# Patient Record
Sex: MALE | Race: WHITE | NOT HISPANIC OR LATINO | URBAN - METROPOLITAN AREA
[De-identification: names, ages, dates, MRNs, and addresses within clinical notes are randomized per-mention and may not be internally consistent; named-entity substitution may affect disease eponyms.]

---

## 2017-12-15 ENCOUNTER — EMERGENCY (EMERGENCY)
Facility: HOSPITAL | Age: 57
LOS: 1 days | End: 2017-12-15
Attending: EMERGENCY MEDICINE | Admitting: EMERGENCY MEDICINE
Payer: COMMERCIAL

## 2017-12-15 VITALS
DIASTOLIC BLOOD PRESSURE: 80 MMHG | SYSTOLIC BLOOD PRESSURE: 118 MMHG | TEMPERATURE: 98 F | HEART RATE: 102 BPM | OXYGEN SATURATION: 97 % | RESPIRATION RATE: 16 BRPM

## 2017-12-15 VITALS
SYSTOLIC BLOOD PRESSURE: 134 MMHG | DIASTOLIC BLOOD PRESSURE: 78 MMHG | OXYGEN SATURATION: 98 % | HEART RATE: 101 BPM | TEMPERATURE: 99 F | RESPIRATION RATE: 15 BRPM

## 2017-12-15 DIAGNOSIS — K85.90 ACUTE PANCREATITIS WITHOUT NECROSIS OR INFECTION, UNSPECIFIED: ICD-10-CM

## 2017-12-15 DIAGNOSIS — Z88.0 ALLERGY STATUS TO PENICILLIN: ICD-10-CM

## 2017-12-15 DIAGNOSIS — K80.50 CALCULUS OF BILE DUCT WITHOUT CHOLANGITIS OR CHOLECYSTITIS WITHOUT OBSTRUCTION: ICD-10-CM

## 2017-12-15 DIAGNOSIS — Z87.891 PERSONAL HISTORY OF NICOTINE DEPENDENCE: ICD-10-CM

## 2017-12-15 DIAGNOSIS — Z96.643 PRESENCE OF ARTIFICIAL HIP JOINT, BILATERAL: Chronic | ICD-10-CM

## 2017-12-15 DIAGNOSIS — I48.92 UNSPECIFIED ATRIAL FLUTTER: ICD-10-CM

## 2017-12-15 DIAGNOSIS — E78.5 HYPERLIPIDEMIA, UNSPECIFIED: ICD-10-CM

## 2017-12-15 DIAGNOSIS — R10.13 EPIGASTRIC PAIN: ICD-10-CM

## 2017-12-15 DIAGNOSIS — I10 ESSENTIAL (PRIMARY) HYPERTENSION: ICD-10-CM

## 2017-12-15 LAB
ALBUMIN SERPL ELPH-MCNC: 3.6 G/DL — SIGNIFICANT CHANGE UP (ref 3.4–5)
ALP SERPL-CCNC: 177 U/L — HIGH (ref 40–120)
ALT FLD-CCNC: 99 U/L — HIGH (ref 12–42)
ANION GAP SERPL CALC-SCNC: 9 MMOL/L — SIGNIFICANT CHANGE UP (ref 9–16)
APTT BLD: 44.7 SEC — HIGH (ref 27.5–36.5)
AST SERPL-CCNC: 270 U/L — HIGH (ref 15–37)
BILIRUB DIRECT SERPL-MCNC: 1 MG/DL — HIGH
BILIRUB SERPL-MCNC: 1.4 MG/DL — HIGH (ref 0.2–1.2)
BUN SERPL-MCNC: 32 MG/DL — HIGH (ref 7–23)
CALCIUM SERPL-MCNC: 9.2 MG/DL — SIGNIFICANT CHANGE UP (ref 8.5–10.5)
CHLORIDE SERPL-SCNC: 95 MMOL/L — LOW (ref 96–108)
CK MB BLD-MCNC: 2.06 % — SIGNIFICANT CHANGE UP
CK MB CFR SERPL CALC: 2.8 NG/ML — SIGNIFICANT CHANGE UP (ref 0.5–3.6)
CO2 SERPL-SCNC: 29 MMOL/L — SIGNIFICANT CHANGE UP (ref 22–31)
CREAT SERPL-MCNC: 2.12 MG/DL — HIGH (ref 0.5–1.3)
D DIMER BLD IA.RAPID-MCNC: 363 NG/ML DDU — HIGH
GLUCOSE SERPL-MCNC: 129 MG/DL — HIGH (ref 70–99)
HCT VFR BLD CALC: 36.9 % — LOW (ref 39–50)
HGB BLD-MCNC: 12.7 G/DL — LOW (ref 13–17)
INR BLD: 1 — SIGNIFICANT CHANGE UP (ref 0.88–1.16)
LACTATE SERPL-SCNC: 2.2 MMOL/L — HIGH (ref 0.4–2)
LIDOCAIN IGE QN: 1450 U/L — HIGH (ref 73–393)
MAGNESIUM SERPL-MCNC: 1.7 MG/DL — SIGNIFICANT CHANGE UP (ref 1.6–2.6)
MCHC RBC-ENTMCNC: 33.6 PG — SIGNIFICANT CHANGE UP (ref 27–34)
MCHC RBC-ENTMCNC: 34.4 G/DL — SIGNIFICANT CHANGE UP (ref 32–36)
MCV RBC AUTO: 97.6 FL — SIGNIFICANT CHANGE UP (ref 80–100)
PLATELET # BLD AUTO: 136 K/UL — LOW (ref 150–400)
POTASSIUM SERPL-MCNC: 2.9 MMOL/L — CRITICAL LOW (ref 3.5–5.3)
POTASSIUM SERPL-SCNC: 2.9 MMOL/L — CRITICAL LOW (ref 3.5–5.3)
PROT SERPL-MCNC: 7.5 G/DL — SIGNIFICANT CHANGE UP (ref 6.4–8.2)
PROTHROM AB SERPL-ACNC: 11 SEC — SIGNIFICANT CHANGE UP (ref 9.8–12.7)
RBC # BLD: 3.78 M/UL — LOW (ref 4.2–5.8)
RBC # FLD: 12.1 % — SIGNIFICANT CHANGE UP (ref 10.3–16.9)
SODIUM SERPL-SCNC: 133 MMOL/L — SIGNIFICANT CHANGE UP (ref 132–145)
TROPONIN I SERPL-MCNC: 0.18 NG/ML — HIGH (ref 0.02–0.06)
WBC # BLD: 6.3 K/UL — SIGNIFICANT CHANGE UP (ref 3.8–10.5)
WBC # FLD AUTO: 6.3 K/UL — SIGNIFICANT CHANGE UP (ref 3.8–10.5)

## 2017-12-15 PROCEDURE — 71010: CPT | Mod: 26

## 2017-12-15 PROCEDURE — 74176 CT ABD & PELVIS W/O CONTRAST: CPT | Mod: 26

## 2017-12-15 PROCEDURE — 76705 ECHO EXAM OF ABDOMEN: CPT | Mod: 26

## 2017-12-15 PROCEDURE — 93010 ELECTROCARDIOGRAM REPORT: CPT

## 2017-12-15 PROCEDURE — 99285 EMERGENCY DEPT VISIT HI MDM: CPT | Mod: 25

## 2017-12-15 RX ORDER — HYDROMORPHONE HYDROCHLORIDE 2 MG/ML
1 INJECTION INTRAMUSCULAR; INTRAVENOUS; SUBCUTANEOUS ONCE
Qty: 0 | Refills: 0 | Status: DISCONTINUED | OUTPATIENT
Start: 2017-12-15 | End: 2017-12-15

## 2017-12-15 RX ORDER — ONDANSETRON 8 MG/1
8 TABLET, FILM COATED ORAL ONCE
Qty: 0 | Refills: 0 | Status: COMPLETED | OUTPATIENT
Start: 2017-12-15 | End: 2017-12-15

## 2017-12-15 RX ORDER — SODIUM CHLORIDE 9 MG/ML
1000 INJECTION INTRAMUSCULAR; INTRAVENOUS; SUBCUTANEOUS ONCE
Qty: 0 | Refills: 0 | Status: COMPLETED | OUTPATIENT
Start: 2017-12-15 | End: 2017-12-15

## 2017-12-15 RX ORDER — POTASSIUM CHLORIDE 20 MEQ
10 PACKET (EA) ORAL
Qty: 0 | Refills: 0 | Status: DISCONTINUED | OUTPATIENT
Start: 2017-12-15 | End: 2017-12-19

## 2017-12-15 RX ORDER — MORPHINE SULFATE 50 MG/1
6 CAPSULE, EXTENDED RELEASE ORAL ONCE
Qty: 0 | Refills: 0 | Status: DISCONTINUED | OUTPATIENT
Start: 2017-12-15 | End: 2017-12-15

## 2017-12-15 RX ORDER — POTASSIUM CHLORIDE 20 MEQ
40 PACKET (EA) ORAL ONCE
Qty: 0 | Refills: 0 | Status: COMPLETED | OUTPATIENT
Start: 2017-12-15 | End: 2017-12-15

## 2017-12-15 RX ORDER — SODIUM CHLORIDE 9 MG/ML
1000 INJECTION, SOLUTION INTRAVENOUS
Qty: 0 | Refills: 0 | Status: DISCONTINUED | OUTPATIENT
Start: 2017-12-15 | End: 2017-12-19

## 2017-12-15 RX ADMIN — HYDROMORPHONE HYDROCHLORIDE 1 MILLIGRAM(S): 2 INJECTION INTRAMUSCULAR; INTRAVENOUS; SUBCUTANEOUS at 03:06

## 2017-12-15 RX ADMIN — HYDROMORPHONE HYDROCHLORIDE 1 MILLIGRAM(S): 2 INJECTION INTRAMUSCULAR; INTRAVENOUS; SUBCUTANEOUS at 03:56

## 2017-12-15 RX ADMIN — MORPHINE SULFATE 6 MILLIGRAM(S): 50 CAPSULE, EXTENDED RELEASE ORAL at 03:00

## 2017-12-15 RX ADMIN — ONDANSETRON 8 MILLIGRAM(S): 8 TABLET, FILM COATED ORAL at 03:00

## 2017-12-15 RX ADMIN — SODIUM CHLORIDE 2000 MILLILITER(S): 9 INJECTION INTRAMUSCULAR; INTRAVENOUS; SUBCUTANEOUS at 03:06

## 2017-12-15 RX ADMIN — Medication 100 MILLIEQUIVALENT(S): at 03:59

## 2017-12-15 RX ADMIN — SODIUM CHLORIDE 200 MILLILITER(S): 9 INJECTION, SOLUTION INTRAVENOUS at 05:04

## 2017-12-15 NOTE — ED PROVIDER NOTE - OBJECTIVE STATEMENT
56 yo M with PMHx of MI x 2, no stents, HTN, HLD, CKD, baseline Cr unknown, "leaky valve" on pradaxa, presenting c/o epigastric pain x 3 hrs PTA to the ED.  Pt reports   Denies fever, chills, N/V/D/C, melena, hematochezia, hematuria, change in urinary/bowel function, dysuria, vaginal bleeding, d/c, flank pain, HA, dizziness, focal weakness, CP, SOB, palpitations, cough, and malaise. 56 yo M with PMHx of MI x 2, no stents, HTN, HLD, CKD, baseline Cr unknown, "leaky valve" on pradaxa, presenting c/o epigastric pain x 3 hrs PTA to the ED.  Pt reports being awaken by sharp pain in the epigastric region. Pain is constant, rated 9/10 with no alleviating factor noted.  Attempted alleviation of sx with pepcid PTA to the ED with no improvement.  No similar pain reported in the past.  Had a few martinis and some onion rings for dinner, nothing out of the norm per pt. Denies fever, chills, palpitations, diaphoresis, RAMIREZ, SOB, orthopnea, cough, hemoptysis, wheezing, peripheral edema, focal weakness, numbness, tingling, paresthesia, HA, dizziness, neck pain, V/D/C, abdominal pain, change in urinary/bowel function, trauma, fall, rash, and malaise. 56 yo M with PMHx of MI x 2, no stents, HTN, HLD, CKD, baseline Cr unknown, "leaky valve" on pradaxa, presenting c/o epigastric pain x 3 hrs PTA to the ED.  Pt reports being awaken by sharp pain in the epigastric region. Pain is constant, rated 9/10 with no alleviating factor noted.  Attempted alleviation of sx with pepcid PTA to the ED with no improvement.  No similar pain reported in the past.  Had a few martinis and some fried food for dinner, nothing out of the norm per pt. Denies fever, chills, palpitations, diaphoresis, RAMIREZ, SOB, orthopnea, cough, hemoptysis, wheezing, peripheral edema, focal weakness, numbness, tingling, paresthesia, HA, dizziness, neck pain, V/D/C, abdominal pain, change in urinary/bowel function, trauma, fall, rash, and malaise.

## 2017-12-15 NOTE — ED PROVIDER NOTE - DIAGNOSTIC INTERPRETATION
Xray (wet reads) interpreted by CHILO MARTINEZ   CXR - Cardiac silhouette, mediastinal and hilar contours wnl, no acute consolidation, infiltrate, effusion, or PTX. No bony abnormalities noted

## 2017-12-15 NOTE — ED PROVIDER NOTE - ATTENDING CONTRIBUTION TO CARE
57 y.o. male with hx MI x 2, no stents, HTN, HLD, CKD, "leaky valve" on pradaxa with c/o epigastric pain/RUQ pain, labs noted with elevated LFTs and lipase, bedside US with (+) gallstones no AGBW thickening, pt requested to go to [facility], accepted for transfer there for surgical and medical eval for gallstone pancreatitis.  EKG with aflutter (likely chronic). Pain controlled with dilaudid IV.

## 2017-12-15 NOTE — ED PROVIDER NOTE - MEDICAL DECISION MAKING DETAILS
pt with sudden onset of epigastric pain at 12am this morning, afebrile, no other associated sx, EKG with new onset A.flutter, no h/o flutter/a.fib in the past, currently on pradaxa, labs with no leukocytosis, noted elevated T.bili w LFTs and lipase, bedside US performed and noted multiple mobile gallstones with no pericholecystitic fluid or wall thickening, sx likely biliary colic w gallstone pancreatitis, pt with elevated Cr at baseline, CT A/P non con performed, consistent w above findings, s/p multiple rounds of IV analgesics with no improvement in sx, pt with most cares established at [facility], PMD Dr. Adan, pt is requesting to be transferred to [facility], case discussed via [facility] transfer center [facility], and ER [facility], accepted by Dr. Galaviz for further management of biliary colic/gallstone induced pancreatitis/new onset A.flutter/hypokalemia, trop elevated at 0.18 but likely 2/2 impaired renal clearance, EKG w A.flutter but no acute ST changes, d-dimer slightly elevated at 363, but normal after age adjustment, unlikely PE, also pt already on NOAC - pradaxa, transfer team aware of findings

## 2017-12-15 NOTE — ED PROVIDER NOTE - PHYSICAL EXAMINATION
Gen - NAD, comfortable in stretcher, non-toxic appearing, speaking in full sentences   Skin - warm, dry, intact  HEENT - AT/NC, PERRL, EOMI, no conjunctival injection, o/p clear with no erythema, edema, or exudate, uvula midline, airway patent, neck supple, no JVD or carotid bruits b/l, no palpable nodes   CV - S1S2, R/R/R  Resp - respiration non-labored, CTAB, symmetric bs b/l, no r/r/w  GI - NABS, soft, ND, NT, no rebound or guarding, no CVAT b/l   MS - w/w/p, no c/c/e, calves supple and NT, distal pulses symmetric b/l   Neuro - AxOx3, no focal neuro deficits, CN II-XII grossly intact, cerebellar function intact, ambulatory without gait disturbance Gen - Obese M, in pain, uncomfortable, no respiratory distress    Skin - warm, dry, intact  HEENT - AT/NC, PERRL, EOMI, no conjunctival injection, o/p clear with no erythema, edema, or exudate, uvula midline, airway patent, neck supple, no JVD or carotid bruits b/l, no palpable nodes   CV - S1S2, R/R/R  Resp - respiration non-labored, CTAB, symmetric bs b/l, no r/r/w  GI - NABS, soft, ND, epigastrium TTP with guarding, no rebound, no CVAT b/l   MS - w/w/p, no c/c/e, calves supple and NT, distal pulses symmetric b/l   Neuro - AxOx3, no focal neuro deficits, ambulatory without gait disturbance Gen - Obese M, writhing in pain, uncomfortable, no respiratory distress    Skin - warm, dry, intact  HEENT - AT/NC, PERRL, EOMI, no conjunctival injection, o/p clear with no erythema, edema, or exudate, uvula midline, airway patent, neck supple, no JVD or carotid bruits b/l, no palpable nodes   CV - S1S2, R/R/R  Resp - respiration non-labored, CTAB, symmetric bs b/l, no r/r/w  GI - NABS, soft, ND, epigastrium TTP with guarding, no rebound, no CVAT b/l   MS - w/w/p, no c/c/e, calves supple and NT, distal pulses symmetric b/l   Neuro - AxOx3, no focal neuro deficits, ambulatory without gait disturbance

## 2017-12-15 NOTE — ED ADULT NURSE NOTE - OBJECTIVE STATEMENT
Pt c/o sudden onset epigastric pain 8/10 that woke him from his sleep at 12AM. Pt reports burning sensation, non radiating. Pt denies any SOB, no fever/chills, no N/V/D, no recent travel or recent sick contacts. Pt has hx of x2 silent heart attacks and takes Pradaxa for 'leaky valve'. Denies any arrhthymias in the past, reports having tachycardia. Pt c/o sudden onset epigastric pain 8/10 that woke him from his sleep at 12AM. Pt reports burning sensation, non radiating. Pt denies any SOB, no fever/chills, no N/V/D, no recent travel or recent sick contacts. Pt has hx of x2 silent heart attacks and takes Pradaxa for 'leaky valve'. Denies any arrhthymias in the past, reports having tachycardia. Pt reports hx of gastritis, reports drinking x6 martinis and hamburger tonight.

## 2017-12-15 NOTE — ED ADULT NURSE NOTE - CHPI ED SYMPTOMS NEG
no dizziness/no vomiting/no diaphoresis/no shortness of breath/no chills/no syncope/no nausea/no fever/no back pain/no chest pain/no cough

## 2018-09-08 NOTE — ED PROVIDER NOTE - CARE PLAN
Private Vehicle
Principal Discharge DX:	Biliary colic  Secondary Diagnosis:	Pancreatitis  Secondary Diagnosis:	Atrial flutter

## 2018-10-02 NOTE — ED PROVIDER NOTE - CROS ED ROS STATEMENT
all other ROS negative except as per HPI Purse String (Intermediate) Text: Given the location of the defect and the characteristics of the surrounding skin a pursestring intermediate closure was deemed most appropriate.  Undermining was performed circumfirentially around the surgical defect.  A purstring suture was then placed and tightened.

## 2023-10-03 ENCOUNTER — INPATIENT (INPATIENT)
Facility: HOSPITAL | Age: 63
LOS: 23 days | Discharge: NURSING, REHAB OR LONG-TERM CARE | DRG: 640 | End: 2023-10-27
Attending: INTERNAL MEDICINE | Admitting: INTERNAL MEDICINE
Payer: MEDICARE

## 2023-10-03 VITALS
TEMPERATURE: 98 F | OXYGEN SATURATION: 98 % | HEART RATE: 106 BPM | WEIGHT: 225.09 LBS | RESPIRATION RATE: 18 BRPM | SYSTOLIC BLOOD PRESSURE: 123 MMHG | DIASTOLIC BLOOD PRESSURE: 78 MMHG

## 2023-10-03 DIAGNOSIS — Z96.643 PRESENCE OF ARTIFICIAL HIP JOINT, BILATERAL: Chronic | ICD-10-CM

## 2023-10-03 LAB
ALBUMIN SERPL ELPH-MCNC: 2.9 G/DL — LOW (ref 3.4–5)
ALP SERPL-CCNC: 273 U/L — HIGH (ref 40–120)
ALT FLD-CCNC: 43 U/L — HIGH (ref 12–42)
AMMONIA BLD-MCNC: 37 UMOL/L — HIGH (ref 11–32)
ANION GAP SERPL CALC-SCNC: 17 MMOL/L — HIGH (ref 9–16)
APTT BLD: 35.4 SEC — SIGNIFICANT CHANGE UP (ref 24.5–35.6)
AST SERPL-CCNC: 45 U/L — HIGH (ref 15–37)
BASOPHILS # BLD AUTO: 0.04 K/UL — SIGNIFICANT CHANGE UP (ref 0–0.2)
BASOPHILS NFR BLD AUTO: 0.7 % — SIGNIFICANT CHANGE UP (ref 0–2)
BILIRUB SERPL-MCNC: 2 MG/DL — HIGH (ref 0.2–1.2)
BUN SERPL-MCNC: 18 MG/DL — SIGNIFICANT CHANGE UP (ref 7–23)
CALCIUM SERPL-MCNC: 9.5 MG/DL — SIGNIFICANT CHANGE UP (ref 8.5–10.5)
CHLORIDE SERPL-SCNC: 93 MMOL/L — LOW (ref 96–108)
CO2 SERPL-SCNC: 22 MMOL/L — SIGNIFICANT CHANGE UP (ref 22–31)
CREAT SERPL-MCNC: 3.93 MG/DL — HIGH (ref 0.5–1.3)
EGFR: 16 ML/MIN/1.73M2 — LOW
EOSINOPHIL # BLD AUTO: 0.03 K/UL — SIGNIFICANT CHANGE UP (ref 0–0.5)
EOSINOPHIL NFR BLD AUTO: 0.5 % — SIGNIFICANT CHANGE UP (ref 0–6)
ETHANOL SERPL-MCNC: 29 MG/DL — HIGH
FLUAV AG NPH QL: SIGNIFICANT CHANGE UP
FLUBV AG NPH QL: SIGNIFICANT CHANGE UP
GLUCOSE SERPL-MCNC: 78 MG/DL — SIGNIFICANT CHANGE UP (ref 70–99)
HCT VFR BLD CALC: 32 % — LOW (ref 39–50)
HGB BLD-MCNC: 10.2 G/DL — LOW (ref 13–17)
IMM GRANULOCYTES NFR BLD AUTO: 0.5 % — SIGNIFICANT CHANGE UP (ref 0–0.9)
INR BLD: 1.46 — HIGH (ref 0.85–1.18)
LACTATE BLDV-MCNC: 3 MMOL/L — HIGH (ref 0.5–2)
LYMPHOCYTES # BLD AUTO: 0.86 K/UL — LOW (ref 1–3.3)
LYMPHOCYTES # BLD AUTO: 14.1 % — SIGNIFICANT CHANGE UP (ref 13–44)
MAGNESIUM SERPL-MCNC: 1.7 MG/DL — SIGNIFICANT CHANGE UP (ref 1.6–2.6)
MCHC RBC-ENTMCNC: 31.9 GM/DL — LOW (ref 32–36)
MCHC RBC-ENTMCNC: 33.3 PG — SIGNIFICANT CHANGE UP (ref 27–34)
MCV RBC AUTO: 104.6 FL — HIGH (ref 80–100)
MONOCYTES # BLD AUTO: 0.57 K/UL — SIGNIFICANT CHANGE UP (ref 0–0.9)
MONOCYTES NFR BLD AUTO: 9.4 % — SIGNIFICANT CHANGE UP (ref 2–14)
NEUTROPHILS # BLD AUTO: 4.55 K/UL — SIGNIFICANT CHANGE UP (ref 1.8–7.4)
NEUTROPHILS NFR BLD AUTO: 74.8 % — SIGNIFICANT CHANGE UP (ref 43–77)
NRBC # BLD: 0 /100 WBCS — SIGNIFICANT CHANGE UP (ref 0–0)
NT-PROBNP SERPL-SCNC: HIGH PG/ML
PCO2 BLDV: 40 MMHG — LOW (ref 42–55)
PH BLDV: 7.36 — SIGNIFICANT CHANGE UP (ref 7.32–7.43)
PHOSPHATE SERPL-MCNC: 4.4 MG/DL — SIGNIFICANT CHANGE UP (ref 2.5–4.5)
PLATELET # BLD AUTO: 141 K/UL — LOW (ref 150–400)
PO2 BLDV: 38 MMHG — SIGNIFICANT CHANGE UP (ref 25–45)
POTASSIUM SERPL-MCNC: 3.4 MMOL/L — LOW (ref 3.5–5.3)
POTASSIUM SERPL-SCNC: 3.4 MMOL/L — LOW (ref 3.5–5.3)
PROT SERPL-MCNC: 7.4 G/DL — SIGNIFICANT CHANGE UP (ref 6.4–8.2)
PROTHROM AB SERPL-ACNC: 16.3 SEC — HIGH (ref 9.5–13)
RBC # BLD: 3.06 M/UL — LOW (ref 4.2–5.8)
RBC # FLD: 17.9 % — HIGH (ref 10.3–14.5)
RSV RNA NPH QL NAA+NON-PROBE: SIGNIFICANT CHANGE UP
SAO2 % BLDV: 62.8 % — LOW (ref 67–88)
SARS-COV-2 RNA SPEC QL NAA+PROBE: SIGNIFICANT CHANGE UP
SODIUM SERPL-SCNC: 132 MMOL/L — SIGNIFICANT CHANGE UP (ref 132–145)
TROPONIN I, HIGH SENSITIVITY RESULT: 564.2 NG/L — HIGH
WBC # BLD: 6.08 K/UL — SIGNIFICANT CHANGE UP (ref 3.8–10.5)
WBC # FLD AUTO: 6.08 K/UL — SIGNIFICANT CHANGE UP (ref 3.8–10.5)

## 2023-10-03 PROCEDURE — 99222 1ST HOSP IP/OBS MODERATE 55: CPT | Mod: GC

## 2023-10-03 PROCEDURE — 71045 X-RAY EXAM CHEST 1 VIEW: CPT | Mod: 26

## 2023-10-03 PROCEDURE — 99285 EMERGENCY DEPT VISIT HI MDM: CPT

## 2023-10-03 NOTE — ED PROVIDER NOTE - PROGRESS NOTE DETAILS
patient is requesting admission to [facility], his PMD is Romulo Morton, cardiologist Dr Payton Centeno. will call admission now through ctc. patient is requesting admission to [facility], his PMD is Romulo Morton, cardiologist Dr Payton Centeno. will call admission now through ctc. at the time of presentation of patient - VS stable, and satting 99% on 3L NC. spoke to cardiologist on call dr remy and hospitalist dr bernadette jackson, per hospitalist noting hospital is at capacity, and recommends admitting patient to [facility]. Spoke to Dr Burns, recommends medicine tely for admission, and will subconsult as needed. Dr Jon Allan accepting patient for medicine tele. patient has no white count, refusing rectal temp or guiac, afebrile oral temp. given fluid overload, fluids held as no wbc or fever. abdomen is protruberant but nontender, and do not suspect SBP. cultures sent will give one dose rocephin. will attempt to also reach out to renal for admission. Dr Jon Allan accepting patient for medicine tele. patient has no white count, refusing rectal temp or guiac, afebrile oral temp. given fluid overload, fluids held as no wbc or fever. abdomen is protruberant but nontender, and do not suspect SBP. cultures sent will give one dose rocephin. will attempt to also reach out to renal for admission. spoke to dr mccarty, renal aware of patient, and reviewed all labs. We will likely dialyze in the morning.  Patient informed of admission to [facility] he consented

## 2023-10-03 NOTE — ED PROVIDER NOTE - CARE PLAN
1 Principal Discharge DX:	Acute on chronic systolic congestive heart failure  Secondary Diagnosis:	End stage renal disease

## 2023-10-03 NOTE — ED ADULT NURSE NOTE - OBJECTIVE STATEMENT
64 y/o male bibems after shortness of breath at dinner. patient endorses he is an every day drinker (socially) and missed his dialysis today. permcath located on right chest. dialysis center is aware rescheduled patient for tomorrow for scheduled session.

## 2023-10-03 NOTE — ED ADULT TRIAGE NOTE - CHIEF COMPLAINT QUOTE
pt. picked up from a restaurant c/o worsening shortness of breath today. Pt. reports he didn't go to his dialysis today because he's been feeling unwell, chest/epigastric pain nausea vomiting.

## 2023-10-03 NOTE — ED PROVIDER NOTE - OBJECTIVE STATEMENT
Patient with hx of ESRD, HD 4 times a week M/T/TH/SAT, hx iron deficiency anemia, HLD, HTN, GERD, CAD, new to HD in the past 6 months, use of port a cath to R side of chest, preparing for L AV fistula, presents with increasing SOB at rest, associated with generalized weakness and fatigue. patient denies active cp, Patient with hx of ESRD, HD 4 times a week M/T/TH/SAT, hx iron deficiency anemia, HLD, HTN, GERD, CAD, new to HD in the past 6 months, use of port a cath to R side of chest, preparing for L AV fistula, presents with increasing SOB at rest, associated with generalized weakness and fatigue and 2 episode of nonbloody vomiting, clear once in the morning then in the evening in the ED. patient denies active cp, diarrhea, abdominal pain, focal weakness, headaches, neck pain, dizziness, or syncope. denies smoking. daily drinker, denies drugs. denies recent trauma or hospitalizations. Per patient compliant with all medications.    home meds: asa, plavix, midodrine, nephrovite, omeprazole, pregabalin, iron, vit B12, B, D3, Selvemer, Bumex, cyproheptadine, Calicode 25 mg

## 2023-10-03 NOTE — ED PROVIDER NOTE - CONSIDERATION OF ADMISSION OBSERVATION
Consideration of Admission/Observation Patient will require admission likely for dialysis and fluid overload and management

## 2023-10-03 NOTE — ED PROVIDER NOTE - OTHER FREE TEXT FOR MDM DISCUSSED CASE WITH QUESTION
Discussed case with [facility] cardiology on-call as well as hospitalist on-call names documented in progress notes and times of discussions.

## 2023-10-03 NOTE — ED PROVIDER NOTE - CLINICAL SUMMARY MEDICAL DECISION MAKING FREE TEXT BOX
Patient with hx of ESRD, HD 4 times a week M/T/TH/SAT, hx iron deficiency anemia, HLD, HTN, GERD, CAD, new to HD in the past 6 months, use of port a cath to R side of chest, preparing for L AV fistula, presents with increasing SOB at rest, associated with generalized weakness and fatigue and 2 episode of nonbloody vomiting, clear once in the morning then in the evening in the ED.    At this time differential includes fluid overload, CHF, cardiogenic shock, NSTEMI, need for emergent dialysis, hyperkalemia, I do not suspect SBP, do not suspect sepsis although will do rectal temp, patient is declining rectal temp will encourage, panculture, patient notes that he does make urine, his abdomen is soft with no fluid shift, check all electrolytes cardiac labs, viral swab to rule out infectious etiology

## 2023-10-04 DIAGNOSIS — E78.5 HYPERLIPIDEMIA, UNSPECIFIED: ICD-10-CM

## 2023-10-04 DIAGNOSIS — D50.9 IRON DEFICIENCY ANEMIA, UNSPECIFIED: ICD-10-CM

## 2023-10-04 DIAGNOSIS — F10.99 ALCOHOL USE, UNSPECIFIED WITH UNSPECIFIED ALCOHOL-INDUCED DISORDER: ICD-10-CM

## 2023-10-04 DIAGNOSIS — F10.20 ALCOHOL DEPENDENCE, UNCOMPLICATED: ICD-10-CM

## 2023-10-04 DIAGNOSIS — K21.9 GASTRO-ESOPHAGEAL REFLUX DISEASE WITHOUT ESOPHAGITIS: ICD-10-CM

## 2023-10-04 DIAGNOSIS — K76.1 CHRONIC PASSIVE CONGESTION OF LIVER: ICD-10-CM

## 2023-10-04 DIAGNOSIS — N18.6 END STAGE RENAL DISEASE: ICD-10-CM

## 2023-10-04 DIAGNOSIS — K70.30 ALCOHOLIC CIRRHOSIS OF LIVER WITHOUT ASCITES: ICD-10-CM

## 2023-10-04 DIAGNOSIS — D64.9 ANEMIA, UNSPECIFIED: ICD-10-CM

## 2023-10-04 DIAGNOSIS — Z29.9 ENCOUNTER FOR PROPHYLACTIC MEASURES, UNSPECIFIED: ICD-10-CM

## 2023-10-04 DIAGNOSIS — I10 ESSENTIAL (PRIMARY) HYPERTENSION: ICD-10-CM

## 2023-10-04 DIAGNOSIS — I25.10 ATHEROSCLEROTIC HEART DISEASE OF NATIVE CORONARY ARTERY WITHOUT ANGINA PECTORIS: ICD-10-CM

## 2023-10-04 DIAGNOSIS — L30.9 DERMATITIS, UNSPECIFIED: ICD-10-CM

## 2023-10-04 DIAGNOSIS — I50.20 UNSPECIFIED SYSTOLIC (CONGESTIVE) HEART FAILURE: ICD-10-CM

## 2023-10-04 PROBLEM — N18.9 CHRONIC KIDNEY DISEASE, UNSPECIFIED: Chronic | Status: ACTIVE | Noted: 2017-12-15

## 2023-10-04 LAB
A1C WITH ESTIMATED AVERAGE GLUCOSE RESULT: 5.4 % — SIGNIFICANT CHANGE UP (ref 4–5.6)
ALBUMIN SERPL ELPH-MCNC: 3.1 G/DL — LOW (ref 3.3–5)
ALBUMIN SERPL ELPH-MCNC: 3.2 G/DL — LOW (ref 3.3–5)
ALP SERPL-CCNC: 240 U/L — HIGH (ref 40–120)
ALP SERPL-CCNC: 267 U/L — HIGH (ref 40–120)
ALT FLD-CCNC: 34 U/L — SIGNIFICANT CHANGE UP (ref 10–45)
ALT FLD-CCNC: 38 U/L — SIGNIFICANT CHANGE UP (ref 10–45)
ANION GAP SERPL CALC-SCNC: 16 MMOL/L — SIGNIFICANT CHANGE UP (ref 5–17)
ANION GAP SERPL CALC-SCNC: 18 MMOL/L — HIGH (ref 5–17)
APTT BLD: 33.1 SEC — SIGNIFICANT CHANGE UP (ref 24.5–35.6)
AST SERPL-CCNC: 35 U/L — SIGNIFICANT CHANGE UP (ref 10–40)
AST SERPL-CCNC: 38 U/L — SIGNIFICANT CHANGE UP (ref 10–40)
BASOPHILS # BLD AUTO: 0.03 K/UL — SIGNIFICANT CHANGE UP (ref 0–0.2)
BASOPHILS NFR BLD AUTO: 0.5 % — SIGNIFICANT CHANGE UP (ref 0–2)
BILIRUB SERPL-MCNC: 1.9 MG/DL — HIGH (ref 0.2–1.2)
BILIRUB SERPL-MCNC: 2.2 MG/DL — HIGH (ref 0.2–1.2)
BLD GP AB SCN SERPL QL: NEGATIVE — SIGNIFICANT CHANGE UP
BLD GP AB SCN SERPL QL: NEGATIVE — SIGNIFICANT CHANGE UP
BUN SERPL-MCNC: 13 MG/DL — SIGNIFICANT CHANGE UP (ref 7–23)
BUN SERPL-MCNC: 19 MG/DL — SIGNIFICANT CHANGE UP (ref 7–23)
CALCIUM SERPL-MCNC: 8.5 MG/DL — SIGNIFICANT CHANGE UP (ref 8.4–10.5)
CALCIUM SERPL-MCNC: 9.6 MG/DL — SIGNIFICANT CHANGE UP (ref 8.4–10.5)
CHLORIDE SERPL-SCNC: 78 MMOL/L — LOW (ref 96–108)
CHLORIDE SERPL-SCNC: 92 MMOL/L — LOW (ref 96–108)
CK MB CFR SERPL CALC: 3.5 NG/ML — SIGNIFICANT CHANGE UP (ref 0–6.7)
CK SERPL-CCNC: 31 U/L — SIGNIFICANT CHANGE UP (ref 30–200)
CO2 SERPL-SCNC: 18 MMOL/L — LOW (ref 22–31)
CO2 SERPL-SCNC: 22 MMOL/L — SIGNIFICANT CHANGE UP (ref 22–31)
CREAT SERPL-MCNC: 3.33 MG/DL — HIGH (ref 0.5–1.3)
CREAT SERPL-MCNC: 4.01 MG/DL — HIGH (ref 0.5–1.3)
EGFR: 16 ML/MIN/1.73M2 — LOW
EGFR: 20 ML/MIN/1.73M2 — LOW
EOSINOPHIL # BLD AUTO: 0.01 K/UL — SIGNIFICANT CHANGE UP (ref 0–0.5)
EOSINOPHIL NFR BLD AUTO: 0.2 % — SIGNIFICANT CHANGE UP (ref 0–6)
ESTIMATED AVERAGE GLUCOSE: 108 MG/DL — SIGNIFICANT CHANGE UP (ref 68–114)
FOLATE SERPL-MCNC: 19.8 NG/ML — SIGNIFICANT CHANGE UP
GLUCOSE BLDC GLUCOMTR-MCNC: 85 MG/DL — SIGNIFICANT CHANGE UP (ref 70–99)
GLUCOSE SERPL-MCNC: 747 MG/DL — CRITICAL HIGH (ref 70–99)
GLUCOSE SERPL-MCNC: 99 MG/DL — SIGNIFICANT CHANGE UP (ref 70–99)
HAV IGM SER-ACNC: SIGNIFICANT CHANGE UP
HBV CORE AB SER-ACNC: SIGNIFICANT CHANGE UP
HBV CORE IGM SER-ACNC: SIGNIFICANT CHANGE UP
HBV SURFACE AB SER-ACNC: REACTIVE
HBV SURFACE AG SER-ACNC: SIGNIFICANT CHANGE UP
HCT VFR BLD CALC: 26.7 % — LOW (ref 39–50)
HCT VFR BLD CALC: 31.9 % — LOW (ref 39–50)
HCT VFR BLD CALC: 33.9 % — LOW (ref 39–50)
HCV AB S/CO SERPL IA: 0.06 S/CO — SIGNIFICANT CHANGE UP
HCV AB SERPL-IMP: SIGNIFICANT CHANGE UP
HGB BLD-MCNC: 10.2 G/DL — LOW (ref 13–17)
HGB BLD-MCNC: 10.9 G/DL — LOW (ref 13–17)
HGB BLD-MCNC: 6.9 G/DL — CRITICAL LOW (ref 13–17)
IMM GRANULOCYTES NFR BLD AUTO: 0.4 % — SIGNIFICANT CHANGE UP (ref 0–0.9)
INR BLD: 1.56 — HIGH (ref 0.85–1.18)
LACTATE SERPL-SCNC: 1.1 MMOL/L — SIGNIFICANT CHANGE UP (ref 0.5–2)
LYMPHOCYTES # BLD AUTO: 0.74 K/UL — LOW (ref 1–3.3)
LYMPHOCYTES # BLD AUTO: 13.5 % — SIGNIFICANT CHANGE UP (ref 13–44)
MAGNESIUM SERPL-MCNC: 1.4 MG/DL — LOW (ref 1.6–2.6)
MAGNESIUM SERPL-MCNC: 1.6 MG/DL — SIGNIFICANT CHANGE UP (ref 1.6–2.6)
MCHC RBC-ENTMCNC: 25.8 GM/DL — LOW (ref 32–36)
MCHC RBC-ENTMCNC: 32 GM/DL — SIGNIFICANT CHANGE UP (ref 32–36)
MCHC RBC-ENTMCNC: 32.2 GM/DL — SIGNIFICANT CHANGE UP (ref 32–36)
MCHC RBC-ENTMCNC: 32.8 PG — SIGNIFICANT CHANGE UP (ref 27–34)
MCHC RBC-ENTMCNC: 33.5 PG — SIGNIFICANT CHANGE UP (ref 27–34)
MCHC RBC-ENTMCNC: 34.2 PG — HIGH (ref 27–34)
MCV RBC AUTO: 102.6 FL — HIGH (ref 80–100)
MCV RBC AUTO: 104.3 FL — HIGH (ref 80–100)
MCV RBC AUTO: 132.2 FL — HIGH (ref 80–100)
MONOCYTES # BLD AUTO: 0.5 K/UL — SIGNIFICANT CHANGE UP (ref 0–0.9)
MONOCYTES NFR BLD AUTO: 9.1 % — SIGNIFICANT CHANGE UP (ref 2–14)
NEUTROPHILS # BLD AUTO: 4.17 K/UL — SIGNIFICANT CHANGE UP (ref 1.8–7.4)
NEUTROPHILS NFR BLD AUTO: 76.3 % — SIGNIFICANT CHANGE UP (ref 43–77)
NRBC # BLD: 0 /100 WBCS — SIGNIFICANT CHANGE UP (ref 0–0)
NRBC # BLD: 0 /100 WBCS — SIGNIFICANT CHANGE UP (ref 0–0)
PHOSPHATE SERPL-MCNC: 3.8 MG/DL — SIGNIFICANT CHANGE UP (ref 2.5–4.5)
PHOSPHATE SERPL-MCNC: 4.6 MG/DL — HIGH (ref 2.5–4.5)
PLATELET # BLD AUTO: 108 K/UL — LOW (ref 150–400)
PLATELET # BLD AUTO: 115 K/UL — LOW (ref 150–400)
PLATELET # BLD AUTO: 76 K/UL — LOW (ref 150–400)
POTASSIUM SERPL-MCNC: 3.1 MMOL/L — LOW (ref 3.5–5.3)
POTASSIUM SERPL-MCNC: 3.8 MMOL/L — SIGNIFICANT CHANGE UP (ref 3.5–5.3)
POTASSIUM SERPL-SCNC: 3.1 MMOL/L — LOW (ref 3.5–5.3)
POTASSIUM SERPL-SCNC: 3.8 MMOL/L — SIGNIFICANT CHANGE UP (ref 3.5–5.3)
PROT SERPL-MCNC: 6.3 G/DL — SIGNIFICANT CHANGE UP (ref 6–8.3)
PROT SERPL-MCNC: 7 G/DL — SIGNIFICANT CHANGE UP (ref 6–8.3)
PROTHROM AB SERPL-ACNC: 17.6 SEC — HIGH (ref 9.5–13)
RBC # BLD: 2.02 M/UL — LOW (ref 4.2–5.8)
RBC # BLD: 3.11 M/UL — LOW (ref 4.2–5.8)
RBC # BLD: 3.25 M/UL — LOW (ref 4.2–5.8)
RBC # FLD: 17.8 % — HIGH (ref 10.3–14.5)
RBC # FLD: 17.9 % — HIGH (ref 10.3–14.5)
RBC # FLD: 18.7 % — HIGH (ref 10.3–14.5)
RH IG SCN BLD-IMP: NEGATIVE — SIGNIFICANT CHANGE UP
RH IG SCN BLD-IMP: NEGATIVE — SIGNIFICANT CHANGE UP
SODIUM SERPL-SCNC: 112 MMOL/L — CRITICAL LOW (ref 135–145)
SODIUM SERPL-SCNC: 132 MMOL/L — LOW (ref 135–145)
T4 AB SER-ACNC: 3.86 UG/DL — LOW (ref 4.5–11.7)
TROPONIN T, HIGH SENSITIVITY RESULT: 130 NG/L — CRITICAL HIGH (ref 0–51)
TROPONIN T, HIGH SENSITIVITY RESULT: 133 NG/L — CRITICAL HIGH (ref 0–51)
TROPONIN T, HIGH SENSITIVITY RESULT: 144 NG/L — CRITICAL HIGH (ref 0–51)
TSH SERPL-MCNC: 5.2 UIU/ML — HIGH (ref 0.27–4.2)
URATE SERPL-MCNC: 4.5 MG/DL — SIGNIFICANT CHANGE UP (ref 3.4–8.8)
VIT B12 SERPL-MCNC: >2000 PG/ML — HIGH (ref 232–1245)
VIT B12 SERPL-MCNC: >2000 PG/ML — HIGH (ref 232–1245)
WBC # BLD: 3.62 K/UL — LOW (ref 3.8–10.5)
WBC # BLD: 5.47 K/UL — SIGNIFICANT CHANGE UP (ref 3.8–10.5)
WBC # BLD: 6.17 K/UL — SIGNIFICANT CHANGE UP (ref 3.8–10.5)
WBC # FLD AUTO: 3.62 K/UL — LOW (ref 3.8–10.5)
WBC # FLD AUTO: 5.47 K/UL — SIGNIFICANT CHANGE UP (ref 3.8–10.5)
WBC # FLD AUTO: 6.17 K/UL — SIGNIFICANT CHANGE UP (ref 3.8–10.5)

## 2023-10-04 PROCEDURE — 99222 1ST HOSP IP/OBS MODERATE 55: CPT | Mod: GC

## 2023-10-04 PROCEDURE — 76705 ECHO EXAM OF ABDOMEN: CPT | Mod: 26

## 2023-10-04 PROCEDURE — 99232 SBSQ HOSP IP/OBS MODERATE 35: CPT | Mod: GC

## 2023-10-04 PROCEDURE — 99232 SBSQ HOSP IP/OBS MODERATE 35: CPT

## 2023-10-04 RX ORDER — FOLIC ACID 0.8 MG
1 TABLET ORAL DAILY
Refills: 0 | Status: DISCONTINUED | OUTPATIENT
Start: 2023-10-04 | End: 2023-10-27

## 2023-10-04 RX ORDER — ATORVASTATIN CALCIUM 80 MG/1
40 TABLET, FILM COATED ORAL AT BEDTIME
Refills: 0 | Status: DISCONTINUED | OUTPATIENT
Start: 2023-10-04 | End: 2023-10-27

## 2023-10-04 RX ORDER — MIDODRINE HYDROCHLORIDE 2.5 MG/1
5 TABLET ORAL EVERY 8 HOURS
Refills: 0 | Status: DISCONTINUED | OUTPATIENT
Start: 2023-10-04 | End: 2023-10-09

## 2023-10-04 RX ORDER — THIAMINE MONONITRATE (VIT B1) 100 MG
500 TABLET ORAL EVERY 24 HOURS
Refills: 0 | Status: COMPLETED | OUTPATIENT
Start: 2023-10-04 | End: 2023-10-06

## 2023-10-04 RX ORDER — ATORVASTATIN CALCIUM 80 MG/1
1 TABLET, FILM COATED ORAL
Refills: 0 | DISCHARGE

## 2023-10-04 RX ORDER — ASPIRIN/CALCIUM CARB/MAGNESIUM 324 MG
81 TABLET ORAL DAILY
Refills: 0 | Status: DISCONTINUED | OUTPATIENT
Start: 2023-10-04 | End: 2023-10-07

## 2023-10-04 RX ORDER — PANTOPRAZOLE SODIUM 20 MG/1
40 TABLET, DELAYED RELEASE ORAL
Refills: 0 | Status: DISCONTINUED | OUTPATIENT
Start: 2023-10-04 | End: 2023-10-27

## 2023-10-04 RX ORDER — OMEPRAZOLE 10 MG/1
1 CAPSULE, DELAYED RELEASE ORAL
Refills: 0 | DISCHARGE

## 2023-10-04 RX ORDER — ASPIRIN/CALCIUM CARB/MAGNESIUM 324 MG
1 TABLET ORAL
Refills: 0 | DISCHARGE

## 2023-10-04 RX ORDER — CLOPIDOGREL BISULFATE 75 MG/1
75 TABLET, FILM COATED ORAL DAILY
Refills: 0 | Status: DISCONTINUED | OUTPATIENT
Start: 2023-10-04 | End: 2023-10-10

## 2023-10-04 RX ORDER — INFLUENZA VIRUS VACCINE 15; 15; 15; 15 UG/.5ML; UG/.5ML; UG/.5ML; UG/.5ML
0.7 SUSPENSION INTRAMUSCULAR ONCE
Refills: 0 | Status: COMPLETED | OUTPATIENT
Start: 2023-10-04 | End: 2023-10-04

## 2023-10-04 RX ORDER — BUMETANIDE 0.25 MG/ML
2 INJECTION INTRAMUSCULAR; INTRAVENOUS
Refills: 0 | Status: DISCONTINUED | OUTPATIENT
Start: 2023-10-04 | End: 2023-10-06

## 2023-10-04 RX ORDER — ONDANSETRON 8 MG/1
4 TABLET, FILM COATED ORAL ONCE
Refills: 0 | Status: COMPLETED | OUTPATIENT
Start: 2023-10-04 | End: 2023-10-04

## 2023-10-04 RX ADMIN — Medication 105 MILLIGRAM(S): at 03:25

## 2023-10-04 RX ADMIN — ATORVASTATIN CALCIUM 40 MILLIGRAM(S): 80 TABLET, FILM COATED ORAL at 20:19

## 2023-10-04 RX ADMIN — CLOPIDOGREL BISULFATE 75 MILLIGRAM(S): 75 TABLET, FILM COATED ORAL at 16:20

## 2023-10-04 RX ADMIN — Medication 1 MILLIGRAM(S): at 16:19

## 2023-10-04 RX ADMIN — Medication 75 MILLIGRAM(S): at 16:19

## 2023-10-04 RX ADMIN — MIDODRINE HYDROCHLORIDE 5 MILLIGRAM(S): 2.5 TABLET ORAL at 16:38

## 2023-10-04 RX ADMIN — Medication 1 TABLET(S): at 16:19

## 2023-10-04 RX ADMIN — PANTOPRAZOLE SODIUM 40 MILLIGRAM(S): 20 TABLET, DELAYED RELEASE ORAL at 07:00

## 2023-10-04 RX ADMIN — Medication 81 MILLIGRAM(S): at 16:19

## 2023-10-04 RX ADMIN — ONDANSETRON 4 MILLIGRAM(S): 8 TABLET, FILM COATED ORAL at 01:57

## 2023-10-04 NOTE — H&P ADULT - HISTORY OF PRESENT ILLNESS
CC: "  HPI  Denies fevers, chills, cough, chest pain, dyspnea, nausea, headache, diarrhea, sick contactschange in urinary or bowel habits      In the ED:    - VS: Tmax: , HR:  , BP:  , RR:  , O2:    %     - Pertinent Labs:     - Imaging: CXR: CT: US: Cath: EKG:     - Treatment/interventions:     PMHx:   PSHx:  Meds: See med rec  Allergies:  Social: see below      CC: missed HD for ESRD  HPI: 63 year old males with history of ESRD (HD 4 x M/T/TH/SAT)ELMER HLD, GERD, CAD presents after missed HD on 10/3/23 with increased shortness of breath at rest and brief bilateral leg weakness and two episodes of non-bloody emesis. He began HD 6 months prior with R chest port use, with recent left AV fistula 1-2 weeks ago. He noticed increased shortness of breath without any recent travel or sick contacts. At baseline he breathes comfortably on room air, but required 3 L nasal cannula for 85% oxygen saturation in ED. He felt like his legs gave out earlier in the day and at baseline ambulates with a cane. He denies recent falls, but has fall history with most recent fall 3-4 weeks ago with no acute head injuries. He also chronically drinks alcohol, with 2-3 martini's per day with last drink at 2 pm on 10/3/23. He denies alcohol withdrawal hospitalizations in the past and no withdrawal seizures. He endorses some mid epigastric chest pain that does not radiate. He denies fever, chills, diarrhea, abdominal pain, headaches, neck pain, dizziness, or syncope. denies smoking. He receives all his care at [facility] and is compliant with medications. He took last took all his medications at home at 10/3/23 AM and takes all medications together in the morning to prevent forgetting taking his medications.     In the ED:    - VS: Tmax: 98.3, HR: 106, /78, RR 18, 98% on RA    - Pertinent Labs: WBC 6, Hg 10.2, .6; INR 1.46; Serum Ammonia 37, K 3.4, Mg 1.7, Na 132, AGAP 17, Cr 3.93, Albumin 2.0, BiliT 2, Alk Phos 273, AST 45, ALT 43, VBG pH 7.36, BIENVENIDO 29, proBNP 72672, Trop I 564    - Imaging:  EKG sinus tach 104 bpm, Qtc 397, right axis deviation, incomplete RBBB, possible right ventricular hypertrophy, nonspecific t wave abnormality. CXR no infiltrates.    - Treatment/interventions: None in ED  PMHx: HTN, HLD, GERD, CAD s/p MI no stents, HD on ESRD  PSHx: two hip replacement surgeries  Meds: See med rec  Allergies: pencillin- patient not aware of reaction  Social: see below  CC: missed HD for ESRD  HPI: 63 year old males with history of ESRD (HD 4 x M/T/TH/SAT)ELMER HLD, GERD, CAD presents after missed HD on 10/3/23 with increased shortness of breath at rest and brief bilateral leg weakness and two episodes of non-bloody emesis. He began HD 6 months prior with R chest port use, with recent left hand mapping 1-2 weeks ago for upcoming L AV fistula placement. He noticed increased shortness of breath without any recent travel or sick contacts. At baseline he breathes comfortably on room air, but required 3 L nasal cannula for 85% oxygen saturation in ED. He felt like his legs gave out earlier in the day and at baseline ambulates with a cane. He denies recent falls, but has fall history with most recent fall 3-4 weeks ago with no acute head injuries. He also chronically drinks alcohol, with 2-3 martini's per day with last drink at 2 pm on 10/3/23. He denies alcohol withdrawal hospitalizations in the past and no withdrawal seizures. He endorses some mid epigastric chest pain that does not radiate. He denies fever, chills, diarrhea, abdominal pain, headaches, neck pain, dizziness, or syncope. denies smoking. He receives all his care at [facility] and is compliant with medications. He took last took all his medications at home at 10/3/23 AM and takes all medications together in the morning to prevent forgetting taking his medications.     In the ED:    - VS: Tmax: 98.3, HR: 106, /78, RR 18, 98% on RA    - Pertinent Labs: WBC 6, Hg 10.2, .6; INR 1.46; Serum Ammonia 37, K 3.4, Mg 1.7, Na 132, AGAP 17, Cr 3.93, Albumin 2.0, BiliT 2, Alk Phos 273, AST 45, ALT 43, VBG pH 7.36, BIENVENIDO 29, proBNP 93087, Trop I 564    - Imaging:  EKG sinus tach 104 bpm, Qtc 397, right axis deviation, incomplete RBBB, possible right ventricular hypertrophy, nonspecific t wave abnormality. CXR no infiltrates.    - Treatment/interventions: None in ED  PMHx: HTN, HLD, GERD, CAD s/p MI no stents, HD on ESRD  PSHx: two hip replacement surgeries  Meds: See med rec  Allergies: pencillin- patient not aware of reaction  Social: see below  yes

## 2023-10-04 NOTE — PROGRESS NOTE ADULT - PROBLEM SELECTOR PLAN 2
History of HF with recent TTE EF 30-40% with Dr. Jacobo Cain ([facility]), Dr. Corey Souza ([facility] Heart Failure). Bilateral 2+ pitting edema to midthigh and left sided crackles, concern for some fluid overload. ProBNP >57571. Home med Bumex 2 mg MWF; no GDMT listed.  -Continue home med Bumex 2 mg MWF  -Obtain collateral for [facility] Card/HF for GDMT, EF confirmation  -Strict I&O

## 2023-10-04 NOTE — PROGRESS NOTE ADULT - PROBLEM SELECTOR PLAN 9
History of GERD, complains of epigastric pain.   -Continue home med omeprazole 40 mg qd 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  with. Bilateral lower extremity 2+edema with topical dry skin, likely venous stasis dermatitis on due to peripheral artery disease vs. heart failure related edema changes. Likely from niacin deficiency (pellagra) as etiology for dermatitis as patient has 3 month anorexia, fatigue, numbness.  -Obtain collateral from [facility] Dermatologist; recently prescribed Cerave w/o use  -Caution with home cyproheptadine 4 mg home med with zofran to avoid serotonin syndrome  -Consider niacin supplementation (vitamin b3)

## 2023-10-04 NOTE — PROGRESS NOTE ADULT - PROBLEM SELECTOR PLAN 2
History of HF with recent TTE EF 30-40% with Dr. Jacobo Cain ([facility]), Dr. Corey Souza ([facility] Heart Failure). Bilateral 2+ pitting edema to midthigh and left sided crackles, concern for some fluid overload. ProBNP >87387. Home med Bumex 2 mg MWF; no GDMT listed.  -Continue home med Bumex 2 mg MWF  -Obtain collateral for [facility] Card/HF for GDMT, EF confirmation  -Strict I&O History of HF with recent TTE EF 30-40% with Dr. Jacobo Cain ([facility]), Dr. Corey Souza ([facility] Heart Failure). Bilateral 2+ pitting edema to midthigh and left sided crackles, concern for some fluid overload. ProBNP >07265. Home med Bumex 2 mg MWF; no GDMT listed.  -Continue home med Bumex 2 mg MWF  -collateral for [facility] Card/HF in physical chart  -Strict I&O

## 2023-10-04 NOTE — H&P ADULT - PROBLEM SELECTOR PLAN 4
At admission BIENVENIDO 29. AST/AlT 45/43. Last drink 2 PM on 10/3/23, has 2-3 martinis daily. No history of alcohol withdrawals or withdrawal seizures. CIWA 3. Three non-bloody, clear/bilious emesis in past 24 hours. Qtc 397. 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   -Zofran 4 mg q6 PRN nausea, vomiting  -CIWA protocol: q4 CIWA screens with CIWA>8 give 2 mg ativan  -Thiamine, folic acid, multivitamin  -Home meds B1 100 mg OTC, D3 1000 IU OTC, B12 1000 mcg OTC  -CTM on telemetry; 12 hours from last drink  -Consider urine tox screen

## 2023-10-04 NOTE — PROGRESS NOTE ADULT - SUBJECTIVE AND OBJECTIVE BOX
INTERVAL HPI/OVERNIGHT EVENTS:  Patient was seen and examined at bedside. Overnight the patient had one small episode of emesis nbnb and given 4mg IV zofran. Patient resting comfortably. No other complaints at this time. Patient denies: fever, chills, lightheadedness, weakness, CP, palpitations, SOB, cough, N/V. ROS otherwise negative.    VITAL SIGNS:  T(F): 97.8 (10-04-23 @ 11:20)  HR: 103 (10-04-23 @ 11:20)  BP: 123/95 (10-04-23 @ 11:20)  RR: 18 (10-04-23 @ 11:20)  SpO2: 100% (10-04-23 @ 11:20)  Wt(kg): --      10-03-23 @ 07:01  -  10-04-23 @ 07:00  --------------------------------------------------------  IN: 300 mL / OUT: 0 mL / NET: 300 mL        PHYSICAL EXAM:    CONSTITUTIONAL: no apparent distress  EYES: PERRLA and symmetric, EOMI, no conjunctival or scleral injection, non-icteric; no nystagmus  ENMT: Dry mucus membranes  NECK: Supple  RESP: No respiratory distress, no use of accessory muscles; right lung CTAB; left lower lung crackles  CV: RRR, +S1S2, no MRG; +bilateral 2+ pitting peripheral edema up to midthigh  GI: Soft, NT, ND, no rebound, no guarding; +reducible umbilical hernia; +fluid wave ascites, +multiple abdominal straie without caput medusa  MSK: Normal ROM without pain, no spinal tenderness, normal muscle strength/tone  SKIN: +bilateral upper extremity maculopapular rash with excoriations as well on balding scalp; venous stasis dermatitis bilateral ankles and lower calves. No jaundice  NEURO: CN II-XII intact; normal reflexes in upper and lower extremities, sensation intact in upper and lower extremities b/l to light touch   PSYCH: Appropriate insight/judgment; AO x 3, mood and affect appropriate, recent/remote memory intact    MEDICATIONS  (STANDING):  aspirin enteric coated 81 milliGRAM(s) Oral daily  atorvastatin 40 milliGRAM(s) Oral at bedtime  clopidogrel Tablet 75 milliGRAM(s) Oral daily  folic acid 1 milliGRAM(s) Oral daily  influenza  Vaccine (HIGH DOSE) 0.7 milliLiter(s) IntraMuscular once  multivitamin 1 Tablet(s) Oral daily  pantoprazole    Tablet 40 milliGRAM(s) Oral before breakfast  pregabalin 75 milliGRAM(s) Oral daily  thiamine IVPB 500 milliGRAM(s) IV Intermittent every 24 hours    MEDICATIONS  (PRN):  LORazepam   Injectable 1 milliGRAM(s) IV Push every 1 hour PRN CIWA-Ar score 8 or greater      Allergies    penicillins (Unknown)    Intolerances        LABS:                        10.2   5.47  )-----------( 108      ( 04 Oct 2023 05:49 )             31.9     10-04    132<L>  |  92<L>  |  19  ----------------------------<  99  3.8   |  22  |  4.01<H>    Ca    9.6      04 Oct 2023 05:49  Phos  4.6     10-04  Mg     1.6     10-04    TPro  7.0  /  Alb  3.2<L>  /  TBili  2.2<H>  /  DBili  x   /  AST  38  /  ALT  38  /  AlkPhos  267<H>  10-04    PT/INR - ( 04 Oct 2023 04:05 )   PT: 17.6 sec;   INR: 1.56          PTT - ( 04 Oct 2023 04:05 )  PTT:33.1 sec  Urinalysis Basic - ( 04 Oct 2023 05:49 )    Color: x / Appearance: x / SG: x / pH: x  Gluc: 99 mg/dL / Ketone: x  / Bili: x / Urobili: x   Blood: x / Protein: x / Nitrite: x   Leuk Esterase: x / RBC: x / WBC x   Sq Epi: x / Non Sq Epi: x / Bacteria: x        RADIOLOGY & ADDITIONAL TESTS:  Reviewed Hospital Course:   Pt with 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 Remote hx CVA. Afib s/p watchman, not on full dose AC as pt has watchman, not on rate control iso persistent hypotension (Pt chronically on midodrine outpt). Pt also has mild AS/moderate AI, Chronic diastolic HF, most recet TTE LVEF 35% on 6/15/23; (Only on Bumex 2g Qd on non-HD days, no GDMT iso kidney function and hypotension). Pt presented with one day SOB and leg weakness/ heaviness after missing 1 day HD. Pt found to by hypoxic to 85% on admission, placed on HF, weaned to 3LNC and now on RA.       INTERVAL HPI/OVERNIGHT EVENTS:  Patient was seen and examined at bedside. Overnight the patient had one small episode of emesis nbnb and given 4mg IV zofran. Patient resting comfortably. No other complaints at this time. Patient denies: fever, chills, lightheadedness, weakness, CP, palpitations, SOB, cough, N/V. ROS otherwise negative.    VITAL SIGNS:  T(F): 97.8 (10-04-23 @ 11:20)  HR: 103 (10-04-23 @ 11:20)  BP: 123/95 (10-04-23 @ 11:20)  RR: 18 (10-04-23 @ 11:20)  SpO2: 100% (10-04-23 @ 11:20)  Wt(kg): --      10-03-23 @ 07:01  -  10-04-23 @ 07:00  --------------------------------------------------------  IN: 300 mL / OUT: 0 mL / NET: 300 mL        PHYSICAL EXAM:    CONSTITUTIONAL: no apparent distress  EYES: PERRLA and symmetric, EOMI, no conjunctival or scleral injection, non-icteric; no nystagmus  ENMT: Dry mucus membranes  NECK: Supple  RESP: No respiratory distress, no use of accessory muscles; right lung CTAB; left lower lung crackles  CV: RRR, +S1S2, no MRG; +bilateral 2+ pitting peripheral edema up to midthigh  GI: Soft, NT, ND, no rebound, no guarding; +reducible umbilical hernia; +fluid wave ascites, +multiple abdominal straie without caput medusa  MSK: Normal ROM without pain, no spinal tenderness, normal muscle strength/tone  SKIN: +bilateral upper extremity maculopapular rash with excoriations as well on balding scalp; venous stasis dermatitis bilateral ankles and lower calves. No jaundice  NEURO: CN II-XII intact; normal reflexes in upper and lower extremities, sensation intact in upper and lower extremities b/l to light touch   PSYCH: Appropriate insight/judgment; AO x 3, mood and affect appropriate, recent/remote memory intact    MEDICATIONS  (STANDING):  aspirin enteric coated 81 milliGRAM(s) Oral daily  atorvastatin 40 milliGRAM(s) Oral at bedtime  clopidogrel Tablet 75 milliGRAM(s) Oral daily  folic acid 1 milliGRAM(s) Oral daily  influenza  Vaccine (HIGH DOSE) 0.7 milliLiter(s) IntraMuscular once  multivitamin 1 Tablet(s) Oral daily  pantoprazole    Tablet 40 milliGRAM(s) Oral before breakfast  pregabalin 75 milliGRAM(s) Oral daily  thiamine IVPB 500 milliGRAM(s) IV Intermittent every 24 hours    MEDICATIONS  (PRN):  LORazepam   Injectable 1 milliGRAM(s) IV Push every 1 hour PRN CIWA-Ar score 8 or greater      Allergies    penicillins (Unknown)    Intolerances        LABS:                        10.2   5.47  )-----------( 108      ( 04 Oct 2023 05:49 )             31.9     10-04    132<L>  |  92<L>  |  19  ----------------------------<  99  3.8   |  22  |  4.01<H>    Ca    9.6      04 Oct 2023 05:49  Phos  4.6     10-04  Mg     1.6     10-04    TPro  7.0  /  Alb  3.2<L>  /  TBili  2.2<H>  /  DBili  x   /  AST  38  /  ALT  38  /  AlkPhos  267<H>  10-04    PT/INR - ( 04 Oct 2023 04:05 )   PT: 17.6 sec;   INR: 1.56          PTT - ( 04 Oct 2023 04:05 )  PTT:33.1 sec  Urinalysis Basic - ( 04 Oct 2023 05:49 )    Color: x / Appearance: x / SG: x / pH: x  Gluc: 99 mg/dL / Ketone: x  / Bili: x / Urobili: x   Blood: x / Protein: x / Nitrite: x   Leuk Esterase: x / RBC: x / WBC x   Sq Epi: x / Non Sq Epi: x / Bacteria: x        RADIOLOGY & ADDITIONAL TESTS:  Reviewed Hospital Course:   Pt with 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 Remote hx CVA. Afib s/p watchman, not on full dose AC as pt has watchman, not on rate control iso persistent hypotension (Pt chronically on midodrine outpt). Pt also has mild AS/moderate AI, Chronic diastolic HF, most recet TTE LVEF 35% on 6/15/23; (Only on Bumex 2g Qd on non-HD days, no GDMT iso kidney function and hypotension). Pt presented with one day SOB and leg weakness/ heaviness after missing 1 day HD. POCUS negative for pericardial effusion. Pt with enlarged heart on CXR and peripheral edema. Echo pending. Pt found to by hypoxic to 85% on admission, placed on HF, weaned to 3LNC and now on RA.  Pt now s/p HD and feeling better. Pt troponin and lactate now downtrending and cleared respectively. Pt pending echo and abdominal US. Pt stable and medically ready for stepdown to [facility] for continued medical management      INTERVAL HPI/OVERNIGHT EVENTS:  Patient was seen and examined at bedside. Overnight the patient had one small episode of emesis nbnb and given 4mg IV zofran. Patient resting comfortably. No other complaints at this time. Patient denies: fever, chills, lightheadedness, weakness, CP, palpitations, SOB, cough, N/V. ROS otherwise negative.    VITAL SIGNS:  T(F): 97.8 (10-04-23 @ 11:20)  HR: 103 (10-04-23 @ 11:20)  BP: 123/95 (10-04-23 @ 11:20)  RR: 18 (10-04-23 @ 11:20)  SpO2: 100% (10-04-23 @ 11:20)  Wt(kg): --      10-03-23 @ 07:01  -  10-04-23 @ 07:00  --------------------------------------------------------  IN: 300 mL / OUT: 0 mL / NET: 300 mL        PHYSICAL EXAM:    CONSTITUTIONAL: no apparent distress  EYES: PERRLA and symmetric, EOMI, no conjunctival or scleral injection, non-icteric; no nystagmus  ENMT: Dry mucus membranes  NECK: Supple  RESP: No respiratory distress, no use of accessory muscles; right lung CTAB; left lower lung crackles  CV: RRR, +S1S2, no MRG; +bilateral 2+ pitting peripheral edema up to midthigh  GI: Soft, NT, ND, no rebound, no guarding; +reducible umbilical hernia; +fluid wave ascites, +multiple abdominal straie without caput medusa  MSK: Normal ROM without pain, no spinal tenderness, normal muscle strength/tone  SKIN: +bilateral upper extremity maculopapular rash with excoriations as well on balding scalp; venous stasis dermatitis bilateral ankles and lower calves. No jaundice  NEURO: CN II-XII intact; normal reflexes in upper and lower extremities, sensation intact in upper and lower extremities b/l to light touch   PSYCH: Appropriate insight/judgment; AO x 3, mood and affect appropriate, recent/remote memory intact    MEDICATIONS  (STANDING):  aspirin enteric coated 81 milliGRAM(s) Oral daily  atorvastatin 40 milliGRAM(s) Oral at bedtime  clopidogrel Tablet 75 milliGRAM(s) Oral daily  folic acid 1 milliGRAM(s) Oral daily  influenza  Vaccine (HIGH DOSE) 0.7 milliLiter(s) IntraMuscular once  multivitamin 1 Tablet(s) Oral daily  pantoprazole    Tablet 40 milliGRAM(s) Oral before breakfast  pregabalin 75 milliGRAM(s) Oral daily  thiamine IVPB 500 milliGRAM(s) IV Intermittent every 24 hours    MEDICATIONS  (PRN):  LORazepam   Injectable 1 milliGRAM(s) IV Push every 1 hour PRN CIWA-Ar score 8 or greater      Allergies    penicillins (Unknown)    Intolerances        LABS:                        10.2   5.47  )-----------( 108      ( 04 Oct 2023 05:49 )             31.9     10-04    132<L>  |  92<L>  |  19  ----------------------------<  99  3.8   |  22  |  4.01<H>    Ca    9.6      04 Oct 2023 05:49  Phos  4.6     10-04  Mg     1.6     10-04    TPro  7.0  /  Alb  3.2<L>  /  TBili  2.2<H>  /  DBili  x   /  AST  38  /  ALT  38  /  AlkPhos  267<H>  10-04    PT/INR - ( 04 Oct 2023 04:05 )   PT: 17.6 sec;   INR: 1.56          PTT - ( 04 Oct 2023 04:05 )  PTT:33.1 sec  Urinalysis Basic - ( 04 Oct 2023 05:49 )    Color: x / Appearance: x / SG: x / pH: x  Gluc: 99 mg/dL / Ketone: x  / Bili: x / Urobili: x   Blood: x / Protein: x / Nitrite: x   Leuk Esterase: x / RBC: x / WBC x   Sq Epi: x / Non Sq Epi: x / Bacteria: x        RADIOLOGY & ADDITIONAL TESTS:  Reviewed

## 2023-10-04 NOTE — H&P ADULT - NSHPLABSRESULTS_GEN_ALL_CORE
10.2   6.08  )-----------( 141      ( 03 Oct 2023 20:16 )             32.0       10-03    132  |  93<L>  |  18  ----------------------------<  78  3.4<L>   |  22  |  3.93<H>    Ca    9.5      03 Oct 2023 20:16  Phos  4.4     10-03  Mg     1.7     10-03    TPro  7.4  /  Alb  2.9<L>  /  TBili  2.0<H>  /  DBili  x   /  AST  45<H>  /  ALT  43<H>  /  AlkPhos  273<H>  10-03    Urinalysis Basic - ( 03 Oct 2023 20:16 )    Color: x / Appearance: x / SG: x / pH: x  Gluc: 78 mg/dL / Ketone: x  / Bili: x / Urobili: x   Blood: x / Protein: x / Nitrite: x   Leuk Esterase: x / RBC: x / WBC x   Sq Epi: x / Non Sq Epi: x / Bacteria: x    PT/INR - ( 03 Oct 2023 20:16 )   PT: 16.3 sec;   INR: 1.46     PTT - ( 03 Oct 2023 20:16 )  PTT:35.4 sec  CAPILLARY BLOOD GLUCOSE

## 2023-10-04 NOTE — H&P ADULT - NSHPPHYSICALEXAM_GEN_ALL_CORE
T(C): 36.7 (10-04-23 @ 00:03), Max: 36.8 (10-03-23 @ 22:48)  HR: 96 (10-04-23 @ 00:42) (86 - 106)  BP: 133/83 (10-04-23 @ 00:42) (122/80 - 138/76)  RR: 18 (10-04-23 @ 00:42) (16 - 18)  SpO2: 98% (10-04-23 @ 00:03) (98% - 99%)    CONSTITUTIONAL: Well groomed, no apparent distress  EYES: PERRLA and symmetric, EOMI, No conjunctival or scleral injection, non-icteric  ENMT: Oral mucosa with moist membranes. Normal dentition; no pharyngeal injection or exudates             NECK: Supple, symmetric and without tracheal deviation   RESP: No respiratory distress, no use of accessory muscles; CTA b/l, no WRR  CV: RRR, +S1S2, no MRG; no JVD; no peripheral edema  GI: Soft, NT, ND, no rebound, no guarding; no palpable masses; no hepatosplenomegaly; no hernia palpated  LYMPH: No cervical LAD or tenderness; no axillary LAD or tenderness; no inguinal LAD or tenderness  MSK: Normal gait; No digital clubbing or cyanosis; examination of the (head/neck/spine/ribs/pelvis, RUE, LUE, RLE, LLE) without misalignment,            Normal ROM without pain, no spinal tenderness, normal muscle strength/tone  SKIN: No rashes or ulcers noted; no subcutaneous nodules or induration palpable  NEURO: CN II-XII intact; normal reflexes in upper and lower extremities, sensation intact in upper and lower extremities b/l to light touch   PSYCH: Appropriate insight/judgment; A+O x 3, mood and affect appropriate, recent/remote memory intact T(C): 36.7 (10-04-23 @ 00:03), Max: 36.8 (10-03-23 @ 22:48)  HR: 96 (10-04-23 @ 00:42) (86 - 106)  BP: 133/83 (10-04-23 @ 00:42) (122/80 - 138/76)  RR: 18 (10-04-23 @ 00:42) (16 - 18)  SpO2: 98% (10-04-23 @ 00:03) (98% - 99%)    CONSTITUTIONAL: no apparent distress  EYES: PERRLA and symmetric, EOMI, no conjunctival or scleral injection, non-icteric; no nystagmus  ENMT: Dry mucus membranes  NECK: Supple  RESP: No respiratory distress, no use of accessory muscles; right lung CTAB; left lower lung crackles  CV: RRR, +S1S2, no MRG; +bilateral 2+ pitting peripheral edema up to midthigh  GI: Soft, NT, ND, no rebound, no guarding; +reducible umbilical hernia; +fluid wave ascites, +multiple abdominal straie without caput medusa; +hepatosplenomegaly  MSK: Normal ROM without pain, no spinal tenderness, normal muscle strength/tone  SKIN: +bilateral upper extremity maculopapular rash with excoriations as well on balding scalp; venous stasis dermatitis bilateral ankles and lower calves. No jaundice  NEURO: CN II-XII intact; normal reflexes in upper and lower extremities, sensation intact in upper and lower extremities b/l to light touch   PSYCH: Appropriate insight/judgment; AO x 3, mood and affect appropriate, recent/remote memory intact T(C): 36.7 (10-04-23 @ 00:03), Max: 36.8 (10-03-23 @ 22:48)  HR: 96 (10-04-23 @ 00:42) (86 - 106)  BP: 133/83 (10-04-23 @ 00:42) (122/80 - 138/76)  RR: 18 (10-04-23 @ 00:42) (16 - 18)  SpO2: 98% (10-04-23 @ 00:03) (98% - 99%)    CONSTITUTIONAL: no apparent distress  EYES: PERRLA and symmetric, EOMI, no conjunctival or scleral injection, non-icteric; no nystagmus  ENMT: Dry mucus membranes  NECK: Supple  RESP: No respiratory distress, no use of accessory muscles; right lung CTAB; left lower lung crackles  CV: RRR, +S1S2, no MRG; +bilateral 2+ pitting peripheral edema up to midthigh  GI: Soft, NT, ND, no rebound, no guarding; +reducible umbilical hernia; +fluid wave ascites, +multiple abdominal straie without caput medusa  MSK: Normal ROM without pain, no spinal tenderness, normal muscle strength/tone  SKIN: +bilateral upper extremity maculopapular rash with excoriations as well on balding scalp; venous stasis dermatitis bilateral ankles and lower calves. No jaundice  NEURO: CN II-XII intact; normal reflexes in upper and lower extremities, sensation intact in upper and lower extremities b/l to light touch   PSYCH: Appropriate insight/judgment; AO x 3, mood and affect appropriate, recent/remote memory intact

## 2023-10-04 NOTE — PROGRESS NOTE ADULT - PROBLEM SELECTOR PLAN 6
History of HLD, home med atorvastatin 40 mg. Patient unclear if had stent after MI.  -continue home med atorvastatin 40 mg  -Obtain collateral for [facility]/HF for MI/stent history and recent lipid panel History of CAD with "cardiac events," patient not sure if stent placed but on home meds of asa, plavix. Elevated trop I 564.2. Etiology ischemic vs. due to missed HD for ESRD.  Troponin 144 (10/4)   -Continue home med aspirin 81 mg, plavix 75 mg  -Trend troponin  -   -CTM on telemetry  -Obtain collateral for [facility] for MI/stent history and recent lipid panel

## 2023-10-04 NOTE — PROGRESS NOTE ADULT - PROBLEM SELECTOR PLAN 7
At admission Hg 10.2, Hct 32, .6. No baseline Hg or iron studies for comparison. Home med iron 65 mg qd for ELMER, though MCV is macrocytic likely 2/2 folate or b12 deficiency. Etiology of anemia likely multifactorial of ELMER, AOCD, and vitb12/folate deficiency. No known history of GI bleeds, hemoptysis, hematochezia, melenic stool.  -Hold home med iron 65 mg  -Iron studies for morning labs  -F/u vit b12, folate levels  -F/u coag panel  -Maintain active type and screen

## 2023-10-04 NOTE — H&P ADULT - PROBLEM SELECTOR PLAN 1
ESRD, started on HD 6 months ago, 4x week (M/T/Th/Sat), missed HD session on 10/3/23. Use R port on chest, with L AV fistula placed 1-2 weeks ago. At admission, Cr 3.93 (un-established baseline Cr), K 3.4, Phos 4.4.  -continue home nephro chichi 60 300mg; hold midodrine 5 mg TID i/s/o normotensive BP and missed HD session  -pending HD session in AM  -appreciate continued nephro recs ESRD, started on HD 6 months ago, 4x week (M/T/Th/Sat), missed HD session on 10/3/23. Use R port on chest, with L AV fistula will be placed soon and vein was mapped last week. At admission, Cr 3.93 (un-established baseline Cr), K 3.4, Phos 4.4. Metabolic acidosis (AGAP 17) likely due to hypochloremic emesis.   -continue home nephro chichi 60 300mg; hold midodrine 5 mg TID i/s/o normotensive BP and missed HD session  -pending HD session in AM  -appreciate continued nephro recs  -consider VBG or ABG for metabolic acidosis  -avoid L hand blood draw as AV fistula will be placed there at [facility] in next couple of weeks

## 2023-10-04 NOTE — PROGRESS NOTE ADULT - SUBJECTIVE AND OBJECTIVE BOX
Patient seen on HD tolerating procedure well. Patient does not offer any complaints and is hemodynamically stable.  Continue HD as prescribed.   Hemodialysis Treatment.:     Schedule: Once, Modality: Hemodialysis, Access: Internal Jugular Central Venous Catheter    Dialyzer: Optiflux [id number], Time: 210 Min    Blood Flow: 400 mL/Min , Dialysate Flow: 500 mL/Min, Dialysate Temp: 36.5, Tubinmm (Adult)    Target Fluid Removal: 2 Liters    Dialysate Electrolytes (mEq/L): Potassium 3, Calcium 2.5, Sodium 138, Bicarbonate 35       Vitals   T(F): 97.8  HR: 103  BP: 123/95  RR: 18  SpO2: 100%          Physical Exam   GENERAL: Alert, awake, NAD laying in bed tolerating HD   HEENT: VIPIN, EOMI, neck supple, no JVP, icteric sclera   CHEST/LUNG: crackles appreciated b/l   HEART: Regular rate and rhythm, no murmur, no gallops, no rub   ABDOMEN: +fluid wave, NT soft, ND   EXTREMITIES: 2+ pitting edema b/l LE venous stasis dermatitis bilateral ankles  Neurology: AAOx3, no focal neurological deficit  Access: L TDC accessed

## 2023-10-04 NOTE — CONSULT NOTE ADULT - SUBJECTIVE AND OBJECTIVE BOX
HPI:  HPI: 63 year old males with history of ESRD (HD 4 x M/T/TH/SAT)ELMER HLD, GERD, CAD presents after missed HD on 10/3/23 with increased shortness of breath at rest and brief bilateral leg weakness and two episodes of non-bloody emesis. He began HD 6 months prior with R chest port use, with recent left hand mapping 1-2 weeks ago for upcoming L AV fistula placement. He noticed increased shortness of breath without any recent travel or sick contacts. At baseline he breathes comfortably on room air, but required 3 L nasal cannula for 85% oxygen saturation in ED. He felt like his legs gave out earlier in the day and at baseline ambulates with a cane. He receives all his care at [facility] and is compliant with medications. He took last took all his medications at home at 10/3/23 AM and takes all medications together in the morning to prevent forgetting taking his medications. Patient states that his last HD session was on Monday and was able to remove 2L, states that still working to adjust his dry weight. Dry weight post HD on 10/2 was 221 Kg. Also states that recently he started to have low BP with HD, and was started on midodrine. Patient also states that his urine output has decreased over the past few months. Nephrology consulted for inpatient HD management.       PAST MEDICAL & SURGICAL HISTORY:  HTN (hypertension)      HLD (hyperlipidemia)      Chronic kidney disease      H/O bilateral hip replacements            Allergies:  penicillins (Unknown)      Home Medications:   aspirin 81 mg oral tablet: 1 tab(s) orally once a day  atorvastatin 40 mg oral tablet: 1 tab(s) orally once a day  Bumex 2 mg oral tablet: 1 tab(s) orally Monday, Wednesday, and Friday  cyproheptadine 4 mg oral tablet: 1 tab(s) orally 3 times a day  midodrine 5 mg oral tablet: 2 tab(s) orally 3 times a day  omeprazole 40 mg oral delayed release capsule: 1 cap(s) orally once a day  Plavix 75 mg oral tablet: 1 tab(s) orally once a day  pregabalin 75 mg oral capsule: 1 cap(s) orally once a day  sevelamer hydrochloride 800 mg oral tablet: 2 tab(s) orally 3 times a day      Hospital Medications:   MEDICATIONS  (STANDING):  aspirin enteric coated 81 milliGRAM(s) Oral daily  atorvastatin 40 milliGRAM(s) Oral at bedtime  clopidogrel Tablet 75 milliGRAM(s) Oral daily  folic acid 1 milliGRAM(s) Oral daily  influenza  Vaccine (HIGH DOSE) 0.7 milliLiter(s) IntraMuscular once  multivitamin 1 Tablet(s) Oral daily  pantoprazole    Tablet 40 milliGRAM(s) Oral before breakfast  pregabalin 75 milliGRAM(s) Oral daily  thiamine IVPB 500 milliGRAM(s) IV Intermittent every 24 hours      SOCIAL HISTORY:  Denies ETOh, Smoking    Family History:  FAMILY HISTORY:        VITALS:  T(F): 98.8 (10-04-23 @ 06:39), Max: 98.8 (10-04-23 @ 06:39)  HR: 104 (10-04-23 @ 09:10)  BP: 128/86 (10-04-23 @ 09:10)  RR: 18 (10-04-23 @ 09:10)  SpO2: 99% (10-04-23 @ 09:10)  Wt(kg): --    10-03 @ 07:01  -  10-04 @ 07:00  --------------------------------------------------------  IN: 300 mL / OUT: 0 mL / NET: 300 mL      Height (cm): 186 (10-04 @ 01:50)  Weight (kg): 102.1 (10-03 @ 20:08)  BMI (kg/m2): 29.5 (10-04 @ 01:50)  BSA (m2): 2.27 (10-04 @ 01:50)  CAPILLARY BLOOD GLUCOSE      POCT Blood Glucose.: 85 mg/dL (04 Oct 2023 04:40)      Review of Systems:  CONSTITUTIONAL: No fever or chills.  RESPIRATORY:  shortness of breath   CARDIOVASCULAR: No Chest pain, shortness of breath, palpitations  GASTROINTESTINAL: , nausea, vomiting  GENITOURINARY: No urinary frequency, gross hematuria, dysuria  NEUROLOGICAL: No headache, weakness  SKIN: No rash or skin lesion  MUSCULOSKELETAL: No swelling  Psych: Denies suicidal or homicidal ideation    PHYSICAL EXAM:  GENERAL: Alert, awake, NAD laying in bed NC   HEENT: VIPIN, EOMI, neck supple, no JVP, icteric sclera   CHEST/LUNG: crackles appreciated b/l   HEART: Regular rate and rhythm, no murmur, no gallops, no rub   ABDOMEN: +fluid wave, NT soft, ND   EXTREMITIES: 2+ pitting edema b/l LE venous stasis dermatitis bilateral ankles  Neurology: AAOx3, no focal neurological deficit  Access: L TDC c/d/i     LABS:  10-04    132<L>  |  92<L>  |  19  ----------------------------<  99  3.8   |  22  |  4.01<H>    Ca    9.6      04 Oct 2023 05:49  Phos  4.6     10-04  Mg     1.6     10-04    TPro  7.0  /  Alb  3.2<L>  /  TBili  2.2<H>  /  DBili      /  AST  38  /  ALT  38  /  AlkPhos  267<H>  10-04    Creatinine Trend: 4.01 <--, 3.33 <--, 3.93 <--                        10.2   5.47  )-----------( 108      ( 04 Oct 2023 05:49 )             31.9     Urine Studies:  Urinalysis Basic - ( 04 Oct 2023 05:49 )    Color:  / Appearance:  / SG:  / pH:   Gluc: 99 mg/dL / Ketone:   / Bili:  / Urobili:    Blood:  / Protein:  / Nitrite:    Leuk Esterase:  / RBC:  / WBC    Sq Epi:  / Non Sq Epi:  / Bacteria:

## 2023-10-04 NOTE — PROGRESS NOTE ADULT - PROBLEM SELECTOR PLAN 10
F: n/a  E: Replete per HD with ESRD  N: NPO  GI ppx:  DVT ppx:  Full code  Dispo: Telemetry F: n/a  E: Replete per HD with ESRD  N: NPO  GI ppx:  DVT ppx:  Full code  Dispo: [facility]

## 2023-10-04 NOTE — H&P ADULT - PROBLEM SELECTOR PLAN 8
History of ELMER. At admission Hg 10.2, Hct 32, .6. No baseline Hg or iron studies for comparison. No known history of GI bleeds, hemoptysis, hematochezia, melenic stool.  -Continue home med iron 65 mg  -Consider iron studies for morning labs  -Maintain active type and screen At admission Hg 10.2, Hct 32, .6. No baseline Hg or iron studies for comparison. Home med iron 65 mg qd for ELMER, though MCV is macrocytic likely 2/2 folate or b12 deficiency. Etiology of anemia likely multifactorial of ELMER, AOCD, and vitb12/folate deficiency. No known history of GI bleeds, hemoptysis, hematochezia, melenic stool.  -Hold home med iron 65 mg  -Iron studies for morning labs  -F/u vit b12, folate levels  -F/u coag panel  -Maintain active type and screen

## 2023-10-04 NOTE — PROGRESS NOTE ADULT - ATTENDING COMMENTS
62 yo M with PMHx ESRD 2/2 IgA nephropathy (HD 4x/wk via R chest port), HFrEF (LVEF 30-40%), HTN, HLD, GERD, CAD, etOH use disorder BIBEMS from a restaurant after experiencing worsening shortness of breath in setting of missed HD admitted to [facility]/telemetry for urgent HD now stepped down to F for ongoing management.      #Volume overload 2/2 missed HD – Renal consulted on admission s/p missed HD outpatient. HD completed on admission to [facility]. Continue midodrine in setting of hypotension 2/2 HD. Plan for continued outpatient F/U for continued AVF planning. HD schedule moving forward per renal.       #etOH use disorder – BAL 29 on admission. Hx of daily etOH use without prior etOH withdrawal hospitalizations, intubations, seizures. Continue CIWA protocol for monitoring of withdrawal sx in next 24-48hrs. Has not required Ativan as of yet this hospitalization. Ativan PRN for CIWA >8.      Agree with remainder of resident plan as above.

## 2023-10-04 NOTE — DISCHARGE NOTE PROVIDER - NSDCMRMEDTOKEN_GEN_ALL_CORE_FT
aspirin 81 mg oral tablet: 1 tab(s) orally once a day  atorvastatin 40 mg oral tablet: 1 tab(s) orally once a day  Bumex 2 mg oral tablet: 1 tab(s) orally Monday, Wednesday, and Friday  cyproheptadine 4 mg oral tablet: 1 tab(s) orally 3 times a day  midodrine 5 mg oral tablet: 2 tab(s) orally 3 times a day  omeprazole 40 mg oral delayed release capsule: 1 cap(s) orally once a day  Plavix 75 mg oral tablet: 1 tab(s) orally once a day  pregabalin 75 mg oral capsule: 1 cap(s) orally once a day  sevelamer hydrochloride 800 mg oral tablet: 2 tab(s) orally 3 times a day   aspirin 81 mg oral tablet: 1 tab(s) orally once a day  atorvastatin 40 mg oral tablet: 1 tab(s) orally once a day  Bumex 2 mg oral tablet: 1 tab(s) orally Monday, Wednesday, and Friday  cyproheptadine 4 mg oral tablet: 1 tab(s) orally 3 times a day  folic acid 1 mg oral tablet: 1 tab(s) orally once a day  hydrOXYzine hydrochloride 25 mg oral tablet: 1 tab(s) orally once a day  midodrine 5 mg oral tablet: 1 tab(s) orally 3 times a day  omeprazole 40 mg oral delayed release capsule: 1 cap(s) orally once a day  Plavix 75 mg oral tablet: 1 tab(s) orally once a day  pregabalin 75 mg oral capsule: 1 cap(s) orally once a day  sevelamer hydrochloride 800 mg oral tablet: 1 tab(s) orally 3 times a day   aspirin 81 mg oral tablet: 1 tab(s) orally once a day  atorvastatin 40 mg oral tablet: 1 tab(s) orally once a day  Bumex 2 mg oral tablet: 1 tab(s) orally Monday, Wednesday, and Friday  cyproheptadine 4 mg oral tablet: 1 tab(s) orally 3 times a day  folic acid 1 mg oral tablet: 1 tab(s) orally once a day  midodrine 5 mg oral tablet: 1 tab(s) orally 3 times a day  omeprazole 40 mg oral delayed release capsule: 1 cap(s) orally once a day  Plavix 75 mg oral tablet: 1 tab(s) orally once a day  pregabalin 75 mg oral capsule: 1 cap(s) orally once a day  sevelamer hydrochloride 800 mg oral tablet: 1 tab(s) orally 3 times a day   aspirin 81 mg oral tablet: 1 tab(s) orally once a day  atorvastatin 40 mg oral tablet: 1 tab(s) orally once a day  folic acid 1 mg oral tablet: 1 tab(s) orally once a day  midodrine 5 mg oral tablet: 2 tab(s) orally 4 times a week  omeprazole 40 mg oral delayed release capsule: 1 cap(s) orally once a day  polyethylene glycol 3350 oral powder for reconstitution: 17 gram(s) orally once a day  pregabalin 75 mg oral capsule: 1 cap(s) orally once a day  senna leaf extract oral tablet: 2 tab(s) orally once a day (at bedtime)  sevelamer hydrochloride 800 mg oral tablet: 1 tab(s) orally 3 times a day

## 2023-10-04 NOTE — H&P ADULT - PROBLEM SELECTOR PLAN 6
History of HLD, home med atorvastatin 40 mg. Patient unclear if had stent after MI.  -continue home med atorvastatin 40 mg  -Obtain collateral for [facility] for MI/stent history and recent lipid panel

## 2023-10-04 NOTE — H&P ADULT - PROBLEM SELECTOR PLAN 10
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  with. Bilateral lower extremity 2+edema with topical dry skin, likely venous stasis dermatitis on due to peripheral artery disease vs. heart failure related edema changes.  -Obtain collateral from [facility] Dermatologist; recently prescribed Cerave w/o use 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  with. Bilateral lower extremity 2+edema with topical dry skin, likely venous stasis dermatitis on due to peripheral artery disease vs. heart failure related edema changes.  -Obtain collateral from [facility] Dermatologist; recently prescribed Cerave w/o use  -Caution with home cyproheptadine 4 mg home med with zofran to avoid serotonin syndrome 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  with. Bilateral lower extremity 2+edema with topical dry skin, likely venous stasis dermatitis on due to peripheral artery disease vs. heart failure related edema changes. Likely from niacin deficiency (pellagra) as etiology for dermatitis as patient has 3 month anorexia, fatigue, numbness.  -Obtain collateral from [facility] Dermatologist; recently prescribed Cerave w/o use  -Caution with home cyproheptadine 4 mg home med with zofran to avoid serotonin syndrome  -Consider niacin supplementation (vitamin b3)

## 2023-10-04 NOTE — H&P ADULT - PROBLEM SELECTOR PLAN 3
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 Clinical exam: abdominal straie, positive fluid wave with ascites, +bilateral pitting edema, +hepatospenlomegaly. Serum ammonia 37, Alk phos 273. Low suspicion for hepatic encephalopathy at this time; patient mentating AOx3 and conversant, w/o jaundice or non-icteric sclera. Low concern for hepatorenal syndrome as remains hemodynamically stable. No known gallbladder pathology established.  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 Clinical exam: abdominal straie, positive fluid wave with ascites, +bilateral pitting edema, +hepatosplenomegaly. Serum ammonia 37, Alk phos 273. Low suspicion for hepatic encephalopathy at this time; patient mentating AOx3 and conversant, w/o jaundice or non-icteric sclera. Low concern for hepatorenal syndrome as remains hemodynamically stable. No known gallbladder pathology established.  -f/u abdominal ultrasound   -consider paracentesis for ascites

## 2023-10-04 NOTE — PROGRESS NOTE ADULT - PROBLEM SELECTOR PLAN 10
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  with. Bilateral lower extremity 2+edema with topical dry skin, likely venous stasis dermatitis on due to peripheral artery disease vs. heart failure related edema changes. Likely from niacin deficiency (pellagra) as etiology for dermatitis as patient has 3 month anorexia, fatigue, numbness.  -Obtain collateral from [facility] Dermatologist; recently prescribed Cerave w/o use  -Caution with home cyproheptadine 4 mg home med with zofran to avoid serotonin syndrome  -Consider niacin supplementation (vitamin b3) F: n/a  E: Replete per HD with ESRD  N: NPO  GI ppx:  DVT ppx:  Full code  Dispo: Telemetry

## 2023-10-04 NOTE — H&P ADULT - PROBLEM SELECTOR PLAN 7
History of CAD with "cardiac events," patient not sure if stent placed but on home meds of asa, plavix. Elevated trop I 564.2. Etiology ischemic vs. due to missed HD for ESRD.  -Continue home med aspirin 81 mg, plavix 75 mg  -Trend troponin  -CTM on telemetry  -Obtain collateral for [facility] for MI/stent history and recent lipid panel

## 2023-10-04 NOTE — PROGRESS NOTE ADULT - PROBLEM SELECTOR PLAN 1
ESRD, started on HD 6 months ago, 4x week (M/T/Th/Sat), missed HD session on 10/3/23. Use R port on chest, with L AV fistula will be placed soon and vein was mapped last week. At admission, Cr 3.93 (un-established baseline Cr), K 3.4, Phos 4.4. Metabolic acidosis (AGAP 17) likely due to hypochloremic emesis.   -continue home nephro chichi 60 300mg; h  - Restart  midodrine 5 mg TID i/s/o hypotension after HD (10/4)   -pending HD session in AM  -appreciate continued nephro recs  -consider VBG or ABG for metabolic acidosis  -avoid L hand blood draw as AV fistula will be placed there at [facility] in next couple of weeks ESRD, started on HD 6 months ago, 4x week (M/T/Th/Sat), missed HD session on 10/3/23. Use R port on chest, with L AV fistula will be placed soon and vein was mapped last week. At admission, Cr 3.93 (un-established baseline Cr), K 3.4, Phos 4.4. Metabolic acidosis (AGAP 17) likely due to hypochloremic emesis.   - Continue HD  - continue home nephro chichi 60 300mg; h  - Restart  midodrine 5 mg TID i/s/o hypotension after HD (10/4)   -appreciate continued nephro recs  -avoid L hand blood draw as AV fistula will be placed there at [facility] in next couple of weeks

## 2023-10-04 NOTE — PROGRESS NOTE ADULT - PROBLEM SELECTOR PLAN 6
History of CAD with "cardiac events," patient not sure if stent placed but on home meds of asa, plavix. Elevated trop I 564.2. Etiology ischemic vs. due to missed HD for ESRD.  Troponin 144 (10/4)   -Continue home med aspirin 81 mg, plavix 75 mg  -Trend troponin  -   -CTM on telemetry  -Obtain collateral for [facility] for MI/stent history and recent lipid panel History of CAD with "cardiac events," patient not sure if stent placed but on home meds of asa, plavix. Elevated trop I 564.2. Etiology ischemic vs. due to missed HD for ESRD.  Troponin 144 (10/4)   -Continue home med aspirin 81 mg, plavix 75 mg  -collateral from [facility] in physical chart

## 2023-10-04 NOTE — DISCHARGE NOTE PROVIDER - HOSPITAL COURSE
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 Remote hx CVA. Afib s/p watchman, not on full dose AC as pt has watchman, not on rate control iso persistent hypotension (Pt chronically on midodrine outpt). Pt also has mild AS/moderate AI, Chronic diastolic HF, most recet TTE LVEF 35% on 6/15/23; (Only on Bumex 2g Qd on non-HD days, no GDMT iso kidney function and hypotension). Pt presented with one day SOB and leg weakness/ heaviness after missing 1 day HD. Pt found to by hypoxic to 85% on admission, placed on HF, weaned to 3LNC and now on RA. Pt now s/p HD (10/4). ,     Inpatient treatment course:    Problem List/Main Diagnoses (system-based):       #ESRD on dialysis.   ESRD, started on HD 6 months ago, 4x week (M/T/Th/Sat), missed HD session on 10/3/23. Use R port on chest, with L AV fistula will be placed soon and vein was mapped last week. At admission, Cr 3.93 (un-established baseline Cr), K 3.4, Phos 4.4. Metabolic acidosis (AGAP 17) likely due to hypochloremic emesis.   -continue home nephro chichi 60 300mg; hold midodrine 5 mg TID i/s/o normotensive BP and missed HD session  - c/w HD     #Heart failure with reduced ejection fraction.   History of HF with recent TTE EF 30-40% with Dr. Jacobo Cain ([facility]), Dr. Corey Souza ([facility]). Bilateral 2+ pitting edema to midthigh and left sided crackles, concern for some fluid overload. ProBNP >31859. Home med Bumex 2 mg MWF; no GDMT listed.  -Continue home med Bumex 2 mg MWF    #Cardiac cirrhosis.   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 Clinical exam: abdominal straie, positive fluid wave with ascites, +bilateral pitting edema, +hepatosplenomegaly. Serum ammonia 37, Alk phos 273. Low suspicion for hepatic encephalopathy at this time; patient mentating AOx3 and conversant, w/o jaundice or non-icteric sclera. Low concern for hepatorenal syndrome as remains hemodynamically stable. No known gallbladder pathology established.  Abdominal ultrasound demonstrates__________________  -consider paracentesis for ascites.    #Moderate alcohol use disorder.   At admission BIENVENIDO 29. AST/AlT 45/43. Last drink 2 PM on 10/3/23, has 2-3 martinis daily. No history of alcohol withdrawals or withdrawal seizures. CIWA 3. Three non-bloody, clear/bilious emesis in past 24 hours. Qtc 397. 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   -Zofran 4 mg q6 PRN nausea, vomiting  -CIWA protocol: q4 CIWA screens with CIWA>8 give 2 mg ativan  -Thiamine, folic acid, multivitamin  -Home meds B1 100 mg OTC, D3 1000 IU OTC, B12 1000 mcg OTC  -CTM on telemetry; 12 hours from last drink  -Consider urine tox screen.    #HLD (hyperlipidemia).   History of HLD, home med atorvastatin 40 mg. Patient unclear if had stent after MI.  -continue home med atorvastatin 40 mg      # CAD (coronary artery disease).   History of CAD with "cardiac events," patient not sure if stent placed but on home meds of asa, plavix. Elevated trop I 564.2. Etiology ischemic vs. due to missed HD for ESRD.  Troponin 144 (10/4)   -Continue home med aspirin 81 mg, plavix 75 mg      #Anemia.   At admission Hg 10.2, Hct 32, .6. No baseline Hg or iron studies for comparison. Home med iron 65 mg qd for ELMER, though MCV is macrocytic likely 2/2 folate or b12 deficiency. Etiology of anemia likely multifactorial of ELMER, AOCD, and vitb12/folate deficiency. No known history of GI bleeds, hemoptysis, hematochezia, melenic stool.  - c/w home meds    #GERD (gastroesophageal reflux disease).   History of GERD, complains of epigastric pain.   -Continue home med omeprazole 40 mg qd.    #Dermatitis.   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  with. Bilateral lower extremity 2+edema with topical dry skin, likely venous stasis dermatitis on due to peripheral artery disease vs. heart failure related edema changes. Likely from niacin deficiency (pellagra) as etiology for dermatitis as patient has 3 month anorexia, fatigue, numbness.  - c/w home med  - f/u w/ outpt dermatologist     Patient was discharged to: (home/GENE/acute rehab/hospice, etc, and with what services – home health PT/RN? 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 Remote hx CVA. Afib s/p watchman, not on full dose AC as pt has watchman, not on rate control iso persistent hypotension (Pt chronically on midodrine outpt). Pt also has mild AS/moderate AI, Chronic diastolic HF, most recet TTE LVEF 35% on 6/15/23; (Only on Bumex 2g Qd on non-HD days, no GDMT iso kidney function and hypotension). Pt presented with one day SOB and leg weakness/ heaviness after missing 1 day HD. Pt found to by hypoxic to 85% on admission, placed on HF, weaned to 3LNC and now on RA. Pt now s/p HD (10/4).     Problem List/Main Diagnoses (system-based):  #ESRD on dialysis  ESRD, started on HD 6 months ago, 4x week (M/T/Th/Sat), missed HD session on 10/3/23. Use R port on chest. Vein was mapped last week with plan for new dialysis access placement next week. At admission, Cr 3.93 (un-established baseline Cr), K 3.4, Phos 4.4. Metabolic acidosis (AGAP 17) likely due to hypochloremic emesis / etoh leading to lactic acidosis.   - continue home nephro chichi 60 300mg   - Restart midodrine 5 mg TID i/s/o hypotension after HD (10/4)   - c HD sessions    #Heart failure with reduced ejection fraction.   History of HF with recent TTE EF 30-40% with Dr. Jacobo Cain ([facility]), Dr. Corey Souza ([facility]). Edema and crackles resolved on exam. ProBNP >06954. Home med Bumex 2 mg MWF; not on GDMT given kidney function/hypotensive episodes.  - Continue home med Bumex 2 mg MWF  - GDMT to be started/slowly added on as outpatient  - f/u with Dr. Lara (appointment made for 10/13)    #Cardiac cirrhosis  History of cardiac cirrhosis likely 2/2 chronic alcohol use. Low suspicion for hepatic encephalopathy at this time; patient mentating AOx3 and conversant, w/o jaundice or non-icteric sclera. Low concern for hepatorenal syndrome as remains hemodynamically stable. No known gallbladder pathology established.  Abdominal US (10/4) Cirrhotic morphology. Pulsatile flow in the main and left portal veins. Moderate ascites. Increased right renal cortical echogenicity compatible with medical renal disease.  - address cardiac dysfunction as per above and with HD    #Moderate alcohol use disorder  At admission BIENVENIDO 29. AST/AlT 45/43. Last drink 2 PM on 10/3/23, has 2-3 martinis daily. No history of alcohol withdrawals or withdrawal seizures. CIWA 2 this AM (anxiety). One non-bloody, clear/bilious emesis in past 24 hours. Qtc 397. 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   - c Zofran 4 mg IV q12 PRN nausea, vomiting   - CIWA protocol: q4 CIWA screens with CIWA>8 give 2 mg ativan  - Thiamine, folic acid, multivitamin  - Home meds B1 100 mg OTC, D3 1000 IU OTC, B12 1000 mcg OTC    #HLD (hyperlipidemia)  History of HLD, home med atorvastatin 40 mg. Patient unclear if had stent after MI.  -continue home med atorvastatin 40 mg    # CAD (coronary artery disease)  History of CAD with "cardiac events," patient not sure if stent placed but on home meds of asa, plavix. Elevated trop I 564.2. Etiology ischemic vs. due to missed HD for ESRD leading to volume overload and demand.  Troponin 144 (10/4) , downtrending  -Continue home med aspirin 81 mg, plavix 75 mg  -Stop trending troponin    #Anemia  At admission Hg 10.2, Hct 32, .6. No baseline Hg or iron studies for comparison. Home med iron 65 mg qd for ELMER, though MCV is macrocytic likely 2/2 folate or b12 deficiency. Etiology of anemia likely multifactorial of ELMER, AOCD, and vitb12/folate deficiency. No known history of GI bleeds, hemoptysis, hematochezia, melenic stool.  - c/w home meds    #GERD (gastroesophageal reflux disease)  History of GERD, complains of epigastric pain.   -Continue home med omeprazole 40 mg qd.    #Dermatitis  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  with. Bilateral lower extremity 2+edema with topical dry skin, likely venous stasis dermatitis on due to peripheral artery disease vs. heart failure related edema changes. Likely from niacin deficiency (pellagra) as etiology for dermatitis as patient has 3 month anorexia, fatigue, numbness.  - c/w home med  - f/u w/ outpt dermatologist     Patient was discharged to: (home/[facility]E/acute rehab/hospice, etc, and with what services – home health PT/RN? 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 Remote hx CVA. Afib s/p watchman, not on full dose AC as pt has watchman, not on rate control iso persistent hypotension (Pt chronically on midodrine outpt). Pt also has mild AS/moderate AI, Chronic diastolic HF, most recet TTE LVEF 35% on 6/15/23; (Only on Bumex 2g Qd on non-HD days, no GDMT iso kidney function and hypotension). Pt presented with one day SOB and leg weakness/ heaviness after missing 1 day HD. Pt found to by hypoxic to 85% on admission, placed on HF, weaned to 3LNC and now on RA. Pt now s/p HD (10/4).       Problem List/Main Diagnoses (system-based):  #ESRD on dialysis  ESRD, started on HD 6 months ago, 4x week (M/T/Th/Sat), missed HD session on 10/3/23. Use R port on chest. Vein was mapped last week with plan for new dialysis access placement next week. At admission, Cr 3.93 (un-established baseline Cr), K 3.4, Phos 4.4. Metabolic acidosis (AGAP 17) likely due to hypochloremic emesis / etoh leading to lactic acidosis.   - continue home nephro chichi 60 300mg   - Restart midodrine 5 mg TID i/s/o hypotension after HD (10/4)   - c HD sessions    #Heart failure with reduced ejection fraction.   History of HF with recent TTE EF 30-40% with Dr. Jacobo Cain ([facility]), Dr. Corey Souza ([facility]). Edema and crackles resolved on exam. ProBNP >79682. Home med Bumex 2 mg MWF; not on GDMT given kidney function/hypotensive episodes.  - Continue home med Bumex 2 mg MWF  - GDMT to be started/slowly added on as outpatient  - f/u with Dr. Lara (appointment made for 10/13)    #Cardiac cirrhosis  History of cardiac cirrhosis likely 2/2 chronic alcohol use. Low suspicion for hepatic encephalopathy at this time; patient mentating AOx3 and conversant, w/o jaundice or non-icteric sclera. Low concern for hepatorenal syndrome as remains hemodynamically stable. No known gallbladder pathology established.  Abdominal US (10/4) Cirrhotic morphology. Pulsatile flow in the main and left portal veins. Moderate ascites. Increased right renal cortical echogenicity compatible with medical renal disease.  - address cardiac dysfunction as per above and with HD    #Moderate alcohol use disorder  At admission BIENVENIDO 29. AST/AlT 45/43. Last drink 2 PM on 10/3/23, has 2-3 martinis daily. No history of alcohol withdrawals or withdrawal seizures. CIWA 2 this AM (anxiety). One non-bloody, clear/bilious emesis in past 24 hours. Qtc 397. 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   - c Zofran 4 mg IV q12 PRN nausea, vomiting   - CIWA protocol: q4 CIWA screens with CIWA>8 give 2 mg ativan  - Thiamine, folic acid, multivitamin  - Home meds B1 100 mg OTC, D3 1000 IU OTC, B12 1000 mcg OTC    #HLD (hyperlipidemia)  History of HLD, home med atorvastatin 40 mg. Patient unclear if had stent after MI.  -continue home med atorvastatin 40 mg    # CAD (coronary artery disease)  History of CAD with "cardiac events," patient not sure if stent placed but on home meds of asa, plavix. Elevated trop I 564.2. Etiology ischemic vs. due to missed HD for ESRD leading to volume overload and demand.  Troponin 144 (10/4) , downtrending  -Continue home med aspirin 81 mg, plavix 75 mg  -Stop trending troponin    #Anemia  At admission Hg 10.2, Hct 32, .6. No baseline Hg or iron studies for comparison. Home med iron 65 mg qd for ELMER, though MCV is macrocytic likely 2/2 folate or b12 deficiency. Etiology of anemia likely multifactorial of ELMER, AOCD, and vitb12/folate deficiency. No known history of GI bleeds, hemoptysis, hematochezia, melenic stool.  - c/w home meds    #GERD (gastroesophageal reflux disease)  History of GERD, complains of epigastric pain.   -Continue home med omeprazole 40 mg qd.    #Dermatitis  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  with. Bilateral lower extremity 2+edema with topical dry skin, likely venous stasis dermatitis on due to peripheral artery disease vs. heart failure related edema changes. Likely from niacin deficiency (pellagra) as etiology for dermatitis as patient has 3 month anorexia, fatigue, numbness.  - c/w home med  - f/u w/ outpt dermatologist     Patient was discharged to: (home/[facility]/acute rehab/hospice, etc, and with what services – home health PT/RN? Home O2?)  New medications:  Changes to old medications:  Medications that were stopped:   Items to follow up as outpatient:  Physical exam at the time of discharge:   #Discharge: do not delete    Mr. Aceves is a 64 yo M w/ PMHx of HFrEF (last known EF 30-40% a few weeks ago), AFib (s/p watchman), ERSD 2/2 IgA nephropathy on hemodialysis (Mon, Tues, Thurs, Sat) since February 2023, congestive hepatopathy, alcohol use disorder, who presented with one day SOB and leg weakness/heaviness iso missed dialysis, admitted for HD and transferred to CCU for concern of cardiogenic shock i/s/o volume overload on dobutamine. Patient received CVVHD and was stepped down to telemetry on 10/11/23 late afternoon with interval SBP 80smmHg, also c/b acute HypoNa to 122: as discussed with Nephrology, pt too hypotensive to safely dialyze on floor; transferred back to CCU/ast boarding mini-ICU for continuous HD with close monitoring. Dialyzed in CCU and stepped back down to telemetry 10/17. At Tele, was noted to be fluid overloaded/hyponatremic requiring daily HD sessions. Given that patient was on maximum dose of midodrine and would need CVVD/aquaphoresis, pt was stepped up to CCU for 2 days and placed on a dobutamine drip.  Sodium levels remained to be 125-127 on CCU admission, so pt was given 250cc 3% NaCl bolus x2 per renal recs. Pt now completed aquapheresis and weaned off dobutamine gtt. Clinically stable for stepdown to cardiac tele on 10/24. Pt returned to his 4 day HD schedule and was deemed stable for discharge to [facility] on 10/27.     Problem List/Main Diagnoses (system-based):  #ESRD on dialysis  ESRD, started on HD 6 months ago, 4x week (M/T/Th/Sat), missed HD session on 10/3/23. Use R port on chest. Vein was mapped last week with plan for new dialysis access placement next week. At admission, Cr 3.93 (un-established baseline Cr), K 3.4, Phos 4.4. Metabolic acidosis (AGAP 17) likely due to hypochloremic emesis / etoh leading to lactic acidosis.   Plan:   - c/w home nephro chichi 60 300mg   - c/w midodrine 10 mg on dialysis days and PRN for hypotension episodes    - c/w HD sessions, 4x week (M/T/Th/Sat)    #Hyponatremia  Pt was asymptomatic w Na levels as low as 112. Stable levels of 124-127 after dialysis. Pt is to receive dialysis 4x/week. s/p ~6.5L serous fluid removed on     #Heart failure with reduced ejection fraction.   History of HF with recent TTE EF 30-40% with Dr. Jacobo Cain ([facility]), Dr. Corey Souza ([facility]). ProBNP >47189. Home med Bumex 2 mg MWF; not on GDMT given kidney function/hypotensive episodes.  - GDMT to be started/slowly added on as outpatient  - F/u with Dr. Lara and Dr. Cain at [facility]    #Moderate alcohol use disorder  At admission BIENVENIDO 29. AST/AlT 45/43. Last drink 2 PM on 10/3/23, has 2-3 martinis daily. No history of alcohol withdrawals or withdrawal seizures. CIWA 2 this AM (anxiety). One non-bloody, clear/bilious emesis in past 24 hours. Qtc 397. 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   - c/w Thiamine, folic acid, multivitamin    #HLD (hyperlipidemia)  History of HLD, home med atorvastatin 40 mg. Patient unclear if had stent after MI.  - c/w home med atorvastatin 40 mg    # CAD (coronary artery disease)  History of CAD with "cardiac events," patient not sure if stent placed but on home meds of asa, plavix. Elevated trop I 564.2. Etiology ischemic vs. due to missed HD for ESRD leading to volume overload and demand.  Troponin 144 (10/4) , downtrending  - c/w home med aspirin 81 mg    #Anemia  At admission Hg 10.2, Hct 32, .6. No baseline Hg or iron studies for comparison. Home med iron 65 mg qd for ELMER, though MCV is macrocytic likely 2/2 folate or b12 deficiency. Etiology of anemia likely multifactorial of ELMER, AOCD, and vitb12/folate deficiency. No known history of GI bleeds, hemoptysis, hematochezia, melenic stool.  - c/w home meds    #GERD (gastroesophageal reflux disease)  History of GERD, complains of epigastric pain.   - Continue home med omeprazole 40 mg qd.    #Dermatitis  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  with. Bilateral lower extremity 2+edema with topical dry skin, likely venous stasis dermatitis on due to peripheral artery disease vs. heart failure related edema changes. Likely from niacin deficiency (pellagra) as etiology for dermatitis as patient has 3 month anorexia, fatigue, numbness.  - c/w home med  - f/u w/ outpt dermatologist     Patient was discharged to: home  New medications: no new meds  Changes to old medications: none  Medications that were stopped: plavix and bumex   Items to follow up as outpatient: HD and hepatology and cardiology    Physical exam at the time of discharge:  General: NAD  HEENT: MMM  Neck: supple  Cardiovascular: +S1/S2; RRR  Respiratory: CTA B/L; no W/R/R  Gastrointestinal: soft, NT/ND  Extremities: WWP; no edema, clubbing or cyanosis  Vascular: 2+ radial, DP/PT pulses B/L  Neurological: AAOx3; no focal deficits   #Discharge: do not delete    Mr. Aceves is a 64 yo M w/ PMHx of HFrEF (last known EF 30-40% a few weeks ago), AFib (s/p watchman), ERSD 2/2 IgA nephropathy on hemodialysis (Mon, Tues, Thurs, Sat) since February 2023, congestive hepatopathy, alcohol use disorder, who presented with one day SOB and leg weakness/heaviness iso missed dialysis, admitted for HD and transferred to CCU for concern of cardiogenic shock i/s/o volume overload on dobutamine. Patient received CVVHD and was stepped down to telemetry on 10/11/23 late afternoon with interval SBP 80smmHg, also c/b acute HypoNa to 122: as discussed with Nephrology, pt too hypotensive to safely dialyze on floor; transferred back to CCU/ast boarding mini-ICU for continuous HD with close monitoring. Dialyzed in CCU and stepped back down to telemetry 10/17. At Tele, was noted to be fluid overloaded/hyponatremic requiring daily HD sessions. Given that patient was on maximum dose of midodrine and would need CVVD/aquaphoresis, pt was stepped up to CCU for 2 days and placed on a dobutamine drip.  Sodium levels remained to be 125-127 on CCU admission, so pt was given 250cc 3% NaCl bolus x2 per renal recs. Pt now completed aquapheresis and weaned off dobutamine gtt. Clinically stable for stepdown to cardiac tele on 10/24. Pt returned to his 4 day HD schedule and was deemed stable for discharge to [facility] on 10/27.     Problem List/Main Diagnoses (system-based):  #ESRD on dialysis  ESRD, started on HD 6 months ago, 4x week (M/T/Th/Sat), missed HD session on 10/3/23. Use R port on chest. Vein was mapped last week with plan for new dialysis access placement next week. At admission, Cr 3.93 (un-established baseline Cr), K 3.4, Phos 4.4. Metabolic acidosis (AGAP 17) likely due to hypochloremic emesis / etoh leading to lactic acidosis.   Plan:   - c/w home nephro chichi 60 300mg   - c/w midodrine 10 mg on dialysis days and PRN for hypotension episodes    - c/w HD sessions, 4x week (M/T/Th/Sat)    #Hyponatremia  Pt was asymptomatic w Na levels as low as 112. Stable levels of 124-127 after dialysis. Pt is to receive dialysis 4x/week. s/p ~6.5L serous fluid removed on 10/25/2023.   Plan:  - 1L fluid restriction per day  - Salt tabs TID  - Hepatology f/u at [facility]     #Heart failure with reduced ejection fraction.   History of HF with recent TTE EF 30-40% with Dr. Jacobo Cain ([facility]), Dr. Corey Souza ([facility]). ProBNP >04095. Home med Bumex 2 mg MWF; not on GDMT given kidney function/hypotensive episodes.  - GDMT to be started/slowly added on as outpatient  - F/u with Dr. Lara and Dr. Cain at [facility]    #Moderate alcohol use disorder  At admission BIENVENIDO 29. AST/AlT 45/43. Last drink 2 PM on 10/3/23, has 2-3 martinis daily. No history of alcohol withdrawals or withdrawal seizures. CIWA 2 this AM (anxiety). One non-bloody, clear/bilious emesis in past 24 hours. Qtc 397. 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   - c/w Thiamine, folic acid, multivitamin    #HLD (hyperlipidemia)  History of HLD, home med atorvastatin 40 mg. Patient unclear if had stent after MI.  - c/w home med atorvastatin 40 mg    # CAD (coronary artery disease)  History of CAD with "cardiac events," patient not sure if stent placed but on home meds of asa, plavix. Elevated trop I 564.2. Etiology ischemic vs. due to missed HD for ESRD leading to volume overload and demand.  Troponin 144 (10/4) , downtrending  - c/w home med aspirin 81 mg    #Anemia  At admission Hg 10.2, Hct 32, .6. No baseline Hg or iron studies for comparison. Home med iron 65 mg qd for ELMER, though MCV is macrocytic likely 2/2 folate or b12 deficiency. Etiology of anemia likely multifactorial of ELMER, AOCD, and vitb12/folate deficiency. No known history of GI bleeds, hemoptysis, hematochezia, melenic stool.  - c/w home meds    #GERD (gastroesophageal reflux disease)  History of GERD, complains of epigastric pain.   - Continue home med omeprazole 40 mg qd.    #Dermatitis  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  with. Bilateral lower extremity 2+edema with topical dry skin, likely venous stasis dermatitis on due to peripheral artery disease vs. heart failure related edema changes. Likely from niacin deficiency (pellagra) as etiology for dermatitis as patient has 3 month anorexia, fatigue, numbness.  - c/w home med  - f/u w/ outpt dermatologist     Patient was discharged to: home  New medications: no new meds  Changes to old medications: none  Medications that were stopped: plavix and bumex   Items to follow up as outpatient: HD and hepatology and cardiology    Physical exam at the time of discharge:  General: NAD  HEENT: MMM  Neck: supple  Cardiovascular: +S1/S2; RRR  Respiratory: CTA B/L; no W/R/R  Gastrointestinal: soft, NT/ND  Extremities: WWP; no edema, clubbing or cyanosis  Vascular: 2+ radial, DP/PT pulses B/L  Neurological: AAOx3; no focal deficits   #Discharge: do not delete    Mr. Aceves is a 64 yo M w/ PMHx of HFrEF (last known EF 30-40% a few weeks ago), AFib (s/p watchman), ERSD 2/2 IgA nephropathy on hemodialysis (Mon, Tues, Thurs, Sat) since February 2023, congestive hepatopathy, alcohol use disorder, who presented with one day SOB and leg weakness/heaviness iso missed dialysis, admitted for HD and transferred to CCU for concern of cardiogenic shock i/s/o volume overload on dobutamine. Patient received CVVHD and was stepped down to telemetry on 10/11/23 late afternoon with interval SBP 80smmHg, also c/b acute HypoNa to 122: as discussed with Nephrology, pt too hypotensive to safely dialyze on floor; transferred back to CCU/ast boarding mini-ICU for continuous HD with close monitoring. Dialyzed in CCU and stepped back down to telemetry 10/17. At Tele, was noted to be fluid overloaded/hyponatremic requiring daily HD sessions. Given that patient was on maximum dose of midodrine and would need CVVD/aquaphoresis, pt was stepped up to CCU for 2 days and placed on a dobutamine drip.  Sodium levels remained to be 125-127 on CCU admission, so pt was given 250cc 3% NaCl bolus x2 per renal recs. Pt now completed aquapheresis and weaned off dobutamine gtt. Clinically stable for stepdown to cardiac tele on 10/24. Pt returned to his 4 day HD schedule and was deemed stable for discharge to [facility] on 10/27.     Problem List/Main Diagnoses (system-based):  #ESRD on dialysis  ESRD, started on HD 6 months ago, 4x week (M/T/Th/Sat), missed HD session on 10/3/23. Use R port on chest. Vein was mapped last week with plan for new dialysis access placement next week. At admission, Cr 3.93 (un-established baseline Cr), K 3.4, Phos 4.4. Metabolic acidosis (AGAP 17) likely due to hypochloremic emesis / etoh leading to lactic acidosis.   Plan:   - c/w home nephro chichi 60 300mg   - c/w midodrine 10 mg on dialysis days 45 minutes prior to session and PRN for hypotension episodes    - c/w HD sessions, 4x week (M/T/Th/Sat)    #Hyponatremia	  Pt was asymptomatic w Na levels as low as 112. Stable levels of 124-127 after dialysis. Pt is to receive dialysis 4x/week. s/p ~6.5L serous fluid removed on 10/25/2023.   Plan:  - 1L fluid restriction per day  - Salt tabs TID  - Hepatology f/u at [facility]     #Heart failure with reduced ejection fraction.   History of HF with recent TTE EF 30-40% with Dr. Jacobo Cain ([facility]), Dr. Corey Souza ([facility] Heart Failure). ProBNP >47190. Home med Bumex 2 mg MWF; not on GDMT given kidney function/hypotensive episodes.  - GDMT to be started/slowly added on as outpatient  - F/u with Dr. Lara and Dr. Cain at [facility]    #Moderate alcohol use disorder  At admission BIENVENIDO 29. AST/AlT 45/43. Last drink 2 PM on 10/3/23, has 2-3 martinis daily. No history of alcohol withdrawals or withdrawal seizures. CIWA 2 this AM (anxiety). One non-bloody, clear/bilious emesis in past 24 hours. Qtc 397. 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   - c/w Thiamine, folic acid, multivitamin    #HLD (hyperlipidemia)  History of HLD, home med atorvastatin 40 mg. Patient unclear if had stent after MI.  - c/w home med atorvastatin 40 mg    # CAD (coronary artery disease)  History of CAD with "cardiac events," patient not sure if stent placed but on home meds of asa, plavix. Elevated trop I 564.2. Etiology ischemic vs. due to missed HD for ESRD leading to volume overload and demand.  Troponin 144 (10/4) , downtrending  - c/w home med aspirin 81 mg    #Anemia  At admission Hg 10.2, Hct 32, .6. No baseline Hg or iron studies for comparison. Home med iron 65 mg qd for ELMER, though MCV is macrocytic likely 2/2 folate or b12 deficiency. Etiology of anemia likely multifactorial of ELMER, AOCD, and vitb12/folate deficiency. No known history of GI bleeds, hemoptysis, hematochezia, melenic stool.  - c/w home meds    #GERD (gastroesophageal reflux disease)  History of GERD, complains of epigastric pain.   - Continue home med omeprazole 40 mg qd.    #Dermatitis  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  with. Bilateral lower extremity 2+edema with topical dry skin, likely venous stasis dermatitis on due to peripheral artery disease vs. heart failure related edema changes. Likely from niacin deficiency (pellagra) as etiology for dermatitis as patient has 3 month anorexia, fatigue, numbness.  - c/w home med  - f/u w/ outpt dermatologist     Patient was discharged to: home  New medications: no new meds  Changes to old medications: none  Medications that were stopped: plavix and bumex   Items to follow up as outpatient: HD and hepatology and cardiology    Physical exam at the time of discharge:  General: NAD  HEENT: MMM  Neck: supple  Cardiovascular: +S1/S2; RRR  Respiratory: CTA B/L; no W/R/R  Gastrointestinal: soft, NT/ND  Extremities: WWP; no edema, clubbing or cyanosis  Vascular: 2+ radial, DP/PT pulses B/L  Neurological: AAOx3; no focal deficits

## 2023-10-04 NOTE — H&P ADULT - PROBLEM SELECTOR PLAN 2
History of HF with recent TTE EF 30-40% with Dr. Jacobo Cain ([facility]), Dr. Corey Souza ([facility] Heart Failure). Bilateral 2+ pitting edema to midthigh and left sided crackles, concern for some fluid overload. ProBNP >34880. Home med Bumex 2 mg MWF; no GDMT listed.  -Continue home med Bumex 2 mg MWF  -Obtain collateral for [facility] Card/HF for GDMT, EF confirmation  -Strict I&O

## 2023-10-04 NOTE — CONSULT NOTE ADULT - ASSESSMENT
63Y M w/hx of ESRD (HD 4x weekly M/T//SAT) admitted for SOB and generalized weakness and fatigue missed HD, volume overloaded on exam with stable electrolytes consulted for inpatient HD management       #ESRD  HD center: [facility], [address] [place] [address] ([phone])  Nephrologist: Dr. Jean Nicole with [facility]   Rx: 3.5hrs UF 2L 4x a week   EDW: per patient still adjusting, last weight 221lbs, will f/u OP records  Stable lytes, volume overloaded on exam     Plan:  HD today as below   Hemodialysis Treatment.:     Schedule: Once, Modality: Hemodialysis, Access: Internal Jugular Central Venous Catheter    Dialyzer: Optiflux [id number], Time: 210 Min    Blood Flow: 400 mL/Min , Dialysate Flow: 500 mL/Min, Dialysate Temp: 36.5, Tubinmm (Adult)    Target Fluid Removal: 2 Liters    Dialysate Electrolytes (mEq/L): Potassium 3, Calcium 2.5, Sodium 138, Bicarbonate 35  Daily BMP   Daily weights  Will reassess for HD 10/5    Access:  R TDC c/d/i   Per patient has Vascular appointment next week for LUE AVF creation, would avoid further blood draws and IV insertion      HTN:  BP stable   On Midodrine 5mg TID   UF with HD as tolerated   F/u echo     Anemia:  Hgb at goal 10.2  EPO with HD     MBD;  Calcium: 9.6  Phos: 4.6  Continue phos binders

## 2023-10-04 NOTE — PROGRESS NOTE ADULT - SUBJECTIVE AND OBJECTIVE BOX
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 Remote hx CVA. Afib s/p watchman, not on full dose AC as pt has watchman, not on rate control iso persistent hypotension (Pt chronically on midodrine outpt). Pt also has mild AS/moderate AI, Chronic diastolic HF, most recet TTE LVEF 35% on 6/15/23; (Only on Bumex 2g Qd on non-HD days, no GDMT iso kidney function and hypotension). Pt presented with one day SOB and leg weakness/ heaviness after missing 1 day HD. POCUS negative for pericardial effusion. Pt with enlarged heart on CXR and peripheral edema. Echo pending. Pt found to by hypoxic to 85% on admission, placed on HF, weaned to 3LNC and now on RA.  Pt now s/p HD and feeling better. Pt troponin and lactate now downtrending and cleared respectively. Pt pending echo and abdominal US. Pt stable and medically ready for stepdown to [facility] for continued medical management    SUBJECTIVE / INTERVAL HPI: Patient seen and examined at bedside. No complaints at this time. States that he has seen outpatient doctor for abdominal hernia- no surgical intervention was recommended. ROS otherwise negative.    VITAL SIGNS:  Vital Signs Last 24 Hrs  T(C): 36.7 (04 Oct 2023 22:54), Max: 37.1 (04 Oct 2023 01:06)  T(F): 98 (04 Oct 2023 22:54), Max: 98.8 (04 Oct 2023 06:39)  HR: 100 (04 Oct 2023 23:17) (71 - 104)  BP: 107/76 (04 Oct 2023 23:17) (105/72 - 133/83)  BP(mean): 87 (04 Oct 2023 23:17) (87 - 103)  RR: 18 (04 Oct 2023 23:17) (16 - 18)  SpO2: 98% (04 Oct 2023 20:15) (92% - 100%)    Parameters below as of 04 Oct 2023 23:17  Patient On (Oxygen Delivery Method): room air        PHYSICAL EXAM:    General: NAD  HEENT: NCAT  Neck: supple, trachea midline  Cardiovascular: S1, S2 normal; RRR, no M/G/R  Respiratory: CTABL; no W/R/R  Gastrointestinal: soft, nontender, nondistended. Reducible umbilical hernia  Skin: no ulcerations or visible rashes appreciated  Extremities: WWP; no edema, clubbing or cyanosis  Vascular: 2+ radial, DP/PT pulses B/L  Neurological: AAOx3; CN II-XII grossly intact; no focal deficits    MEDICATIONS:  MEDICATIONS  (STANDING):  aspirin enteric coated 81 milliGRAM(s) Oral daily  atorvastatin 40 milliGRAM(s) Oral at bedtime  buMETAnide 2 milliGRAM(s) Oral <User Schedule>  clopidogrel Tablet 75 milliGRAM(s) Oral daily  folic acid 1 milliGRAM(s) Oral daily  influenza  Vaccine (HIGH DOSE) 0.7 milliLiter(s) IntraMuscular once  midodrine. 5 milliGRAM(s) Oral every 8 hours  multivitamin 1 Tablet(s) Oral daily  pantoprazole    Tablet 40 milliGRAM(s) Oral before breakfast  pregabalin 75 milliGRAM(s) Oral daily  thiamine IVPB 500 milliGRAM(s) IV Intermittent every 24 hours    MEDICATIONS  (PRN):  LORazepam   Injectable 1 milliGRAM(s) IV Push every 1 hour PRN CIWA-Ar score 8 or greater      ALLERGIES:  Allergies    penicillins (Unknown)    Intolerances        LABS:                        10.2   5.47  )-----------( 108      ( 04 Oct 2023 05:49 )             31.9     10-04    132<L>  |  92<L>  |  19  ----------------------------<  99  3.8   |  22  |  4.01<H>    Ca    9.6      04 Oct 2023 05:49  Phos  4.6     10-04  Mg     1.6     10-04    TPro  7.0  /  Alb  3.2<L>  /  TBili  2.2<H>  /  DBili  x   /  AST  38  /  ALT  38  /  AlkPhos  267<H>  10-04    PT/INR - ( 04 Oct 2023 04:05 )   PT: 17.6 sec;   INR: 1.56          PTT - ( 04 Oct 2023 04:05 )  PTT:33.1 sec  Urinalysis Basic - ( 04 Oct 2023 05:49 )    Color: x / Appearance: x / SG: x / pH: x  Gluc: 99 mg/dL / Ketone: x  / Bili: x / Urobili: x   Blood: x / Protein: x / Nitrite: x   Leuk Esterase: x / RBC: x / WBC x   Sq Epi: x / Non Sq Epi: x / Bacteria: x      CAPILLARY BLOOD GLUCOSE      POCT Blood Glucose.: 85 mg/dL (04 Oct 2023 04:40)      RADIOLOGY & ADDITIONAL TESTS: Reviewed. 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 Remote hx CVA. Afib s/p watchman, not on full dose AC as pt has watchman, not on rate control iso persistent hypotension (Pt chronically on midodrine outpt). Pt also has mild AS/moderate AI, Chronic diastolic HF, most recet TTE LVEF 35% on 6/15/23; (Only on Bumex 2g Qd on non-HD days, no GDMT iso kidney function and hypotension). Pt presented with one day SOB and leg weakness/ heaviness after missing 1 day HD. POCUS negative for pericardial effusion. Pt with enlarged heart on CXR and peripheral edema. Echo pending. Pt found to by hypoxic to 85% on admission, placed on HF, weaned to 3LNC and now on RA.  Pt now s/p HD and feeling better. Pt troponin and lactate now downtrending and cleared respectively. Pt pending echo and abdominal US. Pt stable and medically ready for stepdown to [facility] for continued medical management    SUBJECTIVE / INTERVAL HPI: Patient seen and examined at bedside. No complaints at this time. States that he has seen outpatient doctor for abdominal hernia- no surgical intervention was recommended. ROS otherwise negative.    VITAL SIGNS:  Vital Signs Last 24 Hrs  T(C): 36.7 (04 Oct 2023 22:54), Max: 37.1 (04 Oct 2023 01:06)  T(F): 98 (04 Oct 2023 22:54), Max: 98.8 (04 Oct 2023 06:39)  HR: 100 (04 Oct 2023 23:17) (71 - 104)  BP: 107/76 (04 Oct 2023 23:17) (105/72 - 133/83)  BP(mean): 87 (04 Oct 2023 23:17) (87 - 103)  RR: 18 (04 Oct 2023 23:17) (16 - 18)  SpO2: 98% (04 Oct 2023 20:15) (92% - 100%)    Parameters below as of 04 Oct 2023 23:17  Patient On (Oxygen Delivery Method): room air        PHYSICAL EXAM:    General: NAD  HEENT: NCAT  Neck: supple, trachea midline  Cardiovascular: S1, S2 normal; RRR, no M/G/R  Respiratory: CTABL; no W/R/R  Gastrointestinal: soft, nontender, distended. Reducible umbilical hernia  Skin: no ulcerations or visible rashes appreciated  Extremities: WWP; no edema, clubbing or cyanosis  Vascular: 2+ radial, DP/PT pulses B/L  Neurological: AAOx3; CN II-XII grossly intact; no focal deficits    MEDICATIONS:  MEDICATIONS  (STANDING):  aspirin enteric coated 81 milliGRAM(s) Oral daily  atorvastatin 40 milliGRAM(s) Oral at bedtime  buMETAnide 2 milliGRAM(s) Oral <User Schedule>  clopidogrel Tablet 75 milliGRAM(s) Oral daily  folic acid 1 milliGRAM(s) Oral daily  influenza  Vaccine (HIGH DOSE) 0.7 milliLiter(s) IntraMuscular once  midodrine. 5 milliGRAM(s) Oral every 8 hours  multivitamin 1 Tablet(s) Oral daily  pantoprazole    Tablet 40 milliGRAM(s) Oral before breakfast  pregabalin 75 milliGRAM(s) Oral daily  thiamine IVPB 500 milliGRAM(s) IV Intermittent every 24 hours    MEDICATIONS  (PRN):  LORazepam   Injectable 1 milliGRAM(s) IV Push every 1 hour PRN CIWA-Ar score 8 or greater      ALLERGIES:  Allergies    penicillins (Unknown)    Intolerances        LABS:                        10.2   5.47  )-----------( 108      ( 04 Oct 2023 05:49 )             31.9     10-04    132<L>  |  92<L>  |  19  ----------------------------<  99  3.8   |  22  |  4.01<H>    Ca    9.6      04 Oct 2023 05:49  Phos  4.6     10-04  Mg     1.6     10-04    TPro  7.0  /  Alb  3.2<L>  /  TBili  2.2<H>  /  DBili  x   /  AST  38  /  ALT  38  /  AlkPhos  267<H>  10-04    PT/INR - ( 04 Oct 2023 04:05 )   PT: 17.6 sec;   INR: 1.56          PTT - ( 04 Oct 2023 04:05 )  PTT:33.1 sec  Urinalysis Basic - ( 04 Oct 2023 05:49 )    Color: x / Appearance: x / SG: x / pH: x  Gluc: 99 mg/dL / Ketone: x  / Bili: x / Urobili: x   Blood: x / Protein: x / Nitrite: x   Leuk Esterase: x / RBC: x / WBC x   Sq Epi: x / Non Sq Epi: x / Bacteria: x      CAPILLARY BLOOD GLUCOSE      POCT Blood Glucose.: 85 mg/dL (04 Oct 2023 04:40)      RADIOLOGY & ADDITIONAL TESTS: Reviewed.

## 2023-10-04 NOTE — H&P ADULT - ASSESSMENT
63 year old male with history of ESRD (HD 4x weekly M/T/TH/SAT), HLD, HTN, GERD, CAD presents with increasing SOB at rest, associated with generalized weakness and fatigue two non-bloody emesis, clear once in the morning then in the evening in the ED.

## 2023-10-04 NOTE — PROGRESS NOTE ADULT - PROBLEM SELECTOR PLAN 4
At admission BIENVENIDO 29. AST/AlT 45/43. Last drink 2 PM on 10/3/23, has 2-3 martinis daily. No history of alcohol withdrawals or withdrawal seizures. CIWA 3. Three non-bloody, clear/bilious emesis in past 24 hours. Qtc 397. 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   -Zofran 4 mg q6 PRN nausea, vomiting  -CIWA protocol: q4 CIWA screens with CIWA>8 give 2 mg ativan  -Thiamine, folic acid, multivitamin  -Home meds B1 100 mg OTC, D3 1000 IU OTC, B12 1000 mcg OTC  -CTM on telemetry; 12 hours from last drink  -Consider urine tox screen At admission BIENVENIDO 29. AST/AlT 45/43. Last drink 2 PM on 10/3/23, has 2-3 martinis daily. No history of alcohol withdrawals or withdrawal seizures. CIWA 3. Three non-bloody, clear/bilious emesis in past 24 hours. Qtc 397. 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   -Zofran 4 mg q6 PRN nausea, vomiting  -CIWA protocol: q4 CIWA screens with CIWA>8 give 2 mg ativan  -Thiamine, folic acid, multivitamin  -Home meds B1 100 mg OTC, D3 1000 IU OTC, B12 1000 mcg OTC

## 2023-10-04 NOTE — PATIENT PROFILE ADULT - SURGICAL SITE INCISION
FAMILY HISTORY:  Family history of reaction to anesthesia, brother-delayed awakening  FH: diabetes mellitus, mother  FH: GI cancer, father  FH: HTN (hypertension), mother  FH: myocardial infarction, maternal grandmother and uncle no

## 2023-10-04 NOTE — CHART NOTE - NSCHARTNOTEFT_GEN_A_CORE
Additional collateral collected from [facility] heart failure physician Dr. Manjinder Souza's office.     Pt with 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 Remote hx CVA. Afib s/p watchman, not on full dose AC as pt has watchman, not on rate control iso persistent hypotension (Pt chronically on midodrine outpt). Pt also has mild AS/moderate AI, Chronic diastolic HF, most recet TTE LVEF 35% on 6/15/23; (Only on Bumex 2g Qd on non-HD days, no GDMT iso kidney function and hypotension).     Current outpt HF recs from Dr. Souza are C/w midodrine, Bumex, ASA, Plavix.   Not on AC iso watchman  Not on GDMT or rate control iso persistent hypotension, ideally start GDMT when possible.

## 2023-10-04 NOTE — PROGRESS NOTE ADULT - PROBLEM SELECTOR PLAN 5
History of HLD, home med atorvastatin 40 mg. Patient unclear if had stent after MI.  -continue home med atorvastatin 40 mg  -Obtain collateral for [facility] Card/HF for MI/stent history and recent lipid panel History of HLD, home med atorvastatin 40 mg. Patient unclear if had stent after MI.  -continue home med atorvastatin 40 mg  -collateral from [facility] Card/HF in physical chart

## 2023-10-04 NOTE — PATIENT PROFILE ADULT - FALL HARM RISK - HARM RISK INTERVENTIONS

## 2023-10-04 NOTE — PROVIDER CONTACT NOTE (CRITICAL VALUE NOTIFICATION) - ACTION/TREATMENT ORDERED:
redraw labs
No intervention at this time. Troponin T trending down. Waiting for patient to return from dialysis.
recheck blood sugar, redraw labs

## 2023-10-04 NOTE — H&P ADULT - NSHPOUTPATIENTPROVIDERS_GEN_ALL_CORE
Dr. Jacobo Cain ([facility])  Dr. Corey Souza ([facility] Heart Failure)  Dr. Jean Nicole ([facility] Nephrology)  Dr. Christiana Arellano ([facility] Pulmonary/Crit Care/Pulm HTN)

## 2023-10-04 NOTE — DISCHARGE NOTE PROVIDER - PROVIDER TOKENS
FREE:[LAST:[Viki],FIRST:[Corey],PHONE:[phone]],FAX:[(   )    -],ADDRESS:[[address] [place] [address]],SCHEDULEDAPPT:[10/13/2023],SCHEDULEDAPPTTIME:[09:30 AM]] FREE:[LAST:[Leonilaita],FIRST:[Corey],PHONE:[phone]],FAX:[(   )    -],ADDRESS:[[address] [place] [address]  [phone]],SCHEDULEDAPPT:[11/03/2023],SCHEDULEDAPPTTIME:[11:00 AM],ESTABLISHEDPATIENT:[T]]

## 2023-10-04 NOTE — PROGRESS NOTE ADULT - PROBLEM SELECTOR PLAN 1
ESRD, started on HD 6 months ago, 4x week (M/T/Th/Sat), missed HD session on 10/3/23. Use R port on chest, with L AV fistula will be placed soon and vein was mapped last week. At admission, Cr 3.93 (un-established baseline Cr), K 3.4, Phos 4.4. Metabolic acidosis (AGAP 17) likely due to hypochloremic emesis.   -continue home nephro chichi 60 300mg; hold midodrine 5 mg TID i/s/o normotensive BP and missed HD session  -pending HD session in AM  -appreciate continued nephro recs  -consider VBG or ABG for metabolic acidosis  -avoid L hand blood draw as AV fistula will be placed there at [facility] in next couple of weeks ESRD, started on HD 6 months ago, 4x week (M/T/Th/Sat), missed HD session on 10/3/23. Use R port on chest, with L AV fistula will be placed soon and vein was mapped last week. At admission, Cr 3.93 (un-established baseline Cr), K 3.4, Phos 4.4. Metabolic acidosis (AGAP 17) likely due to hypochloremic emesis.   -continue home nephro chichi 60 300mg; h  - Restart  midodrine 5 mg TID i/s/o hypotension after HD (10/4)   -pending HD session in AM  -appreciate continued nephro recs  -consider VBG or ABG for metabolic acidosis  -avoid L hand blood draw as AV fistula will be placed there at [facility] in next couple of weeks

## 2023-10-04 NOTE — H&P ADULT - ATTENDING COMMENTS
Reg exercise  Weight loss  Tylenol as needed for pain   This is a 62 y/o male with CAD, MI, stents with increasing SOB, weakness, vomiting, on O2 NC . He has bilateral LE edema.  -SOB with hypoxia  -acute respiratory failure  -HFrEF  -ESRD with fluid overload  -abdominal ascites  -increased troponin  -increased bilirubin  -increased transaminases  -alcohol dependent  >Monitor for alcohol withdrawal  >HD with removal of fluid  Please see the resident's note above for the rest of the details.

## 2023-10-04 NOTE — PROGRESS NOTE ADULT - PROBLEM SELECTOR PLAN 3
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 Clinical exam: abdominal straie, positive fluid wave with ascites, +bilateral pitting edema, +hepatosplenomegaly. Serum ammonia 37, Alk phos 273. Low suspicion for hepatic encephalopathy at this time; patient mentating AOx3 and conversant, w/o jaundice or non-icteric sclera. Low concern for hepatorenal syndrome as remains hemodynamically stable. No known gallbladder pathology established.  Abdominal US (10/4) Cirrhotic morphology. Pulsatile flow in the main and left portal veins. Moderate ascites. Increased right renal cortical echogenicity compatible with medical renal disease.  -consider paracentesis for ascites

## 2023-10-04 NOTE — PROGRESS NOTE ADULT - PROBLEM SELECTOR PLAN 7
History of CAD with "cardiac events," patient not sure if stent placed but on home meds of asa, plavix. Elevated trop I 564.2. Etiology ischemic vs. due to missed HD for ESRD.  Troponin 144 (10/4)   -Continue home med aspirin 81 mg, plavix 75 mg  -Trend troponin  -CTM on telemetry  -Obtain collateral for [facility] Card/HF for MI/stent history and recent lipid panel History of CAD with "cardiac events," patient not sure if stent placed but on home meds of asa, plavix. Elevated trop I 564.2. Etiology ischemic vs. due to missed HD for ESRD.  Troponin 144 (10/4)   -Continue home med aspirin 81 mg, plavix 75 mg  -Trend troponin  -   -CTM on telemetry  -Obtain collateral for [facility] Card/HF for MI/stent history and recent lipid panel At admission Hg 10.2, Hct 32, .6. No baseline Hg or iron studies for comparison. Home med iron 65 mg qd for ELMER, though MCV is macrocytic likely 2/2 folate or b12 deficiency. Etiology of anemia likely multifactorial of ELMER, AOCD, and vitb12/folate deficiency. No known history of GI bleeds, hemoptysis, hematochezia, melenic stool.  -Hold home med iron 65 mg  -Iron studies for morning labs  -F/u vit b12, folate levels  -F/u coag panel  -Maintain active type and screen

## 2023-10-04 NOTE — PROGRESS NOTE ADULT - PROBLEM SELECTOR PLAN 8
At admission Hg 10.2, Hct 32, .6. No baseline Hg or iron studies for comparison. Home med iron 65 mg qd for ELMER, though MCV is macrocytic likely 2/2 folate or b12 deficiency. Etiology of anemia likely multifactorial of ELMER, AOCD, and vitb12/folate deficiency. No known history of GI bleeds, hemoptysis, hematochezia, melenic stool.  -Hold home med iron 65 mg  -Iron studies for morning labs  -F/u vit b12, folate levels  -F/u coag panel  -Maintain active type and screen History of GERD, complains of epigastric pain.   -Continue home med omeprazole 40 mg qd

## 2023-10-04 NOTE — DISCHARGE NOTE PROVIDER - NSDCCPCAREPLAN_GEN_ALL_CORE_FT
PRINCIPAL DISCHARGE DIAGNOSIS  Diagnosis: End stage renal disease  Assessment and Plan of Treatment:       SECONDARY DISCHARGE DIAGNOSES  Diagnosis: Hyponatremia  Assessment and Plan of Treatment:      PRINCIPAL DISCHARGE DIAGNOSIS  Diagnosis: End stage renal disease  Assessment and Plan of Treatment: End stage renal disease is a chronic condition that causes decreased urinary output and limitations on removing fluid in your body. Your stage of renal, cardiac, and liver disease makes this condition complex and missing dialysis sessions may cause electrolyte abnormalities as seen during your admission. It is important for you to follow up with your established doctors at [facility] for continued management. You will be receiving dialysis at [facility] and will continue your dialysis sessions for right now Monday/Tuesday/Thursday/Saturday. This schedule may change as you arrive to your outpatient facility.      SECONDARY DISCHARGE DIAGNOSES  Diagnosis: Hyponatremia  Assessment and Plan of Treatment: Please limit the amount of fluid you are consuming and increase your salt intake. This is a condition in which the salt levels in your blood are too low. This can cause you to have headaches, blurry vision, confusion, nausea/vomiting, and worse case, seizures. Your levels will be checked on during dialysis. Of note, you had a paracentesis to remove almost 6 liters of fluid from your stomach. Please follow up with your hepatologist at [facility] for further care.     PRINCIPAL DISCHARGE DIAGNOSIS  Diagnosis: End stage renal disease  Assessment and Plan of Treatment: End stage renal disease is a chronic condition that causes decreased urinary output and limitations on removing fluid in your body. Your stage of renal, cardiac, and liver disease makes this condition complex and missing dialysis sessions may cause electrolyte abnormalities as seen during your admission. It is important for you to follow up with your established doctors at [facility] for continued management. You will be receiving dialysis at [facility] and will continue your dialysis sessions for right now Monday/Tuesday/Thursday/Saturday. This schedule may change as you arrive to your outpatient facility.  It is important that you take your midodrine 10 mg dose 45 minutes to 1 hour ago prior to your dialysis session as you tend to get a low blood pressure after your sessions.      SECONDARY DISCHARGE DIAGNOSES  Diagnosis: Hyponatremia  Assessment and Plan of Treatment: Please limit the amount of fluid you are consuming and increase your salt intake. This is a condition in which the salt levels in your blood are too low. This can cause you to have headaches, blurry vision, confusion, nausea/vomiting, and worse case, seizures. Your levels will be checked on during dialysis. Of note, you had a paracentesis to remove almost 6 liters of fluid from your stomach. Please follow up with your hepatologist at [facility] for further care.

## 2023-10-04 NOTE — PROGRESS NOTE ADULT - PROBLEM SELECTOR PLAN 9
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  with. Bilateral lower extremity 2+edema with topical dry skin, likely venous stasis dermatitis on due to peripheral artery disease vs. heart failure related edema changes. Likely from niacin deficiency (pellagra) as etiology for dermatitis as patient has 3 month anorexia, fatigue, numbness.  -Obtain collateral from [facility] Dermatologist; recently prescribed Cerave w/o use  -Caution with home cyproheptadine 4 mg home med with zofran to avoid serotonin syndrome  -Consider niacin supplementation (vitamin b3)

## 2023-10-04 NOTE — DISCHARGE NOTE PROVIDER - NSDCCPTREATMENT_GEN_ALL_CORE_FT
PRINCIPAL PROCEDURE  Procedure: US guided paracentesis  Findings and Treatment: Diagnostic  Therapeutic  Location: L Peritoneal cavity  Local Anesthesia: 1% lidocaine  Amount Of Fluid Obtained (mL): 6000 milliLiter(s)  Findings: serous peritoneal fluid  Patient tolerated procedure well without any complications.  · Estimated Blood Loss: none

## 2023-10-04 NOTE — DISCHARGE NOTE PROVIDER - CARE PROVIDER_API CALL
Corey Drew  [address] [place] [address]  Phone: [phone]  Fax: (   )    -  Scheduled Appointment: 10/13/2023 09:30 AM   Corey Souza  [address] [place] [id number]  [phone]  Phone: [phone]  Fax: (   )    -  Established Patient  Scheduled Appointment: 11/03/2023 11:00 AM

## 2023-10-04 NOTE — PROGRESS NOTE ADULT - PROBLEM SELECTOR PLAN 3
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 Clinical exam: abdominal straie, positive fluid wave with ascites, +bilateral pitting edema, +hepatosplenomegaly. Serum ammonia 37, Alk phos 273. Low suspicion for hepatic encephalopathy at this time; patient mentating AOx3 and conversant, w/o jaundice or non-icteric sclera. Low concern for hepatorenal syndrome as remains hemodynamically stable. No known gallbladder pathology established.  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 Clinical exam: abdominal straie, positive fluid wave with ascites, +bilateral pitting edema, +hepatosplenomegaly. Serum ammonia 37, Alk phos 273. Low suspicion for hepatic encephalopathy at this time; patient mentating AOx3 and conversant, w/o jaundice or non-icteric sclera. Low concern for hepatorenal syndrome as remains hemodynamically stable. No known gallbladder pathology established.  Abdominal US (10/4) Cirrhotic morphology. Pulsatile flow in the main and left portal veins. Moderate ascites. Increased right renal cortical echogenicity compatible with medical renal disease.  -consider paracentesis for ascites

## 2023-10-05 LAB
ALBUMIN SERPL ELPH-MCNC: 3 G/DL — LOW (ref 3.3–5)
ALP SERPL-CCNC: 237 U/L — HIGH (ref 40–120)
ALT FLD-CCNC: 40 U/L — SIGNIFICANT CHANGE UP (ref 10–45)
ANION GAP SERPL CALC-SCNC: 16 MMOL/L — SIGNIFICANT CHANGE UP (ref 5–17)
AST SERPL-CCNC: 42 U/L — HIGH (ref 10–40)
BASOPHILS # BLD AUTO: 0.04 K/UL — SIGNIFICANT CHANGE UP (ref 0–0.2)
BASOPHILS NFR BLD AUTO: 0.8 % — SIGNIFICANT CHANGE UP (ref 0–2)
BILIRUB SERPL-MCNC: 1.9 MG/DL — HIGH (ref 0.2–1.2)
BUN SERPL-MCNC: 15 MG/DL — SIGNIFICANT CHANGE UP (ref 7–23)
CALCIUM SERPL-MCNC: 9.4 MG/DL — SIGNIFICANT CHANGE UP (ref 8.4–10.5)
CHLORIDE SERPL-SCNC: 94 MMOL/L — LOW (ref 96–108)
CO2 SERPL-SCNC: 23 MMOL/L — SIGNIFICANT CHANGE UP (ref 22–31)
CREAT SERPL-MCNC: 3.19 MG/DL — HIGH (ref 0.5–1.3)
EGFR: 21 ML/MIN/1.73M2 — LOW
EOSINOPHIL # BLD AUTO: 0.06 K/UL — SIGNIFICANT CHANGE UP (ref 0–0.5)
EOSINOPHIL NFR BLD AUTO: 1.2 % — SIGNIFICANT CHANGE UP (ref 0–6)
FERRITIN SERPL-MCNC: 686 NG/ML — HIGH (ref 30–400)
GLUCOSE SERPL-MCNC: 86 MG/DL — SIGNIFICANT CHANGE UP (ref 70–99)
HCT VFR BLD CALC: 32 % — LOW (ref 39–50)
HGB BLD-MCNC: 10.1 G/DL — LOW (ref 13–17)
IMM GRANULOCYTES NFR BLD AUTO: 0.4 % — SIGNIFICANT CHANGE UP (ref 0–0.9)
INR BLD: 1.54 — HIGH (ref 0.85–1.18)
IRON SATN MFR SERPL: 45 % — SIGNIFICANT CHANGE UP (ref 16–55)
IRON SATN MFR SERPL: 76 UG/DL — SIGNIFICANT CHANGE UP (ref 45–165)
LYMPHOCYTES # BLD AUTO: 0.95 K/UL — LOW (ref 1–3.3)
LYMPHOCYTES # BLD AUTO: 19.6 % — SIGNIFICANT CHANGE UP (ref 13–44)
MAGNESIUM SERPL-MCNC: 1.7 MG/DL — SIGNIFICANT CHANGE UP (ref 1.6–2.6)
MCHC RBC-ENTMCNC: 31.6 GM/DL — LOW (ref 32–36)
MCHC RBC-ENTMCNC: 33.1 PG — SIGNIFICANT CHANGE UP (ref 27–34)
MCV RBC AUTO: 104.9 FL — HIGH (ref 80–100)
MELD SCORE WITH DIALYSIS: 29 POINTS — SIGNIFICANT CHANGE UP
MELD SCORE WITHOUT DIALYSIS: 27 POINTS — SIGNIFICANT CHANGE UP
MONOCYTES # BLD AUTO: 0.51 K/UL — SIGNIFICANT CHANGE UP (ref 0–0.9)
MONOCYTES NFR BLD AUTO: 10.5 % — SIGNIFICANT CHANGE UP (ref 2–14)
NEUTROPHILS # BLD AUTO: 3.27 K/UL — SIGNIFICANT CHANGE UP (ref 1.8–7.4)
NEUTROPHILS NFR BLD AUTO: 67.5 % — SIGNIFICANT CHANGE UP (ref 43–77)
NRBC # BLD: 0 /100 WBCS — SIGNIFICANT CHANGE UP (ref 0–0)
PHOSPHATE SERPL-MCNC: 4.1 MG/DL — SIGNIFICANT CHANGE UP (ref 2.5–4.5)
PLATELET # BLD AUTO: 106 K/UL — LOW (ref 150–400)
POTASSIUM SERPL-MCNC: 3.5 MMOL/L — SIGNIFICANT CHANGE UP (ref 3.5–5.3)
POTASSIUM SERPL-SCNC: 3.5 MMOL/L — SIGNIFICANT CHANGE UP (ref 3.5–5.3)
PROT SERPL-MCNC: 6.4 G/DL — SIGNIFICANT CHANGE UP (ref 6–8.3)
PROTHROM AB SERPL-ACNC: 17.3 SEC — HIGH (ref 9.5–13)
RBC # BLD: 3.05 M/UL — LOW (ref 4.2–5.8)
RBC # FLD: 18.2 % — HIGH (ref 10.3–14.5)
SODIUM SERPL-SCNC: 133 MMOL/L — LOW (ref 135–145)
TIBC SERPL-MCNC: 170 UG/DL — LOW (ref 220–430)
TRANSFERRIN SERPL-MCNC: 138 MG/DL — LOW (ref 200–360)
UIBC SERPL-MCNC: 94 UG/DL — LOW (ref 110–370)
WBC # BLD: 4.85 K/UL — SIGNIFICANT CHANGE UP (ref 3.8–10.5)
WBC # FLD AUTO: 4.85 K/UL — SIGNIFICANT CHANGE UP (ref 3.8–10.5)

## 2023-10-05 PROCEDURE — 99232 SBSQ HOSP IP/OBS MODERATE 35: CPT | Mod: GC

## 2023-10-05 PROCEDURE — 93306 TTE W/DOPPLER COMPLETE: CPT | Mod: 26

## 2023-10-05 RX ORDER — ONDANSETRON 8 MG/1
4 TABLET, FILM COATED ORAL
Refills: 0 | Status: DISCONTINUED | OUTPATIENT
Start: 2023-10-05 | End: 2023-10-05

## 2023-10-05 RX ORDER — HYDROXYZINE HCL 10 MG
1 TABLET ORAL
Qty: 0 | Refills: 0 | DISCHARGE

## 2023-10-05 RX ORDER — MAGNESIUM SULFATE 500 MG/ML
2 VIAL (ML) INJECTION ONCE
Refills: 0 | Status: COMPLETED | OUTPATIENT
Start: 2023-10-05 | End: 2023-10-05

## 2023-10-05 RX ORDER — FOLIC ACID 0.8 MG
1 TABLET ORAL
Qty: 0 | Refills: 0 | DISCHARGE
Start: 2023-10-05

## 2023-10-05 RX ORDER — ONDANSETRON 8 MG/1
4 TABLET, FILM COATED ORAL EVERY 12 HOURS
Refills: 0 | Status: DISCONTINUED | OUTPATIENT
Start: 2023-10-05 | End: 2023-10-27

## 2023-10-05 RX ORDER — SIMETHICONE 80 MG/1
80 TABLET, CHEWABLE ORAL ONCE
Refills: 0 | Status: COMPLETED | OUTPATIENT
Start: 2023-10-05 | End: 2023-10-05

## 2023-10-05 RX ORDER — SEVELAMER CARBONATE 2400 MG/1
1 POWDER, FOR SUSPENSION ORAL
Qty: 0 | Refills: 0 | DISCHARGE

## 2023-10-05 RX ADMIN — Medication 1 TABLET(S): at 11:11

## 2023-10-05 RX ADMIN — MIDODRINE HYDROCHLORIDE 5 MILLIGRAM(S): 2.5 TABLET ORAL at 16:59

## 2023-10-05 RX ADMIN — PANTOPRAZOLE SODIUM 40 MILLIGRAM(S): 20 TABLET, DELAYED RELEASE ORAL at 07:03

## 2023-10-05 RX ADMIN — ATORVASTATIN CALCIUM 40 MILLIGRAM(S): 80 TABLET, FILM COATED ORAL at 22:58

## 2023-10-05 RX ADMIN — Medication 105 MILLIGRAM(S): at 01:01

## 2023-10-05 RX ADMIN — Medication 81 MILLIGRAM(S): at 11:12

## 2023-10-05 RX ADMIN — SIMETHICONE 80 MILLIGRAM(S): 80 TABLET, CHEWABLE ORAL at 22:58

## 2023-10-05 RX ADMIN — MIDODRINE HYDROCHLORIDE 5 MILLIGRAM(S): 2.5 TABLET ORAL at 23:58

## 2023-10-05 RX ADMIN — Medication 1 MILLIGRAM(S): at 11:12

## 2023-10-05 RX ADMIN — CLOPIDOGREL BISULFATE 75 MILLIGRAM(S): 75 TABLET, FILM COATED ORAL at 11:12

## 2023-10-05 RX ADMIN — Medication 1 TABLET(S): at 11:12

## 2023-10-05 RX ADMIN — Medication 75 MILLIGRAM(S): at 11:31

## 2023-10-05 RX ADMIN — MIDODRINE HYDROCHLORIDE 5 MILLIGRAM(S): 2.5 TABLET ORAL at 07:03

## 2023-10-05 RX ADMIN — MIDODRINE HYDROCHLORIDE 5 MILLIGRAM(S): 2.5 TABLET ORAL at 00:42

## 2023-10-05 RX ADMIN — Medication 25 GRAM(S): at 11:11

## 2023-10-05 NOTE — PROGRESS NOTE ADULT - PROBLEM SELECTOR PLAN 6
History of CAD with "cardiac events," patient not sure if stent placed but on home meds of asa, plavix. Elevated trop I 564.2. Etiology ischemic vs. due to missed HD for ESRD leading to volume overload and demand.  Troponin 144 (10/4) , downtrending.   -Continue home med aspirin 81 mg, plavix 75 mg  -Stop trending troponin

## 2023-10-05 NOTE — PHYSICAL THERAPY INITIAL EVALUATION ADULT - ADDITIONAL COMMENTS
Pt currently resides w/ 2 roomates in private home, 3 INDIRA. Has chairlift to access 2nd floor for bedroom. Primarily amb in [place]. Experienced 1 fall 3 months ago, unable to self recover. Indep w/ showering and dressing tasks prior to [facility] admission. Pt currently resides w/ 2 roomates in private home, 8 INDIRA. Has chairlift to access 2nd floor for bedroom. Primarily amb in [place]. Experienced 1 fall 3 months ago, unable to self recover. Indep w/ showering and dressing tasks prior to [facility] admission.

## 2023-10-05 NOTE — PROGRESS NOTE ADULT - PROBLEM SELECTOR PLAN 5
History of HLD, home med atorvastatin 40 mg. Patient unclear if had stent after MI.  -continue home med atorvastatin 40 mg

## 2023-10-05 NOTE — PROGRESS NOTE ADULT - PROBLEM SELECTOR PLAN 7
At admission Hg 10.2, Hct 32, .6. No baseline Hgb or iron studies for comparison. Home med iron 65 mg qd for ELMER, though MCV is macrocytic likely 2/2 folate deficiency. However, etiology of anemia likely multifactorial of ELMER, AOCD, and folate deficiency. No known history of GI bleeds, hemoptysis, hematochezia, melenic stool. B12 increased.   -Hold home med iron 65 mg  -order Iron studies for next lab draw  -F/u folate level  -Maintain active type and screen

## 2023-10-05 NOTE — PHYSICAL THERAPY INITIAL EVALUATION ADULT - PERTINENT HX OF CURRENT PROBLEM, REHAB EVAL
63 year old males with history of ESRD (HD 4 x M/T/TH/SAT)ELMER HLD, GERD, CAD presents after missed HD on 10/3/23 with increased shortness of breath at rest and brief bilateral leg weakness and two episodes of non-bloody emesis. He began HD 6 months prior with R chest port use, with recent left hand mapping 1-2 weeks ago for upcoming L AV fistula placement. He noticed increased shortness of breath without any recent travel or sick contacts. At baseline he breathes comfortably on room air, but required 3 L nasal cannula for 85% oxygen saturation in ED. He felt like his legs gave out earlier in the day and at baseline ambulates with a cane. He denies recent falls, but has fall history with most recent fall 3-4 weeks ago with no acute head injuries. He also chronically drinks alcohol, with 2-3 martini's per day with last drink at 2 pm on 10/3/23. He denies alcohol withdrawal hospitalizations in the past and no withdrawal seizures. He endorses some mid epigastric chest pain that does not radiate. He denies fever, chills, diarrhea, abdominal pain, headaches, neck pain, dizziness, or syncope. denies smoking. He receives all his care at [facility] and is compliant with medications. He took last took all his medications at home at 10/3/23 AM and takes all medications together in the morning to prevent forgetting taking his medications.

## 2023-10-05 NOTE — PHYSICAL THERAPY INITIAL EVALUATION ADULT - TRANSFER SAFETY CONCERNS NOTED: SIT/STAND, REHAB EVAL
Pt unsteady w/ dec weight shifting abilities. Maintained slight crouched posture 100% of the time./decreased weight-shifting ability

## 2023-10-05 NOTE — PROGRESS NOTE ADULT - ASSESSMENT
Patient is a 63 year old man with HFrEF (last known EF 30-40% a few weeks ago), ERSD on dialysis (M,T, TH, S) since February 2023, congestive hepatopathy, severe eczema, alcohol use disorder, who presents today with one day SOB and leg weakness/heaviness, admitted for HD.  Patient is a 63 year old M PMH HFrEF (last known EF 30-40% a few weeks ago), ERSD on dialysis (M,T, TH, S) since February 2023, congestive hepatopathy, alcohol use disorder, who presented with one day SOB and leg weakness/heaviness iso missed dialysis, admitted for HD.

## 2023-10-05 NOTE — PROGRESS NOTE ADULT - PROBLEM SELECTOR PLAN 9
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 B/l LE dry skin, likely venous stasis dermatitis on due to peripheral artery disease vs. heart failure related edema changes. Likely from niacin deficiency (pellagra) as etiology for dermatitis as patient has 3 month anorexia, fatigue, numbness. Recently prescribed Cerave w/o use  - Consider niacin supplementation (vitamin b3) or topical steroid  - lotion

## 2023-10-05 NOTE — PHYSICAL THERAPY INITIAL EVALUATION ADULT - GAIT DEVIATIONS NOTED, PT EVAL
Pt w/ dec weight shifting abilities/slightly unsteady gait however no LOB/falls or knee buckling observed. Dec step length and anette. Required assist w/ RW management especially during turns./decreased anette/decreased step length/decreased weight-shifting ability

## 2023-10-05 NOTE — PROGRESS NOTE ADULT - PROBLEM SELECTOR PLAN 3
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 Low suspicion for hepatic encephalopathy at this time; patient mentating AOx3 and conversant, w/o jaundice or non-icteric sclera. Low concern for hepatorenal syndrome as remains hemodynamically stable. No known gallbladder pathology established.  Abdominal US (10/4) Cirrhotic morphology. Pulsatile flow in the main and left portal veins. Moderate ascites. Increased right renal cortical echogenicity compatible with medical renal disease.  - consider paracentesis if increasing ascites   - address cardiac dysfunction as per above and with HD

## 2023-10-05 NOTE — PROGRESS NOTE ADULT - PROBLEM SELECTOR PLAN 2
History of HF with recent TTE EF 30-40% with Dr. Jacobo Cain ([facility]), Dr. Corey Souza ([facility] Failure). Edema and crackles resolved on exam. ProBNP >14389. Home med Bumex 2 mg MWF; no GDMT listed.  - Continue home med Bumex 2 mg MWF  - C strict I&Os  - GDMT to be started/slowly added on as outpatient

## 2023-10-05 NOTE — PROGRESS NOTE ADULT - ATTENDING COMMENTS
Patient is a 63 year old M PMH HFrEF (last known EF 30-40% a few weeks ago), ERSD on dialysis (M,T, TH, S) since February 2023, congestive hepatopathy, alcohol use disorder, who presented with one day SOB and leg weakness/heaviness iso missed dialysis, admitted for HD.     #Volume overload 2/2 missed HD session  #Anemia  #HF not in acute exacerbation   #ETOH use disorder    -s/p urgent HD 10/04 No HD session today. Nephrology following   -Midodrine, home dose   -EPO with HD for Hb of 10  -Repeat TFTs   -C/w CIWA monitoring and ativan for for CIWA >8  -Repeat TSH and free T4  PT recs for GENE

## 2023-10-05 NOTE — PROGRESS NOTE ADULT - SUBJECTIVE AND OBJECTIVE BOX
**INCOMPLETE NOTE    OVERNIGHT EVENTS: None    SUBJECTIVE:  Patient seen and examined at bedside, comfortable, NAD. Denied fever, chest pain, dyspnea, abdominal pain.     Vital Signs Last 12 Hrs  T(F): 97.4 (10-05-23 @ 05:54), Max: 98 (10-04-23 @ 22:54)  HR: 78 (10-05-23 @ 05:54) (63 - 100)  BP: 105/75 (10-05-23 @ 05:54) (105/75 - 124/81)  BP(mean): 87 (10-04-23 @ 23:17) (87 - 102)  RR: 18 (10-05-23 @ 05:54) (17 - 18)  SpO2: 100% (10-05-23 @ 05:54) (97% - 100%)  I&O's Summary    04 Oct 2023 07:01  -  05 Oct 2023 07:00  --------------------------------------------------------  IN: 811 mL / OUT: 1000 mL / NET: -189 mL        PHYSICAL EXAM:  Constitutional: NAD, comfortable in bed.  HEENT: NC/AT, PERRLA, EOMI, no conjunctival pallor or scleral icterus, MMM  Neck: Supple, no JVD  Respiratory: CTA B/L. No w/r/r.   Cardiovascular: RRR, normal S1 and S2, no m/r/g.   Gastrointestinal: +BS, soft NTND, no guarding or rebound tenderness, no palpable masses   Extremities: wwp; no cyanosis, clubbing or edema.   Vascular: Pulses equal and strong throughout.   Neurological: AAOx3, no CN deficits, strength and sensation intact throughout.   Skin: No gross skin abnormalities or rashes        LABS:                        10.2   5.47  )-----------( 108      ( 04 Oct 2023 05:49 )             31.9     10-04    132<L>  |  92<L>  |  19  ----------------------------<  99  3.8   |  22  |  4.01<H>    Ca    9.6      04 Oct 2023 05:49  Phos  4.6     10-04  Mg     1.6     10-04    TPro  7.0  /  Alb  3.2<L>  /  TBili  2.2<H>  /  DBili  x   /  AST  38  /  ALT  38  /  AlkPhos  267<H>  10-04    PT/INR - ( 04 Oct 2023 04:05 )   PT: 17.6 sec;   INR: 1.56          PTT - ( 04 Oct 2023 04:05 )  PTT:33.1 sec  Urinalysis Basic - ( 04 Oct 2023 05:49 )    Color: x / Appearance: x / SG: x / pH: x  Gluc: 99 mg/dL / Ketone: x  / Bili: x / Urobili: x   Blood: x / Protein: x / Nitrite: x   Leuk Esterase: x / RBC: x / WBC x   Sq Epi: x / Non Sq Epi: x / Bacteria: x      RADIOLOGY & ADDITIONAL TESTS:  - reviewed    MEDICATIONS  (STANDING):  aspirin enteric coated 81 milliGRAM(s) Oral daily  atorvastatin 40 milliGRAM(s) Oral at bedtime  buMETAnide 2 milliGRAM(s) Oral <User Schedule>  clopidogrel Tablet 75 milliGRAM(s) Oral daily  folic acid 1 milliGRAM(s) Oral daily  influenza  Vaccine (HIGH DOSE) 0.7 milliLiter(s) IntraMuscular once  midodrine. 5 milliGRAM(s) Oral every 8 hours  multivitamin 1 Tablet(s) Oral daily  pantoprazole    Tablet 40 milliGRAM(s) Oral before breakfast  pregabalin 75 milliGRAM(s) Oral daily  thiamine IVPB 500 milliGRAM(s) IV Intermittent every 24 hours    MEDICATIONS  (PRN):  LORazepam   Injectable 1 milliGRAM(s) IV Push every 1 hour PRN CIWA-Ar score 8 or greater   OVERNIGHT EVENTS:   - Overnight the patient had one small episode of emesis nbnb and given 4mg IV zofran. Patient resting comfortably. No other complaints at this time    SUBJECTIVE:    - Patient seen and examined at bedside, comfortable, NAD. Denied fever, chest pain, dyspnea, abdominal pain.     Vital Signs Last 12 Hrs  T(F): 97.4 (10-05-23 @ 05:54), Max: 98 (10-04-23 @ 22:54)  HR: 78 (10-05-23 @ 05:54) (63 - 100)  BP: 105/75 (10-05-23 @ 05:54) (105/75 - 124/81)  BP(mean): 87 (10-04-23 @ 23:17) (87 - 102)  RR: 18 (10-05-23 @ 05:54) (17 - 18)  SpO2: 100% (10-05-23 @ 05:54) (97% - 100%)    I&O's Summary  04 Oct 2023 07:01  -  05 Oct 2023 07:00  --------------------------------------------------------  IN: 811 mL / OUT: 1000 mL / NET: -189 mL    PHYSICAL EXAM:  CONSTITUTIONAL: NAD  EYES: PERRLA and symmetric, EOMI, no conjunctival or scleral injection, non-icteric; no nystagmus  ENMT: moist mucus membranes  NECK: Supple  RESP: No respiratory distress, no use of accessory muscles; CTAB b/l; no crackles   CV: RRR, +S1S2, no MRG; trace edema of b/l LE  GI: Soft, NT, mildly distended, no rebound, no guarding; +reducible umbilical hernia midline lower quadrant;+multiple abdominal striae   MSK: Normal ROM without pain, no spinal tenderness, normal muscle strength/tone  SKIN: +bilateral upper extremity maculopapular rash with excoriations as well on scalp; venous stasis dermatitis bilateral ankles and lower calves. No jaundice  NEURO: CN II-XII intact; normal reflexes in upper and lower extremities, sensation intact in upper and lower extremities b/l to light touch   PSYCH: Appropriate insight/judgment; AO x 3, mood and affect appropriate, recent/remote memory intact    LABS:                        10.2   5.47  )-----------( 108      ( 04 Oct 2023 05:49 )             31.9     10-04    132<L>  |  92<L>  |  19  ----------------------------<  99  3.8   |  22  |  4.01<H>    Ca    9.6      04 Oct 2023 05:49  Phos  4.6     10-04  Mg     1.6     10-04    TPro  7.0  /  Alb  3.2<L>  /  TBili  2.2<H>  /  DBili  x   /  AST  38  /  ALT  38  /  AlkPhos  267<H>  10-04    PT/INR - ( 04 Oct 2023 04:05 )   PT: 17.6 sec;   INR: 1.56          PTT - ( 04 Oct 2023 04:05 )  PTT:33.1 sec  Urinalysis Basic - ( 04 Oct 2023 05:49 )    Color: x / Appearance: x / SG: x / pH: x  Gluc: 99 mg/dL / Ketone: x  / Bili: x / Urobili: x   Blood: x / Protein: x / Nitrite: x   Leuk Esterase: x / RBC: x / WBC x   Sq Epi: x / Non Sq Epi: x / Bacteria: x    RADIOLOGY & ADDITIONAL TESTS:  - reviewed    MEDICATIONS  (STANDING):  aspirin enteric coated 81 milliGRAM(s) Oral daily  atorvastatin 40 milliGRAM(s) Oral at bedtime  buMETAnide 2 milliGRAM(s) Oral <User Schedule>  clopidogrel Tablet 75 milliGRAM(s) Oral daily  folic acid 1 milliGRAM(s) Oral daily  influenza  Vaccine (HIGH DOSE) 0.7 milliLiter(s) IntraMuscular once  midodrine. 5 milliGRAM(s) Oral every 8 hours  multivitamin 1 Tablet(s) Oral daily  pantoprazole    Tablet 40 milliGRAM(s) Oral before breakfast  pregabalin 75 milliGRAM(s) Oral daily  thiamine IVPB 500 milliGRAM(s) IV Intermittent every 24 hours    MEDICATIONS  (PRN):  LORazepam   Injectable 1 milliGRAM(s) IV Push every 1 hour PRN CIWA-Ar score 8 or greater

## 2023-10-05 NOTE — PROGRESS NOTE ADULT - PROBLEM SELECTOR PLAN 4
At admission BIENVENIDO 29. AST/AlT 45/43. Last drink 2 PM on 10/3/23, has 2-3 martinis daily. No history of alcohol withdrawals or withdrawal seizures. CIWA 2 this AM (anxiety). One non-bloody, clear/bilious emesis in past 24 hours. Qtc 397. 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   - c Zofran 4 mg IV q12 PRN nausea, vomiting   - CIWA protocol: q4 CIWA screens with CIWA>8 give 2 mg ativan  - Thiamine, folic acid, multivitamin  - Home meds B1 100 mg OTC, D3 1000 IU OTC, B12 1000 mcg OTC

## 2023-10-05 NOTE — PROGRESS NOTE ADULT - PROBLEM SELECTOR PLAN 10
F: none  E: Replete per HD with ESRD  N: renal restrictions  GI ppx: pantoprazole  DVT ppx: SCDs and on DAPT  Full code  Dispo: [facility]

## 2023-10-05 NOTE — PROGRESS NOTE ADULT - PROBLEM SELECTOR PLAN 1
ESRD, started on HD 6 months ago, 4x week (M/T/Th/Sat), missed HD session on 10/3/23. R port utilized. At admission, Cr 3.93 (un-established baseline Cr), K 3.4, Phos 4.4. Metabolic acidosis (AGAP 17) likely due to hypochloremic emesis / etoh leading to lactic acidosis   - continue home nephro chicih 60 300mg   - Restart midodrine 5 mg TID i/s/o hypotension after HD (10/4)   - HD session in AM today   - appreciate continued nephro recs  - avoid L hand blood draw as AV fistula will be placed there at [facility] in next couple of weeks

## 2023-10-06 LAB
ALBUMIN SERPL ELPH-MCNC: 2.9 G/DL — LOW (ref 3.3–5)
ALBUMIN SERPL ELPH-MCNC: 3.2 G/DL — LOW (ref 3.3–5)
ALBUMIN SERPL ELPH-MCNC: 3.3 G/DL — SIGNIFICANT CHANGE UP (ref 3.3–5)
ALP SERPL-CCNC: 222 U/L — HIGH (ref 40–120)
ALP SERPL-CCNC: 247 U/L — HIGH (ref 40–120)
ALP SERPL-CCNC: 249 U/L — HIGH (ref 40–120)
ALT FLD-CCNC: 40 U/L — SIGNIFICANT CHANGE UP (ref 10–45)
ALT FLD-CCNC: 44 U/L — SIGNIFICANT CHANGE UP (ref 10–45)
ALT FLD-CCNC: 45 U/L — SIGNIFICANT CHANGE UP (ref 10–45)
ANION GAP SERPL CALC-SCNC: 13 MMOL/L — SIGNIFICANT CHANGE UP (ref 5–17)
ANION GAP SERPL CALC-SCNC: 14 MMOL/L — SIGNIFICANT CHANGE UP (ref 5–17)
ANION GAP SERPL CALC-SCNC: 17 MMOL/L — SIGNIFICANT CHANGE UP (ref 5–17)
ANISOCYTOSIS BLD QL: SIGNIFICANT CHANGE UP
APTT BLD: 161.6 SEC — CRITICAL HIGH (ref 24.5–35.6)
APTT BLD: 172.6 SEC — CRITICAL HIGH (ref 24.5–35.6)
APTT BLD: 31.4 SEC — SIGNIFICANT CHANGE UP (ref 24.5–35.6)
AST SERPL-CCNC: 40 U/L — SIGNIFICANT CHANGE UP (ref 10–40)
AST SERPL-CCNC: 45 U/L — HIGH (ref 10–40)
AST SERPL-CCNC: 48 U/L — HIGH (ref 10–40)
BASE EXCESS BLDA CALC-SCNC: -2.8 MMOL/L — LOW (ref -2–3)
BASE EXCESS BLDA CALC-SCNC: -3.5 MMOL/L — LOW (ref -2–3)
BASE EXCESS BLDV CALC-SCNC: -0.8 MMOL/L — SIGNIFICANT CHANGE UP (ref -2–3)
BASOPHILS # BLD AUTO: 0.18 K/UL — SIGNIFICANT CHANGE UP (ref 0–0.2)
BASOPHILS NFR BLD AUTO: 2.8 % — HIGH (ref 0–2)
BILIRUB SERPL-MCNC: 1.5 MG/DL — HIGH (ref 0.2–1.2)
BILIRUB SERPL-MCNC: 1.6 MG/DL — HIGH (ref 0.2–1.2)
BILIRUB SERPL-MCNC: 1.8 MG/DL — HIGH (ref 0.2–1.2)
BUN SERPL-MCNC: 23 MG/DL — SIGNIFICANT CHANGE UP (ref 7–23)
BUN SERPL-MCNC: 24 MG/DL — HIGH (ref 7–23)
BUN SERPL-MCNC: 26 MG/DL — HIGH (ref 7–23)
BURR CELLS BLD QL SMEAR: PRESENT — SIGNIFICANT CHANGE UP
CALCIUM SERPL-MCNC: 9.4 MG/DL — SIGNIFICANT CHANGE UP (ref 8.4–10.5)
CALCIUM SERPL-MCNC: 9.7 MG/DL — SIGNIFICANT CHANGE UP (ref 8.4–10.5)
CALCIUM SERPL-MCNC: 9.9 MG/DL — SIGNIFICANT CHANGE UP (ref 8.4–10.5)
CHLORIDE SERPL-SCNC: 91 MMOL/L — LOW (ref 96–108)
CHLORIDE SERPL-SCNC: 92 MMOL/L — LOW (ref 96–108)
CHLORIDE SERPL-SCNC: 93 MMOL/L — LOW (ref 96–108)
CK MB CFR SERPL CALC: 5.4 NG/ML — SIGNIFICANT CHANGE UP (ref 0–6.7)
CK SERPL-CCNC: 67 U/L — SIGNIFICANT CHANGE UP (ref 30–200)
CO2 BLDA-SCNC: 23 MMOL/L — SIGNIFICANT CHANGE UP (ref 19–24)
CO2 BLDA-SCNC: 25 MMOL/L — HIGH (ref 19–24)
CO2 BLDV-SCNC: 27.8 MMOL/L — HIGH (ref 22–26)
CO2 SERPL-SCNC: 22 MMOL/L — SIGNIFICANT CHANGE UP (ref 22–31)
CO2 SERPL-SCNC: 23 MMOL/L — SIGNIFICANT CHANGE UP (ref 22–31)
CO2 SERPL-SCNC: 25 MMOL/L — SIGNIFICANT CHANGE UP (ref 22–31)
CREAT SERPL-MCNC: 3.92 MG/DL — HIGH (ref 0.5–1.3)
CREAT SERPL-MCNC: 4.06 MG/DL — HIGH (ref 0.5–1.3)
CREAT SERPL-MCNC: 4.11 MG/DL — HIGH (ref 0.5–1.3)
D DIMER BLD IA.RAPID-MCNC: 2174 NG/ML DDU — HIGH
EGFR: 15 ML/MIN/1.73M2 — LOW
EGFR: 16 ML/MIN/1.73M2 — LOW
EGFR: 16 ML/MIN/1.73M2 — LOW
ELLIPTOCYTES BLD QL SMEAR: SLIGHT — SIGNIFICANT CHANGE UP
EOSINOPHIL # BLD AUTO: 0.06 K/UL — SIGNIFICANT CHANGE UP (ref 0–0.5)
EOSINOPHIL NFR BLD AUTO: 0.9 % — SIGNIFICANT CHANGE UP (ref 0–6)
FIBRINOGEN PPP-MCNC: 160 MG/DL — LOW (ref 200–445)
GAS PNL BLDA: SIGNIFICANT CHANGE UP
GIANT PLATELETS BLD QL SMEAR: PRESENT — SIGNIFICANT CHANGE UP
GLUCOSE BLDC GLUCOMTR-MCNC: 108 MG/DL — HIGH (ref 70–99)
GLUCOSE BLDC GLUCOMTR-MCNC: 37 MG/DL — CRITICAL LOW (ref 70–99)
GLUCOSE BLDC GLUCOMTR-MCNC: 64 MG/DL — LOW (ref 70–99)
GLUCOSE BLDC GLUCOMTR-MCNC: 72 MG/DL — SIGNIFICANT CHANGE UP (ref 70–99)
GLUCOSE SERPL-MCNC: 101 MG/DL — HIGH (ref 70–99)
GLUCOSE SERPL-MCNC: 91 MG/DL — SIGNIFICANT CHANGE UP (ref 70–99)
GLUCOSE SERPL-MCNC: 93 MG/DL — SIGNIFICANT CHANGE UP (ref 70–99)
HCO3 BLDA-SCNC: 22 MMOL/L — SIGNIFICANT CHANGE UP (ref 21–28)
HCO3 BLDA-SCNC: 23 MMOL/L — SIGNIFICANT CHANGE UP (ref 21–28)
HCO3 BLDV-SCNC: 26 MMOL/L — SIGNIFICANT CHANGE UP (ref 22–29)
HCT VFR BLD CALC: 31.5 % — LOW (ref 39–50)
HCT VFR BLD CALC: 31.6 % — LOW (ref 39–50)
HCT VFR BLD CALC: 33.3 % — LOW (ref 39–50)
HCT VFR BLD CALC: 35.2 % — LOW (ref 39–50)
HGB BLD CALC-MCNC: 10.9 G/DL — LOW (ref 12.6–17.4)
HGB BLD-MCNC: 10.1 G/DL — LOW (ref 13–17)
HGB BLD-MCNC: 10.3 G/DL — LOW (ref 13–17)
HGB BLD-MCNC: 10.4 G/DL — LOW (ref 13–17)
HGB BLD-MCNC: 10.9 G/DL — LOW (ref 13–17)
INR BLD: 1.63 — HIGH (ref 0.85–1.18)
LACTATE SERPL-SCNC: 1.2 MMOL/L — SIGNIFICANT CHANGE UP (ref 0.5–2)
LACTATE SERPL-SCNC: 2 MMOL/L — SIGNIFICANT CHANGE UP (ref 0.5–2)
LACTATE SERPL-SCNC: 2.3 MMOL/L — HIGH (ref 0.5–2)
LYMPHOCYTES # BLD AUTO: 0.06 K/UL — LOW (ref 1–3.3)
LYMPHOCYTES # BLD AUTO: 0.9 % — LOW (ref 13–44)
MACROCYTES BLD QL: SIGNIFICANT CHANGE UP
MAGNESIUM SERPL-MCNC: 1.7 MG/DL — SIGNIFICANT CHANGE UP (ref 1.6–2.6)
MAGNESIUM SERPL-MCNC: 1.7 MG/DL — SIGNIFICANT CHANGE UP (ref 1.6–2.6)
MANUAL SMEAR VERIFICATION: SIGNIFICANT CHANGE UP
MCHC RBC-ENTMCNC: 31 GM/DL — LOW (ref 32–36)
MCHC RBC-ENTMCNC: 31.2 GM/DL — LOW (ref 32–36)
MCHC RBC-ENTMCNC: 32.1 GM/DL — SIGNIFICANT CHANGE UP (ref 32–36)
MCHC RBC-ENTMCNC: 32.6 GM/DL — SIGNIFICANT CHANGE UP (ref 32–36)
MCHC RBC-ENTMCNC: 33 PG — SIGNIFICANT CHANGE UP (ref 27–34)
MCHC RBC-ENTMCNC: 33.1 PG — SIGNIFICANT CHANGE UP (ref 27–34)
MCHC RBC-ENTMCNC: 33.2 PG — SIGNIFICANT CHANGE UP (ref 27–34)
MCHC RBC-ENTMCNC: 33.3 PG — SIGNIFICANT CHANGE UP (ref 27–34)
MCV RBC AUTO: 101.9 FL — HIGH (ref 80–100)
MCV RBC AUTO: 104 FL — HIGH (ref 80–100)
MCV RBC AUTO: 105.7 FL — HIGH (ref 80–100)
MCV RBC AUTO: 107 FL — HIGH (ref 80–100)
METHGB MFR BLDV: 0.6 % — SIGNIFICANT CHANGE UP
MICROCYTES BLD QL: SLIGHT — SIGNIFICANT CHANGE UP
MONOCYTES # BLD AUTO: 0.3 K/UL — SIGNIFICANT CHANGE UP (ref 0–0.9)
MONOCYTES NFR BLD AUTO: 4.7 % — SIGNIFICANT CHANGE UP (ref 2–14)
NEUTROPHILS # BLD AUTO: 5.68 K/UL — SIGNIFICANT CHANGE UP (ref 1.8–7.4)
NEUTROPHILS NFR BLD AUTO: 89.8 % — HIGH (ref 43–77)
NRBC # BLD: 0 /100 WBCS — SIGNIFICANT CHANGE UP (ref 0–0)
NRBC # BLD: 1 /100 — HIGH (ref 0–0)
NRBC # BLD: SIGNIFICANT CHANGE UP /100 WBCS (ref 0–0)
OVALOCYTES BLD QL SMEAR: SLIGHT — SIGNIFICANT CHANGE UP
PCO2 BLDA: 41 MMHG — SIGNIFICANT CHANGE UP (ref 35–48)
PCO2 BLDA: 44 MMHG — SIGNIFICANT CHANGE UP (ref 35–48)
PCO2 BLDV: 52 MMHG — SIGNIFICANT CHANGE UP (ref 42–55)
PH BLDA: 7.33 — LOW (ref 7.35–7.45)
PH BLDA: 7.34 — LOW (ref 7.35–7.45)
PH BLDV: 7.31 — LOW (ref 7.32–7.43)
PHOSPHATE SERPL-MCNC: 4.7 MG/DL — HIGH (ref 2.5–4.5)
PHOSPHATE SERPL-MCNC: 4.7 MG/DL — HIGH (ref 2.5–4.5)
PLAT MORPH BLD: ABNORMAL
PLATELET # BLD AUTO: 102 K/UL — LOW (ref 150–400)
PLATELET # BLD AUTO: 104 K/UL — LOW (ref 150–400)
PLATELET # BLD AUTO: 83 K/UL — LOW (ref 150–400)
PLATELET # BLD AUTO: 90 K/UL — LOW (ref 150–400)
PO2 BLDA: 415 MMHG — HIGH (ref 83–108)
PO2 BLDA: 481 MMHG — HIGH (ref 83–108)
PO2 BLDV: 39 MMHG — SIGNIFICANT CHANGE UP (ref 25–45)
POIKILOCYTOSIS BLD QL AUTO: SIGNIFICANT CHANGE UP
POLYCHROMASIA BLD QL SMEAR: SLIGHT — SIGNIFICANT CHANGE UP
POTASSIUM SERPL-MCNC: 3.6 MMOL/L — SIGNIFICANT CHANGE UP (ref 3.5–5.3)
POTASSIUM SERPL-MCNC: 3.8 MMOL/L — SIGNIFICANT CHANGE UP (ref 3.5–5.3)
POTASSIUM SERPL-MCNC: 3.8 MMOL/L — SIGNIFICANT CHANGE UP (ref 3.5–5.3)
POTASSIUM SERPL-SCNC: 3.6 MMOL/L — SIGNIFICANT CHANGE UP (ref 3.5–5.3)
POTASSIUM SERPL-SCNC: 3.8 MMOL/L — SIGNIFICANT CHANGE UP (ref 3.5–5.3)
POTASSIUM SERPL-SCNC: 3.8 MMOL/L — SIGNIFICANT CHANGE UP (ref 3.5–5.3)
PROT SERPL-MCNC: 6.2 G/DL — SIGNIFICANT CHANGE UP (ref 6–8.3)
PROT SERPL-MCNC: 6.9 G/DL — SIGNIFICANT CHANGE UP (ref 6–8.3)
PROT SERPL-MCNC: 6.9 G/DL — SIGNIFICANT CHANGE UP (ref 6–8.3)
PROTHROM AB SERPL-ACNC: 18.3 SEC — HIGH (ref 9.5–13)
RBC # BLD: 3.03 M/UL — LOW (ref 4.2–5.8)
RBC # BLD: 3.1 M/UL — LOW (ref 4.2–5.8)
RBC # BLD: 3.15 M/UL — LOW (ref 4.2–5.8)
RBC # BLD: 3.29 M/UL — LOW (ref 4.2–5.8)
RBC # FLD: 18.1 % — HIGH (ref 10.3–14.5)
RBC # FLD: 18.1 % — HIGH (ref 10.3–14.5)
RBC # FLD: 18.3 % — HIGH (ref 10.3–14.5)
RBC # FLD: 18.3 % — HIGH (ref 10.3–14.5)
RBC BLD AUTO: ABNORMAL
SAO2 % BLDA: 100 % — HIGH (ref 94–98)
SAO2 % BLDA: 100 % — HIGH (ref 94–98)
SAO2 % BLDV: 62.7 % — LOW (ref 67–88)
SMUDGE CELLS # BLD: PRESENT — SIGNIFICANT CHANGE UP
SODIUM SERPL-SCNC: 130 MMOL/L — LOW (ref 135–145)
T4 FREE SERPL-MCNC: 0.98 NG/DL — SIGNIFICANT CHANGE UP (ref 0.93–1.7)
TROPONIN T, HIGH SENSITIVITY RESULT: 104 NG/L — CRITICAL HIGH (ref 0–51)
TROPONIN T, HIGH SENSITIVITY RESULT: 114 NG/L — CRITICAL HIGH (ref 0–51)
TSH SERPL-MCNC: 4.7 UIU/ML — HIGH (ref 0.27–4.2)
VARIANT LYMPHS # BLD: 0.9 % — SIGNIFICANT CHANGE UP (ref 0–6)
WBC # BLD: 4.82 K/UL — SIGNIFICANT CHANGE UP (ref 3.8–10.5)
WBC # BLD: 5.2 K/UL — SIGNIFICANT CHANGE UP (ref 3.8–10.5)
WBC # BLD: 5.97 K/UL — SIGNIFICANT CHANGE UP (ref 3.8–10.5)
WBC # BLD: 6.32 K/UL — SIGNIFICANT CHANGE UP (ref 3.8–10.5)
WBC # FLD AUTO: 4.82 K/UL — SIGNIFICANT CHANGE UP (ref 3.8–10.5)
WBC # FLD AUTO: 5.2 K/UL — SIGNIFICANT CHANGE UP (ref 3.8–10.5)
WBC # FLD AUTO: 5.97 K/UL — SIGNIFICANT CHANGE UP (ref 3.8–10.5)
WBC # FLD AUTO: 6.32 K/UL — SIGNIFICANT CHANGE UP (ref 3.8–10.5)

## 2023-10-06 PROCEDURE — 76604 US EXAM CHEST: CPT | Mod: 26,GC

## 2023-10-06 PROCEDURE — 99291 CRITICAL CARE FIRST HOUR: CPT

## 2023-10-06 PROCEDURE — 36000 PLACE NEEDLE IN VEIN: CPT

## 2023-10-06 PROCEDURE — 99232 SBSQ HOSP IP/OBS MODERATE 35: CPT | Mod: GC

## 2023-10-06 PROCEDURE — 99223 1ST HOSP IP/OBS HIGH 75: CPT | Mod: GC

## 2023-10-06 PROCEDURE — 93308 TTE F-UP OR LMTD: CPT | Mod: 26,GC

## 2023-10-06 PROCEDURE — 93308 TTE F-UP OR LMTD: CPT | Mod: 26

## 2023-10-06 PROCEDURE — 76937 US GUIDE VASCULAR ACCESS: CPT | Mod: 26

## 2023-10-06 PROCEDURE — 99292 CRITICAL CARE ADDL 30 MIN: CPT

## 2023-10-06 PROCEDURE — 99291 CRITICAL CARE FIRST HOUR: CPT | Mod: 25

## 2023-10-06 PROCEDURE — 36620 INSERTION CATHETER ARTERY: CPT | Mod: 76

## 2023-10-06 RX ORDER — HEPARIN SODIUM 5000 [USP'U]/ML
INJECTION INTRAVENOUS; SUBCUTANEOUS
Qty: 25000 | Refills: 0 | Status: DISCONTINUED | OUTPATIENT
Start: 2023-10-06 | End: 2023-10-06

## 2023-10-06 RX ORDER — DEXTROSE 50 % IN WATER 50 %
50 SYRINGE (ML) INTRAVENOUS ONCE
Refills: 0 | Status: DISCONTINUED | OUTPATIENT
Start: 2023-10-06 | End: 2023-10-06

## 2023-10-06 RX ORDER — DOBUTAMINE HCL 250MG/20ML
2.5 VIAL (ML) INTRAVENOUS
Qty: 1000 | Refills: 0 | Status: DISCONTINUED | OUTPATIENT
Start: 2023-10-06 | End: 2023-10-06

## 2023-10-06 RX ORDER — HEPARIN SODIUM 5000 [USP'U]/ML
1600 INJECTION INTRAVENOUS; SUBCUTANEOUS
Qty: 25000 | Refills: 0 | Status: DISCONTINUED | OUTPATIENT
Start: 2023-10-06 | End: 2023-10-08

## 2023-10-06 RX ORDER — DOBUTAMINE HCL 250MG/20ML
2.5 VIAL (ML) INTRAVENOUS
Qty: 500 | Refills: 0 | Status: DISCONTINUED | OUTPATIENT
Start: 2023-10-06 | End: 2023-10-10

## 2023-10-06 RX ADMIN — PANTOPRAZOLE SODIUM 40 MILLIGRAM(S): 20 TABLET, DELAYED RELEASE ORAL at 06:23

## 2023-10-06 RX ADMIN — HEPARIN SODIUM 1800 UNIT(S)/HR: 5000 INJECTION INTRAVENOUS; SUBCUTANEOUS at 12:17

## 2023-10-06 RX ADMIN — ONDANSETRON 4 MILLIGRAM(S): 8 TABLET, FILM COATED ORAL at 05:37

## 2023-10-06 RX ADMIN — Medication 7.66 MICROGRAM(S)/KG/MIN: at 18:31

## 2023-10-06 RX ADMIN — ATORVASTATIN CALCIUM 40 MILLIGRAM(S): 80 TABLET, FILM COATED ORAL at 22:01

## 2023-10-06 RX ADMIN — Medication 105 MILLIGRAM(S): at 01:44

## 2023-10-06 RX ADMIN — MIDODRINE HYDROCHLORIDE 5 MILLIGRAM(S): 2.5 TABLET ORAL at 06:22

## 2023-10-06 RX ADMIN — MIDODRINE HYDROCHLORIDE 5 MILLIGRAM(S): 2.5 TABLET ORAL at 23:11

## 2023-10-06 NOTE — PROVIDER CONTACT NOTE (CHANGE IN STATUS NOTIFICATION) - ASSESSMENT
Patient came down in HD from floor unit. On assessment, pt 02 sat 85%, pt complain of not feeling well and complained of SOB. Extremities noted to be cyanotic. /84, HR 92, RR- 22, temp: 97.5, O2 sat 85%. Nephrology team at bedside assessing pt.

## 2023-10-06 NOTE — PROVIDER CONTACT NOTE (CHANGE IN STATUS NOTIFICATION) - SITUATION
Patient came down in HD from floor unit. On assessment, pt 02 sat 85%, pt complain of not feeling well and complained of SOB. Extremities noted to be cyanotic.

## 2023-10-06 NOTE — PROGRESS NOTE ADULT - PROBLEM SELECTOR PLAN 7
At admission Hg 10.2, Hct 32, .6. No baseline Hg or iron studies for comparison. Home med iron 65 mg qd for ELMER, though MCV is macrocytic likely 2/2 folate or b12 deficiency. Etiology of anemia likely multifactorial of ELMER, AOCD, and vitb12/folate deficiency. No known history of GI bleeds, hemoptysis, hematochezia, melenic stool.  -Hold home med iron 65 mg  -Iron studies for morning labs  -F/u vit b12, folate levels  -F/u coag panel  -Maintain active type and screen At admission Hg 10.2, Hct 32, .6. No baseline Hgb or iron studies for comparison. Home med iron 65 mg qd for ELMER, though MCV is macrocytic likely 2/2 folate deficiency. However, etiology of anemia likely multifactorial of ELMER, AOCD, and folate deficiency. No known history of GI bleeds, hemoptysis, hematochezia, melenic stool. B12 increased.   -Hold home med iron 65 mg  -F/u folate level  -Maintain active type and screen, order placed for AM labs

## 2023-10-06 NOTE — PROGRESS NOTE ADULT - ASSESSMENT
63Y M w/hx of ESRD (HD 4x weekly M/T/TH/SAT) admitted for SOB and generalized weakness and fatigue missed HD, now h/c c/b cardiogenic shock, started on inotropes and stepped up to CCU for further monitoring       #ESRD  Last HD 10/4 tolerated 2L with no complications   now on dobutamine     Plan:  CVVHD with net negative 60cc/hr adjust Uf based on HDS as below   CRRT Treatment:   Modality: CVVHD, Filter: NxStage [id number], Target Blood Flow: 300 mL/Min  Target Fluid Balance: Titrate to net NEGATIVE fluid balance, 60 mL/Hr     Access:  R TDC c/d/i       HTN:  BP stable   On Midodrine 5mg TID   UF with HD as tolerated       Anemia:  Hgb at goal 10.3  EPO with HD     MBD;  Calcium: 9.4  Phos: 4.7  Continue phos binders

## 2023-10-06 NOTE — PROCEDURE NOTE - NSUS ED ADDITIONAL DETAIL1 FT
b/l a line pattern anteriorly  no pleffs  ascites b/l  posterior small atelectasis worse on R, with b lines
RV dilated, decreased systolic function  LVEF severely reduced  wall motion abnormality   bowing if interventricular septum in systole- mild  pericardial effusion- moderate. no tamponade physiology present  VTI ~8.5 (single measurement in a patient in afib may be imprecise, but overall low VTI)

## 2023-10-06 NOTE — PROGRESS NOTE ADULT - PROBLEM SELECTOR PLAN 9
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  with. Bilateral lower extremity 2+edema with topical dry skin, likely venous stasis dermatitis on due to peripheral artery disease vs. heart failure related edema changes. Likely from niacin deficiency (pellagra) as etiology for dermatitis as patient has 3 month anorexia, fatigue, numbness.  -Obtain collateral from 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 B/l LE dry skin, likely venous stasis dermatitis on due to peripheral artery disease vs. heart failure related edema changes. Likely from niacin deficiency (pellagra) as etiology for dermatitis as patient has 3 month anorexia, fatigue, numbness. Recently prescribed Cerave w/o use  - Consider niacin supplementation (vitamin b3) or topical steroid  - lotion inpatient  - f/u outpt dermatologist

## 2023-10-06 NOTE — CHART NOTE - NSCHARTNOTEFT_GEN_A_CORE
64 yo M PMHx of ESRD (HD M/T/TH/SAT), ELMER, HTn, HLD, CAD (unknown MI/PCI history), CHF admitted with shortness of breath in the setting of a missed HD session. TTE this admission with Bi-ventricular dysfunction (possible EtOH induced vs ischemic) which was seen on recent TTE done as outpatient ([facility] Cardiology/HF).     RRT called today for hypotension (SBP 90s) and acute mental status change and reported hypoxia.  CCU was consulted for admission due to concern for cardiogenic shock.     On my arrival patient was mentating appropriately and /80 with warm extremities. He was empirically placed on HiFlow NC due to inability to obtain accurate pulse ox (appearance of finger appeared mottled).   Limited TTE (technically difficult study) with moderate LV systolic dysfunction, RV appeared dilated with reduced systolic dysfunction. Additionally, there was a moderate pericardial effusion located posterior to LV.  No respiratory variation to PW doppler of MV to suggest tamponade physiology.   Lactate 2.0 and LFTs were stable.     The patient does not appear to be in cardiogenic shock but agree with empiric heparin gtt in case of PE, and worsened RV function.  - Official TTE ordered to re-evaluate RV function , echo lab called and expedited.  - consider CTA chest to r/o PE   - Cardiology consult service to follow     discussed with CCU attending Dr. Arun Guadarrama MD  Cardiovascular Disease Fellow, PGY-6 64 yo M PMHx of ESRD (HD M/T/TH/SAT), ELMER, HTn, HLD, CAD (unknown MI/PCI history), CHF admitted with shortness of breath in the setting of a missed HD session. TTE this admission with Bi-ventricular dysfunction (possible EtOH induced vs ischemic) which was seen on recent TTE done as outpatient ([facility] Cardiology/HF).     RRT called today for hypotension (SBP 90s) and acute mental status change and reported hypoxia.  CCU was consulted for admission due to concern for cardiogenic shock.     On my arrival patient was mentating appropriately and /80 with warm extremities. He was empirically placed on HiFlow NC due to inability to obtain accurate pulse ox (appearance of finger appeared mottled).   Limited TTE (technically difficult study) with moderate-severe LV systolic dysfunction, RV appeared dilated with reduced systolic dysfunction. Additionally, there was a moderate pericardial effusion located posterior to LV.  No respiratory variation to PW doppler of MV to suggest tamponade physiology.   Lactate 2.0 and LFTs were stable.     The patient does not appear to be in cardiogenic shock but agree with empiric heparin gtt in case of PE, and worsened RV function.  - Official TTE ordered to re-evaluate RV function , echo lab called and expedited.  - consider CTA chest to r/o PE   - Cardiology consult service to follow     discussed with CCU attending Dr. Arun Guadarrama MD  Cardiovascular Disease Fellow, PGY-6 64 yo M PMHx of ESRD (HD M/T/TH/SAT), ELMER, HTn, HLD, CAD (unknown MI/PCI history), CHF admitted with shortness of breath in the setting of a missed HD session. TTE this admission with Bi-ventricular dysfunction (possible EtOH induced vs ischemic) which was seen on recent TTE done as outpatient ([facility] Cardiology/HF).     RRT called today for hypotension (SBP 90s) and acute mental status change and reported hypoxia.  CCU was consulted for admission due to concern for cardiogenic shock.     On my arrival patient was mentating appropriately and /80 with warm extremities. He was empirically placed on HiFlow NC due to inability to obtain accurate pulse ox (appearance of finger appeared mottled).   Limited TTE (technically difficult study) with moderateLV systolic dysfunction, RV appeared dilated with reduced systolic dysfunction. Additionally, there was a moderate pericardial effusion located posterior to LV.  No respiratory variation to PW doppler of MV to suggest tamponade physiology. VTI low but inaccurate in setting of afib. Lactate 2.0 and LFTs were stable.     The patient does not appear to be in cardiogenic shock but agree with empiric heparin gtt in case of PE, and worsened RV function.  - Official TTE ordered to re-evaluate RV function , echo lab called and expedited.  - consider CTA chest to r/o PE   - Cardiology consult service to follow     discussed with CCU attending Dr. Arun Guadarrama MD  Cardiovascular Disease Fellow, PGY-6

## 2023-10-06 NOTE — PROGRESS NOTE ADULT - PROBLEM SELECTOR PLAN 8
History of GERD, complains of epigastric pain.   -Continue home med omeprazole 40 mg qd History of GERD, no complaints of epigastric pain at this time.   -Continue pantoprazole 40 mg qd.

## 2023-10-06 NOTE — CONSULT NOTE ADULT - SUBJECTIVE AND OBJECTIVE BOX
63M w/ PMHx of ESRD on HD (M/T/Th/Sat via R SC Permacath placed 6 months ago per pt, pending LUE AVF placement), CAD, ELMER, HLD, GERD, and ?ETOH abuse (drinks 2-3 martini's daily, last drink 2pm 10/3/23) who initially presented to [facility] after missed HD session 10/3/23 c/o SOB and brief b/l LE weakness, as well as two episodes of non-bloody emesis. Since admission course s/f new initial O2 requirement (satting 85% on RA, improved to 98% on 3L NC and since weaned off to RA), Dx of new onset Afib, an uncomplicated HD session on 10/4/23 w/ 2L removed after which home midodrine was restarted for soft BP's, and TTE 10/5 revealing moderately reduced LVEF w/ RWMA, reduced RV function w/ dilated RV, mod AR w/ mild-mod AS, mod-severe TR, PASP 37, and small-mod pericardial effusion w/o tamponade physiology. Pt was planned to undergo additional HD session today (10/6).    RRT called at 9:40am for episode of hypotension to SBP 90's and associated AMS prior to HD session.    On arrival VS: T 97.5F oral, HR 89 irregularly irregular, /84, RR 24 non-labored, SpO2 85% but w/ difficulty obtaining good waveform but pt noted to have peripheral cyanosis of b/l hands and feet; FS BGL 72. Pt reports feeling tired/lethargic, but otherwise no complaints and further denies chest pain, SOB, RAMIREZ, palpitations, dizziness, LOC, N/V/D, fever/chills/sick contact, diaphoresis, orthopnea/PND, and leg swelling. Pt was then transitioned to NRB 15L, still w/ difficulty obtaining SpO2 reading and no significant improvement in distal extremity cyanosis, and was subsequently transitioned to HFNC w/ gradual resolution of peripheral cyanosis, and ABG and venous bloodwork was sent. Pt administered D50 IV x1 amp, repeat FS . POCUS reveals A-line pattern b/l, no LE DVT b/l. STAT Cardiology consult was placed and bedside echo performed by Cardiology fellow reveals grossly reduced EF w/ RV dilation (>1:1 ratio w/ LV) and ?intraventricular septal bowing into LV, biatrial dilation, and significant AS/AR. EKG reveals low voltage Afib w/ incomplete RBBB; no ischemic changes.    Allergies  penicillins (Unknown)    Home Medications:  aspirin 81 mg oral tablet: 1 tab(s) orally once a day (04 Oct 2023 01:57)  atorvastatin 40 mg oral tablet: 1 tab(s) orally once a day (04 Oct 2023 02:00)  Bumex 2 mg oral tablet: 1 tab(s) orally Monday, Wednesday, and Friday (04 Oct 2023 01:57)  cyproheptadine 4 mg oral tablet: 1 tab(s) orally 3 times a day (04 Oct 2023 04:07)  folic acid 1 mg oral tablet: 1 tab(s) orally once a day (05 Oct 2023 14:11)  midodrine 5 mg oral tablet: 1 tab(s) orally 3 times a day (05 Oct 2023 14:11)  omeprazole 40 mg oral delayed release capsule: 1 cap(s) orally once a day (04 Oct 2023 01:58)  Plavix 75 mg oral tablet: 1 tab(s) orally once a day (04 Oct 2023 02:02)  pregabalin 75 mg oral capsule: 1 cap(s) orally once a day (04 Oct 2023 01:58)  sevelamer hydrochloride 800 mg oral tablet: 1 tab(s) orally 3 times a day (05 Oct 2023 14:11)    PAST MEDICAL & SURGICAL HISTORY:  HTN (hypertension)  HLD (hyperlipidemia)  Chronic kidney disease  H/O bilateral hip replacements    ROS:  See HPI     PHYSICAL EXAM    Vital Signs Last 24 Hrs  T(C): 36.3 (06 Oct 2023 05:51), Max: 36.3 (05 Oct 2023 12:47)  T(F): 97.4 (06 Oct 2023 05:51), Max: 97.4 (05 Oct 2023 12:47)  HR: 92 (06 Oct 2023 10:06) (91 - 110)  BP: 115/78 (06 Oct 2023 05:51) (113/77 - 120/80)  BP(mean): --  RR: 20 (06 Oct 2023 10:06) (17 - 20)  SpO2: 98% (06 Oct 2023 10:06) (98% - 99%)    Parameters below as of 06 Oct 2023 10:06  Patient On (Oxygen Delivery Method): nasal cannula, high flow  O2 Flow (L/min): 50  O2 Concentration (%): 100      General:  awake and in no apparent distress. Somnolent but easily arrousable.  HEENT:  VIPIN              Lymphatic system: No LN  Lungs: Bilateral BS  Cardiovascular: Irregularly irregular rate and rhythm ,+S1/+S2, rubs, gallops. + 3/6 Systolic crescendo/decrescendo murmur at R upper sternal border.  Gastrointestinal: Soft, Positive BS  Musculoskeletal: No clubbing.  Moves all extremities.    Skin: Warm.  Intact, cyanosed in distal extremities and cool  Neurological: No motor or sensory deficit     10-06-23 @ 07:01  -  10-06-23 @ 20:12  --------------------------------------------------------  IN:    DOBUTamine: 23 mL    Heparin Infusion: 126 mL  Total IN: 149 mL    OUT:  Total OUT: 0 mL    Total NET: 149 mL          LABS:                          10.3   5.20  )-----------( 90       ( 06 Oct 2023 18:33 )             31.6                                               10-06    130<L>  |  92<L>  |  26<H>  ----------------------------<  101<H>  3.6   |  25  |  4.11<H>    Ca    9.4      06 Oct 2023 17:37  Phos  4.7     10-06  Mg     1.7     10-06    TPro  6.2  /  Alb  2.9<L>  /  TBili  1.5<H>  /  DBili  x   /  AST  40  /  ALT  40  /  AlkPhos  222<H>  10-06      PT/INR - ( 06 Oct 2023 10:18 )   PT: 18.3 sec;   INR: 1.63          PTT - ( 06 Oct 2023 18:33 )  PTT:172.6 sec                                       Urinalysis Basic - ( 06 Oct 2023 17:37 )    Color: x / Appearance: x / SG: x / pH: x  Gluc: 101 mg/dL / Ketone: x  / Bili: x / Urobili: x   Blood: x / Protein: x / Nitrite: x   Leuk Esterase: x / RBC: x / WBC x   Sq Epi: x / Non Sq Epi: x / Bacteria: x        CARDIAC MARKERS ( 06 Oct 2023 13:17 )  x     / x     / 67 U/L / x     / 5.4 ng/mL                                            LIVER FUNCTIONS - ( 06 Oct 2023 17:37 )  Alb: 2.9 g/dL / Pro: 6.2 g/dL / ALK PHOS: 222 U/L / ALT: 40 U/L / AST: 40 U/L / GGT: x                                                  Culture - Blood (collected 03 Oct 2023 20:47)  Source: .Blood Blood  Preliminary Report (06 Oct 2023 03:01):    No growth at 48 Hours    Culture - Blood (collected 03 Oct 2023 20:47)  Source: .Blood Blood  Preliminary Report (06 Oct 2023 03:01):    No growth at 48 Hours                                                                                       ABG - ( 06 Oct 2023 11:02 )  pH, Arterial: 7.33  pH, Blood: x     /  pCO2: 44    /  pO2: 481   / HCO3: 23    / Base Excess: -2.8  /  SaO2: 100.0     MEDICATIONS  (STANDING):  aspirin enteric coated 81 milliGRAM(s) Oral daily  atorvastatin 40 milliGRAM(s) Oral at bedtime  clopidogrel Tablet 75 milliGRAM(s) Oral daily  CRRT Treatment    <Continuous>  DOBUTamine Infusion 2.5 MICROgram(s)/kG/Min (7.66 mL/Hr) IV Continuous <Continuous>  folic acid 1 milliGRAM(s) Oral daily  heparin  Infusion.  Unit(s)/Hr (18 mL/Hr) IV Continuous <Continuous>  influenza  Vaccine (HIGH DOSE) 0.7 milliLiter(s) IntraMuscular once  midodrine. 5 milliGRAM(s) Oral every 8 hours  multivitamin 1 Tablet(s) Oral daily  Nephro-chichi 1 Tablet(s) Oral every 24 hours  pantoprazole    Tablet 40 milliGRAM(s) Oral before breakfast  pregabalin 75 milliGRAM(s) Oral daily  PureFlow Dialysate RFP-400 (K 2 / Ca 3) 5000 milliLiter(s) (2000 mL/Hr) CRRT <Continuous>    MEDICATIONS  (PRN):  LORazepam   Injectable 1 milliGRAM(s) IV Push every 1 hour PRN CIWA-Ar score 8 or greater  ondansetron Injectable 4 milliGRAM(s) IV Push every 12 hours PRN Nausea and/or Vomiting

## 2023-10-06 NOTE — PROGRESS NOTE ADULT - PROBLEM SELECTOR PLAN 10
F: n/a  E: Replete per HD with ESRD  N: NPO  GI ppx:  DVT ppx:  Full code  Dispo: [facility] F: none  E: Replete per HD with ESRD  N: renal restrictions  GI ppx: pantoprazole  DVT ppx: SCDs and on DAPT  Full code  Dispo: [facility]

## 2023-10-06 NOTE — PROCEDURE NOTE - NSPOSTCAREGUIDE_GEN_A_CORE
Verbal/written post procedure instructions were given to patient/caregiver/Instructed patient/caregiver regarding signs and symptoms of infection/Keep the cast/splint/dressing clean and dry/Care for catheter as per unit/ICU protocols
Care for catheter as per unit/ICU protocols
Care for catheter as per unit/ICU protocols

## 2023-10-06 NOTE — PROCEDURE NOTE - NSUSCPTCODES_ED_ALL
12078 US Chest (PTX, Pleural Effussion/CHF vs COPD)
15720 Echocardiography Transthoracic with Image 2D (Echo/FAST)

## 2023-10-06 NOTE — PROGRESS NOTE ADULT - PROBLEM SELECTOR PLAN 2
History of HF with recent TTE EF 30-40% with Dr. Jacobo Cain ([facility]), Dr. Corey Souza ([facility] Heart Failure). Bilateral 2+ pitting edema to midthigh and left sided crackles, concern for some fluid overload. ProBNP >91564. Home med Bumex 2 mg MWF; no GDMT listed.  -Continue home med Bumex 2 mg MWF  -collateral for [facility] Card/HF in physical chart  -Strict I&O History of HF with recent TTE EF 30-40% with Dr. Jacobo Cain ([facility]), Dr. Corey Souza ([facility] Heart Failure). Edema and crackles resolved on exam. ProBNP >77565. Home med Bumex 2 mg MWF; no GDMT listed.  - Continue home med Bumex 2 mg MWF  - GDMT to be started/slowly added on as outpatient.

## 2023-10-06 NOTE — PROGRESS NOTE ADULT - PROBLEM SELECTOR PLAN 6
History of CAD with "cardiac events," patient not sure if stent placed but on home meds of asa, plavix. Elevated trop I 564.2. Etiology ischemic vs. due to missed HD for ESRD.  Troponin 144 (10/4)   -Continue home med aspirin 81 mg, plavix 75 mg  -collateral from [facility] Card/HF in physical chart History of CAD with "cardiac events," patient not sure if stent placed but on home meds of asa, plavix. Elevated trop I 564.2. Etiology ischemic vs. due to missed HD for ESRD leading to volume overload and demand.  Troponin 144 (10/4) , downtrending.   -Continue home med aspirin 81 mg, plavix 75 mg  -Stop trending troponin

## 2023-10-06 NOTE — PROGRESS NOTE ADULT - ASSESSMENT
63 year old male with history of ESRD (HD 4x weekly M/T/TH/SAT), HLD, HTN, GERD, CAD presents with increasing SOB at rest, associated with generalized weakness and fatigue two non-bloody emesis, clear once in the morning then in the evening in the ED.     NEURO  -FLOR    CARDIO  #Heart failure with reduced ejection fraction.   History of HF with recent TTE EF 30-40% with Dr. Jacobo Cain ([facility]), Dr. Corey Souza ([facility]). Edema and crackles resolved on exam. ProBNP >84959. Home med Bumex 2 mg MWF; no GDMT listed.  - Continue home med Bumex 2 mg MWF  - GDMT to be started/slowly added on as outpatient.    #CAD (coronary artery disease).   History of CAD with "cardiac events," patient not sure if stent placed but on home meds of asa, plavix. Elevated trop I 564.2. Etiology ischemic vs. due to missed HD for ESRD leading to volume overload and demand.  Troponin 144 (10/4) , downtrending.   -Continue home med aspirin 81 mg, plavix 75 mg  -Stop trending troponin.    #HLD (hyperlipidemia).   History of HLD, home med atorvastatin 40 mg. Patient unclear if had stent after MI.  -continue home med atorvastatin 40 mg.    RESPIRATORY  -FLOR    GASTROINTESTINAL  #GERD (gastroesophageal reflux disease).   History of GERD, no complaints of epigastric pain at this time.   -Continue pantoprazole 40 mg qd.    RENAL  #ESRD on dialysis.   Started on HD 6 months ago, 4x week (M/T/Th/Sat), missed HD session on 10/3/23. R port utilized. At admission, Cr 3.93 (un-established baseline Cr), K 3.4, Phos 4.4. Metabolic acidosis (AGAP 17) likely due to hypochloremic emesis / etoh leading to lactic acidosis   - continue home nephro chichi 60 300mg   - c/w midodrine 5 mg TID i/s/o hypotension after HD  - appreciate continued nephro recs- next dialysis session as per nephro  - will continue to avoid L hand blood draw as AV fistula will be placed there at [facility] in next couple of weeks.    #Cardiac cirrhosis  History of cardiac cirrhosis likely 2/2 chronic alcohol use. Low suspicion for hepatic encephalopathy at this time; patient mentating AOx3 and conversant, w/o jaundice or non-icteric sclera. Low concern for hepatorenal syndrome as remains hemodynamically stable. No known gallbladder pathology established.  Abdominal US (10/4) Cirrhotic morphology. Pulsatile flow in the main and left portal veins. Moderate ascites. Increased right renal cortical echogenicity compatible with medical renal disease.  - consider paracentesis if increasing ascites   - address cardiac dysfunction as per above and with HD.    HEME  #Anemia.   At admission Hg 10.2, Hct 32, .6. No baseline Hgb or iron studies for comparison. Home med iron 65 mg qd for ELMER, though MCV is macrocytic likely 2/2 folate deficiency. However, etiology of anemia likely multifactorial of ELMER, AOCD, and folate deficiency. No known history of GI bleeds, hemoptysis, hematochezia, melenic stool. B12 increased.   -Hold home med iron 65 mg  -F/u folate level  -Maintain active type and screen, order placed for AM labs.    ENDO  -FLOR    ID  -FLOR    PSYCH  #Moderate alcohol use disorder.   At admission BIENVENIDO 29. AST/AlT 45/43. Last drink 2 PM on 10/3/23, has 2-3 martinis daily. No history of alcohol withdrawals or withdrawal seizures. CIWA 2 this AM (anxiety). One non-bloody, clear/bilious emesis in past 24 hours. Qtc 397. 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   - c Zofran 4 mg IV q12 PRN nausea, vomiting   - CIWA protocol: q4 CIWA screens with CIWA>8 give 2 mg ativan  - Thiamine, folic acid, multivitamin  - Home meds B1 100 mg OTC, D3 1000 IU OTC, B12 1000 mcg OTC.    DERM  #Dermatitis.   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 B/l LE dry skin, likely venous stasis dermatitis on due to peripheral artery disease vs. heart failure related edema changes. Likely from niacin deficiency (pellagra) as etiology for dermatitis as patient has 3 month anorexia, fatigue, numbness. Recently prescribed Cerave w/o use  - Consider niacin supplementation (vitamin b3) or topical steroid  - lotion inpatient  - f/u outpt dermatologist.    PROPHYLACTIC MEASURE  F: none  E: Replete per HD with ESRD  N: renal restrictions  GI ppx: pantoprazole  DVT ppx: SCDs and on DAPT  Full code  Dispo: [facility].     63 year old male with history of ESRD (HD 4x weekly M/T/TH/SAT), HLD, HTN, GERD, CAD presents with increasing SOB at rest, associated with generalized weakness and fatigue two non-bloody emesis, clear once in the morning then in the evening in the ED.     NEURO  AAOx3  -FLOR    CARDIO  #Heart failure with reduced ejection fraction.   History of HF with recent TTE EF 30-40% with Dr. Jacobo Cain ([facility]), Dr. Corey Souza ([facility]). Edema and crackles resolved on exam. ProBNP >07122. Home med Bumex 2 mg MWF; no GDMT listed.  - Continue home med Bumex 2 mg MWF  - Start dobutamine drip 2.5 mcg/kg/min  - GDMT to be started/slowly added on as outpatient.    #CAD (coronary artery disease).   History of CAD with "cardiac events," patient not sure if stent placed but on home meds of asa, plavix. Elevated trop I 564.2. Etiology ischemic vs. due to missed HD for ESRD leading to volume overload and demand.  Troponin 144 (10/4) , downtrending.   -Continue home med aspirin 81 mg, plavix 75 mg  -Stop trending troponin.    #HLD (hyperlipidemia).   History of HLD, home med atorvastatin 40 mg. Patient unclear if had stent after MI.  -continue home med atorvastatin 40 mg.    RESPIRATORY  -FLOR    GASTROINTESTINAL  #GERD (gastroesophageal reflux disease).   History of GERD, no complaints of epigastric pain at this time.   -Continue pantoprazole 40 mg qd.    RENAL  #ESRD on dialysis.   Started on HD 6 months ago, 4x week (M/T/Th/Sat), missed HD session on 10/3/23. R port utilized. At admission, Cr 3.93 (un-established baseline Cr), K 3.4, Phos 4.4. Metabolic acidosis (AGAP 17) likely due to hypochloremic emesis / etoh leading to lactic acidosis   - continue home nephro chichi 60 300mg   - c/w midodrine 5 mg TID i/s/o hypotension after HD  - plan for CVVHD today   - appreciate continued nephro recs  - will continue to avoid L hand blood draw as AV fistula will be placed there at [facility] in next couple of weeks.    #Cardiac cirrhosis  History of cardiac cirrhosis likely 2/2 chronic alcohol use. Low suspicion for hepatic encephalopathy at this time; patient mentating AOx3 and conversant, w/o jaundice or non-icteric sclera. Low concern for hepatorenal syndrome as remains hemodynamically stable. No known gallbladder pathology established.  Abdominal US (10/4) Cirrhotic morphology. Pulsatile flow in the main and left portal veins. Moderate ascites. Increased right renal cortical echogenicity compatible with medical renal disease.  - consider paracentesis if increasing ascites   - address cardiac dysfunction as per above and with HD.    HEME  #Anemia.   At admission Hg 10.2, Hct 32, .6. No baseline Hgb or iron studies for comparison. Home med iron 65 mg qd for ELMER, though MCV is macrocytic likely 2/2 folate deficiency. However, etiology of anemia likely multifactorial of ELMER, AOCD, and folate deficiency. No known history of GI bleeds, hemoptysis, hematochezia, melenic stool. B12 increased.   -Hold home med iron 65 mg  -F/u folate level  -Maintain active type and screen, order placed for AM labs.    ENDO  -FLOR    ID  -FLOR    PSYCH  #Moderate alcohol use disorder.   At admission BIENVENIDO 29. AST/AlT 45/43. Last drink 2 PM on 10/3/23, has 2-3 martinis daily. No history of alcohol withdrawals or withdrawal seizures. CIWA 2 this AM (anxiety). One non-bloody, clear/bilious emesis in past 24 hours. Qtc 397. 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   - c Zofran 4 mg IV q12 PRN nausea, vomiting   - CIWA protocol: q4 CIWA screens with CIWA>8 give 2 mg ativan  - Thiamine, folic acid, multivitamin  - Home meds B1 100 mg OTC, D3 1000 IU OTC, B12 1000 mcg OTC.    DERM  #Dermatitis.   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 B/l LE dry skin, likely venous stasis dermatitis on due to peripheral artery disease vs. heart failure related edema changes. Likely from niacin deficiency (pellagra) as etiology for dermatitis as patient has 3 month anorexia, fatigue, numbness. Recently prescribed Cerave w/o use  - Consider niacin supplementation (vitamin b3) or topical steroid  - lotion inpatient  - f/u outpt dermatologist.    PROPHYLACTIC MEASURE  F: none  E: Replete per HD with ESRD- CVVHD 10/06  N: renal restrictions  GI ppx: pantoprazole  DVT ppx: SCDs and on DAPT  Full code  Dispo: CCU     63 year old male with history of ESRD (HD 4x weekly M/T/TH/SAT), atrial fibrillation (s/p Watchman), HLD, HTN, GERD presents with increasing SOB at rest, associated with generalized weakness and fatigue two non-bloody emesis, clear once in the morning then in the evening in the ED.     NEURO  AAOx3  -FLOR    CARDIO  #Heart failure with reduced ejection fraction.   History of HF with recent TTE EF 30-40% with Dr. Jacobo Cain ([facility]), Dr. Corey Souza ([facility]). Edema and crackles resolved on exam. ProBNP >39747. Home med Bumex 2 mg MWF; no GDMT listed.  - Continue home med Bumex 2 mg MWF  - Start dobutamine drip 2.5 mcg/kg/min  - GDMT to be started/slowly added on as outpatient.    #CAD (coronary artery disease).   History of CAD with "cardiac events," patient not sure if stent placed but on home meds of asa, plavix. Elevated trop I 564.2. Etiology ischemic vs. due to missed HD for ESRD leading to volume overload and demand.  Troponin 144 (10/4) , downtrending.   -Continue home med aspirin 81 mg, plavix 75 mg  -Stop trending troponin.  -Obtain collateral    #HLD (hyperlipidemia).   History of HLD, home med atorvastatin 40 mg. Patient unclear if had stent after MI.  -continue home med atorvastatin 40 mg.    RESPIRATORY  -FLOR    GASTROINTESTINAL  #GERD (gastroesophageal reflux disease).   History of GERD, no complaints of epigastric pain at this time.   -Continue pantoprazole 40 mg qd.    RENAL  #ESRD on dialysis.   Started on HD 6 months ago, 4x week (M/T/Th/Sat), missed HD session on 10/3/23. R port utilized. At admission, Cr 3.93 (un-established baseline Cr), K 3.4, Phos 4.4. Metabolic acidosis (AGAP 17) likely due to hypochloremic emesis / etoh leading to lactic acidosis   - continue home nephro chichi 60 300mg   - c/w midodrine 5 mg TID i/s/o hypotension after HD  - plan for CVVHD today   - appreciate continued nephro recs  - will continue to avoid L hand blood draw as AV fistula will be placed there at [facility] in next couple of weeks.    #Cardiac cirrhosis  History of cardiac cirrhosis likely 2/2 chronic alcohol use. Low suspicion for hepatic encephalopathy at this time; patient mentating AOx3 and conversant, w/o jaundice or non-icteric sclera. Low concern for hepatorenal syndrome as remains hemodynamically stable. No known gallbladder pathology established.  Abdominal US (10/4) Cirrhotic morphology. Pulsatile flow in the main and left portal veins. Moderate ascites. Increased right renal cortical echogenicity compatible with medical renal disease.  - consider paracentesis if increasing ascites   - address cardiac dysfunction as per above and with HD.    HEME  #Anemia.   At admission Hg 10.2, Hct 32, .6. No baseline Hgb or iron studies for comparison. Home med iron 65 mg qd for ELMER, though MCV is macrocytic likely 2/2 folate deficiency. However, etiology of anemia likely multifactorial of ELMER, AOCD, and folate deficiency. No known history of GI bleeds, hemoptysis, hematochezia, melenic stool. B12 increased.   -Hold home med iron 65 mg  -F/u folate level  -Maintain active type and screen, order placed for AM labs.    ENDO  -FLOR    ID  -FLOR    PSYCH  #Moderate alcohol use disorder.   At admission BIENVENIDO 29. AST/AlT 45/43. Last drink 2 PM on 10/3/23, has 2-3 martinis daily. No history of alcohol withdrawals or withdrawal seizures. CIWA 2 this AM (anxiety). One non-bloody, clear/bilious emesis in past 24 hours. Qtc 397. 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   - c Zofran 4 mg IV q12 PRN nausea, vomiting   - CIWA protocol: q4 CIWA screens with CIWA>8 give 2 mg ativan  - Thiamine, folic acid, multivitamin  - Home meds B1 100 mg OTC, D3 1000 IU OTC, B12 1000 mcg OTC.    DERM  #Dermatitis.   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 B/l LE dry skin, likely venous stasis dermatitis on due to peripheral artery disease vs. heart failure related edema changes. Likely from niacin deficiency (pellagra) as etiology for dermatitis as patient has 3 month anorexia, fatigue, numbness. Recently prescribed Cerave w/o use  - Consider niacin supplementation (vitamin b3) or topical steroid  - lotion inpatient  - f/u outpt dermatologist.    PROPHYLACTIC MEASURE  F: none  E: Replete per HD with ESRD- CVVHD 10/06  N: renal restrictions  GI ppx: pantoprazole  DVT ppx: SCDs and on DAPT  Full code  Dispo: CCU

## 2023-10-06 NOTE — PROGRESS NOTE ADULT - SUBJECTIVE AND OBJECTIVE BOX
**INCOMPLETE NOTE    OVERNIGHT EVENTS: None    SUBJECTIVE:  Patient seen and examined at bedside, comfortable, NAD. Denied fever, chest pain, dyspnea, abdominal pain.     Vital Signs Last 12 Hrs  T(F): 97.4 (10-06-23 @ 05:51), Max: 97.4 (10-06-23 @ 05:51)  HR: 92 (10-06-23 @ 10:06) (92 - 110)  BP: 115/78 (10-06-23 @ 05:51) (113/81 - 115/78)  BP(mean): --  RR: 20 (10-06-23 @ 10:06) (17 - 20)  SpO2: 98% (10-06-23 @ 10:06) (98% - 99%)  I&O's Summary      PHYSICAL EXAM:  Constitutional: NAD, comfortable in bed.  HEENT: NC/AT, PERRLA, EOMI, no conjunctival pallor or scleral icterus, MMM  Neck: Supple, no JVD  Respiratory: CTA B/L. No w/r/r.   Cardiovascular: RRR, normal S1 and S2, no m/r/g.   Gastrointestinal: +BS, soft NTND, no guarding or rebound tenderness, no palpable masses   Extremities: wwp; no cyanosis, clubbing or edema.   Vascular: Pulses equal and strong throughout.   Neurological: AAOx3, no CN deficits, strength and sensation intact throughout.   Skin: No gross skin abnormalities or rashes        LABS:                        10.4   5.97  )-----------( 102      ( 06 Oct 2023 10:18 )             33.3     10-06    130<L>  |  93<L>  |  24<H>  ----------------------------<  93  3.8   |  23  |  4.06<H>    Ca    9.9      06 Oct 2023 10:18  Phos  4.7     10-06  Mg     1.7     10-06    TPro  6.9  /  Alb  3.2<L>  /  TBili  1.6<H>  /  DBili  x   /  AST  45<H>  /  ALT  44  /  AlkPhos  247<H>  10-06    PT/INR - ( 06 Oct 2023 10:18 )   PT: 18.3 sec;   INR: 1.63          PTT - ( 06 Oct 2023 10:18 )  PTT:31.4 sec  Urinalysis Basic - ( 06 Oct 2023 10:18 )    Color: x / Appearance: x / SG: x / pH: x  Gluc: 93 mg/dL / Ketone: x  / Bili: x / Urobili: x   Blood: x / Protein: x / Nitrite: x   Leuk Esterase: x / RBC: x / WBC x   Sq Epi: x / Non Sq Epi: x / Bacteria: x          RADIOLOGY & ADDITIONAL TESTS:    MEDICATIONS  (STANDING):  aspirin enteric coated 81 milliGRAM(s) Oral daily  atorvastatin 40 milliGRAM(s) Oral at bedtime  buMETAnide 2 milliGRAM(s) Oral <User Schedule>  clopidogrel Tablet 75 milliGRAM(s) Oral daily  folic acid 1 milliGRAM(s) Oral daily  influenza  Vaccine (HIGH DOSE) 0.7 milliLiter(s) IntraMuscular once  midodrine. 5 milliGRAM(s) Oral every 8 hours  multivitamin 1 Tablet(s) Oral daily  Nephro-chichi 1 Tablet(s) Oral every 24 hours  pantoprazole    Tablet 40 milliGRAM(s) Oral before breakfast  pregabalin 75 milliGRAM(s) Oral daily    MEDICATIONS  (PRN):  LORazepam   Injectable 1 milliGRAM(s) IV Push every 1 hour PRN CIWA-Ar score 8 or greater  ondansetron Injectable 4 milliGRAM(s) IV Push every 12 hours PRN Nausea and/or Vomiting   *** TRANSFER 7URIS RMF -> ***    HOSPITAL COURSE:      OVERNIGHT EVENTS:   - patient reported abdominal pain. on assessment appeared to be gas related. ordered simethicone    SUBJECTIVE:    -Patient seen and examined at bedside, NAD. Feels fatigued but fine.   -Denied fever, chest pain/CT. Denied abdominal pain    Vital Signs Last 12 Hrs  T(F): 97.4 (10-06-23 @ 05:51), Max: 97.4 (10-06-23 @ 05:51)  HR: 92 (10-06-23 @ 10:06) (92 - 110)  BP: 115/78 (10-06-23 @ 05:51) (113/81 - 115/78)  BP(mean): --  RR: 20 (10-06-23 @ 10:06) (17 - 20)  SpO2: 98% (10-06-23 @ 10:06) (98% - 99%)  I&O's Summary      PHYSICAL EXAM:  CONSTITUTIONAL: NAD  EYES: PERRLA and symmetric, EOMI, no conjunctival or scleral injection, non-icteric; no nystagmus  ENMT: moist mucus membranes  NECK: Supple  RESP: No respiratory distress, no use of accessory muscles; CTAB b/l; no crackles   CV: RRR, +S1S2, no MRG; trace edema of b/l LE  GI: Soft, NT, mildly distended, no rebound, no guarding; +reducible umbilical hernia midline lower quadrant;+multiple abdominal striae   MSK: Normal ROM without pain, no spinal tenderness, normal muscle strength/tone  SKIN: +bilateral upper extremity maculopapular rash with excoriations as well on scalp; venous stasis dermatitis bilateral ankles and lower calves. No jaundice. Very mild mottling of the hands.  NEURO: CN II-XII intact; normal reflexes in upper and lower extremities, sensation intact in upper and lower extremities b/l to light touch   PSYCH: Appropriate insight/judgment; AO x 3, mood and affect appropriate, recent/remote memory intact      LABS:                        10.4   5.97  )-----------( 102      ( 06 Oct 2023 10:18 )             33.3     10-06    130<L>  |  93<L>  |  24<H>  ----------------------------<  93  3.8   |  23  |  4.06<H>    Ca    9.9      06 Oct 2023 10:18  Phos  4.7     10-06  Mg     1.7     10-06    TPro  6.9  /  Alb  3.2<L>  /  TBili  1.6<H>  /  DBili  x   /  AST  45<H>  /  ALT  44  /  AlkPhos  247<H>  10-06    PT/INR - ( 06 Oct 2023 10:18 )   PT: 18.3 sec;   INR: 1.63          PTT - ( 06 Oct 2023 10:18 )  PTT:31.4 sec  Urinalysis Basic - ( 06 Oct 2023 10:18 )    Color: x / Appearance: x / SG: x / pH: x  Gluc: 93 mg/dL / Ketone: x  / Bili: x / Urobili: x   Blood: x / Protein: x / Nitrite: x   Leuk Esterase: x / RBC: x / WBC x   Sq Epi: x / Non Sq Epi: x / Bacteria: x      RADIOLOGY & ADDITIONAL TESTS:  - reviewed    MEDICATIONS  (STANDING):  aspirin enteric coated 81 milliGRAM(s) Oral daily  atorvastatin 40 milliGRAM(s) Oral at bedtime  buMETAnide 2 milliGRAM(s) Oral <User Schedule>  clopidogrel Tablet 75 milliGRAM(s) Oral daily  folic acid 1 milliGRAM(s) Oral daily  influenza  Vaccine (HIGH DOSE) 0.7 milliLiter(s) IntraMuscular once  midodrine. 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 Patient found to by hypoxic to 85% on admission, placed initially on HF, weaned to 3LNC and now on RA while being monitored on telemetry. Patient's lactate and troponin subsequently downtrended and pt was deemed medically stable after receiving his dialysis on 10/4 for stepdown to RMF. While on RMF the patient remained stable with his lung crackles and edema resolved, and was evaluated by PT upon which GENE was recommended. The patient was planned for discharge on Saturday 10/7 after his dialysis session on 10/6. However before starting his dialysis session on 10/6 he became hypoxic to 85% on room air and stated he "did not feel well" and was SOB. His extremities were noted to be cyanotic at this time. The patient continue to be hypoxic and less responsive upon which a rapid response was called.       OVERNIGHT EVENTS:   - patient reported abdominal pain. on assessment appeared to be gas related. ordered simethicone    SUBJECTIVE:    -Patient seen and examined at bedside, NAD. Feels fatigued but fine.   -Denied fever, chest pain/CT. Denied abdominal pain    Vital Signs Last 12 Hrs  T(F): 97.4 (10-06-23 @ 05:51), Max: 97.4 (10-06-23 @ 05:51)  HR: 92 (10-06-23 @ 10:06) (92 - 110)  BP: 115/78 (10-06-23 @ 05:51) (113/81 - 115/78)  BP(mean): --  RR: 20 (10-06-23 @ 10:06) (17 - 20)  SpO2: 98% (10-06-23 @ 10:06) (98% - 99%)  I&O's Summary      PHYSICAL EXAM:  CONSTITUTIONAL: NAD  EYES: PERRLA and symmetric, EOMI, no conjunctival or scleral injection, non-icteric; no nystagmus  ENMT: moist mucus membranes  NECK: Supple  RESP: No respiratory distress, no use of accessory muscles; CTAB b/l; no crackles   CV: RRR, +S1S2, no MRG; trace edema of b/l LE  GI: Soft, NT, mildly distended, no rebound, no guarding; +reducible umbilical hernia midline lower quadrant;+multiple abdominal striae   MSK: Normal ROM without pain, no spinal tenderness, normal muscle strength/tone  SKIN: +bilateral upper extremity maculopapular rash with excoriations as well on scalp; venous stasis dermatitis bilateral ankles and lower calves. No jaundice. Very mild mottling of the hands.  NEURO: CN II-XII intact; normal reflexes in upper and lower extremities, sensation intact in upper and lower extremities b/l to light touch   PSYCH: Appropriate insight/judgment; AO x 3, mood and affect appropriate, recent/remote memory intact      LABS:                        10.4   5.97  )-----------( 102      ( 06 Oct 2023 10:18 )             33.3     10-06    130<L>  |  93<L>  |  24<H>  ----------------------------<  93  3.8   |  23  |  4.06<H>    Ca    9.9      06 Oct 2023 10:18  Phos  4.7     10-06  Mg     1.7     10-06    TPro  6.9  /  Alb  3.2<L>  /  TBili  1.6<H>  /  DBili  x   /  AST  45<H>  /  ALT  44  /  AlkPhos  247<H>  10-06    PT/INR - ( 06 Oct 2023 10:18 )   PT: 18.3 sec;   INR: 1.63          PTT - ( 06 Oct 2023 10:18 )  PTT:31.4 sec  Urinalysis Basic - ( 06 Oct 2023 10:18 )    Color: x / Appearance: x / SG: x / pH: x  Gluc: 93 mg/dL / Ketone: x  / Bili: x / Urobili: x   Blood: x / Protein: x / Nitrite: x   Leuk Esterase: x / RBC: x / WBC x   Sq Epi: x / Non Sq Epi: x / Bacteria: x      RADIOLOGY & ADDITIONAL TESTS:  - reviewed    MEDICATIONS  (STANDING):  aspirin enteric coated 81 milliGRAM(s) Oral daily  atorvastatin 40 milliGRAM(s) Oral at bedtime  buMETAnide 2 milliGRAM(s) Oral <User Schedule>  clopidogrel Tablet 75 milliGRAM(s) Oral daily  folic acid 1 milliGRAM(s) Oral daily  influenza  Vaccine (HIGH DOSE) 0.7 milliLiter(s) IntraMuscular once  midodrine. 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 Patient found to by hypoxic to 85% on admission, placed initially on HF, weaned to 3LNC and now on RA while being monitored on telemetry. Patient's lactate and troponin subsequently downtrended and pt was deemed medically stable after receiving his dialysis on 10/4 for stepdown to RMF. While on RMF the patient remained stable with his lung crackles and edema resolved, and was evaluated by PT upon which GENE was recommended. The patient was planned for discharge on Saturday 10/7 after his dialysis session on 10/6. However before starting his dialysis session on 10/6 he became hypoxic to 85% on room air and stated he "did not feel well" and was SOB. His extremities were noted to be cyanotic at this time. The patient continue to be hypoxic and less responsive upon which a rapid response was called. Bedside ultrasound showed a moderate/large pericardial effusion (enlarged from prior imaging), a dilated RV, ultimately c/f RV failure with a worsening pericardial effusion potentially iso shock.      OVERNIGHT EVENTS:   - patient reported abdominal pain. on assessment appeared to be gas related. ordered simethicone    SUBJECTIVE:    -Patient seen and examined at bedside, NAD. Feels fatigued but fine.   -Denied fever, chest pain/CT. Denied abdominal pain    Vital Signs Last 12 Hrs  T(F): 97.4 (10-06-23 @ 05:51), Max: 97.4 (10-06-23 @ 05:51)  HR: 92 (10-06-23 @ 10:06) (92 - 110)  BP: 115/78 (10-06-23 @ 05:51) (113/81 - 115/78)  BP(mean): --  RR: 20 (10-06-23 @ 10:06) (17 - 20)  SpO2: 98% (10-06-23 @ 10:06) (98% - 99%)  I&O's Summary      PHYSICAL EXAM:  CONSTITUTIONAL: NAD  EYES: PERRLA and symmetric, EOMI, no conjunctival or scleral injection, non-icteric; no nystagmus  ENMT: moist mucus membranes  NECK: Supple  RESP: No respiratory distress, no use of accessory muscles; CTAB b/l; no crackles   CV: RRR, +S1S2, no MRG; trace edema of b/l LE  GI: Soft, NT, mildly distended, no rebound, no guarding; +reducible umbilical hernia midline lower quadrant;+multiple abdominal striae   MSK: Normal ROM without pain, no spinal tenderness, normal muscle strength/tone  SKIN: +bilateral upper extremity maculopapular rash with excoriations as well on scalp; venous stasis dermatitis bilateral ankles and lower calves. No jaundice. Very mild mottling of the hands.  NEURO: CN II-XII intact; normal reflexes in upper and lower extremities, sensation intact in upper and lower extremities b/l to light touch   PSYCH: Appropriate insight/judgment; AO x 3, mood and affect appropriate, recent/remote memory intact      LABS:                        10.4   5.97  )-----------( 102      ( 06 Oct 2023 10:18 )             33.3     10-06    130<L>  |  93<L>  |  24<H>  ----------------------------<  93  3.8   |  23  |  4.06<H>    Ca    9.9      06 Oct 2023 10:18  Phos  4.7     10-06  Mg     1.7     10-06    TPro  6.9  /  Alb  3.2<L>  /  TBili  1.6<H>  /  DBili  x   /  AST  45<H>  /  ALT  44  /  AlkPhos  247<H>  10-06    PT/INR - ( 06 Oct 2023 10:18 )   PT: 18.3 sec;   INR: 1.63          PTT - ( 06 Oct 2023 10:18 )  PTT:31.4 sec  Urinalysis Basic - ( 06 Oct 2023 10:18 )    Color: x / Appearance: x / SG: x / pH: x  Gluc: 93 mg/dL / Ketone: x  / Bili: x / Urobili: x   Blood: x / Protein: x / Nitrite: x   Leuk Esterase: x / RBC: x / WBC x   Sq Epi: x / Non Sq Epi: x / Bacteria: x      RADIOLOGY & ADDITIONAL TESTS:  - reviewed    MEDICATIONS  (STANDING):  aspirin enteric coated 81 milliGRAM(s) Oral daily  atorvastatin 40 milliGRAM(s) Oral at bedtime  buMETAnide 2 milliGRAM(s) Oral <User Schedule>  clopidogrel Tablet 75 milliGRAM(s) Oral daily  folic acid 1 milliGRAM(s) Oral daily  influenza  Vaccine (HIGH DOSE) 0.7 milliLiter(s) IntraMuscular once  midodrine. 5 milliGRAM(s) Oral every 8 hours  multivitamin 1 Tablet(s) Oral daily  Nephro-chichi 1 Tablet(s) Oral every 24 hours  pantoprazole    Tablet 40 milliGRAM(s) Oral before breakfast  pregabalin 75 milliGRAM(s) Oral daily    MEDICATIONS  (PRN):  LORazepam   Injectable 1 milliGRAM(s) IV Push every 1 hour PRN CIWA-Ar score 8 or greater  ondansetron Injectable 4 milliGRAM(s) IV Push every 12 hours PRN Nausea and/or Vomiting

## 2023-10-06 NOTE — CHART NOTE - NSCHARTNOTEFT_GEN_A_CORE
***Rapid Response Clinical Impact Physician Assistant Note***      **************INCOMPLETE****************      Patient is a 63y old  Male         admitted for HPI:  CC: missed HD for ESRD  HPI: 63 year old males with history of ESRD (HD 4 x M/T/TH/SAT)ELMER HLD, GERD, CAD presents after missed HD on 10/3/23 with increased shortness of breath at rest and brief bilateral leg weakness and two episodes of non-bloody emesis. He began HD 6 months prior with R chest port use, with recent left hand mapping 1-2 weeks ago for upcoming L AV fistula placement. He noticed increased shortness of breath without any recent travel or sick contacts. At baseline he breathes comfortably on room air, but required 3 L nasal cannula for 85% oxygen saturation in ED. He felt like his legs gave out earlier in the day and at baseline ambulates with a cane. He denies recent falls, but has fall history with most recent fall 3-4 weeks ago with no acute head injuries. He also chronically drinks alcohol, with 2-3 martini's per day with last drink at 2 pm on 10/3/23. He denies alcohol withdrawal hospitalizations in the past and no withdrawal seizures. He endorses some mid epigastric chest pain that does not radiate. He denies fever, chills, diarrhea, abdominal pain, headaches, neck pain, dizziness, or syncope. denies smoking. He receives all his care at [facility] and is compliant with medications. He took last took all his medications at home at 10/3/23 AM and takes all medications together in the morning to prevent forgetting taking his medications.     In the ED:    - VS: Tmax: 98.3, HR: 106, /78, RR 18, 98% on RA    - Pertinent Labs: WBC 6, Hg 10.2, .6; INR 1.46; Serum Ammonia 37, K 3.4, Mg 1.7, Na 132, AGAP 17, Cr 3.93, Albumin 2.0, BiliT 2, Alk Phos 273, AST 45, ALT 43, VBG pH 7.36, BIENVENIDO 29, proBNP 81832, Trop I 564    - Imaging:  EKG sinus tach 104 bpm, Qtc 397, right axis deviation, incomplete RBBB, possible right ventricular hypertrophy, nonspecific t wave abnormality. CXR no infiltrates.    - Treatment/interventions: None in ED  PMHx: HTN, HLD, GERD, CAD s/p MI no stents, HD on ESRD  PSHx: two hip replacement surgeries  Meds: See med rec  Allergies: pencillin- patient not aware of reaction  Social: see below  (04 Oct 2023 01:03)      Rapid response team called because _____________________.    Patient was seen and examined at the bedside by the rapid response team.    Allergies    penicillins (Unknown)    Intolerances        PAST MEDICAL & SURGICAL HISTORY:  HTN (hypertension)      HLD (hyperlipidemia)      Chronic kidney disease      H/O bilateral hip replacements          Vital Signs Last 24 Hrs  T(C): 36.3 (06 Oct 2023 05:51), Max: 36.3 (05 Oct 2023 12:47)  T(F): 97.4 (06 Oct 2023 05:51), Max: 97.4 (05 Oct 2023 12:47)  HR: 92 (06 Oct 2023 10:06) (91 - 110)  BP: 115/78 (06 Oct 2023 05:51) (113/77 - 120/80)  BP(mean): --  RR: 20 (06 Oct 2023 10:06) (17 - 20)  SpO2: 98% (06 Oct 2023 10:06) (98% - 99%)    Parameters below as of 06 Oct 2023 10:06  Patient On (Oxygen Delivery Method): nasal cannula, high flow  O2 Flow (L/min): 50  O2 Concentration (%): 100    Review of Systems:  CONSTITUTIONAL: No weakness, fevers or chills  EYES/ENT: No visual changes;  No vertigo or throat pain   NECK: No pain or stiffness  RESPIRATORY: No cough, wheezing, hemoptysis; No shortness of breath  CARDIOVASCULAR: No chest pain or palpitations  GASTROINTESTINAL: No abdominal or epigastric pain. No nausea, vomiting, or hematemesis; No diarrhea or constipation. No melena or hematochezia.  GENITOURINARY: No dysuria, frequency or hematuria  NEUROLOGICAL: No numbness or weakness  SKIN: No itching, burning, rashes, or lesions   All other review of systems is negative unless indicated above.    Physical exam:  GENERAL: The patient is awake and alert in no apparent distress.   HEENT: Head is normocephalic and atraumatic. Extraocular muscles are intact. Mucous membranes are moist. No throat erythema/exudates no lymphadenopathy, no JVD,   NECK: Supple.  LUNGS: Clear to auscultation BL without wheezing, rales or rhonchi; respirations unlabored  HEART: Regular rate and rhythm ,+S1/+S2, no murmurs, rubs, gallops  ABDOMEN: Soft, nontender, and nondistended, no rebound, guarding rigidity, bowel sounds in all 4 quadrants  EXTREMITIES: Without any cyanosis, clubbing, rash, lesions or edema.  SKIN: No new rashes or lesions.  MSK: strength equal BL  VASCULAR: Radial and Dorsal pedal pulses palpable BL.  NEUROLOGIC: Grossly intact.  PSYCH: No new changes.                            10.9   6.32  )-----------( 104      ( 06 Oct 2023 05:30 )             35.2     10-06    130<L>  |  91<L>  |  23  ----------------------------<  91  3.8   |  22  |  3.92<H>    Ca    9.7      06 Oct 2023 05:30  Phos  4.7     10-06  Mg     1.7     10-06    TPro  6.9  /  Alb  3.3  /  TBili  1.8<H>  /  DBili  x   /  AST  48<H>  /  ALT  45  /  AlkPhos  249<H>  10-06    ABG - ( 06 Oct 2023 10:12 )  pH, Arterial: 7.30  pH, Blood: x     /  pCO2: 48    /  pO2: 51    / HCO3: 24    / Base Excess: -3.2  /  SaO2: 78.0                   LIVER FUNCTIONS - ( 06 Oct 2023 05:30 )  Alb: 3.3 g/dL / Pro: 6.9 g/dL / ALK PHOS: 249 U/L / ALT: 45 U/L / AST: 48 U/L / GGT: x         Urinalysis Basic - ( 06 Oct 2023 05:30 )    Color: x / Appearance: x / SG: x / pH: x  Gluc: 91 mg/dL / Ketone: x  / Bili: x / Urobili: x   Blood: x / Protein: x / Nitrite: x   Leuk Esterase: x / RBC: x / WBC x   Sq Epi: x / Non Sq Epi: x / Bacteria: x         PT/INR - ( 05 Oct 2023 05:30 )   PT: 17.3 sec;   INR: 1.54              MEDICATIONS  (STANDING):  aspirin enteric coated 81 milliGRAM(s) Oral daily  atorvastatin 40 milliGRAM(s) Oral at bedtime  buMETAnide 2 milliGRAM(s) Oral <User Schedule>  clopidogrel Tablet 75 milliGRAM(s) Oral daily  folic acid 1 milliGRAM(s) Oral daily  influenza  Vaccine (HIGH DOSE) 0.7 milliLiter(s) IntraMuscular once  midodrine. 5 milliGRAM(s) Oral every 8 hours  multivitamin 1 Tablet(s) Oral daily  Nephro-chichi 1 Tablet(s) Oral every 24 hours  pantoprazole    Tablet 40 milliGRAM(s) Oral before breakfast  pregabalin 75 milliGRAM(s) Oral daily    MEDICATIONS  (PRN):  LORazepam   Injectable 1 milliGRAM(s) IV Push every 1 hour PRN CIWA-Ar score 8 or greater  ondansetron Injectable 4 milliGRAM(s) IV Push every 12 hours PRN Nausea and/or Vomiting      Assessment- Rapid Response called for 63y year old Male with a past medical history of PAST MEDICAL & SURGICAL HISTORY:  HTN (hypertension)      HLD (hyperlipidemia)      Chronic kidney disease      H/O bilateral hip replacements            DDx:      Plan-      Dispo:    I have personally and independently provided 31 minutes of critical care services.  This excludes any time spent on separate procedures or teaching. ***Rapid Response Clinical Impact Physician Assistant Note***    64 y/o Male w/ PMHx of ESRD on HD (M/T/Th/Sat via R SC Permacath placed 6 months ago per pt, pending LUE AVF placement), CAD, ELMER, HLD, GERD, and ?ETOH abuse (drinks 2-3 martini's daily, last drink 2pm 10/3/23) who initially presented to [facility] after missed HD session 10/3/23 c/o SOB and brief b/l LE weakness, as well as two episodes of non-bloody emesis. Since admission course s/f initial new O2 requirement (satting 85% on RA, improved to 98% on 3L NC), continued non-bloody vomiting x1 relieved by Zofran IV, and uncomplicated HD session on 10/4/23 w/ 2L removed, at which time home Midodrine was restarted for soft BP's. Pt was planned to undergo additional HD session today (10/6).    Rapid response team called at 9:40am for episode of hypotension to SBP 90's and associated AMS prior to HD session.    Patient was seen and examined at the bedside by the rapid response team. On arrival pt was seated semi-fowlers in care of Nephro team and PCCM Fellow. VS: T 97.5F oral, HR 89 irregularly irregular, /84, RR 24 non-labored, SpO2 85% but w/ difficulty obtaining good waveform but pt noted to have peripheral cyanosis of b/l fingers and toes; FS BGL 72. Pt reports feeling tired/lethargic, but otherwise offer no complains and further denies chest pain, SOB, RAMIREZ, palpitations, dizziness, LOC, N/V/D, fever/chills/sick contact, diaphoresis, orthopnea/PND, and leg swelling. Pt given D50 IV x1 amp, repeat FS .       Allergies:  penicillins (Unknown)  No known Intolerances    PAST MEDICAL & SURGICAL HISTORY:  HTN (hypertension)  HLD (hyperlipidemia)  Chronic kidney disease  H/O bilateral hip replacements    Vital Signs Last 24 Hrs  T(C): 36.3 (06 Oct 2023 05:51), Max: 36.3 (05 Oct 2023 12:47)  T(F): 97.4 (06 Oct 2023 05:51), Max: 97.4 (05 Oct 2023 12:47)  HR: 92 (06 Oct 2023 10:06) (91 - 110)  BP: 115/78 (06 Oct 2023 05:51) (113/77 - 120/80)  BP(mean): --  RR: 20 (06 Oct 2023 10:06) (17 - 20)  SpO2: 98% (06 Oct 2023 10:06) (98% - 99%)    Parameters below as of 06 Oct 2023 10:06  Patient On (Oxygen Delivery Method): nasal cannula, high flow  O2 Flow (L/min): 50  O2 Concentration (%): 100    Review of Systems:  CONSTITUTIONAL: No weakness, fevers or chills  EYES/ENT: No visual changes;  No vertigo or throat pain   NECK: No pain or stiffness  RESPIRATORY: No cough, wheezing, hemoptysis; No shortness of breath  CARDIOVASCULAR: No chest pain or palpitations  GASTROINTESTINAL: No abdominal or epigastric pain. No nausea, vomiting, or hematemesis; No diarrhea or constipation. No melena or hematochezia.  GENITOURINARY: No dysuria, frequency or hematuria  NEUROLOGICAL: No numbness or weakness  SKIN: No itching, burning, rashes, or lesions   All other review of systems is negative unless indicated above.    Physical exam:  GENERAL: The patient is awake and alert in no apparent distress.   HEENT: Head is normocephalic and atraumatic. Extraocular muscles are intact. Mucous membranes are moist. No throat erythema/exudates no lymphadenopathy, no JVD,   NECK: Supple.  LUNGS: Clear to auscultation BL without wheezing, rales or rhonchi; respirations unlabored  HEART: Regular rate and rhythm ,+S1/+S2, no murmurs, rubs, gallops  ABDOMEN: Soft, nontender, and nondistended, no rebound, guarding rigidity, bowel sounds in all 4 quadrants  EXTREMITIES: Without any cyanosis, clubbing, rash, lesions or edema.  SKIN: No new rashes or lesions.  MSK: strength equal BL  VASCULAR: Radial and Dorsal pedal pulses palpable BL.  NEUROLOGIC: Grossly intact.  PSYCH: No new changes.                            10.9   6.32  )-----------( 104      ( 06 Oct 2023 05:30 )             35.2     10-06    130<L>  |  91<L>  |  23  ----------------------------<  91  3.8   |  22  |  3.92<H>    Ca    9.7      06 Oct 2023 05:30  Phos  4.7     10-06  Mg     1.7     10-06    TPro  6.9  /  Alb  3.3  /  TBili  1.8<H>  /  DBili  x   /  AST  48<H>  /  ALT  45  /  AlkPhos  249<H>  10-06    ABG - ( 06 Oct 2023 10:12 )  pH, Arterial: 7.30  pH, Blood: x     /  pCO2: 48    /  pO2: 51    / HCO3: 24    / Base Excess: -3.2  /  SaO2: 78.0                   LIVER FUNCTIONS - ( 06 Oct 2023 05:30 )  Alb: 3.3 g/dL / Pro: 6.9 g/dL / ALK PHOS: 249 U/L / ALT: 45 U/L / AST: 48 U/L / GGT: x         Urinalysis Basic - ( 06 Oct 2023 05:30 )    Color: x / Appearance: x / SG: x / pH: x  Gluc: 91 mg/dL / Ketone: x  / Bili: x / Urobili: x   Blood: x / Protein: x / Nitrite: x   Leuk Esterase: x / RBC: x / WBC x   Sq Epi: x / Non Sq Epi: x / Bacteria: x         PT/INR - ( 05 Oct 2023 05:30 )   PT: 17.3 sec;   INR: 1.54              MEDICATIONS  (STANDING):  aspirin enteric coated 81 milliGRAM(s) Oral daily  atorvastatin 40 milliGRAM(s) Oral at bedtime  buMETAnide 2 milliGRAM(s) Oral <User Schedule>  clopidogrel Tablet 75 milliGRAM(s) Oral daily  folic acid 1 milliGRAM(s) Oral daily  influenza  Vaccine (HIGH DOSE) 0.7 milliLiter(s) IntraMuscular once  midodrine. 5 milliGRAM(s) Oral every 8 hours  multivitamin 1 Tablet(s) Oral daily  Nephro-chichi 1 Tablet(s) Oral every 24 hours  pantoprazole    Tablet 40 milliGRAM(s) Oral before breakfast  pregabalin 75 milliGRAM(s) Oral daily    MEDICATIONS  (PRN):  LORazepam   Injectable 1 milliGRAM(s) IV Push every 1 hour PRN CIWA-Ar score 8 or greater  ondansetron Injectable 4 milliGRAM(s) IV Push every 12 hours PRN Nausea and/or Vomiting      Assessment- Rapid Response called for 63y year old Male with a past medical history of PAST MEDICAL & SURGICAL HISTORY:  HTN (hypertension)      HLD (hyperlipidemia)      Chronic kidney disease      H/O bilateral hip replacements            DDx:      Plan-      Dispo:    I have personally and independently provided 31 minutes of critical care services.  This excludes any time spent on separate procedures or teaching. ***Rapid Response Clinical Impact Physician Assistant Note***    ******INCOMPLETE*********    64 y/o Male w/ PMHx of ESRD on HD (M/T/Th/Sat via R SC Permacath placed 6 months ago per pt, pending LUE AVF placement), CAD, ELMER, HLD, GERD, and ?ETOH abuse (drinks 2-3 martini's daily, last drink 2pm 10/3/23) who initially presented to [facility] after missed HD session 10/3/23 c/o SOB and brief b/l LE weakness, as well as two episodes of non-bloody emesis. Since admission course s/f initial new O2 requirement (satting 85% on RA, improved to 98% on 3L NC), continued non-bloody vomiting x1 relieved by Zofran IV, and uncomplicated HD session on 10/4/23 w/ 2L removed, at which time home Midodrine was restarted for soft BP's. Pt was planned to undergo additional HD session today (10/6).    Rapid response team called at 9:40am for episode of hypotension to SBP 90's and associated AMS prior to HD session.    Patient was seen and examined at the bedside by the rapid response team. On arrival pt was seated semi-fowlers in care of Nephro team and PCCM Fellow. VS: T 97.5F oral, HR 89 irregularly irregular, /84, RR 24 non-labored, SpO2 85% but w/ difficulty obtaining good waveform but pt noted to have peripheral cyanosis of b/l fingers and toes; FS BGL 72. Pt reports feeling tired/lethargic, but otherwise offer no complains and further denies chest pain, SOB, RAMIREZ, palpitations, dizziness, LOC, N/V/D, fever/chills/sick contact, diaphoresis, orthopnea/PND, and leg swelling. Pt was then transitioned to NRB 15L, still w/ difficulty obtaining SpO2 reading and no significant improvement in distal extremity cyanosis, and was subsequently transitioned to HFNC and and ABG was sent. Pt administered D50 IV x1 amp, repeat FS .       _______________________________________________________________________________________________      Allergies:  penicillins (Unknown)  No known Intolerances    PAST MEDICAL & SURGICAL HISTORY:  HTN (hypertension)  HLD (hyperlipidemia)  Chronic kidney disease  H/O bilateral hip replacements    Vital Signs Last 24 Hrs  T(C): 36.3 (06 Oct 2023 05:51), Max: 36.3 (05 Oct 2023 12:47)  T(F): 97.4 (06 Oct 2023 05:51), Max: 97.4 (05 Oct 2023 12:47)  HR: 92 (06 Oct 2023 10:06) (91 - 110)  BP: 115/78 (06 Oct 2023 05:51) (113/77 - 120/80)  BP(mean): --  RR: 20 (06 Oct 2023 10:06) (17 - 20)  SpO2: 98% (06 Oct 2023 10:06) (98% - 99%)    Parameters below as of 06 Oct 2023 10:06  Patient On (Oxygen Delivery Method): nasal cannula, high flow  O2 Flow (L/min): 50  O2 Concentration (%): 100    Review of Systems:  CONSTITUTIONAL: No weakness, fevers or chills  EYES/ENT: No visual changes;  No vertigo or throat pain   NECK: No pain or stiffness  RESPIRATORY: No cough, wheezing, hemoptysis; No shortness of breath  CARDIOVASCULAR: No chest pain or palpitations  GASTROINTESTINAL: No abdominal or epigastric pain. No nausea, vomiting, or hematemesis; No diarrhea or constipation. No melena or hematochezia.  GENITOURINARY: No dysuria, frequency or hematuria  NEUROLOGICAL: No numbness or weakness  SKIN: No itching, burning, rashes, or lesions   All other review of systems is negative unless indicated above.    Physical exam:  GENERAL: The patient is awake and alert in no apparent distress.   HEENT: Head is normocephalic and atraumatic. Extraocular muscles are intact. Mucous membranes are moist. No throat erythema/exudates no lymphadenopathy, no JVD,   NECK: Supple.  LUNGS: Clear to auscultation BL without wheezing, rales or rhonchi; respirations unlabored  HEART: Regular rate and rhythm ,+S1/+S2, no murmurs, rubs, gallops  ABDOMEN: Soft, nontender, and nondistended, no rebound, guarding rigidity, bowel sounds in all 4 quadrants  EXTREMITIES: Without any cyanosis, clubbing, rash, lesions or edema.  SKIN: No new rashes or lesions.  MSK: strength equal BL  VASCULAR: Radial and Dorsal pedal pulses palpable BL.  NEUROLOGIC: Grossly intact.  PSYCH: No new changes.                            10.9   6.32  )-----------( 104      ( 06 Oct 2023 05:30 )             35.2     10-06    130<L>  |  91<L>  |  23  ----------------------------<  91  3.8   |  22  |  3.92<H>    Ca    9.7      06 Oct 2023 05:30  Phos  4.7     10-06  Mg     1.7     10-06    TPro  6.9  /  Alb  3.3  /  TBili  1.8<H>  /  DBili  x   /  AST  48<H>  /  ALT  45  /  AlkPhos  249<H>  10-06    ABG - ( 06 Oct 2023 10:12 )  pH, Arterial: 7.30  pH, Blood: x     /  pCO2: 48    /  pO2: 51    / HCO3: 24    / Base Excess: -3.2  /  SaO2: 78.0                   LIVER FUNCTIONS - ( 06 Oct 2023 05:30 )  Alb: 3.3 g/dL / Pro: 6.9 g/dL / ALK PHOS: 249 U/L / ALT: 45 U/L / AST: 48 U/L / GGT: x         Urinalysis Basic - ( 06 Oct 2023 05:30 )    Color: x / Appearance: x / SG: x / pH: x  Gluc: 91 mg/dL / Ketone: x  / Bili: x / Urobili: x   Blood: x / Protein: x / Nitrite: x   Leuk Esterase: x / RBC: x / WBC x   Sq Epi: x / Non Sq Epi: x / Bacteria: x         PT/INR - ( 05 Oct 2023 05:30 )   PT: 17.3 sec;   INR: 1.54              MEDICATIONS  (STANDING):  aspirin enteric coated 81 milliGRAM(s) Oral daily  atorvastatin 40 milliGRAM(s) Oral at bedtime  buMETAnide 2 milliGRAM(s) Oral <User Schedule>  clopidogrel Tablet 75 milliGRAM(s) Oral daily  folic acid 1 milliGRAM(s) Oral daily  influenza  Vaccine (HIGH DOSE) 0.7 milliLiter(s) IntraMuscular once  midodrine. 5 milliGRAM(s) Oral every 8 hours  multivitamin 1 Tablet(s) Oral daily  Nephro-chichi 1 Tablet(s) Oral every 24 hours  pantoprazole    Tablet 40 milliGRAM(s) Oral before breakfast  pregabalin 75 milliGRAM(s) Oral daily    MEDICATIONS  (PRN):  LORazepam   Injectable 1 milliGRAM(s) IV Push every 1 hour PRN CIWA-Ar score 8 or greater  ondansetron Injectable 4 milliGRAM(s) IV Push every 12 hours PRN Nausea and/or Vomiting      Assessment- Rapid Response called for 63y year old Male with a past medical history of PAST MEDICAL & SURGICAL HISTORY:  HTN (hypertension)      HLD (hyperlipidemia)      Chronic kidney disease      H/O bilateral hip replacements            DDx:      Plan-      Dispo:    I have personally and independently provided 31 minutes of critical care services.  This excludes any time spent on separate procedures or teaching. ***Rapid Response Clinical Impact Physician Assistant Note***    64 y/o Male w/ PMHx of ESRD on HD (M/T/Th/Sat via R SC Permacath placed 6 months ago per pt, pending LUE AVF placement), CAD, ELMER, HLD, GERD, and ?ETOH abuse (drinks 2-3 martini's daily, last drink 2pm 10/3/23) who initially presented to [facility] after missed HD session 10/3/23 c/o SOB and brief b/l LE weakness, as well as two episodes of non-bloody emesis. Since admission course s/f new initial O2 requirement (satting 85% on RA, improved to 98% on 3L NC and since weaned off to RA), Dx of new onset Afib, an uncomplicated HD session on 10/4/23 w/ 2L removed after which home midodrine was restarted for soft BP's, and TTE 10/5 revealing moderately reduced LVEF w/ RWMA, reduced RV function w/ dilated RV, mod AR w/ mild-mod AS, mod-severe TR, PASP 37, and small-mod pericardial effusion w/o tamponade physiology. Pt was planned to undergo additional HD session today (10/6).    Rapid response team called at 9:40am for episode of hypotension to SBP 90's and associated AMS prior to HD session.    Patient was seen and examined at the bedside by the rapid response team. On arrival pt was seated semi-fowlers in care of Nephro team and PCCM Fellow. VS: T 97.5F oral, HR 89 irregularly irregular, /84, RR 24 non-labored, SpO2 85% but w/ difficulty obtaining good waveform but pt noted to have peripheral cyanosis of b/l hands and feet; FS BGL 72. Pt reports feeling tired/lethargic, but otherwise offer no complains and further denies chest pain, SOB, RAMIREZ, palpitations, dizziness, LOC, N/V/D, fever/chills/sick contact, diaphoresis, orthopnea/PND, and leg swelling. Pt was then transitioned to NRB 15L, still w/ difficulty obtaining SpO2 reading and no significant improvement in distal extremity cyanosis, and was subsequently transitioned to HFNC w/ gradual resolution of peripheral cyanosis, and ABG and venous bloodwork was sent. Pt administered D50 IV x1 amp, repeat FS . POCUS reveals A-line pattern b/l, no LE DVT b/l. STAT Cardiology consult was placed and bedside echo performed by Cardiology fellow reveals grossly reduced EF w/ RV dilation (>1:1 ratio w/ LV) and ?intraventricular septal bowing into LV, biatrial dilation, and significant AS/AR. EKG reveals low voltage Afib w/ incomplete RBBB; no ischemic changes.    Allergies:  penicillins (Unknown)  No known Intolerances    PAST MEDICAL & SURGICAL HISTORY:  HTN (hypertension)  HLD (hyperlipidemia)  Chronic kidney disease  H/O bilateral hip replacements    Vital Signs Last 24 Hrs  T(C): 36.3 (06 Oct 2023 05:51), Max: 36.3 (05 Oct 2023 12:47)  T(F): 97.4 (06 Oct 2023 05:51), Max: 97.4 (05 Oct 2023 12:47)  HR: 92 (06 Oct 2023 10:06) (91 - 110)  BP: 115/78 (06 Oct 2023 05:51) (113/77 - 120/80)  BP(mean): --  RR: 20 (06 Oct 2023 10:06) (17 - 20)  SpO2: 98% (06 Oct 2023 10:06) (98% - 99%)    Parameters below as of 06 Oct 2023 10:06  Patient On (Oxygen Delivery Method): nasal cannula, high flow  O2 Flow (L/min): 50  O2 Concentration (%): 100    Review of Systems:  CONSTITUTIONAL: No weakness, fevers or chills. +Lethargy  EYES/ENT: No visual changes;  No vertigo or throat pain  RESPIRATORY: No cough, wheezing, hemoptysis; No shortness of breath  CARDIOVASCULAR: No chest pain or palpitations  GASTROINTESTINAL: No abdominal or epigastric pain. No nausea or hematemesis; No diarrhea or constipation. No melena or hematochezia.  GENITOURINARY: No dysuria, frequency or hematuria  NEUROLOGICAL: No numbness or weakness.  SKIN: No itching, burning, rashes, or lesions.  All other review of systems is negative unless indicated above.    Physical exam:  GENERAL: The patient is awake and in no apparent distress. Somnolent but easily arrousable.   HEENT: Head is normocephalic and atraumatic. Extraocular muscles are intact. Mucous membranes are moist. No throat erythema/exudates no lymphadenopathy, no JVD,   NECK: Supple.  LUNGS: Clear to auscultation BL without wheezing, rales or rhonchi; respirations unlabored  HEART: Irregularly irregular rate and rhythm ,+S1/+S2, rubs, gallops. + 3/6 Systolic crescendo/decrescendo murmur at R upper sternal border.  ABDOMEN: Soft, nontender, no rebound, guarding rigidity, bowel sounds in all 4 quadrants; + Moderate abdominal distention  EXTREMITIES: Without any cyanosis, clubbing, rash, lesions or edema.  SKIN: No new rashes or lesions. Peripheral cyanosis to b/l hands and feet. Relatively warm extremities.  MSK: strength equal BL  VASCULAR: Radial and Dorsal pedal pulses palpable BL.  NEUROLOGIC: Grossly intact w/ no focal deficits.  PSYCH: No new changes.                        10.9   6.32  )-----------( 104      ( 06 Oct 2023 05:30 )             35.2     10-06    130<L>  |  91<L>  |  23  ----------------------------<  91  3.8   |  22  |  3.92<H>    Ca    9.7      06 Oct 2023 05:30  Phos  4.7     10-06  Mg     1.7     10-06    TPro  6.9  /  Alb  3.3  /  TBili  1.8<H>  /  DBili  x   /  AST  48<H>  /  ALT  45  /  AlkPhos  249<H>  10-06    ABG - ( 06 Oct 2023 10:12 )  pH, Arterial: 7.30  pH, Blood: x     /  pCO2: 48    /  pO2: 51    / HCO3: 24    / Base Excess: -3.2  /  SaO2: 78.0           LIVER FUNCTIONS - ( 06 Oct 2023 05:30 )  Alb: 3.3 g/dL / Pro: 6.9 g/dL / ALK PHOS: 249 U/L / ALT: 45 U/L / AST: 48 U/L / GGT: x         Urinalysis Basic - ( 06 Oct 2023 05:30 )    Color: x / Appearance: x / SG: x / pH: x  Gluc: 91 mg/dL / Ketone: x  / Bili: x / Urobili: x   Blood: x / Protein: x / Nitrite: x   Leuk Esterase: x / RBC: x / WBC x   Sq Epi: x / Non Sq Epi: x / Bacteria: x    PT/INR - ( 05 Oct 2023 05:30 )   PT: 17.3 sec;   INR: 1.54         MEDICATIONS  (STANDING):  aspirin enteric coated 81 milliGRAM(s) Oral daily  atorvastatin 40 milliGRAM(s) Oral at bedtime  buMETAnide 2 milliGRAM(s) Oral <User Schedule>  clopidogrel Tablet 75 milliGRAM(s) Oral daily  folic acid 1 milliGRAM(s) Oral daily  influenza  Vaccine (HIGH DOSE) 0.7 milliLiter(s) IntraMuscular once  midodrine. 5 milliGRAM(s) Oral every 8 hours  multivitamin 1 Tablet(s) Oral daily  Nephro-chichi 1 Tablet(s) Oral every 24 hours  pantoprazole    Tablet 40 milliGRAM(s) Oral before breakfast  pregabalin 75 milliGRAM(s) Oral daily    MEDICATIONS  (PRN):  LORazepam   Injectable 1 milliGRAM(s) IV Push every 1 hour PRN CIWA-Ar score 8 or greater  ondansetron Injectable 4 milliGRAM(s) IV Push every 12 hours PRN Nausea and/or Vomiting      Assessment-   64 y/o Male w/ PMHx of ESRD on HD (M/T/Th/Sat via R SC Permacath placed 6 months ago per pt, pending LUE AVF placement), CAD, ELMER, HLD, GERD, and ?ETOH abuse (drinks 2-3 martini's daily, last drink 2pm 10/3/23) who initially presented to [facility] after missed HD session 10/3/23 c/o SOB and brief b/l LE weakness, as well as two episodes of non-bloody emesis, for whom a rapid response was called for an episode of hypotension w/ associated AMS prior to HD session, likely i/s/o biventricular heart failure w/ possible cardiogenic shock.    DDx:  -Cardiogenic shock w/ low output state (most likely)  -PE (possible given echo findings but DVT study negative and pt is not tachycardic and is not on any rate control meds)  -Arrythmia (possible given pt was not on tele-monitoring at this time and w/ new onset Afib)  -Vasovagal episode (less likely, pt was seated in stretcher w/ no inciting activity preceding the event)    Plan-  -Continuous tele/pulse ox monitoring  -c/w HFNC 30L/30%, wean as tolerated and monitor for cyanosis  -f/u labs including ABG, CE's, lactate, Mag, Phos, D-Dimer. Trend lactate and CE's if necessary  -Start heparin ggt and obtain CT PE and formal DVT study for further PE r/o  -Consider repeating formal TTE w/ VTI measurement to r/o normotensive cardiogenic shock (low output state)  -Cardiology consulted, rec's appreciated. Given new onset Afib, acute HFrEF, and new RWMA (prior comparison not available) consider further myocardial ischemia and pHTN w/u  -Pending Cardiology evaluation, consider transfer to CCU for further management  -Renal following, per d/w Renal fellow no urgent need for HD now unless done to offload RV. Further HD rec's appeciated      Dispo: Initially dispo'd to [facility] but upon further assessment w/ Dr. Garg pt was upgraded to MICU    Case d/w PCCM Fellow Dr. Rodriguez and Attendings Dr. Chavez and Dr. Garg    I have personally and independently provided 31 minutes of critical care services.  This excludes any time spent on separate procedures or teaching.

## 2023-10-06 NOTE — PROGRESS NOTE ADULT - PROBLEM SELECTOR PLAN 3
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 Clinical exam: abdominal straie, positive fluid wave with ascites, +bilateral pitting edema, +hepatosplenomegaly. Serum ammonia 37, Alk phos 273. Low suspicion for hepatic encephalopathy at this time; patient mentating AOx3 and conversant, w/o jaundice or non-icteric sclera. Low concern for hepatorenal syndrome as remains hemodynamically stable. No known gallbladder pathology established.  Abdominal US (10/4) Cirrhotic morphology. Pulsatile flow in the main and left portal veins. Moderate ascites. Increased right renal cortical echogenicity compatible with medical renal disease.  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 Low suspicion for hepatic encephalopathy at this time; patient mentating AOx3 and conversant, w/o jaundice or non-icteric sclera. Low concern for hepatorenal syndrome as remains hemodynamically stable. No known gallbladder pathology established.  Abdominal US (10/4) Cirrhotic morphology. Pulsatile flow in the main and left portal veins. Moderate ascites. Increased right renal cortical echogenicity compatible with medical renal disease.  - consider paracentesis if increasing ascites   - address cardiac dysfunction as per above and with HD.

## 2023-10-06 NOTE — PROGRESS NOTE ADULT - PROBLEM SELECTOR PLAN 4
At admission BIENVENIDO 29. AST/AlT 45/43. Last drink 2 PM on 10/3/23, has 2-3 martinis daily. No history of alcohol withdrawals or withdrawal seizures. CIWA 3. Three non-bloody, clear/bilious emesis in past 24 hours. Qtc 397. 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   -Zofran 4 mg q6 PRN nausea, vomiting  -CIWA protocol: q4 CIWA screens with CIWA>8 give 2 mg ativan  -Thiamine, folic acid, multivitamin  -Home meds B1 100 mg OTC, D3 1000 IU OTC, B12 1000 mcg OTC At admission BIENVENIDO 29. AST/AlT 45/43. Last drink 2 PM on 10/3/23, has 2-3 martinis daily. No history of alcohol withdrawals or withdrawal seizures. CIWA 2 this AM (anxiety). One non-bloody, clear/bilious emesis in past 24 hours. Qtc 397. 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   - c Zofran 4 mg IV q12 PRN nausea, vomiting   - CIWA protocol: q4 CIWA screens with CIWA>8 give 2 mg ativan  - Thiamine, folic acid, multivitamin  - Home meds B1 100 mg OTC, D3 1000 IU OTC, B12 1000 mcg OTC.

## 2023-10-06 NOTE — CONSULT NOTE ADULT - SUBJECTIVE AND OBJECTIVE BOX
PULMONARY HYPERTENSION SERVICE INITIAL CONSULT NOTE    HPI:  HPI: 63 year old males with history of ESRD (HD 4 x M/T/TH/SAT)ELMER HLD, GERD, CAD presents after missed HD on 10/3/23 with increased shortness of breath at rest and brief bilateral leg weakness and two episodes of non-bloody emesis. He began HD 6 months prior with R chest port use, with recent left hand mapping 1-2 weeks ago for upcoming L AV fistula placement. He noticed increased shortness of breath without any recent travel or sick contacts. At baseline he breathes comfortably on room air, but required 3 L nasal cannula for 85% oxygen saturation in ED. He felt like his legs gave out earlier in the day and at baseline ambulates with a cane. He denies recent falls, but has fall history with most recent fall 3-4 weeks ago with no acute head injuries. He also chronically drinks alcohol, with 2-3 martini's per day with last drink at 2 pm on 10/3/23. He denies alcohol withdrawal hospitalizations in the past and no withdrawal seizures. He endorses some mid epigastric chest pain that does not radiate. He denies fever, chills, diarrhea, abdominal pain, headaches, neck pain, dizziness, or syncope. denies smoking. He receives all his care at [facility] and is compliant with medications. He took last took all his medications at home at 10/3/23 AM and takes all medications together in the morning to prevent forgetting taking his medications.     In the ED:    - VS: Tmax: 98.3, HR: 106, /78, RR 18, 98% on RA    - Pertinent Labs: WBC 6, Hg 10.2, .6; INR 1.46; Serum Ammonia 37, K 3.4, Mg 1.7, Na 132, AGAP 17, Cr 3.93, Albumin 2.0, BiliT 2, Alk Phos 273, AST 45, ALT 43, VBG pH 7.36, BIENVENIDO 29, proBNP 08631, Trop I 564    - Imaging:  EKG sinus tach 104 bpm, Qtc 397, right axis deviation, incomplete RBBB, possible right ventricular hypertrophy, nonspecific t wave abnormality. CXR no infiltrates.    - Treatment/interventions: None in ED  PMHx: HTN, HLD, GERD, CAD s/p MI no stents, HD on ESRD  PSHx: two hip replacement surgeries  Meds: See med rec  Allergies: pencillin- patient not aware of reaction  Social: see below  (04 Oct 2023 01:03)      REVIEW OF SYSTEMS:  Constitutional: No fever, weight loss or fatigue  Eyes: No eye pain, visual disturbances, or discharge  ENMT:  No difficulty hearing, tinnitus, vertigo; No sinus or throat pain  Neck: No pain, stiffness or neck swelling  Respiratory: see HPI  Cardiovascular: No chest pain, palpitations, dizziness or leg swelling  Gastrointestinal: No abdominal or epigastric pain. No nausea, vomiting or hematemesis; No diarrhea or constipation. No melena or hematochezia.  Genitourinary: No dysuria, frequency, hematuria or incontinence  Neurological: No headaches, memory loss, loss of strength, numbness or tremors  Skin: No itching, burning, rashes or lesions   Lymph Nodes: No enlarged glands  Endocrine: No heat or cold intolerance; No hair loss  Musculoskeletal: No joint pain or swelling; No muscle, back or extremity pain  Psychiatric: No depression, anxiety, mood swings or difficulty sleeping  Heme/Lymph: No easy bruising or bleeding gums  Allergy and Immunologic: No hives or eczema    PAST MEDICAL & SURGICAL HISTORY:  HTN (hypertension)      HLD (hyperlipidemia)      Chronic kidney disease      H/O bilateral hip replacements          FAMILY HISTORY:      SOCIAL HISTORY:  Smoking Status: [ ] Current, [ ] Former, [ ] Never  Pack Years:    MEDICATIONS:  Pulmonary:    Antimicrobials:    Anticoagulants:  aspirin enteric coated 81 milliGRAM(s) Oral daily  clopidogrel Tablet 75 milliGRAM(s) Oral daily  heparin  Infusion.  Unit(s)/Hr IV Continuous <Continuous>    Onc:    GI/:  pantoprazole    Tablet 40 milliGRAM(s) Oral before breakfast    Endocrine:  atorvastatin 40 milliGRAM(s) Oral at bedtime    Cardiac:  buMETAnide 2 milliGRAM(s) Oral <User Schedule>  DOBUTamine Infusion 2.5 MICROgram(s)/kG/Min IV Continuous <Continuous>  midodrine. 5 milliGRAM(s) Oral every 8 hours    Other Medications:  folic acid 1 milliGRAM(s) Oral daily  influenza  Vaccine (HIGH DOSE) 0.7 milliLiter(s) IntraMuscular once  LORazepam   Injectable 1 milliGRAM(s) IV Push every 1 hour PRN  multivitamin 1 Tablet(s) Oral daily  Nephro-chichi 1 Tablet(s) Oral every 24 hours  ondansetron Injectable 4 milliGRAM(s) IV Push every 12 hours PRN  pregabalin 75 milliGRAM(s) Oral daily      Allergies    penicillins (Unknown)    Intolerances        Vital Signs Last 24 Hrs  T(C): 36.4 (06 Oct 2023 09:25), Max: 36.4 (06 Oct 2023 09:25)  T(F): 97.5 (06 Oct 2023 09:25), Max: 97.5 (06 Oct 2023 09:25)  HR: 88 (06 Oct 2023 13:00) (85 - 110)  BP: 88/69 (06 Oct 2023 12:00) (88/69 - 129/83)  BP(mean): 75 (06 Oct 2023 12:00) (75 - 84)  RR: 12 (06 Oct 2023 11:40) (12 - 22)  SpO2: 76% (06 Oct 2023 13:00) (70% - 99%)    Parameters below as of 06 Oct 2023 13:00  Patient On (Oxygen Delivery Method): nasal cannula, high flow  O2 Flow (L/min): 30  O2 Concentration (%): 30        PHYSICAL EXAM:  Constitutional: WD  Head: NC/AT  EENT: anicteric sclera; oropharynx clear, MMM  Neck: supple, no JVD  Respiratory: CTA B/L; no W/R/R  Cardiovascular: +S1/S2, RRR  Gastrointestinal: soft, NT/ND  Extremities: WWP; no clubbing or cyanosis; no edema  Vascular: 2+ radial and pedal pulses  Neurological: alert, oriented    LABS:  ABG - ( 06 Oct 2023 11:02 )  pH, Arterial: 7.33  pH, Blood: x     /  pCO2: 44    /  pO2: 481   / HCO3: 23    / Base Excess: -2.8  /  SaO2: 100.0               CBC Full  -  ( 06 Oct 2023 10:18 )  WBC Count : 5.97 K/uL  RBC Count : 3.15 M/uL  Hemoglobin : 10.4 g/dL  Hematocrit : 33.3 %  Platelet Count - Automated : 102 K/uL  Mean Cell Volume : 105.7 fl  Mean Cell Hemoglobin : 33.0 pg  Mean Cell Hemoglobin Concentration : 31.2 gm/dL  Auto Neutrophil # : x  Auto Lymphocyte # : x  Auto Monocyte # : x  Auto Eosinophil # : x  Auto Basophil # : x  Auto Neutrophil % : x  Auto Lymphocyte % : x  Auto Monocyte % : x  Auto Eosinophil % : x  Auto Basophil % : x    10-06    130<L>  |  93<L>  |  24<H>  ----------------------------<  93  3.8   |  23  |  4.06<H>    Ca    9.9      06 Oct 2023 10:18  Phos  4.7     10-06  Mg     1.7     10-06    TPro  6.9  /  Alb  3.2<L>  /  TBili  1.6<H>  /  DBili  x   /  AST  45<H>  /  ALT  44  /  AlkPhos  247<H>  10-06    PT/INR - ( 06 Oct 2023 10:18 )   PT: 18.3 sec;   INR: 1.63          PTT - ( 06 Oct 2023 10:18 )  PTT:31.4 sec      Urinalysis Basic - ( 06 Oct 2023 10:18 )    Color: x / Appearance: x / SG: x / pH: x  Gluc: 93 mg/dL / Ketone: x  / Bili: x / Urobili: x   Blood: x / Protein: x / Nitrite: x   Leuk Esterase: x / RBC: x / WBC x   Sq Epi: x / Non Sq Epi: x / Bacteria: x                RADIOLOGY & ADDITIONAL STUDIES: Personally reviewed. PULMONARY HYPERTENSION SERVICE INITIAL CONSULT NOTE    HPI:   This is a 63M with IgA nephropathy leading to ESRD (HD MTRS), ischemic and probably nonischemic CAD with HFrEF and HFpEF, afib, not on GDMT who presented for SOB 2/2 missed HD session. Recent left hand mapping 1-2 weeks ago for upcoming L AV fistula placement. He noticed increased shortness of breath without any recent travel or sick contacts. At baseline, he does not use O2. Ambulates with a cane. History of a fall recently 4 weeks ago. Drinks EtoH excessively, last drink at 2 pm on 10/3/23. He denies alcohol withdrawal hospitalizations in the past and no withdrawal seizures. He endorses some mid epigastric chest pain that does not radiate. He receives all his care at [facility] and is compliant with medications- has a cardiologist there. Patient was admitted and underwent HD. On 10/6/23 during HD, he was an RRT for reported hypoxemia, though this was a forehead pulse ox and ABG proved this was inaccurate. Echo with severe biventricular failure. Reports no pain. PH consulted for RV dysfunction.    REVIEW OF SYSTEMS:  Constitutional: No fever, weight loss or fatigue  Eyes: No eye pain, visual disturbances, or discharge  ENMT:  No difficulty hearing, tinnitus, vertigo; No sinus or throat pain  Neck: No pain, stiffness or neck swelling  Respiratory: see HPI  Cardiovascular: No chest pain, palpitations, dizziness or leg swelling  Gastrointestinal: No abdominal or epigastric pain. No nausea, vomiting or hematemesis; No diarrhea or constipation. No melena or hematochezia.  Genitourinary: No dysuria, frequency, hematuria or incontinence  Neurological: No headaches, memory loss, loss of strength, numbness or tremors  Skin: No itching, burning, rashes or lesions   Lymph Nodes: No enlarged glands  Endocrine: No heat or cold intolerance; No hair loss  Musculoskeletal: No joint pain or swelling; No muscle, back or extremity pain  Psychiatric: No depression, anxiety, mood swings or difficulty sleeping  Heme/Lymph: No easy bruising or bleeding gums  Allergy and Immunologic: No hives or eczema    PAST MEDICAL & SURGICAL HISTORY:  HTN (hypertension)  HLD (hyperlipidemia)  Chronic kidney disease  H/O bilateral hip replacements    FAMILY HISTORY: Noncontributory    SOCIAL HISTORY:  Smoking Status: [ ] Current, [ ] Former, [X] Never    MEDICATIONS:  Pulmonary:    Antimicrobials:    Anticoagulants:  aspirin enteric coated 81 milliGRAM(s) Oral daily  clopidogrel Tablet 75 milliGRAM(s) Oral daily  heparin  Infusion.  Unit(s)/Hr IV Continuous <Continuous>    GI/:  pantoprazole    Tablet 40 milliGRAM(s) Oral before breakfast    Endocrine:  atorvastatin 40 milliGRAM(s) Oral at bedtime    Cardiac:  buMETAnide 2 milliGRAM(s) Oral <User Schedule>  DOBUTamine Infusion 2.5 MICROgram(s)/kG/Min IV Continuous <Continuous>  midodrine. 5 milliGRAM(s) Oral every 8 hours    Other Medications:  folic acid 1 milliGRAM(s) Oral daily  influenza  Vaccine (HIGH DOSE) 0.7 milliLiter(s) IntraMuscular once  LORazepam   Injectable 1 milliGRAM(s) IV Push every 1 hour PRN  multivitamin 1 Tablet(s) Oral daily  Nephro-chichi 1 Tablet(s) Oral every 24 hours  ondansetron Injectable 4 milliGRAM(s) IV Push every 12 hours PRN  pregabalin 75 milliGRAM(s) Oral daily    Allergies  penicillins (Unknown)    Vital Signs Last 24 Hrs  T(C): 36.4 (06 Oct 2023 09:25), Max: 36.4 (06 Oct 2023 09:25)  T(F): 97.5 (06 Oct 2023 09:25), Max: 97.5 (06 Oct 2023 09:25)  HR: 88 (06 Oct 2023 13:00) (85 - 110)  BP: 88/69 (06 Oct 2023 12:00) (88/69 - 129/83)  BP(mean): 75 (06 Oct 2023 12:00) (75 - 84)  RR: 12 (06 Oct 2023 11:40) (12 - 22)  SpO2: 76% (06 Oct 2023 13:00) (70% - 99%)    PHYSICAL EXAM:  Constitutional: ill man, lethargic  EENT: anicteric sclera; oropharynx clear  Neck: supple, +JVD  Respiratory: crackles  Cardiovascular: +S1/S2, irregular rate; +3/6 systolic murmur at l chest wall  Gastrointestinal: soft, NT/ND  Extremities: cold; mottled; no clubbing or cyanosis; no edema  Vascular: 2+ radial and pedal pulses  Neurological: lethargic, awakens to noxious stimuli    LABS:  ABG - ( 06 Oct 2023 11:02 )  pH, Arterial: 7.33  pH, Blood: x     /  pCO2: 44    /  pO2: 481   / HCO3: 23    / Base Excess: -2.8  /  SaO2: 100.0     CBC Full  -  ( 06 Oct 2023 10:18 )  WBC Count : 5.97 K/uL  RBC Count : 3.15 M/uL  Hemoglobin : 10.4 g/dL  Hematocrit : 33.3 %  Platelet Count - Automated : 102 K/uL  Mean Cell Volume : 105.7 fl  Mean Cell Hemoglobin : 33.0 pg  Mean Cell Hemoglobin Concentration : 31.2 gm/dL    10-06    130<L>  |  93<L>  |  24<H>  ----------------------------<  93  3.8   |  23  |  4.06<H>    Ca    9.9      06 Oct 2023 10:18  Phos  4.7     10-06  Mg     1.7     10-06    TPro  6.9  /  Alb  3.2<L>  /  TBili  1.6<H>  /  DBili  x   /  AST  45<H>  /  ALT  44  /  AlkPhos  247<H>  10-06    PT/INR - ( 06 Oct 2023 10:18 )   PT: 18.3 sec;   INR: 1.63          PTT - ( 06 Oct 2023 10:18 )  PTT:31.4 sec      Urinalysis Basic - ( 06 Oct 2023 10:18 )    Color: x / Appearance: x / SG: x / pH: x  Gluc: 93 mg/dL / Ketone: x  / Bili: x / Urobili: x   Blood: x / Protein: x / Nitrite: x   Leuk Esterase: x / RBC: x / WBC x   Sq Epi: x / Non Sq Epi: x / Bacteria: x    RADIOLOGY & ADDITIONAL STUDIES: Personally reviewed.  < from: TTE Echo Complete w/o Contrast w/ Doppler (10.05.23 @ 14:23) >  CONCLUSIONS:     1. Technically difficult study.   2. Basal and mid inferior septum, basal and mid inferior wall, and basal   and mid anterior septum are abnormal.   3. Grossly, left ventricular function appears moderately reduced. Apical   views are limited.   4. Dilated right ventricular size.   5. Reduced right ventricular systolic function.   6. Biatrial enlargement.   7. Moderate aortic regurgitation.   8. Mild-to-moderate aortic stenosis. The peak transvalvular velocity is   2.26 m/s, the mean transvalvular gradient is 11.00 mmHg, and the LVOT/AV   velocity ratio is 0.36. The aortic valve area (estimated via the   continuity method) is 1.48 cm².   9. Moderate-to-severe tricuspid regurgitation.  10. Pulmonaryartery systolic pressure is 39 mmHg.  11. Small-to-moderate pericardial effusion without echocardiographic   evidence of cardiac tamponade physiology.  12. Ascites present.    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 Recent left hand mapping 1-2 weeks ago for upcoming L AV fistula placement. He noticed increased shortness of breath without any recent travel or sick contacts. At baseline, he does not use O2. Ambulates with a cane. History of a fall recently 4 weeks ago. Drinks EtoH excessively, last drink at 2 pm on 10/3/23. He denies alcohol withdrawal hospitalizations in the past and no withdrawal seizures. He endorses some mid epigastric chest pain that does not radiate. He receives all his care at [facility] and is compliant with medications- has a cardiologist there. Patient was admitted and underwent HD. On 10/6/23 during HD, he was an RRT for reported hypoxemia, though this was a forehead pulse ox and ABG proved this was inaccurate. Echo with severe biventricular failure. Reports no pain. PH consulted for RV dysfunction.    REVIEW OF SYSTEMS:  Constitutional: No fever, weight loss or fatigue  Eyes: No eye pain, visual disturbances, or discharge  ENMT:  No difficulty hearing, tinnitus, vertigo; No sinus or throat pain  Neck: No pain, stiffness or neck swelling  Respiratory: see HPI  Cardiovascular: No chest pain, palpitations, dizziness or leg swelling  Gastrointestinal: No abdominal or epigastric pain. No nausea, vomiting or hematemesis; No diarrhea or constipation. No melena or hematochezia.  Genitourinary: No dysuria, frequency, hematuria or incontinence  Neurological: No headaches, memory loss, loss of strength, numbness or tremors  Skin: No itching, burning, rashes or lesions   Lymph Nodes: No enlarged glands  Endocrine: No heat or cold intolerance; No hair loss  Musculoskeletal: No joint pain or swelling; No muscle, back or extremity pain  Psychiatric: No depression, anxiety, mood swings or difficulty sleeping  Heme/Lymph: No easy bruising or bleeding gums  Allergy and Immunologic: No hives or eczema    PAST MEDICAL & SURGICAL HISTORY:  HTN (hypertension)  HLD (hyperlipidemia)  Chronic kidney disease  H/O bilateral hip replacements    FAMILY HISTORY: Noncontributory    SOCIAL HISTORY:  Smoking Status: [ ] Current, [ ] Former, [X] Never    MEDICATIONS:  Pulmonary:    Antimicrobials:    Anticoagulants:  aspirin enteric coated 81 milliGRAM(s) Oral daily  clopidogrel Tablet 75 milliGRAM(s) Oral daily  heparin  Infusion.  Unit(s)/Hr IV Continuous <Continuous>    GI/:  pantoprazole    Tablet 40 milliGRAM(s) Oral before breakfast    Endocrine:  atorvastatin 40 milliGRAM(s) Oral at bedtime    Cardiac:  buMETAnide 2 milliGRAM(s) Oral <User Schedule>  DOBUTamine Infusion 2.5 MICROgram(s)/kG/Min IV Continuous <Continuous>  midodrine. 5 milliGRAM(s) Oral every 8 hours    Other Medications:  folic acid 1 milliGRAM(s) Oral daily  influenza  Vaccine (HIGH DOSE) 0.7 milliLiter(s) IntraMuscular once  LORazepam   Injectable 1 milliGRAM(s) IV Push every 1 hour PRN  multivitamin 1 Tablet(s) Oral daily  Nephro-chichi 1 Tablet(s) Oral every 24 hours  ondansetron Injectable 4 milliGRAM(s) IV Push every 12 hours PRN  pregabalin 75 milliGRAM(s) Oral daily    Allergies  penicillins (Unknown)    Vital Signs Last 24 Hrs  T(C): 36.4 (06 Oct 2023 09:25), Max: 36.4 (06 Oct 2023 09:25)  T(F): 97.5 (06 Oct 2023 09:25), Max: 97.5 (06 Oct 2023 09:25)  HR: 88 (06 Oct 2023 13:00) (85 - 110)  BP: 88/69 (06 Oct 2023 12:00) (88/69 - 129/83)  BP(mean): 75 (06 Oct 2023 12:00) (75 - 84)  RR: 12 (06 Oct 2023 11:40) (12 - 22)  SpO2: 76% (06 Oct 2023 13:00) (70% - 99%)    PHYSICAL EXAM:  Constitutional: ill man, lethargic  EENT: anicteric sclera; oropharynx clear  Neck: supple, +JVD  Respiratory: crackles  Cardiovascular: +S1/S2, irregular rate; +3/6 systolic murmur at l chest wall  Gastrointestinal: soft, NT/ND  Extremities: cold; mottled; no clubbing or cyanosis; no edema  Vascular: 2+ radial and pedal pulses  Neurological: lethargic, awakens to noxious stimuli    LABS:  ABG - ( 06 Oct 2023 11:02 )  pH, Arterial: 7.33  pH, Blood: x     /  pCO2: 44    /  pO2: 481   / HCO3: 23    / Base Excess: -2.8  /  SaO2: 100.0     CBC Full  -  ( 06 Oct 2023 10:18 )  WBC Count : 5.97 K/uL  RBC Count : 3.15 M/uL  Hemoglobin : 10.4 g/dL  Hematocrit : 33.3 %  Platelet Count - Automated : 102 K/uL  Mean Cell Volume : 105.7 fl  Mean Cell Hemoglobin : 33.0 pg  Mean Cell Hemoglobin Concentration : 31.2 gm/dL    10-06    130<L>  |  93<L>  |  24<H>  ----------------------------<  93  3.8   |  23  |  4.06<H>    Ca    9.9      06 Oct 2023 10:18  Phos  4.7     10-06  Mg     1.7     10-06    TPro  6.9  /  Alb  3.2<L>  /  TBili  1.6<H>  /  DBili  x   /  AST  45<H>  /  ALT  44  /  AlkPhos  247<H>  10-06    PT/INR - ( 06 Oct 2023 10:18 )   PT: 18.3 sec;   INR: 1.63          PTT - ( 06 Oct 2023 10:18 )  PTT:31.4 sec      Urinalysis Basic - ( 06 Oct 2023 10:18 )    Color: x / Appearance: x / SG: x / pH: x  Gluc: 93 mg/dL / Ketone: x  / Bili: x / Urobili: x   Blood: x / Protein: x / Nitrite: x   Leuk Esterase: x / RBC: x / WBC x   Sq Epi: x / Non Sq Epi: x / Bacteria: x    RADIOLOGY & ADDITIONAL STUDIES: Personally reviewed.  < from: TTE Echo Complete w/o Contrast w/ Doppler (10.05.23 @ 14:23) >  CONCLUSIONS:     1. Technically difficult study.   2. Basal and mid inferior septum, basal and mid inferior wall, and basal   and mid anterior septum are abnormal.   3. Grossly, left ventricular function appears moderately reduced. Apical   views are limited.   4. Dilated right ventricular size.   5. Reduced right ventricular systolic function.   6. Biatrial enlargement.   7. Moderate aortic regurgitation.   8. Mild-to-moderate aortic stenosis. The peak transvalvular velocity is   2.26 m/s, the mean transvalvular gradient is 11.00 mmHg, and the LVOT/AV   velocity ratio is 0.36. The aortic valve area (estimated via the   continuity method) is 1.48 cm².   9. Moderate-to-severe tricuspid regurgitation.  10. Pulmonaryartery systolic pressure is 39 mmHg.  11. Small-to-moderate pericardial effusion without echocardiographic   evidence of cardiac tamponade physiology.  12. Ascites present.    < end of copied text >

## 2023-10-06 NOTE — CHART NOTE - NSCHARTNOTEFT_GEN_A_CORE
Patient sent from floors to [facility] for planned HD, prior to initiating HD, noted waxing and waning in mentation, and noted to be hypoxic on 2L NC saturating between 80-85%. BP was 113/78, physical exam significant for peripheral cyanosis. Bedside echo was done which showed Patient sent from floors to [facility] for planned HD, prior to initiating HD, noted waxing and waning in mentation, and noted to be hypoxic on 2L NC saturating between 80-85%. BP was 113/78, physical exam significant for peripheral cyanosis. Bedside echo was done which showed moderate LV systolic dysfunction with reduced systolic dysfunction. Posterior pericardial effusion also noted. Rapid response was called. Concern for Cardiogenic shock and possible PE, patient transferred to  for closer monitoring and further w/u.

## 2023-10-06 NOTE — CONSULT NOTE ADULT - ASSESSMENT
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 Acutely on 10/6/23 patient had reported episode of hypotension suspected 2/2 BiV failure, and PH consulted for RV failure.    #Severe biventricular failure  #Pulmonary insufficiency    This is a patient with ESRD on HD who has received his appropriate dialysis sessions and today, during his session, developed one time low BP reading and concern for R heart failure. On review of TTE performed 10/5/23 at 2pm, there is biventricular failure. We reviewed the images personally. On my bedside echo today, there is worsening of biventricular failure with reduced VTI which is reduced between 9-11 with variation due to afib. Although there is bowing of interventricular septum during systole. There is also a mild to moderate effusion without tamponade. Clinically, patient is in cardiogenic shock and should be managed as thus due to left and therefore ensuing right heart failure. There is low suspicion that there is pure right heart failure.    Recommendations:  Recommend dobutamine given his ESRD  Further management of cardiogenic shock per ICU and cardiology teams    Patient discussed with Dr. Reno. Please call us back with any new issues or questions. 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 Acutely on 10/6/23 patient had reported episode of hypotension suspected 2/2 BiV failure, and PH consulted for RV failure. Of note, RRT was called for "hypoxia" but ABG confirmed patient NOT hypoxic (O2 sat 100%, PaO2 >400), but unable to obtain accurate pulse ox likely 2/2 mottling/cool skin and hypoperfusion    #Severe biventricular failure  #Pulmonary insufficiency    This is a patient with ESRD on HD who has received his appropriate dialysis sessions and today, during his session, developed one time low BP reading and concern for R heart failure. On review of TTE performed 10/5/23 at 2pm, there is biventricular failure. We reviewed the images personally. Both LV and RV dilated with reduced function, mod to severe TR which likely underestimates PASP, additionally has mod AI and mild to mod AS, severe biatrial enlargement. No prior VTI obtained. On my bedside echo today, there is worsening of biventricular failure with reduced VTI which is reduced between 9-11 with variation due to afib. IVC severely dilated >3cm. Although there is bowing of interventricular septum during systole. There is also a mild to moderate effusion without tamponade. On exam he is lethargic, but arousable, cool distal extremities, mottling of skin. Lactate mildly increased to 2.3. Unable to follow UOP given ESRD though pt is on bumex at home. Clinically, patient is in cardiogenic shock and should be managed as thus due to left and therefore ensuing right heart failure. There is low suspicion that there is pure right heart failure. Unclear inciting factor for worsening BiV failure - low suspicion for PE given pt is not actually hypoxic, there is no significant change in IVS mild flattening from yesterday, AF without significant change in HR. May have been transiently hypotensive during ESRD and, given severe underlying disease, is poorly tolerant to low flow states leading to worsening cardiogenic shock especially in setting of BiV + valvular disease. Would hold off on CTA for now, can obtain LE dopplers but bedside POCUS without DVT.     Recommendations:  Recommend dobutamine given his ESRD  Further management of cardiogenic shock per ICU and cardiology teams    Patient discussed with Dr. Reno. Please call us back with any new issues or questions.

## 2023-10-06 NOTE — CONSULT NOTE ADULT - ASSESSMENT
62 y/o Male w/ PMHx of ESRD on HD (M/T/Th/Sat via R [facility] Permacath placed 6 months ago per pt, pending LUE AVF placement), CAD, ELMER, HLD, GERD, and ?ETOH abuse (drinks 2-3 martini's daily, last drink 2pm 10/3/23) who initially presented to [facility] after missed HD session 10/3/23 c/o SOB and brief b/l LE weakness, as well as two episodes of non-bloody emesis, for whom a rapid response was called for an episode of hypotension w/ associated AMS prior to HD session, likely i/s/o biventricular heart failure w/ possible cardiogenic shock.    DDx:  -Cardiogenic shock w/ low output state (most likely)  -PE (possible given echo findings but DVT study negative and pt is not tachycardic and is not on any rate control meds)  -Arrythmia (possible given pt was not on tele-monitoring at this time and w/ new onset Afib)  -Vasovagal episode (less likely, pt was seated in stretcher w/ no inciting activity preceding the event)    Plan-  -Continuous tele/pulse ox monitoring  -c/w HFNC 30L/30%, wean as tolerated and monitor for cyanosis  -f/u labs including ABG, CE's, lactate, Mag, Phos, D-Dimer. Trend lactate and CE's if necessary  -Start heparin ggt and obtain CT PE and formal DVT study for further PE r/o  -Consider repeating formal TTE w/ VTI measurement to r/o normotensive cardiogenic shock (low output state)  -Cardiology consulted, rec's appreciated. Given new onset Afib, acute HFrEF, and new RWMA (prior comparison not available) consider further myocardial ischemia and pHTN w/u  -Pending Cardiology evaluation, consider transfer to CCU for further management  -Renal following, per d/w Renal fellow no urgent need for HD now unless done to offload RV. Further HD rec's appeciated      Dispo: Initially dispo'd to [facility] but upon further assessment w/ Dr. Garg pt was upgraded to MICU

## 2023-10-06 NOTE — PROGRESS NOTE ADULT - SUBJECTIVE AND OBJECTIVE BOX
*** TRANSFER MICU -> CCU ***    HOSPITAL COURSE:  Pt is a 64yo M PMH CKD stage 5 2/2 IgA nephropathy on HD, ETOH use d/o with cirrhosis c/b ascites requiring paracentesis in the past, remote hx CVA and Afib s/p watchman, not on full dose AC per cardiologist's decision as he has a watchman, not on rate control iso persistent hypotension (chronically on midodrine outpt), and mild AS/moderate AI with chronic diastolic HF (LVEF 35% on 6/15/23, not on GDMT iso kidney function and hypotension) who presented with one day SOB and leg weakness after missing HD. Patient found to by hypoxic to 85% on admission, placed initially on HF, weaned to 3LNC and now on RA while being monitored on telemetry. Patient's lactate and troponin subsequently downtrended and pt was deemed medically stable after receiving his dialysis on 10/4 for stepdown to RMF. While on RMF the patient remained stable with his lung crackles and edema resolved, and was evaluated by PT upon which GENE was recommended. The patient was planned for discharge on Saturday 10/7 after his dialysis session on 10/6. However before starting his dialysis session on 10/6 he became hypoxic to 85% on room air and stated he "did not feel well" and was SOB. His extremities were noted to be cyanotic at this time. The patient continue to be hypoxic and less responsive upon which a rapid response was called. Bedside echo showed a moderate/large pericardial effusion (enlarged from prior imaging), a dilated RV, ultimately c/f RV failure with a worsening pericardial effusion potentially iso shock. Patient stepped up to MICU and subsequently to CCU for management of biventricular failure with dobutamine drip.       INTERVAL EVENTS:       SUBJECTIVE:    -Patient seen and examined at bedside, NAD. Feels fatigued but fine.   -Denied fever, chest pain/CT. Denied abdominal pain    Vital Signs Last 12 Hrs  ICU Vital Signs Last 24 Hrs  T(C): 36.1 (06 Oct 2023 14:38), Max: 36.4 (06 Oct 2023 09:25)  T(F): 96.9 (06 Oct 2023 14:38), Max: 97.5 (06 Oct 2023 09:25)  HR: 90 (06 Oct 2023 16:40) (85 - 110)  BP: 88/69 (06 Oct 2023 12:00) (88/69 - 129/83)  BP(mean): 75 (06 Oct 2023 12:00) (75 - 84)  ABP: 92/50 (06 Oct 2023 16:40) (92/50 - 116/78)  ABP(mean): 70 (06 Oct 2023 16:40) (70 - 94)  RR: 12 (06 Oct 2023 11:40) (12 - 22)  SpO2: 92% (06 Oct 2023 16:40) (70% - 99%)    O2 Parameters below as of 06 Oct 2023 16:00  Patient On (Oxygen Delivery Method): nasal cannula, high flow  O2 Flow (L/min): 30  O2 Concentration (%): 30      PHYSICAL EXAM:  CONSTITUTIONAL: NAD  EYES: PERRLA and symmetric, EOMI, no conjunctival or scleral injection, non-icteric; no nystagmus  ENMT: moist mucus membranes  NECK: Supple  RESP: No respiratory distress, no use of accessory muscles; CTAB b/l; no crackles   CV: RRR, +S1S2, no MRG; trace edema of b/l LE  GI: Soft, NT, mildly distended, no rebound, no guarding; +reducible umbilical hernia midline lower quadrant;+multiple abdominal striae   MSK: Normal ROM without pain, no spinal tenderness, normal muscle strength/tone  SKIN: +bilateral upper extremity maculopapular rash with excoriations as well on scalp; venous stasis dermatitis bilateral ankles and lower calves. No jaundice. Very mild mottling of the hands.  NEURO: CN II-XII intact; normal reflexes in upper and lower extremities, sensation intact in upper and lower extremities b/l to light touch   PSYCH: Appropriate insight/judgment; AO x 3, mood and affect appropriate, recent/remote memory intact      LABS:                        10.4   5.97  )-----------( 102      ( 06 Oct 2023 10:18 )             33.3     10-06    130<L>  |  93<L>  |  24<H>  ----------------------------<  93  3.8   |  23  |  4.06<H>    Ca    9.9      06 Oct 2023 10:18  Phos  4.7     10-06  Mg     1.7     10-06    TPro  6.9  /  Alb  3.2<L>  /  TBili  1.6<H>  /  DBili  x   /  AST  45<H>  /  ALT  44  /  AlkPhos  247<H>  10-06    PT/INR - ( 06 Oct 2023 10:18 )   PT: 18.3 sec;   INR: 1.63          PTT - ( 06 Oct 2023 10:18 )  PTT:31.4 sec  Urinalysis Basic - ( 06 Oct 2023 10:18 )    Color: x / Appearance: x / SG: x / pH: x  Gluc: 93 mg/dL / Ketone: x  / Bili: x / Urobili: x   Blood: x / Protein: x / Nitrite: x   Leuk Esterase: x / RBC: x / WBC x   Sq Epi: x / Non Sq Epi: x / Bacteria: x      CARDIAC MARKERS ( 06 Oct 2023 13:17 )  x     / x     / 67 U/L / x     / 5.4 ng/mL    Lactate, Blood: 2.3 mmol/L (10-06 @ 13:17)  Lactate, Blood: 2.0 mmol/L (10-06 @ 10:18)    RADIOLOGY & ADDITIONAL TESTS:  - reviewed    MEDICATIONS  (STANDING):  aspirin enteric coated 81 milliGRAM(s) Oral daily  atorvastatin 40 milliGRAM(s) Oral at bedtime  buMETAnide 2 milliGRAM(s) Oral <User Schedule>  clopidogrel Tablet 75 milliGRAM(s) Oral daily  folic acid 1 milliGRAM(s) Oral daily  influenza  Vaccine (HIGH DOSE) 0.7 milliLiter(s) IntraMuscular once  midodrine. 5 milliGRAM(s) Oral every 8 hours  multivitamin 1 Tablet(s) Oral daily  Nephro-chichi 1 Tablet(s) Oral every 24 hours  pantoprazole    Tablet 40 milliGRAM(s) Oral before breakfast  pregabalin 75 milliGRAM(s) Oral daily    MEDICATIONS  (PRN):  LORazepam   Injectable 1 milliGRAM(s) IV Push every 1 hour PRN CIWA-Ar score 8 or greater  ondansetron Injectable 4 milliGRAM(s) IV Push every 12 hours PRN Nausea and/or Vomiting   *** TRANSFER MICU -> CCU ***    HOSPITAL COURSE:  Pt is a 62yo M PMH CKD stage 5 2/2 IgA nephropathy on HD, ETOH use d/o with cirrhosis c/b ascites requiring paracentesis in the past, remote hx CVA and Afib s/p watchman, not on full dose AC per cardiologist's decision as he has a watchman, not on rate control iso persistent hypotension (chronically on midodrine outpt), and mild AS/moderate AI with chronic diastolic HF (LVEF 35% on 6/15/23, not on GDMT iso kidney function and hypotension) who presented with one day SOB and leg weakness after missing HD. Patient found to by hypoxic to 85% on admission, placed initially on HF, weaned to 3LNC and now on RA while being monitored on telemetry. Patient's lactate and troponin subsequently downtrended and pt was deemed medically stable after receiving his dialysis on 10/4 for stepdown to RMF. While on RMF the patient remained stable with his lung crackles and edema resolved, and was evaluated by PT upon which GENE was recommended. The patient was planned for discharge on Saturday 10/7 after his dialysis session on 10/6. However before starting his dialysis session on 10/6 he became hypoxic to 85% on room air and stated he "did not feel well" and was SOB. His extremities were noted to be cyanotic at this time. The patient continue to be hypoxic and less responsive upon which a rapid response was called. Bedside echo showed a moderate/large pericardial effusion (enlarged from prior imaging), a dilated RV, ultimately c/f RV failure with a worsening pericardial effusion potentially iso shock. Patient stepped up to MICU and subsequently to CCU i/s/o suspected cardiogenic shock.        INTERVAL EVENTS: Transferred to CCU.     SUBJECTIVE:    -Patient seen and examined at bedside, NAD. Feels fatigued but fine. Going in and out of conversation due to drowsiness. States he is feeling thirsty.      Vital Signs Last 12 Hrs  ICU Vital Signs Last 24 Hrs  T(C): 36.1 (06 Oct 2023 14:38), Max: 36.4 (06 Oct 2023 09:25)  T(F): 96.9 (06 Oct 2023 14:38), Max: 97.5 (06 Oct 2023 09:25)  HR: 90 (06 Oct 2023 16:40) (85 - 110)  BP: 88/69 (06 Oct 2023 12:00) (88/69 - 129/83)  BP(mean): 75 (06 Oct 2023 12:00) (75 - 84)  ABP: 92/50 (06 Oct 2023 16:40) (92/50 - 116/78)  ABP(mean): 70 (06 Oct 2023 16:40) (70 - 94)  RR: 12 (06 Oct 2023 11:40) (12 - 22)  SpO2: 92% (06 Oct 2023 16:40) (70% - 99%)    O2 Parameters below as of 06 Oct 2023 16:00  Patient On (Oxygen Delivery Method): nasal cannula, high flow  O2 Flow (L/min): 30  O2 Concentration (%): 30      PHYSICAL EXAM:  CONSTITUTIONAL: NAD  EYES: PERRLA and symmetric, EOMI, no conjunctival or scleral injection, non-icteric; no nystagmus  ENMT: moist mucus membranes  NECK: Supple  RESP: No respiratory distress, no use of accessory muscles; CTAB b/l; no crackles   CV: RRR, +S1S2, no MRG; trace edema of b/l LE  GI: Soft, NT, mildly distended, no rebound, no guarding; +reducible umbilical hernia midline lower quadrant;+multiple abdominal striae   MSK: Normal ROM without pain, no spinal tenderness, normal muscle strength/tone  SKIN: +bilateral upper extremity maculopapular rash with excoriations as well on scalp; venous stasis dermatitis bilateral ankles and lower calves. No jaundice. Very mild mottling of the hands.  NEURO: CN II-XII intact; normal reflexes in upper and lower extremities, sensation intact in upper and lower extremities b/l to light touch   PSYCH: Appropriate insight/judgment; AO x 3, mood and affect appropriate, recent/remote memory intact      LABS:                        10.4   5.97  )-----------( 102      ( 06 Oct 2023 10:18 )             33.3     10-06    130<L>  |  93<L>  |  24<H>  ----------------------------<  93  3.8   |  23  |  4.06<H>    Ca    9.9      06 Oct 2023 10:18  Phos  4.7     10-06  Mg     1.7     10-06    TPro  6.9  /  Alb  3.2<L>  /  TBili  1.6<H>  /  DBili  x   /  AST  45<H>  /  ALT  44  /  AlkPhos  247<H>  10-06    PT/INR - ( 06 Oct 2023 10:18 )   PT: 18.3 sec;   INR: 1.63          PTT - ( 06 Oct 2023 10:18 )  PTT:31.4 sec  Urinalysis Basic - ( 06 Oct 2023 10:18 )    Color: x / Appearance: x / SG: x / pH: x  Gluc: 93 mg/dL / Ketone: x  / Bili: x / Urobili: x   Blood: x / Protein: x / Nitrite: x   Leuk Esterase: x / RBC: x / WBC x   Sq Epi: x / Non Sq Epi: x / Bacteria: x      CARDIAC MARKERS ( 06 Oct 2023 13:17 )  x     / x     / 67 U/L / x     / 5.4 ng/mL    Lactate, Blood: 2.3 mmol/L (10-06 @ 13:17)  Lactate, Blood: 2.0 mmol/L (10-06 @ 10:18)    RADIOLOGY & ADDITIONAL TESTS:  - reviewed    MEDICATIONS  (STANDING):  aspirin enteric coated 81 milliGRAM(s) Oral daily  atorvastatin 40 milliGRAM(s) Oral at bedtime  buMETAnide 2 milliGRAM(s) Oral <User Schedule>  clopidogrel Tablet 75 milliGRAM(s) Oral daily  folic acid 1 milliGRAM(s) Oral daily  influenza  Vaccine (HIGH DOSE) 0.7 milliLiter(s) IntraMuscular once  midodrine. 5 milliGRAM(s) Oral every 8 hours  multivitamin 1 Tablet(s) Oral daily  Nephro-chichi 1 Tablet(s) Oral every 24 hours  pantoprazole    Tablet 40 milliGRAM(s) Oral before breakfast  pregabalin 75 milliGRAM(s) Oral daily    MEDICATIONS  (PRN):  LORazepam   Injectable 1 milliGRAM(s) IV Push every 1 hour PRN CIWA-Ar score 8 or greater  ondansetron Injectable 4 milliGRAM(s) IV Push every 12 hours PRN Nausea and/or Vomiting

## 2023-10-06 NOTE — PROCEDURE NOTE - NSUSPOCSTATEMENT_ED_ALL
The patient/family was/were informed of limited nature of the exam. Representative images were printed to be scanned into the chart or directly uploaded into the medical record.
The patient/family was/were informed of limited nature of the exam. Representative images were printed to be scanned into the chart or directly uploaded into the medical record.
Epistaxis

## 2023-10-06 NOTE — PROGRESS NOTE ADULT - PROBLEM SELECTOR PLAN 5
History of HLD, home med atorvastatin 40 mg. Patient unclear if had stent after MI.  -continue home med atorvastatin 40 mg  -collateral from [facility] Card/HF in physical chart History of HLD, home med atorvastatin 40 mg. Patient unclear if had stent after MI.  -continue home med atorvastatin 40 mg.

## 2023-10-06 NOTE — PROCEDURE NOTE - NSICDXPROCEDURE_GEN_ALL_CORE_FT
PROCEDURES:  Insertion, arterial line, percutaneous 06-Oct-2023 13:34:32  Berry Mcmullen  Arterial blood gas 06-Oct-2023 15:09:20  Berry Mcmullen  
PROCEDURES:  Insertion of intravenous catheter with ultrasound guidance 06-Oct-2023 18:03:08  Guillermo Guerrero  
PROCEDURES:  Insertion, arterial line, percutaneous 06-Oct-2023 13:34:32  Berry Mcmullen

## 2023-10-06 NOTE — PROGRESS NOTE ADULT - PROBLEM SELECTOR PLAN 1
ESRD, started on HD 6 months ago, 4x week (M/T/Th/Sat), missed HD session on 10/3/23. Use R port on chest, with L AV fistula will be placed soon and vein was mapped last week. At admission, Cr 3.93 (un-established baseline Cr), K 3.4, Phos 4.4. Metabolic acidosis (AGAP 17) likely due to hypochloremic emesis.   - Continue HD  - continue home nephro chichi 60 300mg; h  - Restart  midodrine 5 mg TID i/s/o hypotension after HD (10/4)   -appreciate continued nephro recs  -avoid L hand blood draw as AV fistula will be placed there at [facility] in next couple of weeks ESRD, started on HD 6 months ago, 4x week (M/T/Th/Sat), missed HD session on 10/3/23. R port utilized. At admission, Cr 3.93 (un-established baseline Cr), K 3.4, Phos 4.4. Metabolic acidosis (AGAP 17) likely due to hypochloremic emesis / etoh leading to lactic acidosis   - continue home nephro chichi 60 300mg   - c midodrine 5 mg TID i/s/o hypotension after HD  - HD session today potentially    - appreciate continued nephro recs  - will continue to avoid L hand blood draw as AV fistula will be placed there at [facility] in next couple of weeks

## 2023-10-06 NOTE — PROGRESS NOTE ADULT - SUBJECTIVE AND OBJECTIVE BOX
Patient is a 63y Male seen and evaluated at bedside. Started on Dobutamine, being transferred  to CCU for further management of cardiogenic shock.       Meds:    aspirin enteric coated 81 daily  atorvastatin 40 at bedtime  clopidogrel Tablet 75 daily  CRRT Treatment  <Continuous>  DOBUTamine Infusion 2.5 <Continuous>  folic acid 1 daily  heparin  Infusion.  <Continuous>  influenza  Vaccine (HIGH DOSE) 0.7 once  LORazepam   Injectable 1 every 1 hour PRN  midodrine. 5 every 8 hours  multivitamin 1 daily  Nephro-chichi 1 every 24 hours  ondansetron Injectable 4 every 12 hours PRN  pantoprazole    Tablet 40 before breakfast  pregabalin 75 daily  PureFlow Dialysate RFP-400 (K 2 / Ca 3) 5000 <Continuous>      T(C): , Max: 36.6 (10-06-23 @ 18:00)  T(F): , Max: 97.8 (10-06-23 @ 18:00)  HR: 96 (10-06-23 @ 19:00)  BP: 88/69 (10-06-23 @ 12:00)  BP(mean): 75 (10-06-23 @ 12:00)  RR: 18 (10-06-23 @ 19:00)  SpO2: 98% (10-06-23 @ 19:00)  Wt(kg): --    10-06 @ 07:01  -  10-06 @ 19:10  --------------------------------------------------------  IN: 149 mL / OUT: 0 mL / NET: 149 mL          Review of Systems:  ROS negative except as per HPI      PHYSICAL EXAM:  Constitutional: lethargic  EENT: anicteric sclera; oropharynx clear  Neck: supple, +JVD  Respiratory: crackles  Cardiovascular: +S1/S2, irregular rate; +3/6 systolic murmur at l chest wall  Gastrointestinal: soft, NT/ND  Extremities: cold; mottled; no clubbing or cyanosis; no edema  Neurological: lethargic, awakens to noxious stimuli  Access: R TDC c/d/i       LABS:                        10.3   5.20  )-----------( 90       ( 06 Oct 2023 18:33 )             31.6     10-06    130<L>  |  92<L>  |  26<H>  ----------------------------<  101<H>  3.6   |  25  |  4.11<H>    Ca    9.4      06 Oct 2023 17:37  Phos  4.7     10-06  Mg     1.7     10-06    TPro  6.2  /  Alb  2.9<L>  /  TBili  1.5<H>  /  DBili  x   /  AST  40  /  ALT  40  /  AlkPhos  222<H>  10-06      PT/INR - ( 06 Oct 2023 10:18 )   PT: 18.3 sec;   INR: 1.63          PTT - ( 06 Oct 2023 10:18 )  PTT:31.4 sec  Urinalysis Basic - ( 06 Oct 2023 17:37 )    Color: x / Appearance: x / SG: x / pH: x  Gluc: 101 mg/dL / Ketone: x  / Bili: x / Urobili: x   Blood: x / Protein: x / Nitrite: x   Leuk Esterase: x / RBC: x / WBC x   Sq Epi: x / Non Sq Epi: x / Bacteria: x            RADIOLOGY & ADDITIONAL STUDIES:

## 2023-10-07 LAB
ALBUMIN SERPL ELPH-MCNC: 2.5 G/DL — LOW (ref 3.3–5)
ALBUMIN SERPL ELPH-MCNC: 2.9 G/DL — LOW (ref 3.3–5)
ALBUMIN SERPL ELPH-MCNC: 3 G/DL — LOW (ref 3.3–5)
ALP SERPL-CCNC: 221 U/L — HIGH (ref 40–120)
ALP SERPL-CCNC: 224 U/L — HIGH (ref 40–120)
ALP SERPL-CCNC: 232 U/L — HIGH (ref 40–120)
ALT FLD-CCNC: 39 U/L — SIGNIFICANT CHANGE UP (ref 10–45)
ALT FLD-CCNC: 39 U/L — SIGNIFICANT CHANGE UP (ref 10–45)
ALT FLD-CCNC: SIGNIFICANT CHANGE UP (ref 10–45)
ANION GAP SERPL CALC-SCNC: 11 MMOL/L — SIGNIFICANT CHANGE UP (ref 5–17)
ANION GAP SERPL CALC-SCNC: 11 MMOL/L — SIGNIFICANT CHANGE UP (ref 5–17)
ANION GAP SERPL CALC-SCNC: 12 MMOL/L — SIGNIFICANT CHANGE UP (ref 5–17)
APTT BLD: 107 SEC — HIGH (ref 24.5–35.6)
APTT BLD: 140 SEC — CRITICAL HIGH (ref 24.5–35.6)
APTT BLD: 49.7 SEC — HIGH (ref 24.5–35.6)
APTT BLD: >200 SEC — CRITICAL HIGH (ref 24.5–35.6)
AST SERPL-CCNC: 39 U/L — SIGNIFICANT CHANGE UP (ref 10–40)
AST SERPL-CCNC: 39 U/L — SIGNIFICANT CHANGE UP (ref 10–40)
AST SERPL-CCNC: SIGNIFICANT CHANGE UP (ref 10–40)
BASE EXCESS BLDA CALC-SCNC: -1.2 MMOL/L — SIGNIFICANT CHANGE UP (ref -2–3)
BASOPHILS # BLD AUTO: 0.02 K/UL — SIGNIFICANT CHANGE UP (ref 0–0.2)
BASOPHILS NFR BLD AUTO: 0.4 % — SIGNIFICANT CHANGE UP (ref 0–2)
BILIRUB SERPL-MCNC: 1.7 MG/DL — HIGH (ref 0.2–1.2)
BILIRUB SERPL-MCNC: 1.7 MG/DL — HIGH (ref 0.2–1.2)
BILIRUB SERPL-MCNC: 1.8 MG/DL — HIGH (ref 0.2–1.2)
BLD GP AB SCN SERPL QL: NEGATIVE — SIGNIFICANT CHANGE UP
BUN SERPL-MCNC: 16 MG/DL — SIGNIFICANT CHANGE UP (ref 7–23)
BUN SERPL-MCNC: 19 MG/DL — SIGNIFICANT CHANGE UP (ref 7–23)
BUN SERPL-MCNC: 20 MG/DL — SIGNIFICANT CHANGE UP (ref 7–23)
CALCIUM SERPL-MCNC: 9 MG/DL — SIGNIFICANT CHANGE UP (ref 8.4–10.5)
CALCIUM SERPL-MCNC: 9.1 MG/DL — SIGNIFICANT CHANGE UP (ref 8.4–10.5)
CALCIUM SERPL-MCNC: 9.2 MG/DL — SIGNIFICANT CHANGE UP (ref 8.4–10.5)
CHLORIDE SERPL-SCNC: 95 MMOL/L — LOW (ref 96–108)
CHLORIDE SERPL-SCNC: 95 MMOL/L — LOW (ref 96–108)
CHLORIDE SERPL-SCNC: 96 MMOL/L — SIGNIFICANT CHANGE UP (ref 96–108)
CO2 BLDA-SCNC: 27 MMOL/L — HIGH (ref 19–24)
CO2 SERPL-SCNC: 24 MMOL/L — SIGNIFICANT CHANGE UP (ref 22–31)
CO2 SERPL-SCNC: 25 MMOL/L — SIGNIFICANT CHANGE UP (ref 22–31)
CO2 SERPL-SCNC: 25 MMOL/L — SIGNIFICANT CHANGE UP (ref 22–31)
CREAT SERPL-MCNC: 2.53 MG/DL — HIGH (ref 0.5–1.3)
CREAT SERPL-MCNC: 2.87 MG/DL — HIGH (ref 0.5–1.3)
CREAT SERPL-MCNC: 3.27 MG/DL — HIGH (ref 0.5–1.3)
EGFR: 20 ML/MIN/1.73M2 — LOW
EGFR: 24 ML/MIN/1.73M2 — LOW
EGFR: 27 ML/MIN/1.73M2 — LOW
EOSINOPHIL # BLD AUTO: 0.06 K/UL — SIGNIFICANT CHANGE UP (ref 0–0.5)
EOSINOPHIL NFR BLD AUTO: 1.2 % — SIGNIFICANT CHANGE UP (ref 0–6)
GAS PNL BLDA: SIGNIFICANT CHANGE UP
GLUCOSE SERPL-MCNC: 104 MG/DL — HIGH (ref 70–99)
GLUCOSE SERPL-MCNC: 107 MG/DL — HIGH (ref 70–99)
GLUCOSE SERPL-MCNC: 136 MG/DL — HIGH (ref 70–99)
HCO3 BLDA-SCNC: 26 MMOL/L — SIGNIFICANT CHANGE UP (ref 21–28)
HCT VFR BLD CALC: 30.8 % — LOW (ref 39–50)
HGB BLD-MCNC: 9.9 G/DL — LOW (ref 13–17)
IMM GRANULOCYTES NFR BLD AUTO: 0.8 % — SIGNIFICANT CHANGE UP (ref 0–0.9)
LACTATE SERPL-SCNC: 1.1 MMOL/L — SIGNIFICANT CHANGE UP (ref 0.5–2)
LACTATE SERPL-SCNC: 1.9 MMOL/L — SIGNIFICANT CHANGE UP (ref 0.5–2)
LYMPHOCYTES # BLD AUTO: 0.65 K/UL — LOW (ref 1–3.3)
LYMPHOCYTES # BLD AUTO: 13.1 % — SIGNIFICANT CHANGE UP (ref 13–44)
MAGNESIUM SERPL-MCNC: 1.7 MG/DL — SIGNIFICANT CHANGE UP (ref 1.6–2.6)
MCHC RBC-ENTMCNC: 32.1 GM/DL — SIGNIFICANT CHANGE UP (ref 32–36)
MCHC RBC-ENTMCNC: 33 PG — SIGNIFICANT CHANGE UP (ref 27–34)
MCV RBC AUTO: 102.7 FL — HIGH (ref 80–100)
MONOCYTES # BLD AUTO: 0.35 K/UL — SIGNIFICANT CHANGE UP (ref 0–0.9)
MONOCYTES NFR BLD AUTO: 7 % — SIGNIFICANT CHANGE UP (ref 2–14)
NEUTROPHILS # BLD AUTO: 3.85 K/UL — SIGNIFICANT CHANGE UP (ref 1.8–7.4)
NEUTROPHILS NFR BLD AUTO: 77.5 % — HIGH (ref 43–77)
NRBC # BLD: 0 /100 WBCS — SIGNIFICANT CHANGE UP (ref 0–0)
PCO2 BLDA: 51 MMHG — HIGH (ref 35–48)
PH BLDA: 7.31 — LOW (ref 7.35–7.45)
PHOSPHATE SERPL-MCNC: 3.3 MG/DL — SIGNIFICANT CHANGE UP (ref 2.5–4.5)
PLATELET # BLD AUTO: 70 K/UL — LOW (ref 150–400)
PO2 BLDA: 134 MMHG — HIGH (ref 83–108)
POTASSIUM SERPL-MCNC: 3.2 MMOL/L — LOW (ref 3.5–5.3)
POTASSIUM SERPL-MCNC: 3.3 MMOL/L — LOW (ref 3.5–5.3)
POTASSIUM SERPL-MCNC: SIGNIFICANT CHANGE UP (ref 3.5–5.3)
POTASSIUM SERPL-SCNC: 3.2 MMOL/L — LOW (ref 3.5–5.3)
POTASSIUM SERPL-SCNC: 3.3 MMOL/L — LOW (ref 3.5–5.3)
POTASSIUM SERPL-SCNC: SIGNIFICANT CHANGE UP (ref 3.5–5.3)
PROT SERPL-MCNC: 5.8 G/DL — LOW (ref 6–8.3)
PROT SERPL-MCNC: 6.2 G/DL — SIGNIFICANT CHANGE UP (ref 6–8.3)
PROT SERPL-MCNC: 6.6 G/DL — SIGNIFICANT CHANGE UP (ref 6–8.3)
RBC # BLD: 3 M/UL — LOW (ref 4.2–5.8)
RBC # FLD: 18 % — HIGH (ref 10.3–14.5)
RH IG SCN BLD-IMP: NEGATIVE — SIGNIFICANT CHANGE UP
SAO2 % BLDA: 99.2 % — HIGH (ref 94–98)
SODIUM SERPL-SCNC: 131 MMOL/L — LOW (ref 135–145)
SODIUM SERPL-SCNC: 131 MMOL/L — LOW (ref 135–145)
SODIUM SERPL-SCNC: 132 MMOL/L — LOW (ref 135–145)
WBC # BLD: 4.97 K/UL — SIGNIFICANT CHANGE UP (ref 3.8–10.5)
WBC # FLD AUTO: 4.97 K/UL — SIGNIFICANT CHANGE UP (ref 3.8–10.5)

## 2023-10-07 PROCEDURE — 90945 DIALYSIS ONE EVALUATION: CPT

## 2023-10-07 PROCEDURE — 99291 CRITICAL CARE FIRST HOUR: CPT

## 2023-10-07 PROCEDURE — 71045 X-RAY EXAM CHEST 1 VIEW: CPT | Mod: 26

## 2023-10-07 RX ORDER — MAGNESIUM SULFATE 500 MG/ML
2 VIAL (ML) INJECTION ONCE
Refills: 0 | Status: COMPLETED | OUTPATIENT
Start: 2023-10-07 | End: 2023-10-07

## 2023-10-07 RX ORDER — SEVELAMER CARBONATE 2400 MG/1
800 POWDER, FOR SUSPENSION ORAL
Refills: 0 | Status: DISCONTINUED | OUTPATIENT
Start: 2023-10-07 | End: 2023-10-08

## 2023-10-07 RX ORDER — POTASSIUM CHLORIDE 20 MEQ
40 PACKET (EA) ORAL ONCE
Refills: 0 | Status: COMPLETED | OUTPATIENT
Start: 2023-10-07 | End: 2023-10-07

## 2023-10-07 RX ORDER — POTASSIUM CHLORIDE 20 MEQ
20 PACKET (EA) ORAL ONCE
Refills: 0 | Status: DISCONTINUED | OUTPATIENT
Start: 2023-10-07 | End: 2023-10-07

## 2023-10-07 RX ADMIN — ATORVASTATIN CALCIUM 40 MILLIGRAM(S): 80 TABLET, FILM COATED ORAL at 23:01

## 2023-10-07 RX ADMIN — CLOPIDOGREL BISULFATE 75 MILLIGRAM(S): 75 TABLET, FILM COATED ORAL at 12:30

## 2023-10-07 RX ADMIN — SEVELAMER CARBONATE 800 MILLIGRAM(S): 2400 POWDER, FOR SUSPENSION ORAL at 12:30

## 2023-10-07 RX ADMIN — Medication 40 MILLIEQUIVALENT(S): at 19:48

## 2023-10-07 RX ADMIN — Medication 7.66 MICROGRAM(S)/KG/MIN: at 23:01

## 2023-10-07 RX ADMIN — SEVELAMER CARBONATE 800 MILLIGRAM(S): 2400 POWDER, FOR SUSPENSION ORAL at 07:39

## 2023-10-07 RX ADMIN — MIDODRINE HYDROCHLORIDE 5 MILLIGRAM(S): 2.5 TABLET ORAL at 07:39

## 2023-10-07 RX ADMIN — Medication 40 MILLIEQUIVALENT(S): at 06:44

## 2023-10-07 RX ADMIN — HEPARIN SODIUM 16 UNIT(S)/HR: 5000 INJECTION INTRAVENOUS; SUBCUTANEOUS at 02:50

## 2023-10-07 RX ADMIN — PANTOPRAZOLE SODIUM 40 MILLIGRAM(S): 20 TABLET, DELAYED RELEASE ORAL at 06:44

## 2023-10-07 RX ADMIN — Medication 1 MILLIGRAM(S): at 12:30

## 2023-10-07 RX ADMIN — MIDODRINE HYDROCHLORIDE 5 MILLIGRAM(S): 2.5 TABLET ORAL at 23:01

## 2023-10-07 RX ADMIN — SEVELAMER CARBONATE 800 MILLIGRAM(S): 2400 POWDER, FOR SUSPENSION ORAL at 17:02

## 2023-10-07 RX ADMIN — Medication 25 GRAM(S): at 05:59

## 2023-10-07 RX ADMIN — Medication 75 MILLIGRAM(S): at 12:30

## 2023-10-07 RX ADMIN — Medication 1 TABLET(S): at 10:37

## 2023-10-07 RX ADMIN — MIDODRINE HYDROCHLORIDE 5 MILLIGRAM(S): 2.5 TABLET ORAL at 17:03

## 2023-10-07 RX ADMIN — Medication 1 TABLET(S): at 12:30

## 2023-10-07 NOTE — PROGRESS NOTE ADULT - ASSESSMENT
63 year old male with history of ESRD (HD 4x weekly M/T/TH/SAT), HLD, HTN, GERD, CAD, admitted to CCU w concern for cardiogenic shock iso volume overload.    NEURO  AAOx3  -FLOR    CARDIOVASCULAR    #inadequate cardiac output  #acute-on-chronic HFrEDF  tissue hypoperfusion as evidenced by cyanotic/cool extremities on exam yesterday  bedside us dilated RV, ultimately c/f RV failure with a worsening pericardial effusion   substantial improvement today following overnight cvv hd & low-dose dobutamine  - continue low-dose dobutamine drip  - continue cvv hd    #CAD (coronary artery disease)  unclear hx of cardiac events, per pt, on asa + plavix at home, troponin 144 (10/4 - downtrending)  - obtain collateral / clarify cad hx & asa + plavix timelines  - continuing home medications    #HLD (hyperlipidemia).   History of HLD, home med atorvastatin 40 mg. Patient unsure if had stent after MI  - obtain collateral / clarify cad hx  - continue home med atorvastatin 40 mg.    RESPIRATORY  saturating well on high-flow o2  - d/c HFo2 during daytime, substitute w nc & monitor oxygenation status  - continue hfo2 overnight    GASTROINTESTINAL  #GERD (gastroesophageal reflux disease).   History of GERD, no complaints of epigastric pain at this time.   -Continue pantoprazole 40 mg qd.    RENAL  #ESRD on dialysis.   Started on HD 6 months ago, 4x week (M/T/Th/Sat), missed HD session on 10/3/23. R port utilized. At admission, Cr 3.93 (un-established baseline Cr), K 3.4, Phos 4.4. Metabolic acidosis (AGAP 17) likely due to hypochloremic emesis / etoh leading to lactic acidosis   - continue home nephro chichi 60 300mg   - c/w midodrine i/s/o hypotension after HD (plan to reevaluate long-term use iso HF)  - continue CVVHD   - nephro following, appreciate recommendations    #hepatic cirrhosis  chronic, likely 2/2 chronic alcohol use., no suspicion for hepatic encephalopathy at this time - patient mentating AOx3 and conversant, w/o jaundice or non-icteric sclera  low concern for hepatorenal syndrome as remains hemodynamically stable. No known gallbladder pathology established.  Abdominal US (10/4) Cirrhotic morphology. Pulsatile flow in the main and left portal veins. Moderate ascites. Increased right renal cortical echogenicity compatible with medical renal disease.  - consider paracentesis if increasing ascites     HEME  #Anemia.   At admission Hg 10.2, Hct 32, .6. No baseline Hgb or iron studies for comparison. Home med iron 65 mg qd for ELMER, though MCV is macrocytic likely 2/2 folate deficiency. However, etiology of anemia likely multifactorial of ELMER, AOCD, and folate deficiency. No known history of GI bleeds, hemoptysis, hematochezia, melenic stool. B12 increased.   -Hold home med iron 65 mg  -F/u folate level  -Maintain active type and screen    ENDO  -FLOR    ID  -FLOR    PSYCH  #Moderate alcohol use disorder.   At admission BIENVENIDO 29. AST/AlT 45/43. Last drink 2 PM on 10/3/23, has 2-3 martinis daily. No history of alcohol withdrawals or withdrawal seizures. One non-bloody, clear/bilious emesis in past 24 hours. Qtc 397. 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   CIWA 0 x2 days  - continue Zofran 4 mg IV q12 PRN nausea, vomiting   - Thiamine, folic acid, multivitamin  - Home meds B1 100 mg OTC, D3 1000 IU OTC, B12 1000 mcg OTC    DERM  #Dermatitis.   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 B/l LE dry skin, likely venous stasis dermatitis on due to peripheral artery disease vs. heart failure related edema changes. Likely from niacin deficiency (pellagra) as etiology for dermatitis as patient has 3 month anorexia, fatigue, numbness. Recently prescribed Cerave w/o use  - Consider niacin supplementation (vitamin b3) or topical steroid  - lotion inpatient  - f/u outpt dermatologist.    PROPHYLACTIC MEASURE  F: none  E: Replete per HD with ESRD- CVVHD 10/06  N: renal restrictions  GI ppx: pantoprazole  DVT ppx: SCDs and on DAPT  Full code  Dispo: CCU

## 2023-10-07 NOTE — PROGRESS NOTE ADULT - SUBJECTIVE AND OBJECTIVE BOX
INTERVAL HPI/OVERNIGHT EVENTS:    SUBJECTIVE: Patient seen and examined at bedside.    OBJECTIVE:    VITAL SIGNS:  ICU Vital Signs Last 24 Hrs  T(C): 37 (07 Oct 2023 11:19), Max: 37.7 (07 Oct 2023 05:41)  T(F): 98.6 (07 Oct 2023 11:19), Max: 99.8 (07 Oct 2023 05:41)  HR: 101 (07 Oct 2023 13:12) (83 - 105)  BP: 93/50 (07 Oct 2023 13:12) (65/45 - 150/52)  BP(mean): 67 (07 Oct 2023 13:12) (51 - 84)  ABP: 15/15 (07 Oct 2023 12:05) (15/15 - 122/84)  ABP(mean): 15 (07 Oct 2023 12:05) (15 - 102)  RR: 20 (07 Oct 2023 13:12) (6 - 29)  SpO2: 100% (07 Oct 2023 13:12) (74% - 100%)    O2 Parameters below as of 07 Oct 2023 14:00  Patient On (Oxygen Delivery Method): nasal cannula, high flow  O2 Flow (L/min): 30  O2 Concentration (%): 30          10-06 @ 07:01  -  10-07 @ 07:00  --------------------------------------------------------  IN: 439.4 mL / OUT: 1314 mL / NET: -874.6 mL    10-07 @ 07:01  -  10-07 @ 13:43  --------------------------------------------------------  IN: 368.9 mL / OUT: 411 mL / NET: -42.1 mL      CAPILLARY BLOOD GLUCOSE      POCT Blood Glucose.: 108 mg/dL (06 Oct 2023 11:37)      PHYSICAL EXAM:    General: NAD  HEENT: anicteric sclera, MMM  Neck: supple, no JVD  Respiratory: CTA B/L; no W/R/R  Cardiovascular: +S1/S2; RRR; no M/R/G  Gastrointestinal: soft, NT/ND; +BS x4  Extremities: WWP; 2+ peripheral pulses B/L; no LE edema  Skin: normal color and turgor; no rash  Neurological: A&Ox    MEDICATIONS:  MEDICATIONS  (STANDING):  atorvastatin 40 milliGRAM(s) Oral at bedtime  clopidogrel Tablet 75 milliGRAM(s) Oral daily  CRRT Treatment    <Continuous>  DOBUTamine Infusion 2.5 MICROgram(s)/kG/Min (7.66 mL/Hr) IV Continuous <Continuous>  folic acid 1 milliGRAM(s) Oral daily  heparin  Infusion 1600 Unit(s)/Hr (13 mL/Hr) IV Continuous <Continuous>  influenza  Vaccine (HIGH DOSE) 0.7 milliLiter(s) IntraMuscular once  midodrine. 5 milliGRAM(s) Oral every 8 hours  multivitamin 1 Tablet(s) Oral daily  Nephro-chichi 1 Tablet(s) Oral every 24 hours  pantoprazole    Tablet 40 milliGRAM(s) Oral before breakfast  pregabalin 75 milliGRAM(s) Oral daily  PureFlow Dialysate RFP-400 (K 2 / Ca 3) 5000 milliLiter(s) (2000 mL/Hr) CRRT <Continuous>  sevelamer carbonate 800 milliGRAM(s) Oral three times a day with meals    MEDICATIONS  (PRN):  LORazepam   Injectable 1 milliGRAM(s) IV Push every 1 hour PRN CIWA-Ar score 8 or greater  ondansetron Injectable 4 milliGRAM(s) IV Push every 12 hours PRN Nausea and/or Vomiting      ALLERGIES:  Allergies    penicillins (Unknown)    Intolerances        LABS:                        9.9    4.97  )-----------( 70       ( 07 Oct 2023 05:24 )             30.8     10-07    131<L>  |  95<L>  |  19  ----------------------------<  104<H>  3.3<L>   |  25  |  2.87<H>    Ca    9.2      07 Oct 2023 10:28  Phos  3.3     10-07  Mg     1.7     10-07    TPro  6.2  /  Alb  3.0<L>  /  TBili  1.7<H>  /  DBili  x   /  AST  39  /  ALT  39  /  AlkPhos  224<H>  10-07    LIVER FUNCTIONS - ( 07 Oct 2023 10:28 )  Alb: 3.0 g/dL / Pro: 6.2 g/dL / ALK PHOS: 224 U/L / ALT: 39 U/L / AST: 39 U/L / GGT: x           PT/INR - ( 06 Oct 2023 10:18 )   PT: 18.3 sec;   INR: 1.63          PTT - ( 07 Oct 2023 08:32 )  PTT:49.7 sec  Urinalysis Basic - ( 07 Oct 2023 10:28 )    Color: x / Appearance: x / SG: x / pH: x  Gluc: 104 mg/dL / Ketone: x  / Bili: x / Urobili: x   Blood: x / Protein: x / Nitrite: x   Leuk Esterase: x / RBC: x / WBC x   Sq Epi: x / Non Sq Epi: x / Bacteria: x      CARDIAC MARKERS ( 06 Oct 2023 13:17 )  x     / x     / 67 U/L / x     / 5.4 ng/mL      RADIOLOGY & ADDITIONAL TESTS: Reviewed. INTERVAL HPI/OVERNIGHT EVENTS: started on cvv hd overnight    SUBJECTIVE: Patient seen and examined at bedside. reports feeling tired, but without chest pain or shortness of breath, no new sx or complaints    OBJECTIVE:    VITAL SIGNS:  ICU Vital Signs Last 24 Hrs  T(C): 37 (07 Oct 2023 11:19), Max: 37.7 (07 Oct 2023 05:41)  T(F): 98.6 (07 Oct 2023 11:19), Max: 99.8 (07 Oct 2023 05:41)  HR: 101 (07 Oct 2023 13:12) (83 - 105)  BP: 93/50 (07 Oct 2023 13:12) (65/45 - 150/52)  BP(mean): 67 (07 Oct 2023 13:12) (51 - 84)  ABP: 15/15 (07 Oct 2023 12:05) (15/15 - 122/84)  ABP(mean): 15 (07 Oct 2023 12:05) (15 - 102)  RR: 20 (07 Oct 2023 13:12) (6 - 29)  SpO2: 100% (07 Oct 2023 13:12) (74% - 100%)    O2 Parameters below as of 07 Oct 2023 14:00  Patient On (Oxygen Delivery Method): nasal cannula, high flow  O2 Flow (L/min): 30  O2 Concentration (%): 30          10-06 @ 07:01  -  10-07 @ 07:00  --------------------------------------------------------  IN: 439.4 mL / OUT: 1314 mL / NET: -874.6 mL    10-07 @ 07:01  -  10-07 @ 13:43  --------------------------------------------------------  IN: 368.9 mL / OUT: 411 mL / NET: -42.1 mL      CAPILLARY BLOOD GLUCOSE      POCT Blood Glucose.: 108 mg/dL (06 Oct 2023 11:37)      PHYSICAL EXAM:    CONSTITUTIONAL: NAD  EYES: PERRLA and symmetric, EOMI, no conjunctival or scleral injection, non-icteric; no nystagmus  ENMT: moist mucus membranes  NECK: Supple  RESP: on HF o2, no apparent respiratory distress/use of accessory muscles, +bibasilar crackles  CV: RRR, +S1S2, no MRG; extremities cool, mildly cyanotic  GI: Soft, NT, distended, + fluid wave, but without rebound or guarding; +reducible umbilical hernia midline lower quadrant, + abdominal striae   MSK: Normal ROM without pain, no spinal tenderness, normal muscle strength/tone  SKIN: +bilateral upper extremity maculopapular rash with excoriations as well on scalp; venous stasis dermatitis bilateral ankles and lower calves. No jaundice. Very mild mottling of the hands.  NEURO: CN II-XII intact; normal reflexes in upper and lower extremities, sensation intact in upper and lower extremities b/l to light touch   PSYCH: Appropriate insight/judgment; AO x 3, mood and affect appropriate, recent/remote memory intact    MEDICATIONS:  MEDICATIONS  (STANDING):  atorvastatin 40 milliGRAM(s) Oral at bedtime  clopidogrel Tablet 75 milliGRAM(s) Oral daily  CRRT Treatment    <Continuous>  DOBUTamine Infusion 2.5 MICROgram(s)/kG/Min (7.66 mL/Hr) IV Continuous <Continuous>  folic acid 1 milliGRAM(s) Oral daily  heparin  Infusion 1600 Unit(s)/Hr (13 mL/Hr) IV Continuous <Continuous>  influenza  Vaccine (HIGH DOSE) 0.7 milliLiter(s) IntraMuscular once  midodrine. 5 milliGRAM(s) Oral every 8 hours  multivitamin 1 Tablet(s) Oral daily  Nephro-chichi 1 Tablet(s) Oral every 24 hours  pantoprazole    Tablet 40 milliGRAM(s) Oral before breakfast  pregabalin 75 milliGRAM(s) Oral daily  PureFlow Dialysate RFP-400 (K 2 / Ca 3) 5000 milliLiter(s) (2000 mL/Hr) CRRT <Continuous>  sevelamer carbonate 800 milliGRAM(s) Oral three times a day with meals    MEDICATIONS  (PRN):  LORazepam   Injectable 1 milliGRAM(s) IV Push every 1 hour PRN CIWA-Ar score 8 or greater  ondansetron Injectable 4 milliGRAM(s) IV Push every 12 hours PRN Nausea and/or Vomiting      ALLERGIES:  Allergies    penicillins (Unknown)    Intolerances        LABS:                        9.9    4.97  )-----------( 70       ( 07 Oct 2023 05:24 )             30.8     10-07    131<L>  |  95<L>  |  19  ----------------------------<  104<H>  3.3<L>   |  25  |  2.87<H>    Ca    9.2      07 Oct 2023 10:28  Phos  3.3     10-07  Mg     1.7     10-07    TPro  6.2  /  Alb  3.0<L>  /  TBili  1.7<H>  /  DBili  x   /  AST  39  /  ALT  39  /  AlkPhos  224<H>  10-07    LIVER FUNCTIONS - ( 07 Oct 2023 10:28 )  Alb: 3.0 g/dL / Pro: 6.2 g/dL / ALK PHOS: 224 U/L / ALT: 39 U/L / AST: 39 U/L / GGT: x           PT/INR - ( 06 Oct 2023 10:18 )   PT: 18.3 sec;   INR: 1.63          PTT - ( 07 Oct 2023 08:32 )  PTT:49.7 sec  Urinalysis Basic - ( 07 Oct 2023 10:28 )    Color: x / Appearance: x / SG: x / pH: x  Gluc: 104 mg/dL / Ketone: x  / Bili: x / Urobili: x   Blood: x / Protein: x / Nitrite: x   Leuk Esterase: x / RBC: x / WBC x   Sq Epi: x / Non Sq Epi: x / Bacteria: x      CARDIAC MARKERS ( 06 Oct 2023 13:17 )  x     / x     / 67 U/L / x     / 5.4 ng/mL      RADIOLOGY & ADDITIONAL TESTS: Reviewed.

## 2023-10-07 NOTE — PROGRESS NOTE ADULT - SUBJECTIVE AND OBJECTIVE BOX
Patient is a 63y Male seen and evaluated at bedside. No acute distress, mentating well, more awake, no peripheral cyanosis, on CVVHD, tolerating procedure well. Volume overloaded on exam.       Meds:    aspirin enteric coated 81 daily  atorvastatin 40 at bedtime  clopidogrel Tablet 75 daily  CRRT Treatment  <Continuous>  DOBUTamine Infusion 2.5 <Continuous>  folic acid 1 daily  heparin  Infusion 1600 <Continuous>  influenza  Vaccine (HIGH DOSE) 0.7 once  LORazepam   Injectable 1 every 1 hour PRN  midodrine. 5 every 8 hours  multivitamin 1 daily  Nephro-chichi 1 every 24 hours  ondansetron Injectable 4 every 12 hours PRN  pantoprazole    Tablet 40 before breakfast  pregabalin 75 daily  PureFlow Dialysate RFP-400 (K 2 / Ca 3) 5000 <Continuous>  sevelamer carbonate 800 three times a day with meals      T(C): , Max: 37.7 (10-07-23 @ 05:41)  T(F): , Max: 99.8 (10-07-23 @ 05:41)  HR: 94 (10-07-23 @ 11:00)  BP: 149/58 (10-07-23 @ 11:00)  BP(mean): 84 (10-07-23 @ 11:00)  RR: 12 (10-07-23 @ 11:00)  SpO2: 100% (10-07-23 @ 11:00)  Wt(kg): --    10-06 @ 07:01  -  10-07 @ 07:00  --------------------------------------------------------  IN: 439.4 mL / OUT: 1314 mL / NET: -874.6 mL    10-07 @ 07:01  -  10-07 @ 11:14  --------------------------------------------------------  IN: 177.5 mL / OUT: 411 mL / NET: -233.5 mL          Review of Systems:  ROS negative except as per HPI      PHYSICAL EXAM:  Constitutional: awake, no acute distress, tolerating CVVHD   EENT: anicteric sclera; oropharynx clear  Neck: supple, +JVD  Respiratory: CTLA   Cardiovascular: +S1/S2, irregular rate;  Gastrointestinal: soft, distended reducible umbilical hernia, abdominal striae  Extremities: warm, diffuse edema b/l LE   Neurological: ANOx3 no focal neurological deficits   Access: R TDC accessed         LABS:                        9.9    4.97  )-----------( 70       ( 07 Oct 2023 05:24 )             30.8     10-07    131<L>  |  95<L>  |  19  ----------------------------<  104<H>  3.3<L>   |  25  |  2.87<H>    Ca    9.2      07 Oct 2023 10:28  Phos  3.3     10-07  Mg     1.7     10-07    TPro  6.2  /  Alb  3.0<L>  /  TBili  1.7<H>  /  DBili  x   /  AST  39  /  ALT  39  /  AlkPhos  224<H>  10-07      PT/INR - ( 06 Oct 2023 10:18 )   PT: 18.3 sec;   INR: 1.63          PTT - ( 07 Oct 2023 08:32 )  PTT:49.7 sec  Urinalysis Basic - ( 07 Oct 2023 10:28 )    Color: x / Appearance: x / SG: x / pH: x  Gluc: 104 mg/dL / Ketone: x  / Bili: x / Urobili: x   Blood: x / Protein: x / Nitrite: x   Leuk Esterase: x / RBC: x / WBC x   Sq Epi: x / Non Sq Epi: x / Bacteria: x            RADIOLOGY & ADDITIONAL STUDIES:

## 2023-10-07 NOTE — PROGRESS NOTE ADULT - ASSESSMENT
63Y M w/hx of ESRD  admitted for SOB and generalized weakness and fatigue missed HD, now h/c c/b cardiogenic shock, on inotropes and CVVHD (10/6) for clearance and volume removal, continue CVVHD       #ESRD  Initiated CVVHD (10/6)   Last HD 10/4 tolerated 2L with no complications   now on dobutamine     Plan:  CVVHD with net negative 200cc/hr adjust Uf based on HDS as below   CRRT Treatment:   Modality: CVVHD, Filter: NxStage CAR-505, Target Blood Flow: 300 mL/Min  Target Fluid Balance: Titrate to net NEGATIVE fluid balance, 200 mL/Hr   BID BMP while on CVVHD,   C/w Heparin drip to avoid CVVHD filter clotting     Access:  R TDC accessed       HTN:  BP stable on dobutamine,  UF with CVVHD as above   On Midodrine 5mg TID         Anemia:  Hgb at goal 9.9  EPO with HD     MBD;  Calcium: 9.2  Phos: 3.3  Hold phos binders while on CVVHD

## 2023-10-07 NOTE — PROGRESS NOTE ADULT - ATTENDING COMMENTS
I agree with the fellow's findings and plans as written above with the following additions/amendments:    Seen and examined at bedside on CVVHD, POCUS repeated with mild improvement in EF but still with very high right sided heart failure - will continue CVVHD with aggressive fluid removal for goal -4L per day, otherwise stable and tolerating well. Continue CVVHD as above, further recs as above, discussed with primary team

## 2023-10-08 LAB
ALBUMIN SERPL ELPH-MCNC: 2.9 G/DL — LOW (ref 3.3–5)
ALBUMIN SERPL ELPH-MCNC: 3.1 G/DL — LOW (ref 3.3–5)
ALP SERPL-CCNC: 241 U/L — HIGH (ref 40–120)
ALP SERPL-CCNC: 242 U/L — HIGH (ref 40–120)
ALT FLD-CCNC: 41 U/L — SIGNIFICANT CHANGE UP (ref 10–45)
ALT FLD-CCNC: 43 U/L — SIGNIFICANT CHANGE UP (ref 10–45)
ANION GAP SERPL CALC-SCNC: 10 MMOL/L — SIGNIFICANT CHANGE UP (ref 5–17)
ANION GAP SERPL CALC-SCNC: 9 MMOL/L — SIGNIFICANT CHANGE UP (ref 5–17)
APTT BLD: 120.1 SEC — CRITICAL HIGH (ref 24.5–35.6)
APTT BLD: 51.3 SEC — HIGH (ref 24.5–35.6)
APTT BLD: 94.7 SEC — HIGH (ref 24.5–35.6)
APTT BLD: 99.1 SEC — HIGH (ref 24.5–35.6)
AST SERPL-CCNC: 42 U/L — HIGH (ref 10–40)
AST SERPL-CCNC: 50 U/L — HIGH (ref 10–40)
BASE EXCESS BLDV CALC-SCNC: -0.6 MMOL/L — SIGNIFICANT CHANGE UP (ref -2–3)
BASE EXCESS BLDV CALC-SCNC: 0.1 MMOL/L — SIGNIFICANT CHANGE UP (ref -2–3)
BASOPHILS # BLD AUTO: 0.03 K/UL — SIGNIFICANT CHANGE UP (ref 0–0.2)
BASOPHILS NFR BLD AUTO: 0.7 % — SIGNIFICANT CHANGE UP (ref 0–2)
BILIRUB SERPL-MCNC: 1.7 MG/DL — HIGH (ref 0.2–1.2)
BILIRUB SERPL-MCNC: 1.7 MG/DL — HIGH (ref 0.2–1.2)
BUN SERPL-MCNC: 13 MG/DL — SIGNIFICANT CHANGE UP (ref 7–23)
BUN SERPL-MCNC: 14 MG/DL — SIGNIFICANT CHANGE UP (ref 7–23)
CALCIUM SERPL-MCNC: 9.3 MG/DL — SIGNIFICANT CHANGE UP (ref 8.4–10.5)
CALCIUM SERPL-MCNC: 9.3 MG/DL — SIGNIFICANT CHANGE UP (ref 8.4–10.5)
CHLORIDE SERPL-SCNC: 97 MMOL/L — SIGNIFICANT CHANGE UP (ref 96–108)
CHLORIDE SERPL-SCNC: 98 MMOL/L — SIGNIFICANT CHANGE UP (ref 96–108)
CO2 BLDV-SCNC: 29.3 MMOL/L — HIGH (ref 22–26)
CO2 BLDV-SCNC: 29.4 MMOL/L — HIGH (ref 22–26)
CO2 SERPL-SCNC: 26 MMOL/L — SIGNIFICANT CHANGE UP (ref 22–31)
CO2 SERPL-SCNC: 26 MMOL/L — SIGNIFICANT CHANGE UP (ref 22–31)
CREAT SERPL-MCNC: 2.01 MG/DL — HIGH (ref 0.5–1.3)
CREAT SERPL-MCNC: 2.21 MG/DL — HIGH (ref 0.5–1.3)
EGFR: 32 ML/MIN/1.73M2 — LOW
EGFR: 36 ML/MIN/1.73M2 — LOW
EOSINOPHIL # BLD AUTO: 0.06 K/UL — SIGNIFICANT CHANGE UP (ref 0–0.5)
EOSINOPHIL NFR BLD AUTO: 1.4 % — SIGNIFICANT CHANGE UP (ref 0–6)
GAS PNL BLDV: SIGNIFICANT CHANGE UP
GAS PNL BLDV: SIGNIFICANT CHANGE UP
GLUCOSE SERPL-MCNC: 115 MG/DL — HIGH (ref 70–99)
GLUCOSE SERPL-MCNC: 126 MG/DL — HIGH (ref 70–99)
HCO3 BLDV-SCNC: 28 MMOL/L — SIGNIFICANT CHANGE UP (ref 22–29)
HCO3 BLDV-SCNC: 28 MMOL/L — SIGNIFICANT CHANGE UP (ref 22–29)
HCT VFR BLD CALC: 32.9 % — LOW (ref 39–50)
HGB BLD-MCNC: 10.2 G/DL — LOW (ref 13–17)
IMM GRANULOCYTES NFR BLD AUTO: 0.7 % — SIGNIFICANT CHANGE UP (ref 0–0.9)
LYMPHOCYTES # BLD AUTO: 0.64 K/UL — LOW (ref 1–3.3)
LYMPHOCYTES # BLD AUTO: 14.6 % — SIGNIFICANT CHANGE UP (ref 13–44)
MAGNESIUM SERPL-MCNC: 1.8 MG/DL — SIGNIFICANT CHANGE UP (ref 1.6–2.6)
MAGNESIUM SERPL-MCNC: 2 MG/DL — SIGNIFICANT CHANGE UP (ref 1.6–2.6)
MCHC RBC-ENTMCNC: 31 GM/DL — LOW (ref 32–36)
MCHC RBC-ENTMCNC: 32.4 PG — SIGNIFICANT CHANGE UP (ref 27–34)
MCV RBC AUTO: 104.4 FL — HIGH (ref 80–100)
MONOCYTES # BLD AUTO: 0.46 K/UL — SIGNIFICANT CHANGE UP (ref 0–0.9)
MONOCYTES NFR BLD AUTO: 10.5 % — SIGNIFICANT CHANGE UP (ref 2–14)
NEUTROPHILS # BLD AUTO: 3.17 K/UL — SIGNIFICANT CHANGE UP (ref 1.8–7.4)
NEUTROPHILS NFR BLD AUTO: 72.1 % — SIGNIFICANT CHANGE UP (ref 43–77)
NRBC # BLD: 0 /100 WBCS — SIGNIFICANT CHANGE UP (ref 0–0)
PCO2 BLDV: 56 MMHG — HIGH (ref 42–55)
PCO2 BLDV: 60 MMHG — HIGH (ref 42–55)
PH BLDV: 7.27 — LOW (ref 7.32–7.43)
PH BLDV: 7.3 — LOW (ref 7.32–7.43)
PHOSPHATE SERPL-MCNC: 2.4 MG/DL — LOW (ref 2.5–4.5)
PHOSPHATE SERPL-MCNC: 2.4 MG/DL — LOW (ref 2.5–4.5)
PLATELET # BLD AUTO: 74 K/UL — LOW (ref 150–400)
PO2 BLDV: 36 MMHG — SIGNIFICANT CHANGE UP (ref 25–45)
PO2 BLDV: 37 MMHG — SIGNIFICANT CHANGE UP (ref 25–45)
POTASSIUM SERPL-MCNC: 3.8 MMOL/L — SIGNIFICANT CHANGE UP (ref 3.5–5.3)
POTASSIUM SERPL-MCNC: 4.1 MMOL/L — SIGNIFICANT CHANGE UP (ref 3.5–5.3)
POTASSIUM SERPL-SCNC: 3.8 MMOL/L — SIGNIFICANT CHANGE UP (ref 3.5–5.3)
POTASSIUM SERPL-SCNC: 4.1 MMOL/L — SIGNIFICANT CHANGE UP (ref 3.5–5.3)
PROT SERPL-MCNC: 6.3 G/DL — SIGNIFICANT CHANGE UP (ref 6–8.3)
PROT SERPL-MCNC: 6.6 G/DL — SIGNIFICANT CHANGE UP (ref 6–8.3)
RBC # BLD: 3.15 M/UL — LOW (ref 4.2–5.8)
RBC # FLD: 18.1 % — HIGH (ref 10.3–14.5)
SAO2 % BLDV: 53.9 % — LOW (ref 67–88)
SAO2 % BLDV: 58.6 % — LOW (ref 67–88)
SODIUM SERPL-SCNC: 132 MMOL/L — LOW (ref 135–145)
SODIUM SERPL-SCNC: 134 MMOL/L — LOW (ref 135–145)
WBC # BLD: 4.39 K/UL — SIGNIFICANT CHANGE UP (ref 3.8–10.5)
WBC # FLD AUTO: 4.39 K/UL — SIGNIFICANT CHANGE UP (ref 3.8–10.5)

## 2023-10-08 PROCEDURE — 71045 X-RAY EXAM CHEST 1 VIEW: CPT | Mod: 26

## 2023-10-08 PROCEDURE — 90945 DIALYSIS ONE EVALUATION: CPT

## 2023-10-08 PROCEDURE — 99291 CRITICAL CARE FIRST HOUR: CPT

## 2023-10-08 RX ORDER — MAGNESIUM SULFATE 500 MG/ML
1 VIAL (ML) INJECTION ONCE
Refills: 0 | Status: COMPLETED | OUTPATIENT
Start: 2023-10-08 | End: 2023-10-08

## 2023-10-08 RX ORDER — HEPARIN SODIUM 5000 [USP'U]/ML
1000 INJECTION INTRAVENOUS; SUBCUTANEOUS
Qty: 25000 | Refills: 0 | Status: DISCONTINUED | OUTPATIENT
Start: 2023-10-08 | End: 2023-10-11

## 2023-10-08 RX ADMIN — HEPARIN SODIUM 12 UNIT(S)/HR: 5000 INJECTION INTRAVENOUS; SUBCUTANEOUS at 03:35

## 2023-10-08 RX ADMIN — Medication 1 TABLET(S): at 12:08

## 2023-10-08 RX ADMIN — Medication 1 TABLET(S): at 08:59

## 2023-10-08 RX ADMIN — MIDODRINE HYDROCHLORIDE 5 MILLIGRAM(S): 2.5 TABLET ORAL at 07:56

## 2023-10-08 RX ADMIN — HEPARIN SODIUM 10 UNIT(S)/HR: 5000 INJECTION INTRAVENOUS; SUBCUTANEOUS at 20:03

## 2023-10-08 RX ADMIN — ATORVASTATIN CALCIUM 40 MILLIGRAM(S): 80 TABLET, FILM COATED ORAL at 21:34

## 2023-10-08 RX ADMIN — Medication 1 MILLIGRAM(S): at 12:08

## 2023-10-08 RX ADMIN — MIDODRINE HYDROCHLORIDE 5 MILLIGRAM(S): 2.5 TABLET ORAL at 21:34

## 2023-10-08 RX ADMIN — PANTOPRAZOLE SODIUM 40 MILLIGRAM(S): 20 TABLET, DELAYED RELEASE ORAL at 07:56

## 2023-10-08 RX ADMIN — Medication 100 GRAM(S): at 03:37

## 2023-10-08 RX ADMIN — Medication 75 MILLIGRAM(S): at 12:08

## 2023-10-08 RX ADMIN — MIDODRINE HYDROCHLORIDE 5 MILLIGRAM(S): 2.5 TABLET ORAL at 16:30

## 2023-10-08 RX ADMIN — CLOPIDOGREL BISULFATE 75 MILLIGRAM(S): 75 TABLET, FILM COATED ORAL at 12:08

## 2023-10-08 NOTE — PROGRESS NOTE ADULT - SUBJECTIVE AND OBJECTIVE BOX
Patient is a 63y Male seen and evaluated at bedside. NO acute distress, tolerating CVVHD, net neg 2.7L, continue CVVHD       Meds:    atorvastatin 40 at bedtime  clopidogrel Tablet 75 daily  CRRT Treatment  <Continuous>  DOBUTamine Infusion 2.5 <Continuous>  folic acid 1 daily  heparin  Infusion 1600 <Continuous>  influenza  Vaccine (HIGH DOSE) 0.7 once  midodrine. 5 every 8 hours  multivitamin 1 daily  Nephro-chichi 1 every 24 hours  ondansetron Injectable 4 every 12 hours PRN  pantoprazole    Tablet 40 before breakfast  Phoxillum Filtration BK 4 / 2.5 5000 <Continuous>  pregabalin 75 daily      T(C): , Max: 37 (10-07-23 @ 11:19)  T(F): , Max: 98.6 (10-07-23 @ 11:19)  HR: 100 (10-08-23 @ 09:53)  BP: 95/51 (10-08-23 @ 09:00)  BP(mean): 62 (10-08-23 @ 09:00)  RR: 16 (10-08-23 @ 09:00)  SpO2: 97% (10-08-23 @ 09:00)  Wt(kg): --    10-07 @ 07:01  -  10-08 @ 07:00  --------------------------------------------------------  IN: 1854.8 mL / OUT: 4556 mL / NET: -2701.2 mL    10-08 @ 07:01  -  10-08 @ 10:33  --------------------------------------------------------  IN: 199.4 mL / OUT: 527 mL / NET: -327.6 mL          Review of Systems:  ROS negative except as per HPI      PHYSICAL EXAM:  Constitutional: awake, no acute distress, tolerating CVVHD   EENT: anicteric sclera; oropharynx clear  Neck: supple,   Respiratory: CTLA   Cardiovascular: +S1/S2, irregular rate;  Gastrointestinal: soft, distended reducible umbilical hernia, abdominal striae  Extremities: warm, edema   Neurological: ANOx3 no focal neurological deficits   Access: R TDC accessed       LABS:                        10.2   4.39  )-----------( 74       ( 08 Oct 2023 05:55 )             32.9     10-08    132<L>  |  97  |  13  ----------------------------<  115<H>  4.1   |  26  |  2.01<H>    Ca    9.3      08 Oct 2023 05:55  Phos  2.4     10-08  Mg     2.0     10-08    TPro  6.3  /  Alb  2.9<L>  /  TBili  1.7<H>  /  DBili  x   /  AST  50<H>  /  ALT  43  /  AlkPhos  241<H>  10-08      PTT - ( 08 Oct 2023 05:55 )  PTT:99.1 sec  Urinalysis Basic - ( 08 Oct 2023 05:55 )    Color: x / Appearance: x / SG: x / pH: x  Gluc: 115 mg/dL / Ketone: x  / Bili: x / Urobili: x   Blood: x / Protein: x / Nitrite: x   Leuk Esterase: x / RBC: x / WBC x   Sq Epi: x / Non Sq Epi: x / Bacteria: x            RADIOLOGY & ADDITIONAL STUDIES:           Patient is a 63y Male seen and evaluated at bedside. NO acute distress, tolerating CVVHD, net neg 2.7L, continue CVVHD       Meds:    atorvastatin 40 at bedtime  clopidogrel Tablet 75 daily  CRRT Treatment  <Continuous>  DOBUTamine Infusion 2.5 <Continuous>  folic acid 1 daily  heparin  Infusion 1600 <Continuous>  influenza  Vaccine (HIGH DOSE) 0.7 once  midodrine. 5 every 8 hours  multivitamin 1 daily  Nephro-chichi 1 every 24 hours  ondansetron Injectable 4 every 12 hours PRN  pantoprazole    Tablet 40 before breakfast  Phoxillum Filtration BK 4 / 2.5 5000 <Continuous>  pregabalin 75 daily      T(C): , Max: 37 (10-07-23 @ 11:19)  T(F): , Max: 98.6 (10-07-23 @ 11:19)  HR: 100 (10-08-23 @ 09:53)  BP: 95/51 (10-08-23 @ 09:00)  BP(mean): 62 (10-08-23 @ 09:00)  RR: 16 (10-08-23 @ 09:00)  SpO2: 97% (10-08-23 @ 09:00)  Wt(kg): --    10-07 @ 07:01  -  10-08 @ 07:00  --------------------------------------------------------  IN: 1854.8 mL / OUT: 4556 mL / NET: -2701.2 mL    10-08 @ 07:01  -  10-08 @ 10:33  --------------------------------------------------------  IN: 199.4 mL / OUT: 527 mL / NET: -327.6 mL          Review of Systems:  ROS negative except as per HPI      PHYSICAL EXAM:  Constitutional: awake, no acute distress, tolerating CVVHD   EENT: anicteric sclera; oropharynx clear  Neck: supple, midline trachea  Respiratory: CTAB no accessory muscle use  Cardiovascular: +S1/S2, irregular rate;  Gastrointestinal: soft, distended reducible umbilical hernia, abdominal striae  Extremities: warm, edema   Neurological: ANOx3 no focal neurological deficits   Access: R TDC accessed       LABS:                        10.2   4.39  )-----------( 74       ( 08 Oct 2023 05:55 )             32.9     10-08    132<L>  |  97  |  13  ----------------------------<  115<H>  4.1   |  26  |  2.01<H>    Ca    9.3      08 Oct 2023 05:55  Phos  2.4     10-08  Mg     2.0     10-08    TPro  6.3  /  Alb  2.9<L>  /  TBili  1.7<H>  /  DBili  x   /  AST  50<H>  /  ALT  43  /  AlkPhos  241<H>  10-08      PTT - ( 08 Oct 2023 05:55 )  PTT:99.1 sec  Urinalysis Basic - ( 08 Oct 2023 05:55 )    Color: x / Appearance: x / SG: x / pH: x  Gluc: 115 mg/dL / Ketone: x  / Bili: x / Urobili: x   Blood: x / Protein: x / Nitrite: x   Leuk Esterase: x / RBC: x / WBC x   Sq Epi: x / Non Sq Epi: x / Bacteria: x            RADIOLOGY & ADDITIONAL STUDIES:    Hemoglobin: 10.2 g/dL (10-08-23 @ 05:55)  Phosphorus: 2.4 mg/dL (10-08-23 @ 05:55)  Phosphorus: 2.4 mg/dL (10-07-23 @ 23:18)  Hemoglobin: 9.9 g/dL (10-07-23 @ 05:24)    Albumin: 2.9 g/dL (10-08-23 @ 05:55)  Albumin: 3.1 g/dL (10-07-23 @ 23:18)    sevelamer carbonate 800 milliGRAM(s) Oral three times a day with meals, 10-07-23 @ 05:08, Routine      CRRT Treatment:   Modality: CVVHD, Filter: NxStage CAR-505, Target Blood Flow: 300 mL/Min  Target Fluid Balance: Titrate to net NEGATIVE fluid balance, 200 mL/Hr (10-08-23 @ 08:14) [Active]  Phoxillum Filtration BK 4 / 2.5: Solution, 5000 milliLiter(s) infuse at 2000 mL/Hr; infuse through CRRT Circuit  Administration Instructions: Continuous Renal Replacement Therapy (CRRT)  Special Instructions: Dialysate (10-08-23 @ 08:14) [Active]

## 2023-10-08 NOTE — PROGRESS NOTE ADULT - ASSESSMENT
63Y M w/hx of ESRD  admitted for SOB and generalized weakness and fatigue missed HD, now h/c c/b cardiogenic shock, on inotropes and CVVHD (10/6) tolerating well, net neg 2.7L continue CVVHD       #ESRD  Initiated CVVHD (10/6)   Last HD 10/4 tolerated 2L with no complications   10/7 CVVHD 2.7L net neg   now on dobutamine     Plan:  CVVHD with net negative 200cc/hr adjust Uf based on HDS as below   CRRT Treatment:   Modality: CVVHD, Filter: NxStage CAR-505, Target Blood Flow: 300 mL/Min  Target Fluid Balance: Titrate to net NEGATIVE fluid balance, 200 mL/Hr   BID BMP while on CVVHD,   C/w Heparin drip to avoid CVVHD filter clotting   Will change to phoxillum given low phos     Access:  R TDC accessed       HTN:  BP stable on dobutamine,  UF with CVVHD as above   On Midodrine 5mg TID         Anemia:  Hgb at goal 10.2  EPO with HD     MBD;  Calcium: 9.3  Phos: 2.4  Hold phos binders while on CVVHD, change dialysate as above

## 2023-10-08 NOTE — PROGRESS NOTE ADULT - ATTENDING COMMENTS
I agree with the fellow's findings and plans as written above with the following additions/amendments:    Seen and examined at bedside. Sitting in chair, tolerating CVVHD, mentating much improved. Otherwise continue CVVHD as above with goal aggressive net negative, call with any issues, further recs as above. Discussed with primary team

## 2023-10-08 NOTE — PROGRESS NOTE ADULT - ASSESSMENT
63 year old male with history of ESRD (HD 4x weekly M/T/TH/SAT), HLD, HTN, GERD, CAD, admitted to CCU w concern for cardiogenic shock iso volume overload.    NEURO  AAOx3  -FLOR    CARDIOVASCULAR    #inadequate cardiac output  #acute-on-chronic HFrEDF  tissue hypoperfusion as evidenced by cyanotic/cool extremities on exam yesterday  bedside us dilated RV, ultimately c/f RV failure with a worsening pericardial effusion   substantial improvement today following overnight cvv hd & low-dose dobutamine  - continue low-dose dobutamine drip  - continue cvv hd    #CAD (coronary artery disease)  unclear hx of cardiac events, per pt, on asa + plavix at home, troponin 144 (10/4 - downtrending)  - obtain collateral / clarify cad hx & asa + plavix timelines  - continuing home medications    #HLD (hyperlipidemia).   History of HLD, home med atorvastatin 40 mg. Patient unsure if had stent after MI  - obtain collateral / clarify cad hx  - continue home med atorvastatin 40 mg.    RESPIRATORY  saturating well on high-flow o2  - d/c HFo2 during daytime, substitute w nc & monitor oxygenation status  - continue hfo2 overnight    GASTROINTESTINAL  #GERD (gastroesophageal reflux disease).   History of GERD, no complaints of epigastric pain at this time.   -Continue pantoprazole 40 mg qd.    RENAL  #ESRD on dialysis.   Started on HD 6 months ago, 4x week (M/T/Th/Sat), missed HD session on 10/3/23. R port utilized. At admission, Cr 3.93 (un-established baseline Cr), K 3.4, Phos 4.4. Metabolic acidosis (AGAP 17) likely due to hypochloremic emesis / etoh leading to lactic acidosis   - continue home nephro chichi 60 300mg   - c/w midodrine i/s/o hypotension after HD (plan to reevaluate long-term use iso HF)  - continue CVVHD   - nephro following, appreciate recommendations    #hepatic cirrhosis  chronic, likely 2/2 chronic alcohol use., no suspicion for hepatic encephalopathy at this time - patient mentating AOx3 and conversant, w/o jaundice or non-icteric sclera  low concern for hepatorenal syndrome as remains hemodynamically stable. No known gallbladder pathology established.  Abdominal US (10/4) Cirrhotic morphology. Pulsatile flow in the main and left portal veins. Moderate ascites. Increased right renal cortical echogenicity compatible with medical renal disease.  - consider paracentesis if increasing ascites     HEME  #Anemia.   At admission Hg 10.2, Hct 32, .6. No baseline Hgb or iron studies for comparison. Home med iron 65 mg qd for ELMER, though MCV is macrocytic likely 2/2 folate deficiency. However, etiology of anemia likely multifactorial of ELMER, AOCD, and folate deficiency. No known history of GI bleeds, hemoptysis, hematochezia, melenic stool. B12 increased.   -Hold home med iron 65 mg  -F/u folate level  -Maintain active type and screen    ENDO  -FLOR    ID  -FLOR    PSYCH  #Moderate alcohol use disorder.   At admission BIENVENIDO 29. AST/AlT 45/43. Last drink 2 PM on 10/3/23, has 2-3 martinis daily. No history of alcohol withdrawals or withdrawal seizures. One non-bloody, clear/bilious emesis in past 24 hours. Qtc 397. 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   CIWA 0 x2 days  - continue Zofran 4 mg IV q12 PRN nausea, vomiting   - Thiamine, folic acid, multivitamin  - Home meds B1 100 mg OTC, D3 1000 IU OTC, B12 1000 mcg OTC    DERM  #Dermatitis.   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 B/l LE dry skin, likely venous stasis dermatitis on due to peripheral artery disease vs. heart failure related edema changes. Likely from niacin deficiency (pellagra) as etiology for dermatitis as patient has 3 month anorexia, fatigue, numbness. Recently prescribed Cerave w/o use  - Consider niacin supplementation (vitamin b3) or topical steroid  - lotion inpatient  - f/u outpt dermatologist.    PROPHYLACTIC MEASURE  F: none  E: Replete per HD with ESRD- CVVHD 10/06  N: renal restrictions  GI ppx: pantoprazole  DVT ppx: SCDs and on DAPT  Full code  Dispo: CCU 63 year old male with history of ESRD (HD 4x weekly M/T/TH/SAT), HLD, HTN, GERD, CAD, admitted to CCU w concern for cardiogenic shock iso volume overload.    NEURO  AAOx3  -FLOR    CARDIOVASCULAR    #inadequate cardiac output  #acute-on-chronic HFrEDF  tissue hypoperfusion as evidenced by cyanotic/cool extremities on exam yesterday  bedside us dilated RV, ultimately c/f RV failure with a worsening pericardial effusion   substantial improvement today following overnight cvv hd & low-dose dobutamine  - continue low-dose dobutamine drip  - continue CVVHD  - discontinue fluid restriction     #CAD (coronary artery disease)  unclear hx of cardiac events, per pt, on asa + plavix at home, troponin 144 (10/4 - downtrending)  - obtain collateral / clarify cad hx & asa + plavix timelines  - continuing home medications    #HLD (hyperlipidemia).   History of HLD, home med atorvastatin 40 mg. Patient unsure if had stent after MI  - obtain collateral / clarify cad hx  - continue home med atorvastatin 40 mg.    RESPIRATORY  Patient OOBTC at time of examination, saturating well on room air.   -     GASTROINTESTINAL  #GERD (gastroesophageal reflux disease).   History of GERD, no complaints of epigastric pain at this time.   -Continue pantoprazole 40 mg qd.    RENAL  #ESRD on dialysis.   Started on HD 6 months ago, 4x week (M/T/Th/Sat), missed HD session on 10/3/23. R port utilized. At admission, Cr 3.93 (un-established baseline Cr), K 3.4, Phos 4.4. Metabolic acidosis (AGAP 17) likely due to hypochloremic emesis / etoh leading to lactic acidosis   - continue home nephro chichi 60 300mg   - c/w midodrine i/s/o hypotension after HD (plan to reevaluate long-term use iso HF)  - continue CVVHD   - nephro following, appreciate recommendations    #hepatic cirrhosis  chronic, likely 2/2 chronic alcohol use., no suspicion for hepatic encephalopathy at this time - patient mentating AOx3 and conversant, w/o jaundice or non-icteric sclera  low concern for hepatorenal syndrome as remains hemodynamically stable. No known gallbladder pathology established.  Abdominal US (10/4) Cirrhotic morphology. Pulsatile flow in the main and left portal veins. Moderate ascites. Increased right renal cortical echogenicity compatible with medical renal disease.  - consider paracentesis if increasing ascites     HEME  #Anemia.   At admission Hg 10.2, Hct 32, .6. No baseline Hgb or iron studies for comparison. Home med iron 65 mg qd for ELMER, though MCV is macrocytic likely 2/2 folate deficiency. However, etiology of anemia likely multifactorial of ELMER, AOCD, and folate deficiency. No known history of GI bleeds, hemoptysis, hematochezia, melenic stool. B12 increased.   -Hold home med iron 65 mg  -F/u folate level  -Maintain active type and screen    ENDO  -FLOR    ID  -FLOR    PSYCH  #Moderate alcohol use disorder.   At admission BIENVENIDO 29. AST/AlT 45/43. Last drink 2 PM on 10/3/23, has 2-3 martinis daily. No history of alcohol withdrawals or withdrawal seizures. One non-bloody, clear/bilious emesis in past 24 hours. Qtc 397. 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   CIWA 0 x2 days  - continue Zofran 4 mg IV q12 PRN nausea, vomiting   - Thiamine, folic acid, multivitamin  - Home meds B1 100 mg OTC, D3 1000 IU OTC, B12 1000 mcg OTC    DERM  #Dermatitis.   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 B/l LE dry skin, likely venous stasis dermatitis on due to peripheral artery disease vs. heart failure related edema changes. Likely from niacin deficiency (pellagra) as etiology for dermatitis as patient has 3 month anorexia, fatigue, numbness. Recently prescribed Cerave w/o use  - Consider niacin supplementation (vitamin b3) or topical steroid  - lotion inpatient  - f/u outpt dermatologist.    PROPHYLACTIC MEASURE  F: none  E: Replete per HD with ESRD- CVVHD 10/06  N: renal restrictions  GI ppx: pantoprazole  DVT ppx: SCDs and on DAPT  Full code  Dispo: CCU 63 year old male with history of ESRD (HD 4x weekly M/T/TH/SAT), HLD, HTN, GERD, CAD, admitted to CCU w concern for cardiogenic shock iso volume overload.    NEURO  AAOx3  -FLOR    CARDIOVASCULAR    #inadequate cardiac output  #acute-on-chronic HFrEDF  tissue hypoperfusion as evidenced by cyanotic/cool extremities on exam yesterday  bedside us dilated RV, ultimately c/f RV failure with a worsening pericardial effusion   substantial improvement today following overnight cvv hd & low-dose dobutamine  - continue low-dose dobutamine drip  - continue CVVHD  - discontinue fluid restriction     #CAD (coronary artery disease)  unclear hx of cardiac events, per pt, on asa + plavix at home, troponin 144 (10/4 - downtrending)  - obtain collateral / clarify cad hx & asa + plavix timelines  - continuing home medications    #HLD (hyperlipidemia).   History of HLD, home med atorvastatin 40 mg. Patient unsure if had stent after MI  - obtain collateral / clarify cad hx  - continue home med atorvastatin 40 mg.    RESPIRATORY  Patient OOBTC at time of examination, saturating well on room air.   - Continue to monitor oxygenation status     GASTROINTESTINAL  #GERD (gastroesophageal reflux disease).   History of GERD, no complaints of epigastric pain at this time.   -Continue pantoprazole 40 mg qd.    RENAL  #ESRD on dialysis.   Started on HD 6 months ago, 4x week (M/T/Th/Sat), missed HD session on 10/3/23. R port utilized. At admission, Cr 3.93 (un-established baseline Cr), K 3.4, Phos 4.4. Metabolic acidosis (AGAP 17) likely due to hypochloremic emesis / etoh leading to lactic acidosis. Dialysate changed to Phoxillum due to low phos.   - continue home nephro chichi 60 300mg   - c/w midodrine i/s/o hypotension after HD (plan to reevaluate long-term use iso HF)  - continue CVVHD   - BID BMP while on CVVHD,   - C/w Heparin drip to avoid CVVHD filter clotting   - Hold phosphate binders while on CVVHD   - nephro following, appreciate recommendations    #hepatic cirrhosis  chronic, likely 2/2 chronic alcohol use., no suspicion for hepatic encephalopathy at this time - patient mentating AOx3 and conversant, w/o jaundice or non-icteric sclera  low concern for hepatorenal syndrome as remains hemodynamically stable. No known gallbladder pathology established.  Abdominal US (10/4) Cirrhotic morphology. Pulsatile flow in the main and left portal veins. Moderate ascites. Increased right renal cortical echogenicity compatible with medical renal disease.  - consider paracentesis if increasing ascites   - continue CVVHD    HEME  #Anemia.   At admission Hg 10.2, Hct 32, .6. No baseline Hgb or iron studies for comparison. Home med iron 65 mg qd for ELMER, though MCV is macrocytic likely 2/2 folate deficiency. However, etiology of anemia likely multifactorial of ELMER, AOCD, and folate deficiency. No known history of GI bleeds, hemoptysis, hematochezia, melenic stool. B12 increased.   -Hold home med iron 65 mg  -C/w folic acid 1 mg daily   - EPO with HD   -Maintain active type and screen    ENDO  -FLOR    ID  -FLOR    PSYCH  #Moderate alcohol use disorder.   At admission BIENVENIDO 29. AST/AlT 45/43. Last drink 2 PM on 10/3/23, has 2-3 martinis daily. No history of alcohol withdrawals or withdrawal seizures. One non-bloody, clear/bilious emesis in past 24 hours. Qtc 397. 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   CIWA 0 x2 days  - continue Zofran 4 mg IV q12 PRN nausea, vomiting   - Thiamine, folic acid, multivitamin  - Home meds B1 100 mg OTC, D3 1000 IU OTC, B12 1000 mcg OTC    DERM  #Dermatitis.   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 B/l LE dry skin, likely venous stasis dermatitis on due to peripheral artery disease vs. heart failure related edema changes. Likely from niacin deficiency (pellagra) as etiology for dermatitis as patient has 3 month anorexia, fatigue, numbness. Recently prescribed Cerave w/o use  - Consider niacin supplementation (vitamin b3) or topical steroid  - lotion inpatient  - f/u outpt dermatologist.    PROPHYLACTIC MEASURE  F: none  E: Replete per HD with ESRD- CVVHD 10/06  N: renal restrictions  GI ppx: pantoprazole  DVT ppx: heparin drip; SCDs and on DAPT  Full code  Dispo: CCU 63 year old male with history of ESRD (HD 4x weekly M/T/TH/SAT), atrial fibrillation (S/p Watchman), HLD, HTN, GERD, admitted to CCU w concern for cardiogenic shock iso volume overload.    NEURO  AAOx3  -FLOR    CARDIOVASCULAR    #inadequate cardiac output  #acute-on-chronic HFrEDF  tissue hypoperfusion as evidenced by cyanotic/cool extremities on exam yesterday  bedside us dilated RV, ultimately c/f RV failure with a worsening pericardial effusion   substantial improvement today following overnight cvv hd & low-dose dobutamine  - continue low-dose dobutamine drip  - continue CVVHD  - discontinue fluid restriction     #Atrial fibrillation  Patient with chronic atrial fibrillation s/p watchman placement.   - Continue with plavix    #HLD (hyperlipidemia).   History of HLD, home med atorvastatin 40 mg. Patient unsure if had stent after MI  - obtain collateral / clarify cad hx  - continue home med atorvastatin 40 mg.    RESPIRATORY  Patient OOBTC at time of examination, saturating well on room air.   - Continue to monitor oxygenation status     GASTROINTESTINAL  #GERD (gastroesophageal reflux disease).   History of GERD, no complaints of epigastric pain at this time.   -Continue pantoprazole 40 mg qd.    RENAL  #ESRD on dialysis.   Started on HD 6 months ago, 4x week (M/T/Th/Sat), missed HD session on 10/3/23. R port utilized. At admission, Cr 3.93 (un-established baseline Cr), K 3.4, Phos 4.4. Metabolic acidosis (AGAP 17) likely due to hypochloremic emesis / etoh leading to lactic acidosis. Dialysate changed to Phoxillum due to low phos.   - continue home nephro chichi 60 300mg   - c/w midodrine i/s/o hypotension after HD (plan to reevaluate long-term use iso HF)  - continue CVVHD   - BID BMP while on CVVHD,   - C/w Heparin drip to avoid CVVHD filter clotting   - Hold phosphate binders while on CVVHD   - nephro following, appreciate recommendations    #hepatic cirrhosis  chronic, likely 2/2 chronic alcohol use., no suspicion for hepatic encephalopathy at this time - patient mentating AOx3 and conversant, w/o jaundice or non-icteric sclera  low concern for hepatorenal syndrome as remains hemodynamically stable. No known gallbladder pathology established.  Abdominal US (10/4) Cirrhotic morphology. Pulsatile flow in the main and left portal veins. Moderate ascites. Increased right renal cortical echogenicity compatible with medical renal disease.  - consider paracentesis if increasing ascites   - continue CVVHD    HEME  #Anemia.   At admission Hg 10.2, Hct 32, .6. No baseline Hgb or iron studies for comparison. Home med iron 65 mg qd for ELMER, though MCV is macrocytic likely 2/2 folate deficiency. However, etiology of anemia likely multifactorial of ELMER, AOCD, and folate deficiency. No known history of GI bleeds, hemoptysis, hematochezia, melenic stool. B12 increased.   -Hold home med iron 65 mg  -C/w folic acid 1 mg daily   - EPO with HD   -Maintain active type and screen    ENDO  -FLOR    ID  -FLOR    PSYCH  #Moderate alcohol use disorder.   At admission BIENVENIDO 29. AST/AlT 45/43. Last drink 2 PM on 10/3/23, has 2-3 martinis daily. No history of alcohol withdrawals or withdrawal seizures. One non-bloody, clear/bilious emesis in past 24 hours. Qtc 397. 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   CIWA 0 x2 days  - continue Zofran 4 mg IV q12 PRN nausea, vomiting   - Thiamine, folic acid, multivitamin  - Home meds B1 100 mg OTC, D3 1000 IU OTC, B12 1000 mcg OTC    DERM  #Dermatitis.   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 B/l LE dry skin, likely venous stasis dermatitis on due to peripheral artery disease vs. heart failure related edema changes. Likely from niacin deficiency (pellagra) as etiology for dermatitis as patient has 3 month anorexia, fatigue, numbness. Recently prescribed Cerave w/o use  - Consider niacin supplementation (vitamin b3) or topical steroid  - lotion inpatient  - f/u outpt dermatologist.    PROPHYLACTIC MEASURE  F: none  E: Replete per HD with ESRD- CVVHD 10/06  N: renal restrictions  GI ppx: pantoprazole  DVT ppx: heparin drip; SCDs and on DAPT  Full code  Dispo: CCU

## 2023-10-08 NOTE — PROGRESS NOTE ADULT - SUBJECTIVE AND OBJECTIVE BOX
INTERVAL HPI/OVERNIGHT EVENTS: Heparin gtt increased from 11->12. VBG showed elevated pCO2, attempted to place pt on BIPAP but wasnt able to tolerate, switched to HFNC. Stable repeat VBG. D/otoniel sevelamer for low phos.    SUBJECTIVE: Patient seen and examined at bedside.     OBJECTIVE:    VITAL SIGNS:  ICU Vital Signs Last 24 Hrs  T(C): 36.7 (08 Oct 2023 04:55), Max: 37.1 (07 Oct 2023 07:34)  T(F): 98 (08 Oct 2023 04:55), Max: 98.7 (07 Oct 2023 07:34)  HR: 95 (08 Oct 2023 06:00) (83 - 112)  BP: 99/48 (08 Oct 2023 06:00) (65/45 - 150/52)  BP(mean): 69 (08 Oct 2023 06:00) (51 - 96)  ABP: 15/15 (07 Oct 2023 12:05) (15/15 - 115/76)  ABP(mean): 15 (07 Oct 2023 12:05) (15 - 99)  RR: 9 (08 Oct 2023 06:00) (9 - 24)  SpO2: 100% (08 Oct 2023 06:00) (65% - 100%)    O2 Parameters below as of 08 Oct 2023 06:00  Patient On (Oxygen Delivery Method): nasal cannula, high flow  O2 Flow (L/min): 30  O2 Concentration (%): 30    I&O's Detail    06 Oct 2023 07:01  -  07 Oct 2023 07:00  --------------------------------------------------------  IN:    DOBUTamine: 115.4 mL    Heparin: 112 mL    Heparin Infusion: 162 mL    IV PiggyBack: 50 mL    Oral Fluid: 474 mL  Total IN: 913.4 mL    OUT:    Other (mL): 1314 mL    Voided (mL): 0 mL  Total OUT: 1314 mL    Total NET: -400.6 mL      07 Oct 2023 07:01  -  08 Oct 2023 06:59  --------------------------------------------------------  IN:    DOBUTamine: 184.8 mL    Heparin: 282 mL    IV PiggyBack: 100 mL    Oral Fluid: 1325 mL  Total IN: 1891.8 mL    OUT:    Intermittent Catheterization - Urethral (mL): 50 mL    Other (mL): 4252 mL  Total OUT: 4302 mL    Total NET: -2410.2 mL            CAPILLARY BLOOD GLUCOSE      POCT Blood Glucose.: 108 mg/dL (06 Oct 2023 11:37)      PHYSICAL EXAM:    CONSTITUTIONAL: NAD  EYES: PERRLA and symmetric, EOMI, no conjunctival or scleral injection, non-icteric; no nystagmus  ENMT: moist mucus membranes  NECK: Supple  RESP: on HF o2, no apparent respiratory distress/use of accessory muscles, +bibasilar crackles  CV: RRR, +S1S2, no MRG; extremities cool, mildly cyanotic  GI: Soft, NT, distended, + fluid wave, but without rebound or guarding; +reducible umbilical hernia midline lower quadrant, + abdominal striae   MSK: Normal ROM without pain, no spinal tenderness, normal muscle strength/tone  SKIN: +bilateral upper extremity maculopapular rash with excoriations as well on scalp; venous stasis dermatitis bilateral ankles and lower calves. No jaundice. Very mild mottling of the hands.  NEURO: CN II-XII intact; normal reflexes in upper and lower extremities, sensation intact in upper and lower extremities b/l to light touch   PSYCH: Appropriate insight/judgment; AO x 3, mood and affect appropriate, recent/remote memory intact    MEDICATIONS:  MEDICATIONS  (STANDING):  atorvastatin 40 milliGRAM(s) Oral at bedtime  clopidogrel Tablet 75 milliGRAM(s) Oral daily  CRRT Treatment    <Continuous>  DOBUTamine Infusion 2.5 MICROgram(s)/kG/Min (7.66 mL/Hr) IV Continuous <Continuous>  folic acid 1 milliGRAM(s) Oral daily  heparin  Infusion 1600 Unit(s)/Hr (13 mL/Hr) IV Continuous <Continuous>  influenza  Vaccine (HIGH DOSE) 0.7 milliLiter(s) IntraMuscular once  midodrine. 5 milliGRAM(s) Oral every 8 hours  multivitamin 1 Tablet(s) Oral daily  Nephro-chichi 1 Tablet(s) Oral every 24 hours  pantoprazole    Tablet 40 milliGRAM(s) Oral before breakfast  pregabalin 75 milliGRAM(s) Oral daily  PureFlow Dialysate RFP-400 (K 2 / Ca 3) 5000 milliLiter(s) (2000 mL/Hr) CRRT <Continuous>  sevelamer carbonate 800 milliGRAM(s) Oral three times a day with meals    MEDICATIONS  (PRN):  LORazepam   Injectable 1 milliGRAM(s) IV Push every 1 hour PRN CIWA-Ar score 8 or greater  ondansetron Injectable 4 milliGRAM(s) IV Push every 12 hours PRN Nausea and/or Vomiting      ALLERGIES:  Allergies    penicillins (Unknown)    Intolerances        LABS:                        9.9    4.97  )-----------( 70       ( 07 Oct 2023 05:24 )             30.8     10-07    131<L>  |  95<L>  |  19  ----------------------------<  104<H>  3.3<L>   |  25  |  2.87<H>    Ca    9.2      07 Oct 2023 10:28  Phos  3.3     10-07  Mg     1.7     10-07    TPro  6.2  /  Alb  3.0<L>  /  TBili  1.7<H>  /  DBili  x   /  AST  39  /  ALT  39  /  AlkPhos  224<H>  10-07    LIVER FUNCTIONS - ( 07 Oct 2023 10:28 )  Alb: 3.0 g/dL / Pro: 6.2 g/dL / ALK PHOS: 224 U/L / ALT: 39 U/L / AST: 39 U/L / GGT: x           PT/INR - ( 06 Oct 2023 10:18 )   PT: 18.3 sec;   INR: 1.63          PTT - ( 07 Oct 2023 08:32 )  PTT:49.7 sec  Urinalysis Basic - ( 07 Oct 2023 10:28 )    Color: x / Appearance: x / SG: x / pH: x  Gluc: 104 mg/dL / Ketone: x  / Bili: x / Urobili: x   Blood: x / Protein: x / Nitrite: x   Leuk Esterase: x / RBC: x / WBC x   Sq Epi: x / Non Sq Epi: x / Bacteria: x      CARDIAC MARKERS ( 06 Oct 2023 13:17 )  x     / x     / 67 U/L / x     / 5.4 ng/mL      RADIOLOGY & ADDITIONAL TESTS: Reviewed. INTERVAL HPI/OVERNIGHT EVENTS: Heparin gtt increased from 11->12. VBG showed elevated pCO2, attempted to place pt on BIPAP but wasnt able to tolerate, switched to HFNC. Stable repeat VBG. D/otoniel sevelamer for low phos.    SUBJECTIVE: Patient seen and examined at bedside.     OBJECTIVE:    VITAL SIGNS:  ICU Vital Signs Last 24 Hrs  T(C): 36.7 (08 Oct 2023 04:55), Max: 37.1 (07 Oct 2023 07:34)  T(F): 98 (08 Oct 2023 04:55), Max: 98.7 (07 Oct 2023 07:34)  HR: 95 (08 Oct 2023 06:00) (83 - 112)  BP: 99/48 (08 Oct 2023 06:00) (65/45 - 150/52)  BP(mean): 69 (08 Oct 2023 06:00) (51 - 96)  ABP: 15/15 (07 Oct 2023 12:05) (15/15 - 115/76)  ABP(mean): 15 (07 Oct 2023 12:05) (15 - 99)  RR: 9 (08 Oct 2023 06:00) (9 - 24)  SpO2: 100% (08 Oct 2023 06:00) (65% - 100%)    O2 Parameters below as of 08 Oct 2023 06:00  Patient On (Oxygen Delivery Method): nasal cannula, high flow  O2 Flow (L/min): 30  O2 Concentration (%): 30    I&O's Detail    06 Oct 2023 07:01  -  07 Oct 2023 07:00  --------------------------------------------------------  IN:    DOBUTamine: 115.4 mL    Heparin: 112 mL    Heparin Infusion: 162 mL    IV PiggyBack: 50 mL    Oral Fluid: 474 mL  Total IN: 913.4 mL    OUT:    Other (mL): 1314 mL    Voided (mL): 0 mL  Total OUT: 1314 mL    Total NET: -400.6 mL      07 Oct 2023 07:01  -  08 Oct 2023 06:59  --------------------------------------------------------  IN:    DOBUTamine: 184.8 mL    Heparin: 282 mL    IV PiggyBack: 100 mL    Oral Fluid: 1325 mL  Total IN: 1891.8 mL    OUT:    Intermittent Catheterization - Urethral (mL): 50 mL    Other (mL): 4252 mL  Total OUT: 4302 mL    Total NET: -2410.2 mL      CAPILLARY BLOOD GLUCOSE      POCT Blood Glucose.: 108 mg/dL (06 Oct 2023 11:37)      PHYSICAL EXAM:    CONSTITUTIONAL: NAD  EYES: PERRLA and symmetric, EOMI, no conjunctival or scleral injection, non-icteric; no nystagmus  ENMT: moist mucus membranes  NECK: Supple  RESP: on HF o2, no apparent respiratory distress/use of accessory muscles, +bibasilar crackles  CV: RRR, +S1S2, no MRG; extremities cool, mildly cyanotic  GI: Soft, NT, distended, + fluid wave, but without rebound or guarding; +reducible umbilical hernia midline lower quadrant, + abdominal striae   MSK: Normal ROM without pain, no spinal tenderness, normal muscle strength/tone  SKIN: +bilateral upper extremity maculopapular rash with excoriations as well on scalp; venous stasis dermatitis bilateral ankles and lower calves. No jaundice. Very mild mottling of the hands.  NEURO: CN II-XII intact; normal reflexes in upper and lower extremities, sensation intact in upper and lower extremities b/l to light touch   PSYCH: Appropriate insight/judgment; AO x 3, mood and affect appropriate, recent/remote memory intact    MEDICATIONS:  MEDICATIONS  (STANDING):  atorvastatin 40 milliGRAM(s) Oral at bedtime  clopidogrel Tablet 75 milliGRAM(s) Oral daily  CRRT Treatment    <Continuous>  DOBUTamine Infusion 2.5 MICROgram(s)/kG/Min (7.66 mL/Hr) IV Continuous <Continuous>  folic acid 1 milliGRAM(s) Oral daily  heparin  Infusion 1600 Unit(s)/Hr (13 mL/Hr) IV Continuous <Continuous>  influenza  Vaccine (HIGH DOSE) 0.7 milliLiter(s) IntraMuscular once  midodrine. 5 milliGRAM(s) Oral every 8 hours  multivitamin 1 Tablet(s) Oral daily  Nephro-chichi 1 Tablet(s) Oral every 24 hours  pantoprazole    Tablet 40 milliGRAM(s) Oral before breakfast  pregabalin 75 milliGRAM(s) Oral daily  PureFlow Dialysate RFP-400 (K 2 / Ca 3) 5000 milliLiter(s) (2000 mL/Hr) CRRT <Continuous>  sevelamer carbonate 800 milliGRAM(s) Oral three times a day with meals    MEDICATIONS  (PRN):  LORazepam   Injectable 1 milliGRAM(s) IV Push every 1 hour PRN CIWA-Ar score 8 or greater  ondansetron Injectable 4 milliGRAM(s) IV Push every 12 hours PRN Nausea and/or Vomiting      ALLERGIES:  Allergies    penicillins (Unknown)    Intolerances    LABS:                         10.2   4.39  )-----------( 74       ( 08 Oct 2023 05:55 )             32.9     10-08    132<L>  |  97  |  13  ----------------------------<  115<H>  4.1   |  26  |  2.01<H>    Ca    9.3      08 Oct 2023 05:55  Phos  2.4     10-08  Mg     2.0     10-08    TPro  6.3  /  Alb  2.9<L>  /  TBili  1.7<H>  /  DBili  x   /  AST  50<H>  /  ALT  43  /  AlkPhos  241<H>  10-08    PT/INR - ( 06 Oct 2023 10:18 )   PT: 18.3 sec;   INR: 1.63          PTT - ( 08 Oct 2023 05:55 )  PTT:99.1 sec  Urinalysis Basic - ( 08 Oct 2023 05:55 )    Color: x / Appearance: x / SG: x / pH: x  Gluc: 115 mg/dL / Ketone: x  / Bili: x / Urobili: x   Blood: x / Protein: x / Nitrite: x   Leuk Esterase: x / RBC: x / WBC x   Sq Epi: x / Non Sq Epi: x / Bacteria: x      CARDIAC MARKERS ( 06 Oct 2023 13:17 )  x     / x     / 67 U/L / x     / 5.4 ng/mL      RADIOLOGY, EKG & ADDITIONAL TESTS: Reviewed.    INTERVAL HPI/OVERNIGHT EVENTS: Heparin gtt increased from 11->12. VBG showed elevated pCO2, attempted to place pt on BIPAP but wasnt able to tolerate, switched to HFNC. Stable repeat VBG. D/otoniel sevelamer for low phos.    SUBJECTIVE: Patient seen and examined at bedside. States he wasn't able to sleep as comfortably overnight but slept during the day yesterday. Denies any concerns this morning.     DRIPS: Dobutamine 2.5; Heparin 11 -> 12   ACCESS: R. axillary arterial line 10/06 - 10/07 (now removed), R. forearm P IV 20g 10/02, R. AC P IV 18g 10/06, right subclavian vein HD catheter     OBJECTIVE:    VITAL SIGNS:  ICU Vital Signs Last 24 Hrs  T(C): 36.7 (08 Oct 2023 04:55), Max: 37.1 (07 Oct 2023 07:34)  T(F): 98 (08 Oct 2023 04:55), Max: 98.7 (07 Oct 2023 07:34)  HR: 95 (08 Oct 2023 06:00) (83 - 112)  BP: 99/48 (08 Oct 2023 06:00) (65/45 - 150/52)  BP(mean): 69 (08 Oct 2023 06:00) (51 - 96)  ABP: 15/15 (07 Oct 2023 12:05) (15/15 - 115/76)  ABP(mean): 15 (07 Oct 2023 12:05) (15 - 99)  RR: 9 (08 Oct 2023 06:00) (9 - 24)  SpO2: 100% (08 Oct 2023 06:00) (65% - 100%)    O2 Parameters below as of 08 Oct 2023 06:00  Patient On (Oxygen Delivery Method): nasal cannula, high flow  O2 Flow (L/min): 30  O2 Concentration (%): 30    I&O's Detail    06 Oct 2023 07:01  -  07 Oct 2023 07:00  --------------------------------------------------------  IN:    DOBUTamine: 115.4 mL    Heparin: 112 mL    Heparin Infusion: 162 mL    IV PiggyBack: 50 mL    Oral Fluid: 474 mL  Total IN: 913.4 mL    OUT:    Other (mL): 1314 mL    Voided (mL): 0 mL  Total OUT: 1314 mL    Total NET: -400.6 mL      07 Oct 2023 07:01  -  08 Oct 2023 06:59  --------------------------------------------------------  IN:    DOBUTamine: 184.8 mL    Heparin: 282 mL    IV PiggyBack: 100 mL    Oral Fluid: 1325 mL  Total IN: 1891.8 mL    OUT:    Intermittent Catheterization - Urethral (mL): 50 mL    Other (mL): 4252 mL  Total OUT: 4302 mL    Total NET: -2410.2 mL      CAPILLARY BLOOD GLUCOSE      POCT Blood Glucose.: 108 mg/dL (06 Oct 2023 11:37)      PHYSICAL EXAM:    CONSTITUTIONAL: NAD  EYES: PERRLA and symmetric, EOMI, no conjunctival or scleral injection, non-icteric; no nystagmus  HENMT: moist mucus membranes  NECK: Supple  RESP: on HF o2, no apparent respiratory distress/use of accessory muscles  CV: irregular heart rate, +S1S2, no MRG; warm extremities   GI: Soft, NT, distended, + fluid wave, but without rebound or guarding; +reducible umbilical hernia midline lower quadrant, + abdominal striae   MSK: Normal ROM without pain, no spinal tenderness, normal muscle strength/tone  SKIN: +bilateral upper extremity maculopapular rash with excoriations as well on scalp; venous stasis dermatitis bilateral ankles and lower calves. No jaundice. Very mild mottling of the hands.  NEURO: CN II-XII intact; normal reflexes in upper and lower extremities, sensation intact in upper and lower extremities b/l to light touch   PSYCH: Appropriate insight/judgment; AO x 3, mood and affect appropriate, recent/remote memory intact    MEDICATIONS:  MEDICATIONS  (STANDING):  atorvastatin 40 milliGRAM(s) Oral at bedtime  clopidogrel Tablet 75 milliGRAM(s) Oral daily  CRRT Treatment    <Continuous>  DOBUTamine Infusion 2.5 MICROgram(s)/kG/Min (7.66 mL/Hr) IV Continuous <Continuous>  folic acid 1 milliGRAM(s) Oral daily  heparin  Infusion 1600 Unit(s)/Hr (13 mL/Hr) IV Continuous <Continuous>  influenza  Vaccine (HIGH DOSE) 0.7 milliLiter(s) IntraMuscular once  midodrine. 5 milliGRAM(s) Oral every 8 hours  multivitamin 1 Tablet(s) Oral daily  Nephro-chicih 1 Tablet(s) Oral every 24 hours  pantoprazole    Tablet 40 milliGRAM(s) Oral before breakfast  pregabalin 75 milliGRAM(s) Oral daily  PureFlow Dialysate RFP-400 (K 2 / Ca 3) 5000 milliLiter(s) (2000 mL/Hr) CRRT <Continuous>  sevelamer carbonate 800 milliGRAM(s) Oral three times a day with meals    MEDICATIONS  (PRN):  LORazepam   Injectable 1 milliGRAM(s) IV Push every 1 hour PRN CIWA-Ar score 8 or greater  ondansetron Injectable 4 milliGRAM(s) IV Push every 12 hours PRN Nausea and/or Vomiting      ALLERGIES:  Allergies    penicillins (Unknown)    Intolerances    LABS:                         10.2   4.39  )-----------( 74       ( 08 Oct 2023 05:55 )             32.9     10-08    132<L>  |  97  |  13  ----------------------------<  115<H>  4.1   |  26  |  2.01<H>    Ca    9.3      08 Oct 2023 05:55  Phos  2.4     10-08  Mg     2.0     10-08    TPro  6.3  /  Alb  2.9<L>  /  TBili  1.7<H>  /  DBili  x   /  AST  50<H>  /  ALT  43  /  AlkPhos  241<H>  10-08    PT/INR - ( 06 Oct 2023 10:18 )   PT: 18.3 sec;   INR: 1.63          PTT - ( 08 Oct 2023 05:55 )  PTT:99.1 sec  Urinalysis Basic - ( 08 Oct 2023 05:55 )    Color: x / Appearance: x / SG: x / pH: x  Gluc: 115 mg/dL / Ketone: x  / Bili: x / Urobili: x   Blood: x / Protein: x / Nitrite: x   Leuk Esterase: x / RBC: x / WBC x   Sq Epi: x / Non Sq Epi: x / Bacteria: x      CARDIAC MARKERS ( 06 Oct 2023 13:17 )  x     / x     / 67 U/L / x     / 5.4 ng/mL      RADIOLOGY, EKG & ADDITIONAL TESTS: Reviewed.    INTERVAL HPI/OVERNIGHT EVENTS: Heparin gtt increased from 11->12. VBG showed elevated pCO2, attempted to place pt on BIPAP but wasnt able to tolerate, switched to HFNC. Stable repeat VBG. D/otoniel sevelamer for low phos. 172-288 cc/hr fluid removed overnight, total output of 2678 cc.     SUBJECTIVE: Patient seen and examined at bedside. States he wasn't able to sleep as comfortably overnight but slept during the day yesterday. Denies any concerns this morning.     DRIPS: Dobutamine 2.5; Heparin 11 -> 12   ACCESS: R. axillary arterial line 10/06 - 10/07 (now removed), R. forearm P IV 20g 10/02, R. AC P IV 18g 10/06, right subclavian vein HD catheter     OBJECTIVE:    VITAL SIGNS:  ICU Vital Signs Last 24 Hrs  T(C): 36.7 (08 Oct 2023 04:55), Max: 37.1 (07 Oct 2023 07:34)  T(F): 98 (08 Oct 2023 04:55), Max: 98.7 (07 Oct 2023 07:34)  HR: 95 (08 Oct 2023 06:00) (83 - 112)  BP: 99/48 (08 Oct 2023 06:00) (65/45 - 150/52)  BP(mean): 69 (08 Oct 2023 06:00) (51 - 96)  ABP: 15/15 (07 Oct 2023 12:05) (15/15 - 115/76)  ABP(mean): 15 (07 Oct 2023 12:05) (15 - 99)  RR: 9 (08 Oct 2023 06:00) (9 - 24)  SpO2: 100% (08 Oct 2023 06:00) (65% - 100%)    O2 Parameters below as of 08 Oct 2023 06:00  Patient On (Oxygen Delivery Method): nasal cannula, high flow  O2 Flow (L/min): 30  O2 Concentration (%): 30    I&O's Detail    06 Oct 2023 07:01  -  07 Oct 2023 07:00  --------------------------------------------------------  IN:    DOBUTamine: 115.4 mL    Heparin: 112 mL    Heparin Infusion: 162 mL    IV PiggyBack: 50 mL    Oral Fluid: 474 mL  Total IN: 913.4 mL    OUT:    Other (mL): 1314 mL    Voided (mL): 0 mL  Total OUT: 1314 mL    Total NET: -400.6 mL      07 Oct 2023 07:01  -  08 Oct 2023 06:59  --------------------------------------------------------  IN:    DOBUTamine: 184.8 mL    Heparin: 282 mL    IV PiggyBack: 100 mL    Oral Fluid: 1325 mL  Total IN: 1891.8 mL    OUT:    Intermittent Catheterization - Urethral (mL): 50 mL    Other (mL): 4252 mL  Total OUT: 4302 mL    Total NET: -2410.2 mL      CAPILLARY BLOOD GLUCOSE      POCT Blood Glucose.: 108 mg/dL (06 Oct 2023 11:37)      PHYSICAL EXAM:    CONSTITUTIONAL: NAD  EYES: PERRLA and symmetric, EOMI, no conjunctival or scleral injection, non-icteric; no nystagmus  HENMT: moist mucus membranes  NECK: Supple  RESP: on HF o2, no apparent respiratory distress/use of accessory muscles  CV: irregular heart rate, +S1S2, no MRG; warm extremities   GI: Soft, NT, distended, + fluid wave, but without rebound or guarding; +reducible umbilical hernia midline lower quadrant, + abdominal striae   MSK: Normal ROM without pain, no spinal tenderness, normal muscle strength/tone  SKIN: +bilateral upper extremity maculopapular rash with excoriations as well on scalp; venous stasis dermatitis bilateral ankles and lower calves. No jaundice. Very mild mottling of the hands.  NEURO: CN II-XII intact; normal reflexes in upper and lower extremities, sensation intact in upper and lower extremities b/l to light touch   PSYCH: Appropriate insight/judgment; AO x 3, mood and affect appropriate, recent/remote memory intact    MEDICATIONS:  MEDICATIONS  (STANDING):  atorvastatin 40 milliGRAM(s) Oral at bedtime  clopidogrel Tablet 75 milliGRAM(s) Oral daily  CRRT Treatment    <Continuous>  DOBUTamine Infusion 2.5 MICROgram(s)/kG/Min (7.66 mL/Hr) IV Continuous <Continuous>  folic acid 1 milliGRAM(s) Oral daily  heparin  Infusion 1600 Unit(s)/Hr (13 mL/Hr) IV Continuous <Continuous>  influenza  Vaccine (HIGH DOSE) 0.7 milliLiter(s) IntraMuscular once  midodrine. 5 milliGRAM(s) Oral every 8 hours  multivitamin 1 Tablet(s) Oral daily  Nephro-chichi 1 Tablet(s) Oral every 24 hours  pantoprazole    Tablet 40 milliGRAM(s) Oral before breakfast  pregabalin 75 milliGRAM(s) Oral daily  PureFlow Dialysate RFP-400 (K 2 / Ca 3) 5000 milliLiter(s) (2000 mL/Hr) CRRT <Continuous>  sevelamer carbonate 800 milliGRAM(s) Oral three times a day with meals    MEDICATIONS  (PRN):  LORazepam   Injectable 1 milliGRAM(s) IV Push every 1 hour PRN CIWA-Ar score 8 or greater  ondansetron Injectable 4 milliGRAM(s) IV Push every 12 hours PRN Nausea and/or Vomiting      ALLERGIES:  Allergies    penicillins (Unknown)    Intolerances    LABS:                         10.2   4.39  )-----------( 74       ( 08 Oct 2023 05:55 )             32.9     10-08    132<L>  |  97  |  13  ----------------------------<  115<H>  4.1   |  26  |  2.01<H>    Ca    9.3      08 Oct 2023 05:55  Phos  2.4     10-08  Mg     2.0     10-08    TPro  6.3  /  Alb  2.9<L>  /  TBili  1.7<H>  /  DBili  x   /  AST  50<H>  /  ALT  43  /  AlkPhos  241<H>  10-08    PT/INR - ( 06 Oct 2023 10:18 )   PT: 18.3 sec;   INR: 1.63          PTT - ( 08 Oct 2023 05:55 )  PTT:99.1 sec  Urinalysis Basic - ( 08 Oct 2023 05:55 )    Color: x / Appearance: x / SG: x / pH: x  Gluc: 115 mg/dL / Ketone: x  / Bili: x / Urobili: x   Blood: x / Protein: x / Nitrite: x   Leuk Esterase: x / RBC: x / WBC x   Sq Epi: x / Non Sq Epi: x / Bacteria: x      CARDIAC MARKERS ( 06 Oct 2023 13:17 )  x     / x     / 67 U/L / x     / 5.4 ng/mL      RADIOLOGY, EKG & ADDITIONAL TESTS: Reviewed.    INTERVAL HPI/OVERNIGHT EVENTS: Heparin gtt increased from 11->12. VBG showed elevated pCO2, attempted to place pt on BIPAP but wasnt able to tolerate, switched to HFNC. Stable repeat VBG. D/otoniel sevelamer for low phos. 172-288 cc/hr fluid removed overnight, total output of 2678 cc.    SUBJECTIVE: Patient seen and examined at bedside. States he wasn't able to sleep as comfortably overnight but slept during the day yesterday. Denies any concerns this morning.     DRIPS: Dobutamine 2.5; Heparin 11 -> 12   ACCESS: R. axillary arterial line 10/06 - 10/07 (now removed), R. forearm P IV 20g 10/02, R. AC P IV 18g 10/06, right subclavian vein HD catheter     OBJECTIVE:    VITAL SIGNS:  ICU Vital Signs Last 24 Hrs  T(C): 36.7 (08 Oct 2023 04:55), Max: 37.1 (07 Oct 2023 07:34)  T(F): 98 (08 Oct 2023 04:55), Max: 98.7 (07 Oct 2023 07:34)  HR: 95 (08 Oct 2023 06:00) (83 - 112)  BP: 99/48 (08 Oct 2023 06:00) (65/45 - 150/52)  BP(mean): 69 (08 Oct 2023 06:00) (51 - 96)  ABP: 15/15 (07 Oct 2023 12:05) (15/15 - 115/76)  ABP(mean): 15 (07 Oct 2023 12:05) (15 - 99)  RR: 9 (08 Oct 2023 06:00) (9 - 24)  SpO2: 100% (08 Oct 2023 06:00) (65% - 100%)    O2 Parameters below as of 08 Oct 2023 06:00  Patient On (Oxygen Delivery Method): nasal cannula, high flow  O2 Flow (L/min): 30  O2 Concentration (%): 30    I&O's Detail    06 Oct 2023 07:01  -  07 Oct 2023 07:00  --------------------------------------------------------  IN:    DOBUTamine: 115.4 mL    Heparin: 112 mL    Heparin Infusion: 162 mL    IV PiggyBack: 50 mL    Oral Fluid: 474 mL  Total IN: 913.4 mL    OUT:    Other (mL): 1314 mL    Voided (mL): 0 mL  Total OUT: 1314 mL    Total NET: -400.6 mL      07 Oct 2023 07:01  -  08 Oct 2023 06:59  --------------------------------------------------------  IN:    DOBUTamine: 184.8 mL    Heparin: 282 mL    IV PiggyBack: 100 mL    Oral Fluid: 1325 mL  Total IN: 1891.8 mL    OUT:    Intermittent Catheterization - Urethral (mL): 50 mL    Other (mL): 4252 mL  Total OUT: 4302 mL    Total NET: -2410.2 mL      CAPILLARY BLOOD GLUCOSE      POCT Blood Glucose.: 108 mg/dL (06 Oct 2023 11:37)      PHYSICAL EXAM:    CONSTITUTIONAL: NAD  EYES: PERRLA and symmetric, EOMI, no conjunctival or scleral injection, non-icteric; no nystagmus  HENMT: moist mucus membranes  NECK: Supple  RESP: on HF o2, no apparent respiratory distress/use of accessory muscles  CV: irregular heart rate, +S1S2, no MRG; warm extremities   GI: Soft, NT, distended, + fluid wave, but without rebound or guarding; +reducible umbilical hernia midline lower quadrant, + abdominal striae   MSK: Normal ROM without pain, no spinal tenderness, normal muscle strength/tone  SKIN: +bilateral upper extremity maculopapular rash with excoriations as well on scalp; venous stasis dermatitis bilateral ankles and lower calves. No jaundice. Very mild mottling of the hands.  NEURO: CN II-XII intact; normal reflexes in upper and lower extremities, sensation intact in upper and lower extremities b/l to light touch   PSYCH: Appropriate insight/judgment; AO x 3, mood and affect appropriate, recent/remote memory intact    MEDICATIONS:  MEDICATIONS  (STANDING):  atorvastatin 40 milliGRAM(s) Oral at bedtime  clopidogrel Tablet 75 milliGRAM(s) Oral daily  CRRT Treatment    <Continuous>  DOBUTamine Infusion 2.5 MICROgram(s)/kG/Min (7.66 mL/Hr) IV Continuous <Continuous>  folic acid 1 milliGRAM(s) Oral daily  heparin  Infusion 1600 Unit(s)/Hr (13 mL/Hr) IV Continuous <Continuous>  influenza  Vaccine (HIGH DOSE) 0.7 milliLiter(s) IntraMuscular once  midodrine. 5 milliGRAM(s) Oral every 8 hours  multivitamin 1 Tablet(s) Oral daily  Nephro-chichi 1 Tablet(s) Oral every 24 hours  pantoprazole    Tablet 40 milliGRAM(s) Oral before breakfast  pregabalin 75 milliGRAM(s) Oral daily  PureFlow Dialysate RFP-400 (K 2 / Ca 3) 5000 milliLiter(s) (2000 mL/Hr) CRRT <Continuous>  sevelamer carbonate 800 milliGRAM(s) Oral three times a day with meals    MEDICATIONS  (PRN):  LORazepam   Injectable 1 milliGRAM(s) IV Push every 1 hour PRN CIWA-Ar score 8 or greater  ondansetron Injectable 4 milliGRAM(s) IV Push every 12 hours PRN Nausea and/or Vomiting      ALLERGIES:  Allergies    penicillins (Unknown)    Intolerances    LABS:                         10.2   4.39  )-----------( 74       ( 08 Oct 2023 05:55 )             32.9     10-08    132<L>  |  97  |  13  ----------------------------<  115<H>  4.1   |  26  |  2.01<H>    Ca    9.3      08 Oct 2023 05:55  Phos  2.4     10-08  Mg     2.0     10-08    TPro  6.3  /  Alb  2.9<L>  /  TBili  1.7<H>  /  DBili  x   /  AST  50<H>  /  ALT  43  /  AlkPhos  241<H>  10-08    PT/INR - ( 06 Oct 2023 10:18 )   PT: 18.3 sec;   INR: 1.63          PTT - ( 08 Oct 2023 05:55 )  PTT:99.1 sec  Urinalysis Basic - ( 08 Oct 2023 05:55 )    Color: x / Appearance: x / SG: x / pH: x  Gluc: 115 mg/dL / Ketone: x  / Bili: x / Urobili: x   Blood: x / Protein: x / Nitrite: x   Leuk Esterase: x / RBC: x / WBC x   Sq Epi: x / Non Sq Epi: x / Bacteria: x      CARDIAC MARKERS ( 06 Oct 2023 13:17 )  x     / x     / 67 U/L / x     / 5.4 ng/mL      RADIOLOGY, EKG & ADDITIONAL TESTS: Reviewed.

## 2023-10-09 LAB
ALBUMIN SERPL ELPH-MCNC: 3.1 G/DL — LOW (ref 3.3–5)
ALP SERPL-CCNC: 239 U/L — HIGH (ref 40–120)
ALT FLD-CCNC: 41 U/L — SIGNIFICANT CHANGE UP (ref 10–45)
ANION GAP SERPL CALC-SCNC: 10 MMOL/L — SIGNIFICANT CHANGE UP (ref 5–17)
ANION GAP SERPL CALC-SCNC: 10 MMOL/L — SIGNIFICANT CHANGE UP (ref 5–17)
ANION GAP SERPL CALC-SCNC: 9 MMOL/L — SIGNIFICANT CHANGE UP (ref 5–17)
APTT BLD: 82.6 SEC — HIGH (ref 24.5–35.6)
APTT BLD: 89.4 SEC — HIGH (ref 24.5–35.6)
APTT BLD: 90.5 SEC — HIGH (ref 24.5–35.6)
APTT BLD: 93.1 SEC — HIGH (ref 24.5–35.6)
AST SERPL-CCNC: 37 U/L — SIGNIFICANT CHANGE UP (ref 10–40)
BASOPHILS # BLD AUTO: 0.03 K/UL — SIGNIFICANT CHANGE UP (ref 0–0.2)
BASOPHILS NFR BLD AUTO: 0.7 % — SIGNIFICANT CHANGE UP (ref 0–2)
BILIRUB SERPL-MCNC: 1.6 MG/DL — HIGH (ref 0.2–1.2)
BUN SERPL-MCNC: 10 MG/DL — SIGNIFICANT CHANGE UP (ref 7–23)
BUN SERPL-MCNC: 8 MG/DL — SIGNIFICANT CHANGE UP (ref 7–23)
BUN SERPL-MCNC: 9 MG/DL — SIGNIFICANT CHANGE UP (ref 7–23)
CALCIUM SERPL-MCNC: 8.7 MG/DL — SIGNIFICANT CHANGE UP (ref 8.4–10.5)
CALCIUM SERPL-MCNC: 8.8 MG/DL — SIGNIFICANT CHANGE UP (ref 8.4–10.5)
CALCIUM SERPL-MCNC: 9 MG/DL — SIGNIFICANT CHANGE UP (ref 8.4–10.5)
CHLORIDE SERPL-SCNC: 94 MMOL/L — LOW (ref 96–108)
CHLORIDE SERPL-SCNC: 97 MMOL/L — SIGNIFICANT CHANGE UP (ref 96–108)
CHLORIDE SERPL-SCNC: 98 MMOL/L — SIGNIFICANT CHANGE UP (ref 96–108)
CO2 SERPL-SCNC: 23 MMOL/L — SIGNIFICANT CHANGE UP (ref 22–31)
CO2 SERPL-SCNC: 23 MMOL/L — SIGNIFICANT CHANGE UP (ref 22–31)
CO2 SERPL-SCNC: 25 MMOL/L — SIGNIFICANT CHANGE UP (ref 22–31)
CREAT SERPL-MCNC: 1.26 MG/DL — SIGNIFICANT CHANGE UP (ref 0.5–1.3)
CREAT SERPL-MCNC: 1.45 MG/DL — HIGH (ref 0.5–1.3)
CREAT SERPL-MCNC: 1.52 MG/DL — HIGH (ref 0.5–1.3)
CULTURE RESULTS: SIGNIFICANT CHANGE UP
CULTURE RESULTS: SIGNIFICANT CHANGE UP
EGFR: 51 ML/MIN/1.73M2 — LOW
EGFR: 53 ML/MIN/1.73M2 — LOW
EGFR: 63 ML/MIN/1.73M2 — SIGNIFICANT CHANGE UP
EOSINOPHIL # BLD AUTO: 0.05 K/UL — SIGNIFICANT CHANGE UP (ref 0–0.5)
EOSINOPHIL NFR BLD AUTO: 1.1 % — SIGNIFICANT CHANGE UP (ref 0–6)
GLUCOSE SERPL-MCNC: 123 MG/DL — HIGH (ref 70–99)
GLUCOSE SERPL-MCNC: 141 MG/DL — HIGH (ref 70–99)
GLUCOSE SERPL-MCNC: 169 MG/DL — HIGH (ref 70–99)
HCT VFR BLD CALC: 30.4 % — LOW (ref 39–50)
HGB BLD-MCNC: 9.6 G/DL — LOW (ref 13–17)
IMM GRANULOCYTES NFR BLD AUTO: 0.4 % — SIGNIFICANT CHANGE UP (ref 0–0.9)
INR BLD: 1.45 — HIGH (ref 0.85–1.18)
LACTATE SERPL-SCNC: 1.1 MMOL/L — SIGNIFICANT CHANGE UP (ref 0.5–2)
LYMPHOCYTES # BLD AUTO: 0.83 K/UL — LOW (ref 1–3.3)
LYMPHOCYTES # BLD AUTO: 18.6 % — SIGNIFICANT CHANGE UP (ref 13–44)
MAGNESIUM SERPL-MCNC: 2 MG/DL — SIGNIFICANT CHANGE UP (ref 1.6–2.6)
MAGNESIUM SERPL-MCNC: 2.1 MG/DL — SIGNIFICANT CHANGE UP (ref 1.6–2.6)
MCHC RBC-ENTMCNC: 31.6 GM/DL — LOW (ref 32–36)
MCHC RBC-ENTMCNC: 32.9 PG — SIGNIFICANT CHANGE UP (ref 27–34)
MCV RBC AUTO: 104.1 FL — HIGH (ref 80–100)
MONOCYTES # BLD AUTO: 0.53 K/UL — SIGNIFICANT CHANGE UP (ref 0–0.9)
MONOCYTES NFR BLD AUTO: 11.9 % — SIGNIFICANT CHANGE UP (ref 2–14)
NEUTROPHILS # BLD AUTO: 3.01 K/UL — SIGNIFICANT CHANGE UP (ref 1.8–7.4)
NEUTROPHILS NFR BLD AUTO: 67.3 % — SIGNIFICANT CHANGE UP (ref 43–77)
NRBC # BLD: 0 /100 WBCS — SIGNIFICANT CHANGE UP (ref 0–0)
PHOSPHATE SERPL-MCNC: 2.6 MG/DL — SIGNIFICANT CHANGE UP (ref 2.5–4.5)
PHOSPHATE SERPL-MCNC: 2.8 MG/DL — SIGNIFICANT CHANGE UP (ref 2.5–4.5)
PLATELET # BLD AUTO: 73 K/UL — LOW (ref 150–400)
POTASSIUM SERPL-MCNC: 3.8 MMOL/L — SIGNIFICANT CHANGE UP (ref 3.5–5.3)
POTASSIUM SERPL-MCNC: 3.8 MMOL/L — SIGNIFICANT CHANGE UP (ref 3.5–5.3)
POTASSIUM SERPL-MCNC: 4.1 MMOL/L — SIGNIFICANT CHANGE UP (ref 3.5–5.3)
POTASSIUM SERPL-SCNC: 3.8 MMOL/L — SIGNIFICANT CHANGE UP (ref 3.5–5.3)
POTASSIUM SERPL-SCNC: 3.8 MMOL/L — SIGNIFICANT CHANGE UP (ref 3.5–5.3)
POTASSIUM SERPL-SCNC: 4.1 MMOL/L — SIGNIFICANT CHANGE UP (ref 3.5–5.3)
PROT SERPL-MCNC: 6.5 G/DL — SIGNIFICANT CHANGE UP (ref 6–8.3)
PROTHROM AB SERPL-ACNC: 16.4 SEC — HIGH (ref 9.5–13)
RBC # BLD: 2.92 M/UL — LOW (ref 4.2–5.8)
RBC # FLD: 18.4 % — HIGH (ref 10.3–14.5)
SODIUM SERPL-SCNC: 127 MMOL/L — LOW (ref 135–145)
SODIUM SERPL-SCNC: 130 MMOL/L — LOW (ref 135–145)
SODIUM SERPL-SCNC: 132 MMOL/L — LOW (ref 135–145)
SPECIMEN SOURCE: SIGNIFICANT CHANGE UP
SPECIMEN SOURCE: SIGNIFICANT CHANGE UP
WBC # BLD: 4.47 K/UL — SIGNIFICANT CHANGE UP (ref 3.8–10.5)
WBC # FLD AUTO: 4.47 K/UL — SIGNIFICANT CHANGE UP (ref 3.8–10.5)

## 2023-10-09 PROCEDURE — 99291 CRITICAL CARE FIRST HOUR: CPT

## 2023-10-09 PROCEDURE — 99233 SBSQ HOSP IP/OBS HIGH 50: CPT | Mod: GC

## 2023-10-09 PROCEDURE — 71045 X-RAY EXAM CHEST 1 VIEW: CPT | Mod: 26

## 2023-10-09 RX ADMIN — HEPARIN SODIUM 10 UNIT(S)/HR: 5000 INJECTION INTRAVENOUS; SUBCUTANEOUS at 02:31

## 2023-10-09 RX ADMIN — Medication 1 TABLET(S): at 11:15

## 2023-10-09 RX ADMIN — HEPARIN SODIUM 10 UNIT(S)/HR: 5000 INJECTION INTRAVENOUS; SUBCUTANEOUS at 23:30

## 2023-10-09 RX ADMIN — Medication 1 TABLET(S): at 06:00

## 2023-10-09 RX ADMIN — ATORVASTATIN CALCIUM 40 MILLIGRAM(S): 80 TABLET, FILM COATED ORAL at 21:12

## 2023-10-09 RX ADMIN — MIDODRINE HYDROCHLORIDE 5 MILLIGRAM(S): 2.5 TABLET ORAL at 06:00

## 2023-10-09 RX ADMIN — CLOPIDOGREL BISULFATE 75 MILLIGRAM(S): 75 TABLET, FILM COATED ORAL at 11:15

## 2023-10-09 RX ADMIN — Medication 7.66 MICROGRAM(S)/KG/MIN: at 06:00

## 2023-10-09 RX ADMIN — Medication 1 MILLIGRAM(S): at 11:15

## 2023-10-09 RX ADMIN — PANTOPRAZOLE SODIUM 40 MILLIGRAM(S): 20 TABLET, DELAYED RELEASE ORAL at 06:00

## 2023-10-09 RX ADMIN — Medication 75 MILLIGRAM(S): at 11:15

## 2023-10-09 NOTE — PROGRESS NOTE ADULT - SUBJECTIVE AND OBJECTIVE BOX
INTERVAL HPI/OVERNIGHT EVENTS: Decreased fluid removal rate to 150cc/hr due to low BPs (SBPs in 70s/80s). pTT 90, no changes to heparin. Evening BMP+electrolytes stable.    SUBJECTIVE: Patient seen and examined at bedside.    DRIPS: Dobutamine 2.5; Heparin 10    ACCESS: R. axillary arterial line 10/06 - 10/07 (now removed), R. forearm P IV 20g 10/02, R. AC P IV 18g 10/06, right subclavian vein HD catheter     OBJECTIVE:    VITAL SIGNS:  ICU Vital Signs Last 24 Hrs  T(C): 36.1 (09 Oct 2023 05:46), Max: 36.6 (08 Oct 2023 07:30)  T(F): 97 (09 Oct 2023 05:46), Max: 97.9 (08 Oct 2023 07:30)  HR: 94 (09 Oct 2023 06:00) (71 - 108)  BP: 97/72 (09 Oct 2023 06:00) (77/55 - 172/132)  BP(mean): 79 (09 Oct 2023 06:00) (61 - 144)  ABP: --  ABP(mean): --  RR: 18 (09 Oct 2023 06:00) (8 - 28)  SpO2: 96% (09 Oct 2023 06:00) (89% - 100%)    O2 Parameters below as of 09 Oct 2023 06:00  Patient On (Oxygen Delivery Method): room air      I&O's Detail    08 Oct 2023 07:01  -  09 Oct 2023 07:00  --------------------------------------------------------  IN:    DOBUTamine: 184.8 mL    Heparin: 132 mL    Heparin: 120 mL    Oral Fluid: 1870 mL  Total IN: 2306.8 mL    OUT:    Other (mL): 4999 mL  Total OUT: 4999 mL    Total NET: -2692.2 mL      CAPILLARY BLOOD GLUCOSE      POCT Blood Glucose.: 108 mg/dL (06 Oct 2023 11:37)      PHYSICAL EXAM:    CONSTITUTIONAL: NAD  EYES: PERRLA and symmetric, EOMI, no conjunctival or scleral injection, non-icteric; no nystagmus  HENMT: moist mucus membranes  NECK: Supple  RESP: on HF o2, no apparent respiratory distress/use of accessory muscles  CV: irregular heart rate, +S1S2, no MRG; warm extremities   GI: Soft, NT, distended, + fluid wave, but without rebound or guarding; +reducible umbilical hernia midline lower quadrant, + abdominal striae   MSK: Normal ROM without pain, no spinal tenderness, normal muscle strength/tone  SKIN: +bilateral upper extremity maculopapular rash with excoriations as well on scalp; venous stasis dermatitis bilateral ankles and lower calves. No jaundice. Very mild mottling of the hands.  NEURO: CN II-XII intact; normal reflexes in upper and lower extremities, sensation intact in upper and lower extremities b/l to light touch   PSYCH: Appropriate insight/judgment; AO x 3, mood and affect appropriate, recent/remote memory intact    MEDICATIONS:  MEDICATIONS  (STANDING):  atorvastatin 40 milliGRAM(s) Oral at bedtime  clopidogrel Tablet 75 milliGRAM(s) Oral daily  CRRT Treatment    <Continuous>  DOBUTamine Infusion 2.5 MICROgram(s)/kG/Min (7.66 mL/Hr) IV Continuous <Continuous>  folic acid 1 milliGRAM(s) Oral daily  heparin  Infusion 1600 Unit(s)/Hr (13 mL/Hr) IV Continuous <Continuous>  influenza  Vaccine (HIGH DOSE) 0.7 milliLiter(s) IntraMuscular once  midodrine. 5 milliGRAM(s) Oral every 8 hours  multivitamin 1 Tablet(s) Oral daily  Nephro-chichi 1 Tablet(s) Oral every 24 hours  pantoprazole    Tablet 40 milliGRAM(s) Oral before breakfast  pregabalin 75 milliGRAM(s) Oral daily  PureFlow Dialysate RFP-400 (K 2 / Ca 3) 5000 milliLiter(s) (2000 mL/Hr) CRRT <Continuous>  sevelamer carbonate 800 milliGRAM(s) Oral three times a day with meals    MEDICATIONS  (PRN):  LORazepam   Injectable 1 milliGRAM(s) IV Push every 1 hour PRN CIWA-Ar score 8 or greater  ondansetron Injectable 4 milliGRAM(s) IV Push every 12 hours PRN Nausea and/or Vomiting    ALLERGIES:  Allergies    penicillins (Unknown)    Intolerances    LABS:                           9.6    4.47  )-----------( 73       ( 09 Oct 2023 05:30 )             30.4     10-09    132<L>  |  98  |  9   ----------------------------<  123<H>  3.8   |  25  |  1.45<H>    Ca    8.8      09 Oct 2023 05:30  Phos  2.8     10-09  Mg     2.1     10-09    TPro  6.5  /  Alb  3.1<L>  /  TBili  1.6<H>  /  DBili  x   /  AST  37  /  ALT  41  /  AlkPhos  239<H>  10-09    PT/INR - ( 09 Oct 2023 05:30 )   PT: 16.4 sec;   INR: 1.45          PTT - ( 09 Oct 2023 05:30 )  PTT:93.1 sec  Urinalysis Basic - ( 09 Oct 2023 05:30 )    Color: x / Appearance: x / SG: x / pH: x  Gluc: 123 mg/dL / Ketone: x  / Bili: x / Urobili: x   Blood: x / Protein: x / Nitrite: x   Leuk Esterase: x / RBC: x / WBC x   Sq Epi: x / Non Sq Epi: x / Bacteria: x      Lactate, Blood: 1.1 mmol/L (10-08 @ 23:46)      RADIOLOGY, EKG & ADDITIONAL TESTS: Reviewed.    INTERVAL HPI/OVERNIGHT EVENTS: Decreased fluid removal rate to 150cc/hr due to low BPs (SBPs in 70s/80s). pTT 90, no changes to heparin. Evening BMP+electrolytes stable.    SUBJECTIVE: Patient seen and examined at bedside. States he feels general weakness of his arms and legs due to being immobile in bed. He did tolerate OOBTC yesterday. Able to move all extremities. Denies any other concerns this morning.     DRIPS: Dobutamine 2.5; Heparin 10    ACCESS: R. axillary arterial line 10/06 - 10/07 (now removed), R. forearm P IV 20g 10/02, R. AC P IV 18g 10/06, right subclavian vein HD catheter     OBJECTIVE:    VITAL SIGNS:  ICU Vital Signs Last 24 Hrs  T(C): 36.1 (09 Oct 2023 05:46), Max: 36.6 (08 Oct 2023 07:30)  T(F): 97 (09 Oct 2023 05:46), Max: 97.9 (08 Oct 2023 07:30)  HR: 94 (09 Oct 2023 06:00) (71 - 108)  BP: 97/72 (09 Oct 2023 06:00) (77/55 - 172/132)  BP(mean): 79 (09 Oct 2023 06:00) (61 - 144)  ABP: --  ABP(mean): --  RR: 18 (09 Oct 2023 06:00) (8 - 28)  SpO2: 96% (09 Oct 2023 06:00) (89% - 100%)    O2 Parameters below as of 09 Oct 2023 06:00  Patient On (Oxygen Delivery Method): room air      I&O's Detail    08 Oct 2023 07:01  -  09 Oct 2023 07:00  --------------------------------------------------------  IN:    DOBUTamine: 184.8 mL    Heparin: 132 mL    Heparin: 120 mL    Oral Fluid: 1870 mL  Total IN: 2306.8 mL    OUT:    Other (mL): 4999 mL  Total OUT: 4999 mL    Total NET: -2692.2 mL      CAPILLARY BLOOD GLUCOSE      POCT Blood Glucose.: 108 mg/dL (06 Oct 2023 11:37)      PHYSICAL EXAM:    CONSTITUTIONAL: NAD  EYES: PERRLA and symmetric, EOMI, no conjunctival or scleral injection, non-icteric; no nystagmus  HENMT: moist mucus membranes  NECK: Supple  RESP: on HF o2, no apparent respiratory distress/use of accessory muscles  CV: irregular heart rate, +S1S2, no MRG; warm extremities   GI: Soft, NT, distended, + fluid wave, but without rebound or guarding; +reducible umbilical hernia midline lower quadrant, + abdominal striae   MSK: Normal ROM without pain, no spinal tenderness, normal muscle strength/tone  SKIN: +bilateral upper extremity maculopapular rash with excoriations as well on scalp; venous stasis dermatitis bilateral ankles and lower calves. No jaundice. Very mild mottling of the hands.  NEURO: CN II-XII intact; normal reflexes in upper and lower extremities, sensation intact in upper and lower extremities b/l to light touch   PSYCH: Appropriate insight/judgment; AO x 3, mood and affect appropriate, recent/remote memory intact    MEDICATIONS:  MEDICATIONS  (STANDING):  atorvastatin 40 milliGRAM(s) Oral at bedtime  clopidogrel Tablet 75 milliGRAM(s) Oral daily  CRRT Treatment    <Continuous>  DOBUTamine Infusion 2.5 MICROgram(s)/kG/Min (7.66 mL/Hr) IV Continuous <Continuous>  folic acid 1 milliGRAM(s) Oral daily  heparin  Infusion 1600 Unit(s)/Hr (13 mL/Hr) IV Continuous <Continuous>  influenza  Vaccine (HIGH DOSE) 0.7 milliLiter(s) IntraMuscular once  midodrine. 5 milliGRAM(s) Oral every 8 hours  multivitamin 1 Tablet(s) Oral daily  Nephro-chichi 1 Tablet(s) Oral every 24 hours  pantoprazole    Tablet 40 milliGRAM(s) Oral before breakfast  pregabalin 75 milliGRAM(s) Oral daily  PureFlow Dialysate RFP-400 (K 2 / Ca 3) 5000 milliLiter(s) (2000 mL/Hr) CRRT <Continuous>  sevelamer carbonate 800 milliGRAM(s) Oral three times a day with meals    MEDICATIONS  (PRN):  LORazepam   Injectable 1 milliGRAM(s) IV Push every 1 hour PRN CIWA-Ar score 8 or greater  ondansetron Injectable 4 milliGRAM(s) IV Push every 12 hours PRN Nausea and/or Vomiting    ALLERGIES:  Allergies    penicillins (Unknown)    Intolerances    LABS:                           9.6    4.47  )-----------( 73       ( 09 Oct 2023 05:30 )             30.4     10-09    132<L>  |  98  |  9   ----------------------------<  123<H>  3.8   |  25  |  1.45<H>    Ca    8.8      09 Oct 2023 05:30  Phos  2.8     10-09  Mg     2.1     10-09    TPro  6.5  /  Alb  3.1<L>  /  TBili  1.6<H>  /  DBili  x   /  AST  37  /  ALT  41  /  AlkPhos  239<H>  10-09    PT/INR - ( 09 Oct 2023 05:30 )   PT: 16.4 sec;   INR: 1.45          PTT - ( 09 Oct 2023 05:30 )  PTT:93.1 sec  Urinalysis Basic - ( 09 Oct 2023 05:30 )    Color: x / Appearance: x / SG: x / pH: x  Gluc: 123 mg/dL / Ketone: x  / Bili: x / Urobili: x   Blood: x / Protein: x / Nitrite: x   Leuk Esterase: x / RBC: x / WBC x   Sq Epi: x / Non Sq Epi: x / Bacteria: x      Lactate, Blood: 1.1 mmol/L (10-08 @ 23:46)      RADIOLOGY, EKG & ADDITIONAL TESTS: Reviewed.    INTERVAL HPI/OVERNIGHT EVENTS: Decreased fluid removal rate to 150cc/hr due to low BPs (SBPs in 70s/80s). pTT 90, no changes to heparin. Evening BMP+electrolytes stable.    SUBJECTIVE: Patient seen and examined at bedside. States he feels general weakness of his arms and legs due to being immobile in bed. He tolerated OOBTC yesterday. Able to move all extremities. Denies any other concerns this morning.     DRIPS: Dobutamine 2.5; Heparin 10    ACCESS: R. axillary arterial line 10/06 - 10/07 (now removed), R. forearm P IV 20g 10/02, R. AC P IV 18g 10/06, right subclavian vein HD catheter     OBJECTIVE:    VITAL SIGNS:  ICU Vital Signs Last 24 Hrs  T(C): 36.1 (09 Oct 2023 05:46), Max: 36.6 (08 Oct 2023 07:30)  T(F): 97 (09 Oct 2023 05:46), Max: 97.9 (08 Oct 2023 07:30)  HR: 94 (09 Oct 2023 06:00) (71 - 108)  BP: 97/72 (09 Oct 2023 06:00) (77/55 - 172/132)  BP(mean): 79 (09 Oct 2023 06:00) (61 - 144)  ABP: --  ABP(mean): --  RR: 18 (09 Oct 2023 06:00) (8 - 28)  SpO2: 96% (09 Oct 2023 06:00) (89% - 100%)    O2 Parameters below as of 09 Oct 2023 06:00  Patient On (Oxygen Delivery Method): room air      I&O's Detail    08 Oct 2023 07:01  -  09 Oct 2023 07:00  --------------------------------------------------------  IN:    DOBUTamine: 184.8 mL    Heparin: 132 mL    Heparin: 120 mL    Oral Fluid: 1870 mL  Total IN: 2306.8 mL    OUT:    Other (mL): 4999 mL  Total OUT: 4999 mL    Total NET: -2692.2 mL      CAPILLARY BLOOD GLUCOSE      POCT Blood Glucose.: 108 mg/dL (06 Oct 2023 11:37)      PHYSICAL EXAM:    CONSTITUTIONAL: NAD  EYES: PERRLA and symmetric, EOMI, no conjunctival or scleral injection, non-icteric; no nystagmus  HENMT: moist mucus membranes  NECK: Supple  RESP: on HF o2, no apparent respiratory distress/use of accessory muscles  CV: irregular heart rate, +S1S2, no MRG; warm extremities   GI: Soft, NT, distended, + fluid wave, but without rebound or guarding; +reducible umbilical hernia midline lower quadrant, + abdominal striae   MSK: Normal ROM without pain, no spinal tenderness, normal muscle strength/tone  SKIN: +bilateral upper extremity maculopapular rash with excoriations as well on scalp; venous stasis dermatitis bilateral ankles and lower calves. No jaundice. Very mild mottling of the hands.  NEURO: CN II-XII intact; normal reflexes in upper and lower extremities, sensation intact in upper and lower extremities b/l to light touch   PSYCH: Appropriate insight/judgment; AO x 3, mood and affect appropriate, recent/remote memory intact    MEDICATIONS:  MEDICATIONS  (STANDING):  atorvastatin 40 milliGRAM(s) Oral at bedtime  clopidogrel Tablet 75 milliGRAM(s) Oral daily  CRRT Treatment    <Continuous>  DOBUTamine Infusion 2.5 MICROgram(s)/kG/Min (7.66 mL/Hr) IV Continuous <Continuous>  folic acid 1 milliGRAM(s) Oral daily  heparin  Infusion 1600 Unit(s)/Hr (13 mL/Hr) IV Continuous <Continuous>  influenza  Vaccine (HIGH DOSE) 0.7 milliLiter(s) IntraMuscular once  midodrine. 5 milliGRAM(s) Oral every 8 hours  multivitamin 1 Tablet(s) Oral daily  Nephro-chichi 1 Tablet(s) Oral every 24 hours  pantoprazole    Tablet 40 milliGRAM(s) Oral before breakfast  pregabalin 75 milliGRAM(s) Oral daily  PureFlow Dialysate RFP-400 (K 2 / Ca 3) 5000 milliLiter(s) (2000 mL/Hr) CRRT <Continuous>  sevelamer carbonate 800 milliGRAM(s) Oral three times a day with meals    MEDICATIONS  (PRN):  LORazepam   Injectable 1 milliGRAM(s) IV Push every 1 hour PRN CIWA-Ar score 8 or greater  ondansetron Injectable 4 milliGRAM(s) IV Push every 12 hours PRN Nausea and/or Vomiting    ALLERGIES:  Allergies    penicillins (Unknown)    Intolerances    LABS:                           9.6    4.47  )-----------( 73       ( 09 Oct 2023 05:30 )             30.4     10-09    132<L>  |  98  |  9   ----------------------------<  123<H>  3.8   |  25  |  1.45<H>    Ca    8.8      09 Oct 2023 05:30  Phos  2.8     10-09  Mg     2.1     10-09    TPro  6.5  /  Alb  3.1<L>  /  TBili  1.6<H>  /  DBili  x   /  AST  37  /  ALT  41  /  AlkPhos  239<H>  10-09    PT/INR - ( 09 Oct 2023 05:30 )   PT: 16.4 sec;   INR: 1.45          PTT - ( 09 Oct 2023 05:30 )  PTT:93.1 sec  Urinalysis Basic - ( 09 Oct 2023 05:30 )    Color: x / Appearance: x / SG: x / pH: x  Gluc: 123 mg/dL / Ketone: x  / Bili: x / Urobili: x   Blood: x / Protein: x / Nitrite: x   Leuk Esterase: x / RBC: x / WBC x   Sq Epi: x / Non Sq Epi: x / Bacteria: x      Lactate, Blood: 1.1 mmol/L (10-08 @ 23:46)      RADIOLOGY, EKG & ADDITIONAL TESTS: Reviewed.

## 2023-10-09 NOTE — PROGRESS NOTE ADULT - ASSESSMENT
63 year old male with history of ESRD (HD 4x weekly M/T/TH/SAT), HLD, HTN, GERD, CAD, admitted to CCU w concern for cardiogenic shock iso volume overload.    NEURO  AAOx3  -FLOR    CARDIOVASCULAR    #inadequate cardiac output  #acute-on-chronic HFrEDF  tissue hypoperfusion as evidenced by cyanotic/cool extremities on exam yesterday  bedside us dilated RV, ultimately c/f RV failure with a worsening pericardial effusion   substantial improvement today following overnight cvv hd & low-dose dobutamine  - continue low-dose dobutamine drip  - continue CVVHD  - discontinue fluid restriction     #CAD (coronary artery disease)  unclear hx of cardiac events, per pt, on asa + plavix at home, troponin 144 (10/4 - downtrending)  - obtain collateral / clarify cad hx & asa + plavix timelines  - continuing home medications    #HLD (hyperlipidemia).   History of HLD, home med atorvastatin 40 mg. Patient unsure if had stent after MI  - obtain collateral / clarify cad hx  - continue home med atorvastatin 40 mg.    RESPIRATORY  Patient OOBTC at time of examination, saturating well on room air.   - Continue to monitor oxygenation status     GASTROINTESTINAL  #GERD (gastroesophageal reflux disease).   History of GERD, no complaints of epigastric pain at this time.   -Continue pantoprazole 40 mg qd.    RENAL  #ESRD on dialysis.   Started on HD 6 months ago, 4x week (M/T/Th/Sat), missed HD session on 10/3/23. R port utilized. At admission, Cr 3.93 (un-established baseline Cr), K 3.4, Phos 4.4. Metabolic acidosis (AGAP 17) likely due to hypochloremic emesis / etoh leading to lactic acidosis. Dialysate changed to Phoxillum due to low phos.   - continue home nephro chichi 60 300mg   - c/w midodrine i/s/o hypotension after HD (plan to reevaluate long-term use iso HF)  - continue CVVHD   - BID BMP while on CVVHD,   - C/w Heparin drip to avoid CVVHD filter clotting   - Hold phosphate binders while on CVVHD   - nephro following, appreciate recommendations    #hepatic cirrhosis  chronic, likely 2/2 chronic alcohol use., no suspicion for hepatic encephalopathy at this time - patient mentating AOx3 and conversant, w/o jaundice or non-icteric sclera  low concern for hepatorenal syndrome as remains hemodynamically stable. No known gallbladder pathology established.  Abdominal US (10/4) Cirrhotic morphology. Pulsatile flow in the main and left portal veins. Moderate ascites. Increased right renal cortical echogenicity compatible with medical renal disease.  - consider paracentesis if increasing ascites   - continue CVVHD    HEME  #Anemia.   At admission Hg 10.2, Hct 32, .6. No baseline Hgb or iron studies for comparison. Home med iron 65 mg qd for ELMER, though MCV is macrocytic likely 2/2 folate deficiency. However, etiology of anemia likely multifactorial of ELMER, AOCD, and folate deficiency. No known history of GI bleeds, hemoptysis, hematochezia, melenic stool. B12 increased.   -Hold home med iron 65 mg  -C/w folic acid 1 mg daily   - EPO with HD   -Maintain active type and screen    ENDO  -FLOR    ID  -FLOR    PSYCH  #Moderate alcohol use disorder.   At admission BIENVENIDO 29. AST/AlT 45/43. Last drink 2 PM on 10/3/23, has 2-3 martinis daily. No history of alcohol withdrawals or withdrawal seizures. One non-bloody, clear/bilious emesis in past 24 hours. Qtc 397. 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   CIWA 0 x2 days  - continue Zofran 4 mg IV q12 PRN nausea, vomiting   - Thiamine, folic acid, multivitamin  - Home meds B1 100 mg OTC, D3 1000 IU OTC, B12 1000 mcg OTC    DERM  #Dermatitis.   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 B/l LE dry skin, likely venous stasis dermatitis on due to peripheral artery disease vs. heart failure related edema changes. Likely from niacin deficiency (pellagra) as etiology for dermatitis as patient has 3 month anorexia, fatigue, numbness. Recently prescribed Cerave w/o use  - Consider niacin supplementation (vitamin b3) or topical steroid  - lotion inpatient  - f/u outpt dermatologist.    PROPHYLACTIC MEASURE  F: none  E: Replete per HD with ESRD- CVVHD 10/06  N: renal restrictions  GI ppx: pantoprazole  DVT ppx: heparin drip; SCDs and on DAPT  Full code  Dispo: CCU 63 year old male with history of ESRD (HD 4x weekly M/T/TH/SAT), HLD, HTN, GERD, CAD, admitted to CCU w concern for cardiogenic shock iso volume overload.    NEURO  AAOx3  -FLOR    CARDIOVASCULAR    #inadequate cardiac output  #acute-on-chronic HFrEDF  tissue hypoperfusion as evidenced by cyanotic/cool extremities on exam yesterday  bedside us dilated RV, ultimately c/f RV failure with a worsening pericardial effusion   substantial improvement today following overnight cvv hd & low-dose dobutamine  - continue low dose dobutamine drip  - continue CVVHD at least one more day    #CAD (coronary artery disease)  - Continue plavix     #HLD (hyperlipidemia).   History of HLD, home med atorvastatin 40 mg. Patient unsure if had stent after MI  - continue home med atorvastatin 40 mg.    RESPIRATORY  Patient OOBTC at time of examination, saturating well on room air.   - Continue to monitor oxygenation status     GASTROINTESTINAL  #GERD (gastroesophageal reflux disease).   History of GERD, no complaints of epigastric pain at this time.   -Continue pantoprazole 40 mg qd.    RENAL  #ESRD on dialysis.   Started on HD 6 months ago, 4x week (M/T/Th/Sat), missed HD session on 10/3/23. R port utilized. At admission, Cr 3.93 (un-established baseline Cr), K 3.4, Phos 4.4. Metabolic acidosis (AGAP 17) likely due to hypochloremic emesis / etoh leading to lactic acidosis. Dialysate changed to Phoxillum due to low phos.   - continue home nephro chichi 60 300mg   - discontinue midodrine  - continue CVVHD   - BID BMP while on CVVHD,   - C/w Heparin drip to avoid CVVHD filter clotting   - Hold phosphate binders while on CVVHD   - nephro following, appreciate recommendations    #hepatic cirrhosis  chronic, likely 2/2 chronic alcohol use., no suspicion for hepatic encephalopathy at this time - patient mentating AOx3 and conversant, w/o jaundice or non-icteric sclera  low concern for hepatorenal syndrome as remains hemodynamically stable. No known gallbladder pathology established.  Abdominal US (10/4) Cirrhotic morphology. Pulsatile flow in the main and left portal veins. Moderate ascites. Increased right renal cortical echogenicity compatible with medical renal disease.  - continue CVVHD    HEME  #Anemia.   At admission Hg 10.2, Hct 32, .6. No baseline Hgb or iron studies for comparison. Home med iron 65 mg qd for ELMER, though MCV is macrocytic likely 2/2 folate deficiency. However, etiology of anemia likely multifactorial of ELMER, AOCD, and folate deficiency. No known history of GI bleeds, hemoptysis, hematochezia, melenic stool. B12 increased.   -Hold home med iron 65 mg  -C/w folic acid 1 mg daily   - EPO with HD   -Maintain active type and screen    ENDO  -FLOR    ID  -FLOR    PSYCH  #Moderate alcohol use disorder.   At admission BIENVENIDO 29. AST/AlT 45/43. Last drink 2 PM on 10/3/23, has 2-3 martinis daily. No history of alcohol withdrawals or withdrawal seizures. One non-bloody, clear/bilious emesis in past 24 hours. Qtc 397. 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   CIWA 0 x2 days  - continue Zofran 4 mg IV q12 PRN nausea, vomiting   - Thiamine, folic acid, multivitamin  - Home meds B1 100 mg OTC, D3 1000 IU OTC, B12 1000 mcg OTC    DERM  #Dermatitis.   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 B/l LE dry skin, likely venous stasis dermatitis on due to peripheral artery disease vs. heart failure related edema changes. Likely from niacin deficiency (pellagra) as etiology for dermatitis as patient has 3 month anorexia, fatigue, numbness. Recently prescribed Cerave w/o use  - Consider niacin supplementation (vitamin b3) or topical steroid  - lotion inpatient  - f/u outpt dermatologist.    PROPHYLACTIC MEASURE  F: none  E: Replete per HD with ESRD- CVVHD 10/06  N: renal restrictions  GI ppx: pantoprazole  DVT ppx: heparin drip; SCDs and on DAPT  Full code  Dispo: CCU 63 year old male with history of ESRD (HD 4x weekly M/T/TH/SAT), atrial fibrillation (s/p Watchman), HLD, HTN, GERD admitted to CCU w concern for cardiogenic shock iso volume overload.    NEURO  AAOx3  -FLOR    CARDIOVASCULAR    #inadequate cardiac output  #acute-on-chronic HFrEDF  tissue hypoperfusion as evidenced by cyanotic/cool extremities on exam yesterday  bedside us dilated RV, ultimately c/f RV failure with a worsening pericardial effusion   substantial improvement today following overnight cvv hd & low-dose dobutamine  - continue low dose dobutamine drip  - continue CVVHD at least one more day    #Atrial fibrillation  Patient with chronic atrial fibrillation s/p watchman placement.   - Continue with plavix    #HLD (hyperlipidemia).   History of HLD, home med atorvastatin 40 mg. Patient unsure if had stent after MI  - continue home med atorvastatin 40 mg.    RESPIRATORY  Patient OOBTC at time of examination, saturating well on room air.   - Continue to monitor oxygenation status     GASTROINTESTINAL  #GERD (gastroesophageal reflux disease).   History of GERD, no complaints of epigastric pain at this time.   -Continue pantoprazole 40 mg qd.    RENAL  #ESRD on dialysis.   Started on HD 6 months ago, 4x week (M/T/Th/Sat), missed HD session on 10/3/23. R port utilized. At admission, Cr 3.93 (un-established baseline Cr), K 3.4, Phos 4.4. Metabolic acidosis (AGAP 17) likely due to hypochloremic emesis / etoh leading to lactic acidosis. Dialysate changed to Phoxillum due to low phos.   - continue home nephro chichi 60 300mg   - discontinue midodrine  - continue CVVHD   - BID BMP while on CVVHD,   - C/w Heparin drip to avoid CVVHD filter clotting   - Hold phosphate binders while on CVVHD   - nephro following, appreciate recommendations    #hepatic cirrhosis  chronic, likely 2/2 chronic alcohol use., no suspicion for hepatic encephalopathy at this time - patient mentating AOx3 and conversant, w/o jaundice or non-icteric sclera  low concern for hepatorenal syndrome as remains hemodynamically stable. No known gallbladder pathology established.  Abdominal US (10/4) Cirrhotic morphology. Pulsatile flow in the main and left portal veins. Moderate ascites. Increased right renal cortical echogenicity compatible with medical renal disease.  - continue CVVHD    HEME  #Anemia.   At admission Hg 10.2, Hct 32, .6. No baseline Hgb or iron studies for comparison. Home med iron 65 mg qd for ELMER, though MCV is macrocytic likely 2/2 folate deficiency. However, etiology of anemia likely multifactorial of ELMER, AOCD, and folate deficiency. No known history of GI bleeds, hemoptysis, hematochezia, melenic stool. B12 increased.   -Hold home med iron 65 mg  -C/w folic acid 1 mg daily   - EPO with HD   -Maintain active type and screen    ENDO  -FLOR    ID  -FLOR    PSYCH  #Moderate alcohol use disorder.   At admission BIENVENIDO 29. AST/AlT 45/43. Last drink 2 PM on 10/3/23, has 2-3 martinis daily. No history of alcohol withdrawals or withdrawal seizures. One non-bloody, clear/bilious emesis in past 24 hours. Qtc 397. 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   CIWA 0 x2 days  - continue Zofran 4 mg IV q12 PRN nausea, vomiting   - Thiamine, folic acid, multivitamin  - Home meds B1 100 mg OTC, D3 1000 IU OTC, B12 1000 mcg OTC    DERM  #Dermatitis.   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 B/l LE dry skin, likely venous stasis dermatitis on due to peripheral artery disease vs. heart failure related edema changes. Likely from niacin deficiency (pellagra) as etiology for dermatitis as patient has 3 month anorexia, fatigue, numbness. Recently prescribed Cerave w/o use  - Consider niacin supplementation (vitamin b3) or topical steroid  - lotion inpatient  - f/u outpt dermatologist.    PROPHYLACTIC MEASURE  F: none  E: Replete per HD with ESRD- CVVHD 10/06  N: renal restrictions  GI ppx: pantoprazole  DVT ppx: heparin drip; SCDs and on DAPT  Full code  Dispo: CCU

## 2023-10-09 NOTE — PROGRESS NOTE ADULT - ATTENDING COMMENTS
I:   Seen on CVVHD. Hypotension improved.   A: ESRD. Biventricular failure. Fluid overload.  Hypotension.   P: Continue CVVHD, aim net - 1-2 L/d. Keep MAP > 65.

## 2023-10-09 NOTE — PROGRESS NOTE ADULT - SUBJECTIVE AND OBJECTIVE BOX
Patient is a 63y Male seen and evaluated at bedside. No acute distress, tolerating CVVHD, no issues with AP or , no clotting. tolerated 2.8L net neg, VSS, on room air, out of bed to chair.        Meds:    atorvastatin 40 at bedtime  clopidogrel Tablet 75 daily  CRRT Treatment  <Continuous>  DOBUTamine Infusion 2.5 <Continuous>  folic acid 1 daily  heparin  Infusion 1000 <Continuous>  influenza  Vaccine (HIGH DOSE) 0.7 once  multivitamin 1 daily  Nephro-chichi 1 every 24 hours  ondansetron Injectable 4 every 12 hours PRN  pantoprazole    Tablet 40 before breakfast  Phoxillum Filtration BK 4 / 2.5 5000 <Continuous>  pregabalin 75 daily      T(C): , Max: 36.6 (10-09-23 @ 02:28)  T(F): , Max: 97.8 (10-09-23 @ 02:28)  HR: 99 (10-09-23 @ 12:00)  BP: 106/74 (10-09-23 @ 12:00)  BP(mean): 84 (10-09-23 @ 12:00)  RR: 15 (10-09-23 @ 12:00)  SpO2: 94% (10-09-23 @ 12:00)  Wt(kg): --    10-08 @ 07:01  -  10-09 @ 07:00  --------------------------------------------------------  IN: 2306.8 mL / OUT: 5161 mL / NET: -2854.2 mL    10-09 @ 07:01  -  10-09 @ 12:33  --------------------------------------------------------  IN: 1042.5 mL / OUT: 879 mL / NET: 163.5 mL          Review of Systems:  ROS negative except as per HPI      PHYSICAL EXAM:  Constitutional: awake, no acute distress, sitting in chair tolerating CVVHD, more awake and conversant   EENT: anicteric sclera; oropharynx clear  Neck: supple, midline trachea  Respiratory: CTAB no accessory muscle use  Cardiovascular: +S1/S2, irregular rate;  Gastrointestinal: soft, distended reducible umbilical hernia, abdominal striae  Extremities: warm, no edema   Neurological: ANOx3 no focal neurological deficits   Access: R TDC accessed       LABS:                        9.6    4.47  )-----------( 73       ( 09 Oct 2023 05:30 )             30.4     10-09    132<L>  |  98  |  9   ----------------------------<  123<H>  3.8   |  25  |  1.45<H>    Ca    8.8      09 Oct 2023 05:30  Phos  2.8     10-09  Mg     2.1     10-09    TPro  6.5  /  Alb  3.1<L>  /  TBili  1.6<H>  /  DBili  x   /  AST  37  /  ALT  41  /  AlkPhos  239<H>  10-09      PT/INR - ( 09 Oct 2023 05:30 )   PT: 16.4 sec;   INR: 1.45          PTT - ( 09 Oct 2023 12:00 )  PTT:89.4 sec  Urinalysis Basic - ( 09 Oct 2023 05:30 )    Color: x / Appearance: x / SG: x / pH: x  Gluc: 123 mg/dL / Ketone: x  / Bili: x / Urobili: x   Blood: x / Protein: x / Nitrite: x   Leuk Esterase: x / RBC: x / WBC x   Sq Epi: x / Non Sq Epi: x / Bacteria: x            RADIOLOGY & ADDITIONAL STUDIES:

## 2023-10-09 NOTE — PROGRESS NOTE ADULT - ASSESSMENT
63Y M w/hx of ESRD  admitted for SOB and generalized weakness and fatigue missed HD, now h/c c/b cardiogenic shock, on inotropes and CVVHD (10/6) tolerating well, net neg 2.8L continue CVVHD       #ESRD  Initiated CVVHD (10/6)   Last HD 10/4 tolerated 2L with no complications   10/7 CVVHD 2.7L net neg   10/8 CVVHD  2.8L net neg   now on dobutamine     Plan:  CVVHD with net negative 200cc/hr adjust Uf based on HDS as below   CRRT Treatment:   Modality: CVVHD, Filter: NxStage CAR-505, Target Blood Flow: 300 mL/Min  Target Fluid Balance: Titrate to net NEGATIVE fluid balance, 200 mL/Hr   BID BMP while on CVVHD,   Plan for iHD 10/11, will attempt as much UF tolerated with CVVHD and bring as close to euvolemic   Renal diet       Access:  R TDC accessed       HTN:  BP stable now off dobutamine   UF with CVVHD as above   Discontinue Midodrine as discussed with primary team as patient needs afterload reduction to help off load volume         Anemia:  Hgb at goal 9.6  EPO with HD     MBD;  Calcium: 8.8  Phos: 2.8  Hold phos binders while on CVVHD, change dialysate as above

## 2023-10-09 NOTE — PROGRESS NOTE ADULT - ATTENDING COMMENTS
63M, poor historian, w/ pmh of ESRD on HD, h/o chronic EtOH w/ possible cirrhosis, HTN, HLD, CAD ?h/o PCI, AF, chronic systolic CHF initially admitted to medicine service w/ volume overload after missing HD -> however RRT called during first inpatient HD session d/t hypotension, pt. also found to have severe biventricular failure on TTE w/ clinical evidence of early low output state -> tx'd to CCU service    -CHF - Severe bi-ventricular failure 2/2 likely mixed ischemic/non-ischemic etiologies; Mildly elevated lactate on arrival to CCU on 10/6 w/ cool extremities, started on  2.5mcg and CVVHD and pt. has evidence of significant volume overload, Lactate cleared prior to initiation of CVVHD. Pt. has now tolerated ~2.5-2.8L of volume removal/24 hrs for the past 48 hrs; Pt. remains overloaded but improving, extremities are warm, Pt. A&Ox3; Plan to d/c outpatient Midodrine 5mg TID, will consider low dose afterload reduction of Hydral 10 PO TID, but currently want to allow BP room for continued large volume removal via CVVHD  -CAD - remote h/o PCI; c/w Plavix monotherapy and Lipitor; No signs of ACS on this admission  -AF - currently in rate controlled AF; c/w Heparin gtt for now; defer BB until euvolemic  -DASH/Renal diet  -OOB to chair  -DVT PPx  -Full Code  -Dispo: CCU    Itzel Dias MD  CCU Attending

## 2023-10-10 LAB
ALBUMIN SERPL ELPH-MCNC: 3.1 G/DL — LOW (ref 3.3–5)
ALBUMIN SERPL ELPH-MCNC: 3.1 G/DL — LOW (ref 3.3–5)
ALP SERPL-CCNC: 238 U/L — HIGH (ref 40–120)
ALP SERPL-CCNC: 248 U/L — HIGH (ref 40–120)
ALT FLD-CCNC: 40 U/L — SIGNIFICANT CHANGE UP (ref 10–45)
ALT FLD-CCNC: 41 U/L — SIGNIFICANT CHANGE UP (ref 10–45)
ANION GAP SERPL CALC-SCNC: 10 MMOL/L — SIGNIFICANT CHANGE UP (ref 5–17)
ANION GAP SERPL CALC-SCNC: 13 MMOL/L — SIGNIFICANT CHANGE UP (ref 5–17)
APTT BLD: 96.4 SEC — HIGH (ref 24.5–35.6)
AST SERPL-CCNC: 35 U/L — SIGNIFICANT CHANGE UP (ref 10–40)
AST SERPL-CCNC: 36 U/L — SIGNIFICANT CHANGE UP (ref 10–40)
BASOPHILS # BLD AUTO: 0.04 K/UL — SIGNIFICANT CHANGE UP (ref 0–0.2)
BASOPHILS NFR BLD AUTO: 0.9 % — SIGNIFICANT CHANGE UP (ref 0–2)
BILIRUB SERPL-MCNC: 1.6 MG/DL — HIGH (ref 0.2–1.2)
BILIRUB SERPL-MCNC: 1.8 MG/DL — HIGH (ref 0.2–1.2)
BLD GP AB SCN SERPL QL: NEGATIVE — SIGNIFICANT CHANGE UP
BUN SERPL-MCNC: 8 MG/DL — SIGNIFICANT CHANGE UP (ref 7–23)
BUN SERPL-MCNC: 8 MG/DL — SIGNIFICANT CHANGE UP (ref 7–23)
CALCIUM SERPL-MCNC: 8.9 MG/DL — SIGNIFICANT CHANGE UP (ref 8.4–10.5)
CALCIUM SERPL-MCNC: 8.9 MG/DL — SIGNIFICANT CHANGE UP (ref 8.4–10.5)
CHLORIDE SERPL-SCNC: 95 MMOL/L — LOW (ref 96–108)
CHLORIDE SERPL-SCNC: 97 MMOL/L — SIGNIFICANT CHANGE UP (ref 96–108)
CO2 SERPL-SCNC: 20 MMOL/L — LOW (ref 22–31)
CO2 SERPL-SCNC: 23 MMOL/L — SIGNIFICANT CHANGE UP (ref 22–31)
CREAT SERPL-MCNC: 1.16 MG/DL — SIGNIFICANT CHANGE UP (ref 0.5–1.3)
CREAT SERPL-MCNC: 1.18 MG/DL — SIGNIFICANT CHANGE UP (ref 0.5–1.3)
EGFR: 68 ML/MIN/1.73M2 — SIGNIFICANT CHANGE UP
EGFR: 70 ML/MIN/1.73M2 — SIGNIFICANT CHANGE UP
EOSINOPHIL # BLD AUTO: 0.04 K/UL — SIGNIFICANT CHANGE UP (ref 0–0.5)
EOSINOPHIL NFR BLD AUTO: 0.9 % — SIGNIFICANT CHANGE UP (ref 0–6)
GLUCOSE SERPL-MCNC: 109 MG/DL — HIGH (ref 70–99)
GLUCOSE SERPL-MCNC: 123 MG/DL — HIGH (ref 70–99)
HAV IGM SER-ACNC: SIGNIFICANT CHANGE UP
HBV CORE IGM SER-ACNC: SIGNIFICANT CHANGE UP
HBV SURFACE AG SER-ACNC: SIGNIFICANT CHANGE UP
HCT VFR BLD CALC: 30.7 % — LOW (ref 39–50)
HCV AB S/CO SERPL IA: 0.05 S/CO — SIGNIFICANT CHANGE UP
HCV AB SERPL-IMP: SIGNIFICANT CHANGE UP
HGB BLD-MCNC: 9.7 G/DL — LOW (ref 13–17)
IMM GRANULOCYTES NFR BLD AUTO: 0.2 % — SIGNIFICANT CHANGE UP (ref 0–0.9)
LACTATE SERPL-SCNC: 1.6 MMOL/L — SIGNIFICANT CHANGE UP (ref 0.5–2)
LYMPHOCYTES # BLD AUTO: 0.78 K/UL — LOW (ref 1–3.3)
LYMPHOCYTES # BLD AUTO: 17.9 % — SIGNIFICANT CHANGE UP (ref 13–44)
MAGNESIUM SERPL-MCNC: 2.1 MG/DL — SIGNIFICANT CHANGE UP (ref 1.6–2.6)
MAGNESIUM SERPL-MCNC: 2.1 MG/DL — SIGNIFICANT CHANGE UP (ref 1.6–2.6)
MCHC RBC-ENTMCNC: 31.6 GM/DL — LOW (ref 32–36)
MCHC RBC-ENTMCNC: 32.8 PG — SIGNIFICANT CHANGE UP (ref 27–34)
MCV RBC AUTO: 103.7 FL — HIGH (ref 80–100)
MONOCYTES # BLD AUTO: 0.48 K/UL — SIGNIFICANT CHANGE UP (ref 0–0.9)
MONOCYTES NFR BLD AUTO: 11 % — SIGNIFICANT CHANGE UP (ref 2–14)
NEUTROPHILS # BLD AUTO: 3 K/UL — SIGNIFICANT CHANGE UP (ref 1.8–7.4)
NEUTROPHILS NFR BLD AUTO: 69.1 % — SIGNIFICANT CHANGE UP (ref 43–77)
NRBC # BLD: 0 /100 WBCS — SIGNIFICANT CHANGE UP (ref 0–0)
PHOSPHATE SERPL-MCNC: 3.4 MG/DL — SIGNIFICANT CHANGE UP (ref 2.5–4.5)
PHOSPHATE SERPL-MCNC: 3.5 MG/DL — SIGNIFICANT CHANGE UP (ref 2.5–4.5)
PLATELET # BLD AUTO: 74 K/UL — LOW (ref 150–400)
POTASSIUM SERPL-MCNC: 4.3 MMOL/L — SIGNIFICANT CHANGE UP (ref 3.5–5.3)
POTASSIUM SERPL-MCNC: 4.4 MMOL/L — SIGNIFICANT CHANGE UP (ref 3.5–5.3)
POTASSIUM SERPL-SCNC: 4.3 MMOL/L — SIGNIFICANT CHANGE UP (ref 3.5–5.3)
POTASSIUM SERPL-SCNC: 4.4 MMOL/L — SIGNIFICANT CHANGE UP (ref 3.5–5.3)
PROT SERPL-MCNC: 6.7 G/DL — SIGNIFICANT CHANGE UP (ref 6–8.3)
PROT SERPL-MCNC: 6.8 G/DL — SIGNIFICANT CHANGE UP (ref 6–8.3)
RBC # BLD: 2.96 M/UL — LOW (ref 4.2–5.8)
RBC # FLD: 18.2 % — HIGH (ref 10.3–14.5)
RH IG SCN BLD-IMP: NEGATIVE — SIGNIFICANT CHANGE UP
SODIUM SERPL-SCNC: 128 MMOL/L — LOW (ref 135–145)
SODIUM SERPL-SCNC: 130 MMOL/L — LOW (ref 135–145)
WBC # BLD: 4.35 K/UL — SIGNIFICANT CHANGE UP (ref 3.8–10.5)
WBC # FLD AUTO: 4.35 K/UL — SIGNIFICANT CHANGE UP (ref 3.8–10.5)

## 2023-10-10 PROCEDURE — 99291 CRITICAL CARE FIRST HOUR: CPT

## 2023-10-10 PROCEDURE — 99233 SBSQ HOSP IP/OBS HIGH 50: CPT | Mod: GC

## 2023-10-10 PROCEDURE — 71045 X-RAY EXAM CHEST 1 VIEW: CPT | Mod: 26

## 2023-10-10 RX ORDER — ASPIRIN/CALCIUM CARB/MAGNESIUM 324 MG
81 TABLET ORAL DAILY
Refills: 0 | Status: DISCONTINUED | OUTPATIENT
Start: 2023-10-10 | End: 2023-10-11

## 2023-10-10 RX ADMIN — Medication 1 TABLET(S): at 12:30

## 2023-10-10 RX ADMIN — Medication 7.66 MICROGRAM(S)/KG/MIN: at 07:56

## 2023-10-10 RX ADMIN — Medication 1 MILLIGRAM(S): at 12:30

## 2023-10-10 RX ADMIN — Medication 81 MILLIGRAM(S): at 12:30

## 2023-10-10 RX ADMIN — Medication 1 TABLET(S): at 06:28

## 2023-10-10 RX ADMIN — Medication 75 MILLIGRAM(S): at 12:30

## 2023-10-10 RX ADMIN — PANTOPRAZOLE SODIUM 40 MILLIGRAM(S): 20 TABLET, DELAYED RELEASE ORAL at 06:28

## 2023-10-10 NOTE — DIETITIAN INITIAL EVALUATION ADULT - PROBLEM/PLAN-9
Case Management Initial Discharge Plan  Barney Allen,             Met with:patient to discuss discharge plans. Information verified: address, contacts, phone number, , insurance Yes  Insurance Provider: Managed Medicare; does not have card. Will provide card tomorrow. Emergency Contact/Next of Kin name & number:   [place] Patient [phone]       Who are involved in patient's support system? Patient's wife     PCP:  [facility]om[facility]USER [facility] past year      Discharge Planning    Living Arrangements:  Spouse/Significant Other     Home has 1 stories  4 stairs to climb to get into front door,   Patient able to perform ADL's:Independent    Current Services (outpatient & in home)   DME equipment: RW, Canes, glucometer  DME provider:     Is patient receiving oral anticoagulation therapy? No      Potential Assistance Needed:  N/A    Patient agreeable to home care: Yes  Freedom of choice provided:  yes  Patient states that [facility] [employer] [facility]. 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 Prior SNF/Rehab Placement and Facility:   Agreeable to SNF/Rehab: No  [place] of choice provided: n/a     Evaluation: yes    Expected Discharge date:  21    Patient expects to be discharged to: If home: is the family and/or caregiver wiling & able to provide support at home? Yes. Who will be providing this support? Patient's daughter. Patient will be staying at her house for therapy. *    Follow Up Appointment: Best Day/ Time: Monday PM    Transportation provider: daughter  Transportation arrangements needed for discharge: Yes    Readmission Risk              Risk of Unplanned Readmission:  9             Does patient have a readmission risk score greater than 14?: No  If yes, follow-up appointment must be made within 7 days of discharge.      Goals of Care:       Educated patient and patient's wife on transitional options, provided freedom of choice and are agreeable with plan      Discharge Plan: home goal. Will be staying at daughter's house. [facility]. and OP therapy. CM left  for [facility] for fax number. Need insurance care and patient's daughter's home address.            Electronically signed by Lucas Brantley RN on 12/12/21 at 1:02 PM EST Asc Procedure Text (A): After obtaining clear surgical margins the patient was sent to an ASC for surgical repair.  The patient understands they will receive post-surgical care and follow-up from the ASC physician. DISPLAY PLAN FREE TEXT

## 2023-10-10 NOTE — PROGRESS NOTE ADULT - ASSESSMENT
63 year old male with history of ESRD (HD 4x weekly M/T/TH/SAT), HLD, HTN, GERD, CAD, admitted to CCU w concern for cardiogenic shock iso volume overload.    NEURO  AAOx3  -FLOR    CARDIOVASCULAR    #inadequate cardiac output  #acute-on-chronic HFrEDF  tissue hypoperfusion as evidenced by cyanotic/cool extremities on exam yesterday  bedside us dilated RV, ultimately c/f RV failure with a worsening pericardial effusion   substantial improvement today following overnight cvv hd & low-dose dobutamine  - continue low dose dobutamine drip  - continue CVVHD at least one more day    #CAD (coronary artery disease)  - Continue plavix     #HLD (hyperlipidemia).   History of HLD, home med atorvastatin 40 mg. Patient unsure if had stent after MI  - continue home med atorvastatin 40 mg.    RESPIRATORY  Patient OOBTC at time of examination, saturating well on room air.   - Continue to monitor oxygenation status     GASTROINTESTINAL  #GERD (gastroesophageal reflux disease).   History of GERD, no complaints of epigastric pain at this time.   -Continue pantoprazole 40 mg qd.    RENAL  #ESRD on dialysis.   Started on HD 6 months ago, 4x week (M/T/Th/Sat), missed HD session on 10/3/23. R port utilized. At admission, Cr 3.93 (un-established baseline Cr), K 3.4, Phos 4.4. Metabolic acidosis (AGAP 17) likely due to hypochloremic emesis / etoh leading to lactic acidosis. Dialysate changed to Phoxillum due to low phos.   - continue home nephro chichi 60 300mg   - discontinue midodrine  - continue CVVHD   - BID BMP while on CVVHD,   - C/w Heparin drip to avoid CVVHD filter clotting   - Hold phosphate binders while on CVVHD   - nephro following, appreciate recommendations    #hepatic cirrhosis  chronic, likely 2/2 chronic alcohol use., no suspicion for hepatic encephalopathy at this time - patient mentating AOx3 and conversant, w/o jaundice or non-icteric sclera  low concern for hepatorenal syndrome as remains hemodynamically stable. No known gallbladder pathology established.  Abdominal US (10/4) Cirrhotic morphology. Pulsatile flow in the main and left portal veins. Moderate ascites. Increased right renal cortical echogenicity compatible with medical renal disease.  - continue CVVHD    HEME  #Anemia.   At admission Hg 10.2, Hct 32, .6. No baseline Hgb or iron studies for comparison. Home med iron 65 mg qd for ELMER, though MCV is macrocytic likely 2/2 folate deficiency. However, etiology of anemia likely multifactorial of ELMER, AOCD, and folate deficiency. No known history of GI bleeds, hemoptysis, hematochezia, melenic stool. B12 increased.   -Hold home med iron 65 mg  -C/w folic acid 1 mg daily   - EPO with HD   -Maintain active type and screen    ENDO  -FLOR    ID  -FLOR    PSYCH  #Moderate alcohol use disorder.   At admission BIENVENIDO 29. AST/AlT 45/43. Last drink 2 PM on 10/3/23, has 2-3 martinis daily. No history of alcohol withdrawals or withdrawal seizures. One non-bloody, clear/bilious emesis in past 24 hours. Qtc 397. 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   CIWA 0 x2 days  - continue Zofran 4 mg IV q12 PRN nausea, vomiting   - Thiamine, folic acid, multivitamin  - Home meds B1 100 mg OTC, D3 1000 IU OTC, B12 1000 mcg OTC    DERM  #Dermatitis.   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 B/l LE dry skin, likely venous stasis dermatitis on due to peripheral artery disease vs. heart failure related edema changes. Likely from niacin deficiency (pellagra) as etiology for dermatitis as patient has 3 month anorexia, fatigue, numbness. Recently prescribed Cerave w/o use  - Consider niacin supplementation (vitamin b3) or topical steroid  - lotion inpatient  - f/u outpt dermatologist.    PROPHYLACTIC MEASURE  F: none  E: Replete per HD with ESRD- CVVHD 10/06  N: renal restrictions  GI ppx: pantoprazole  DVT ppx: heparin drip; SCDs and on DAPT  Full code  Dispo: CCU 63 year old male with history of ESRD (HD 4x weekly M/T/TH/SAT), HLD, HTN, GERD, atrial fibrillation (s/p Watchman), admitted to CCU w concern for cardiogenic shock iso volume overload.    NEURO  AAOx3  -FLOR    CARDIOVASCULAR    #inadequate cardiac output  #acute-on-chronic HFrEDF  tissue hypoperfusion as evidenced by cyanotic/cool extremities on exam yesterday  bedside us dilated RV, ultimately c/f RV failure with a worsening pericardial effusion   substantial improvement today following overnight cvv hd & low-dose dobutamine  - discontinue dobutamine drip  - continue CVVHD for one more day today, 10/10 with plan to transition to intermittent HD tomorrow, 10/11    #Atrial fibrillation  Patient with chronic atrial fibrillation s/p watchman placement. Currently on plavix.   - Discontinue plavix  - Start aspirin 81mg     #HLD (hyperlipidemia).   History of HLD, home med atorvastatin 40 mg. Patient unsure if had stent after MI  - continue home med atorvastatin 40 mg.    RESPIRATORY  Patient OOBTC at time of examination, saturating well on room air.   - Continue to monitor oxygenation status     GASTROINTESTINAL  #GERD (gastroesophageal reflux disease).   History of GERD, no complaints of epigastric pain at this time.   -Continue pantoprazole 40 mg qd.    RENAL  #ESRD on dialysis.   Started on HD 6 months ago, 4x week (M/T/Th/Sat), missed HD session on 10/3/23. R port utilized. At admission, Cr 3.93 (un-established baseline Cr), K 3.4, Phos 4.4. Metabolic acidosis (AGAP 17) likely due to hypochloremic emesis / etoh leading to lactic acidosis. Dialysate changed to Phoxillum due to low phos.   - continue home nephro chichi 60 300mg   - continue CVVHD today, 10/10 with plan to start iHD tomorrow, 10/11  - BID BMP while on CVVHD,   - C/w Heparin drip to avoid CVVHD filter clotting- d/c heparin drip once off CVVHD tomorrow  - Hold phosphate binders while on CVVHD   - nephro following, appreciate recommendations    #hepatic cirrhosis  chronic, likely 2/2 chronic alcohol use., no suspicion for hepatic encephalopathy at this time - patient mentating AOx3 and conversant, w/o jaundice or non-icteric sclera  low concern for hepatorenal syndrome as remains hemodynamically stable. No known gallbladder pathology established.  Abdominal US (10/4) Cirrhotic morphology. Pulsatile flow in the main and left portal veins. Moderate ascites. Increased right renal cortical echogenicity compatible with medical renal disease.  - continue CVVHD today    HEME  #Anemia.   At admission Hg 10.2, Hct 32, .6. No baseline Hgb or iron studies for comparison. Home med iron 65 mg qd for ELMER, though MCV is macrocytic likely 2/2 folate deficiency. However, etiology of anemia likely multifactorial of ELMER, AOCD, and folate deficiency. No known history of GI bleeds, hemoptysis, hematochezia, melenic stool. B12 increased.   -Hold home med iron 65 mg  -C/w folic acid 1 mg daily   - EPO with HD   -Maintain active type and screen    ENDO  -FLOR    ID  -FLOR    PSYCH  #Moderate alcohol use disorder.   At admission BIENVENIDO 29. AST/AlT 45/43. Last drink 2 PM on 10/3/23, has 2-3 martinis daily. No history of alcohol withdrawals or withdrawal seizures. One non-bloody, clear/bilious emesis in past 24 hours. Qtc 397. 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   CIWA 0 x2 days  - continue Zofran 4 mg IV q12 PRN nausea, vomiting   - Thiamine, folic acid, multivitamin  - Home meds B1 100 mg OTC, D3 1000 IU OTC, B12 1000 mcg OTC    DERM  #Dermatitis.   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 B/l LE dry skin, likely venous stasis dermatitis on due to peripheral artery disease vs. heart failure related edema changes. Likely from niacin deficiency (pellagra) as etiology for dermatitis as patient has 3 month anorexia, fatigue, numbness. Recently prescribed Cerave w/o use  - Consider niacin supplementation (vitamin b3) or topical steroid  - lotion inpatient  - f/u outpt dermatologist.    PROPHYLACTIC MEASURE  F: none  E: Replete per HD with ESRD- CVVHD 10/06  N: renal restrictions  GI ppx: pantoprazole  DVT ppx: heparin drip; SCDs and on DAPT  Full code  Dispo: CCU

## 2023-10-10 NOTE — DIETITIAN INITIAL EVALUATION ADULT - ADD RECOMMEND
1. Continue with renal restricted diet  - consider fluid restriction to improve fluid/volume status  2. Recommend 250ml Pongo Resume Renal daily; provides 450 kcals, 20g protein  3. c/w Nephrovite  - d/c MVI?  4. Monitor lytes closely  - replenish / correct prn  5. Monitor chemistry, GI function, skin integrity  6. Pain & bowel regimen prn

## 2023-10-10 NOTE — DIETITIAN INITIAL EVALUATION ADULT - OTHER CALCULATIONS
Ideal body weight (83.4kg) used for calculations as pt >120% of IBW. Needs estimated for age and adjusted for current clinical status, increased needs for clinical status, dialysis

## 2023-10-10 NOTE — DIETITIAN INITIAL EVALUATION ADULT - PERTINENT MEDS FT
MEDICATIONS  (STANDING):  aspirin  chewable 81 milliGRAM(s) Oral daily  atorvastatin 40 milliGRAM(s) Oral at bedtime  CRRT Treatment    <Continuous>  folic acid 1 milliGRAM(s) Oral daily  heparin  Infusion 1000 Unit(s)/Hr (10 mL/Hr) IV Continuous <Continuous>  influenza  Vaccine (HIGH DOSE) 0.7 milliLiter(s) IntraMuscular once  multivitamin 1 Tablet(s) Oral daily  Nephro-chichi 1 Tablet(s) Oral every 24 hours  pantoprazole    Tablet 40 milliGRAM(s) Oral before breakfast  Phoxillum Filtration BK 4 / 2.5 5000 milliLiter(s) (2000 mL/Hr) CRRT <Continuous>  pregabalin 75 milliGRAM(s) Oral daily    MEDICATIONS  (PRN):  ondansetron Injectable 4 milliGRAM(s) IV Push every 12 hours PRN Nausea and/or Vomiting

## 2023-10-10 NOTE — PROGRESS NOTE ADULT - SUBJECTIVE AND OBJECTIVE BOX
INTERVAL HPI/OVERNIGHT EVENTS: NAEON. Fluid removal rate of 221-252cc/night. Total fluid removed over shift of 2.675L, 5.053L removed in past 24h. PTT therapeutic.     SUBJECTIVE: Patient seen and examined at bedside. States he feels general weakness of his arms and legs due to being immobile in bed. He tolerated OOBTC yesterday. Able to move all extremities. Denies any other concerns this morning.     DRIPS: Dobutamine 2.5; Heparin 10    ACCESS: R. forearm P IV 20g 10/03, R. AC P IV 18g 10/06, right subclavian vein HD catheter     OBJECTIVE:    VITAL SIGNS:  ICU Vital Signs Last 24 Hrs  T(C): 36.4 (10 Oct 2023 05:01), Max: 36.8 (09 Oct 2023 12:00)  T(F): 97.6 (10 Oct 2023 05:01), Max: 98.3 (09 Oct 2023 12:00)  HR: 93 (10 Oct 2023 07:00) (90 - 108)  BP: 89/58 (10 Oct 2023 07:00) (84/66 - 114/68)  BP(mean): 70 (10 Oct 2023 07:00) (70 - 85)  ABP: --  ABP(mean): --  RR: 16 (10 Oct 2023 07:00) (12 - 26)  SpO2: 96% (10 Oct 2023 07:00) (91% - 100%)    O2 Parameters below as of 10 Oct 2023 07:00  Patient On (Oxygen Delivery Method): room air      I&O's Detail  09 Oct 2023 07:01  -  10 Oct 2023 07:00  --------------------------------------------------------  IN:    DOBUTamine: 184.8 mL    Heparin: 240 mL    Oral Fluid: 2554 mL  Total IN: 2978.8 mL    OUT:    Other (mL): 5312 mL  Total OUT: 5312 mL    Total NET: -2333.2 mL      PHYSICAL EXAM:  CONSTITUTIONAL: NAD  EYES: PERRLA and symmetric, EOMI, no conjunctival or scleral injection, non-icteric; no nystagmus  HENMT: moist mucus membranes  NECK: Supple  RESP: on HF o2, no apparent respiratory distress/use of accessory muscles  CV: irregular heart rate, +S1S2, no MRG; warm extremities   GI: Soft, NT, distended, + fluid wave, but without rebound or guarding; +reducible umbilical hernia midline lower quadrant, + abdominal striae   MSK: Normal ROM without pain, no spinal tenderness, normal muscle strength/tone  SKIN: +bilateral upper extremity maculopapular rash with excoriations as well on scalp; venous stasis dermatitis bilateral ankles and lower calves. No jaundice. Very mild mottling of the hands.  NEURO: CN II-XII intact; normal reflexes in upper and lower extremities, sensation intact in upper and lower extremities b/l to light touch   PSYCH: Appropriate insight/judgment; AO x 3, mood and affect appropriate, recent/remote memory intact    MEDICATIONS:  MEDICATIONS  (STANDING):  atorvastatin 40 milliGRAM(s) Oral at bedtime  clopidogrel Tablet 75 milliGRAM(s) Oral daily  CRRT Treatment    <Continuous>  DOBUTamine Infusion 2.5 MICROgram(s)/kG/Min (7.66 mL/Hr) IV Continuous <Continuous>  folic acid 1 milliGRAM(s) Oral daily  heparin  Infusion 1600 Unit(s)/Hr (13 mL/Hr) IV Continuous <Continuous>  influenza  Vaccine (HIGH DOSE) 0.7 milliLiter(s) IntraMuscular once  midodrine. 5 milliGRAM(s) Oral every 8 hours  multivitamin 1 Tablet(s) Oral daily  Nephro-chichi 1 Tablet(s) Oral every 24 hours  pantoprazole    Tablet 40 milliGRAM(s) Oral before breakfast  pregabalin 75 milliGRAM(s) Oral daily  PureFlow Dialysate RFP-400 (K 2 / Ca 3) 5000 milliLiter(s) (2000 mL/Hr) CRRT <Continuous>  sevelamer carbonate 800 milliGRAM(s) Oral three times a day with meals    MEDICATIONS  (PRN):  LORazepam   Injectable 1 milliGRAM(s) IV Push every 1 hour PRN CIWA-Ar score 8 or greater  ondansetron Injectable 4 milliGRAM(s) IV Push every 12 hours PRN Nausea and/or Vomiting    ALLERGIES:  Allergies    penicillins (Unknown)    Intolerances    LABS:                           9.7    4.35  )-----------( 74       ( 10 Oct 2023 05:30 )             30.7     10-09    127<L>  |  94<L>  |  8   ----------------------------<  169<H>  4.1   |  23  |  1.26    Ca    9.0      09 Oct 2023 17:22  Phos  3.5     10-10  Mg     2.1     10-10    TPro  6.5  /  Alb  3.1<L>  /  TBili  1.6<H>  /  DBili  x   /  AST  37  /  ALT  41  /  AlkPhos  239<H>  10-09    PT/INR - ( 09 Oct 2023 05:30 )   PT: 16.4 sec;   INR: 1.45          PTT - ( 10 Oct 2023 05:30 )  PTT:96.4 sec  Urinalysis Basic - ( 09 Oct 2023 17:22 )    Color: x / Appearance: x / SG: x / pH: x  Gluc: 169 mg/dL / Ketone: x  / Bili: x / Urobili: x   Blood: x / Protein: x / Nitrite: x   Leuk Esterase: x / RBC: x / WBC x   Sq Epi: x / Non Sq Epi: x / Bacteria: x    RADIOLOGY, EKG & ADDITIONAL TESTS: Reviewed.    INTERVAL HPI/OVERNIGHT EVENTS: NAEON. Fluid removal rate of 221-252cc/night. Total fluid removed over shift of 2.675L, 5.053L removed in past 24h. PTT therapeutic. Collateral records obtained from Dr. Souza, patient's cardiologist at [facility], revealed severe pulmonary hypertension. Cardiac MRI revealed moderately dilated RV with RVEF 23%. LV EF 37%. Severely dilated main pulmonary artery up to 4.6 cm.     SUBJECTIVE: Patient seen and examined at bedside. Still endorses poor appetite but states he was able to have dinner for the first time yesterday and ate his entire grilled cheese sandwich. Denies any concerns this morning.     DRIPS: Dobutamine 2.5; Heparin 10    ACCESS: R. forearm P IV 20g 10/03, R. AC P IV 18g 10/06, right subclavian vein HD catheter     OBJECTIVE:    VITAL SIGNS:  ICU Vital Signs Last 24 Hrs  T(C): 36.4 (10 Oct 2023 05:01), Max: 36.8 (09 Oct 2023 12:00)  T(F): 97.6 (10 Oct 2023 05:01), Max: 98.3 (09 Oct 2023 12:00)  HR: 93 (10 Oct 2023 07:00) (90 - 108)  BP: 89/58 (10 Oct 2023 07:00) (84/66 - 114/68)  BP(mean): 70 (10 Oct 2023 07:00) (70 - 85)  ABP: --  ABP(mean): --  RR: 16 (10 Oct 2023 07:00) (12 - 26)  SpO2: 96% (10 Oct 2023 07:00) (91% - 100%)    O2 Parameters below as of 10 Oct 2023 07:00  Patient On (Oxygen Delivery Method): room air      I&O's Detail  09 Oct 2023 07:01  -  10 Oct 2023 07:00  --------------------------------------------------------  IN:    DOBUTamine: 184.8 mL    Heparin: 240 mL    Oral Fluid: 2554 mL  Total IN: 2978.8 mL    OUT:    Other (mL): 5312 mL  Total OUT: 5312 mL    Total NET: -2333.2 mL      PHYSICAL EXAM:  CONSTITUTIONAL: NAD  EYES: PERRLA and symmetric, EOMI, no conjunctival or scleral injection, non-icteric; no nystagmus  HENMT: moist mucus membranes  NECK: Supple  RESP: on HF o2, no apparent respiratory distress/use of accessory muscles  CV: irregular heart rate, +S1S2, no MRG; warm extremities   GI: Soft, NT, distended, + fluid wave, but without rebound or guarding; +reducible umbilical hernia midline lower quadrant, + abdominal striae   MSK: Normal ROM without pain, no spinal tenderness, normal muscle strength/tone  SKIN: +bilateral upper extremity maculopapular rash with excoriations as well on scalp; venous stasis dermatitis bilateral ankles and lower calves. No jaundice. Very mild mottling of the hands.  NEURO: CN II-XII intact; normal reflexes in upper and lower extremities, sensation intact in upper and lower extremities b/l to light touch   PSYCH: Appropriate insight/judgment; AO x 3, mood and affect appropriate, recent/remote memory intact    MEDICATIONS:  MEDICATIONS  (STANDING):  atorvastatin 40 milliGRAM(s) Oral at bedtime  clopidogrel Tablet 75 milliGRAM(s) Oral daily  CRRT Treatment    <Continuous>  DOBUTamine Infusion 2.5 MICROgram(s)/kG/Min (7.66 mL/Hr) IV Continuous <Continuous>  folic acid 1 milliGRAM(s) Oral daily  heparin  Infusion 1600 Unit(s)/Hr (13 mL/Hr) IV Continuous <Continuous>  influenza  Vaccine (HIGH DOSE) 0.7 milliLiter(s) IntraMuscular once  midodrine. 5 milliGRAM(s) Oral every 8 hours  multivitamin 1 Tablet(s) Oral daily  Nephro-chichi 1 Tablet(s) Oral every 24 hours  pantoprazole    Tablet 40 milliGRAM(s) Oral before breakfast  pregabalin 75 milliGRAM(s) Oral daily  PureFlow Dialysate RFP-400 (K 2 / Ca 3) 5000 milliLiter(s) (2000 mL/Hr) CRRT <Continuous>  sevelamer carbonate 800 milliGRAM(s) Oral three times a day with meals    MEDICATIONS  (PRN):  LORazepam   Injectable 1 milliGRAM(s) IV Push every 1 hour PRN CIWA-Ar score 8 or greater  ondansetron Injectable 4 milliGRAM(s) IV Push every 12 hours PRN Nausea and/or Vomiting    ALLERGIES:  Allergies    penicillins (Unknown)    Intolerances    LABS:                           9.7    4.35  )-----------( 74       ( 10 Oct 2023 05:30 )             30.7     10-09    127<L>  |  94<L>  |  8   ----------------------------<  169<H>  4.1   |  23  |  1.26    Ca    9.0      09 Oct 2023 17:22  Phos  3.5     10-10  Mg     2.1     10-10    TPro  6.5  /  Alb  3.1<L>  /  TBili  1.6<H>  /  DBili  x   /  AST  37  /  ALT  41  /  AlkPhos  239<H>  10-09    PT/INR - ( 09 Oct 2023 05:30 )   PT: 16.4 sec;   INR: 1.45          PTT - ( 10 Oct 2023 05:30 )  PTT:96.4 sec  Urinalysis Basic - ( 09 Oct 2023 17:22 )    Color: x / Appearance: x / SG: x / pH: x  Gluc: 169 mg/dL / Ketone: x  / Bili: x / Urobili: x   Blood: x / Protein: x / Nitrite: x   Leuk Esterase: x / RBC: x / WBC x   Sq Epi: x / Non Sq Epi: x / Bacteria: x    RADIOLOGY, EKG & ADDITIONAL TESTS: Reviewed.    INTERVAL HPI/OVERNIGHT EVENTS: NAEON. Fluid removal rate of 221-252cc/night. Total fluid removed over shift of 2.675L, 5.053L removed in past 24h. PTT therapeutic. Collateral records obtained from Dr. Souza, patient's cardiologist at [facility], revealed severe pulmonary hypertension. Cardiac MRI revealed moderately dilated RV with RVEF 23%. LV EF 37%. Severely dilated main pulmonary artery up to 4.6 cm.     SUBJECTIVE: Patient seen and examined at bedside. Still endorses poor appetite but states he was able to have dinner for the first time yesterday and ate his entire grilled cheese sandwich. Denies any concerns this morning.     DRIPS: Dobutamine 2.5; Heparin 10    ACCESS: R. forearm P IV 20g 10/03, R. AC P IV 18g 10/06, right subclavian vein HD catheter     OBJECTIVE:    VITAL SIGNS:  ICU Vital Signs Last 24 Hrs  T(C): 36.4 (10 Oct 2023 05:01), Max: 36.8 (09 Oct 2023 12:00)  T(F): 97.6 (10 Oct 2023 05:01), Max: 98.3 (09 Oct 2023 12:00)  HR: 93 (10 Oct 2023 07:00) (90 - 108)  BP: 89/58 (10 Oct 2023 07:00) (84/66 - 114/68)  BP(mean): 70 (10 Oct 2023 07:00) (70 - 85)  ABP: --  ABP(mean): --  RR: 16 (10 Oct 2023 07:00) (12 - 26)  SpO2: 96% (10 Oct 2023 07:00) (91% - 100%)    O2 Parameters below as of 10 Oct 2023 07:00  Patient On (Oxygen Delivery Method): room air      I&O's Detail  09 Oct 2023 07:01  -  10 Oct 2023 07:00  --------------------------------------------------------  IN:    DOBUTamine: 184.8 mL    Heparin: 240 mL    Oral Fluid: 2554 mL  Total IN: 2978.8 mL    OUT:    Other (mL): 5312 mL  Total OUT: 5312 mL    Total NET: -2333.2 mL      PHYSICAL EXAM:  CONSTITUTIONAL: NAD  EYES: PERRLA and symmetric, EOMI, no conjunctival or scleral injection, non-icteric; no nystagmus  HENMT: moist mucus membranes  NECK: Supple  RESP: on HF o2, no apparent respiratory distress/use of accessory muscles  CV: irregular heart rate, +S1S2, no MRG; warm extremities   GI: Soft, NT, distended, + fluid wave, but without rebound or guarding; +reducible umbilical hernia midline lower quadrant, + abdominal striae   MSK: Normal ROM without pain, no spinal tenderness, normal muscle strength/tone  SKIN: +bilateral upper extremity maculopapular rash with excoriations as well on scalp; venous stasis dermatitis bilateral ankles and lower calves. No jaundice. Very mild mottling of the hands.  NEURO: CN II-XII intact; normal reflexes in upper and lower extremities, sensation intact in upper and lower extremities b/l to light touch   PSYCH: Appropriate insight/judgment; AO x 3, mood and affect appropriate, recent/remote memory intact    MEDICATIONS:  MEDICATIONS  (STANDING):  atorvastatin 40 milliGRAM(s) Oral at bedtime  clopidogrel Tablet 75 milliGRAM(s) Oral daily  CRRT Treatment    <Continuous>  DOBUTamine Infusion 2.5 MICROgram(s)/kG/Min (7.66 mL/Hr) IV Continuous <Continuous>  folic acid 1 milliGRAM(s) Oral daily  heparin  Infusion 1600 Unit(s)/Hr (13 mL/Hr) IV Continuous <Continuous>  influenza  Vaccine (HIGH DOSE) 0.7 milliLiter(s) IntraMuscular once  midodrine. 5 milliGRAM(s) Oral every 8 hours  multivitamin 1 Tablet(s) Oral daily  Nephro-chichi 1 Tablet(s) Oral every 24 hours  pantoprazole    Tablet 40 milliGRAM(s) Oral before breakfast  pregabalin 75 milliGRAM(s) Oral daily  PureFlow Dialysate RFP-400 (K 2 / Ca 3) 5000 milliLiter(s) (2000 mL/Hr) CRRT <Continuous>  sevelamer carbonate 800 milliGRAM(s) Oral three times a day with meals    MEDICATIONS  (PRN):  LORazepam   Injectable 1 milliGRAM(s) IV Push every 1 hour PRN CIWA-Ar score 8 or greater  ondansetron Injectable 4 milliGRAM(s) IV Push every 12 hours PRN Nausea and/or Vomiting    ALLERGIES:  Allergies    penicillins (Unknown)    Intolerances    LABS:                           9.7    4.35  )-----------( 74       ( 10 Oct 2023 05:30 )             30.7     10-10    130<L>  |  97  |  8   ----------------------------<  109<H>  4.3   |  20<L>  |  1.18    Ca    8.9      10 Oct 2023 05:30  Phos  3.5     10-10  Mg     2.1     10-10    TPro  6.7  /  Alb  3.1<L>  /  TBili  1.6<H>  /  DBili  x   /  AST  36  /  ALT  40  /  AlkPhos  238<H>  10-10       PTT - ( 10 Oct 2023 05:30 )  PTT:96.4 sec  Urinalysis Basic - ( 09 Oct 2023 17:22 )    Color: x / Appearance: x / SG: x / pH: x  Gluc: 169 mg/dL / Ketone: x  / Bili: x / Urobili: x   Blood: x / Protein: x / Nitrite: x   Leuk Esterase: x / RBC: x / WBC x   Sq Epi: x / Non Sq Epi: x / Bacteria: x    RADIOLOGY, EKG & ADDITIONAL TESTS: Reviewed.

## 2023-10-10 NOTE — DIETITIAN INITIAL EVALUATION ADULT - PROBLEM SELECTOR PLAN 3
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 Clinical exam: abdominal straie, positive fluid wave with ascites, +bilateral pitting edema, +hepatosplenomegaly. Serum ammonia 37, Alk phos 273. Low suspicion for hepatic encephalopathy at this time; patient mentating AOx3 and conversant, w/o jaundice or non-icteric sclera. Low concern for hepatorenal syndrome as remains hemodynamically stable. No known gallbladder pathology established.  -f/u abdominal ultrasound   -consider paracentesis for ascites

## 2023-10-10 NOTE — GOALS OF CARE CONVERSATION - ADVANCED CARE PLANNING - CONVERSATION DETAILS
Went over MOLST form with patient on 10/08. Per patient, expressed wishes below.   Section B: Attempt CPR  Section C: Provide respiratory support, including non-invasive ventilation, intubation, long-term mechanical ventilation, tracheostomy if needed   Section F: Expressed wish that he does not permit feeding tube to be placed. He is ok with treatment with IV fluids, antibiotics, and dialysis as necessary.     While patient is ok with all the interventions expressed above, he expressed sentiments that he "does not want extreme measures prolonging his life if he is in a vegetative state." His healthcare proxy is his sister and states she is aware of all of his wishes regarding goals of care. Went over MOLST form with patient on 10/08. Per patient, expressed that he would like to have CPR performed if necessary. Discussed respiratory support options as well, and what each option entails. He expressed his desire to have the full range of respiratory support performed if needed, including non-invasive ventilation, intubation, long-term mechanical ventilation, and tracheostomy. When discussing artificial nutrition, patient expressed a firm desire not have any type of feeding tube placed. However, he is ok with treatment with IV fluids, antibiotics, and dialysis as necessary.     Of note, while patient is ok with all the interventions expressed above, he expressed sentiments that he "does not want extreme measures prolonging his life if he is in a vegetative state." His healthcare proxy is his sister and states she is aware of all of his wishes regarding goals of care.

## 2023-10-10 NOTE — PROGRESS NOTE ADULT - ASSESSMENT
63Y M w/hx of ESRD  admitted for SOB and generalized weakness and fatigue missed HD, now h/c c/b cardiogenic shock, with marked clinical improvement off inotrope continue  CVVHD (10/6) tolerating well, net neg 2.3L continue CVVHD and transition to iHD 10/11      #ESRD  Initiated CVVHD (10/6)   Last HD 10/4 tolerated 2L with no complications   10/7 CVVHD 2.7L net neg   10/8 CVVHD  2.8L net neg   10/9 CVVHD 2.3 net neg   off inotropes hyponatremia 2/2 increased FW intake, would decrease free water    Plan:  CVVHD with net negative 200cc/hr adjust Uf based on HDS as below   CRRT Treatment:   Modality: CVVHD, Filter: NxStage CAR-505, Target Blood Flow: 300 mL/Min  Target Fluid Balance: Titrate to net NEGATIVE fluid balance, 200 mL/Hr   BID BMP while on CVVHD,   Plan for iHD 10/11,  Daily weights   Renal diet, Encourage po intake       Access:  R TDC accessed       HTN:  BP stable  UF with CVVHD as above           Anemia:  Hgb at goal 9.7  EPO with HD     MBD;  Calcium: 9.0  Phos: 3.5  Hold phos binders while on CVVHD, change dialysate as above

## 2023-10-10 NOTE — DIETITIAN INITIAL EVALUATION ADULT - OTHER INFO
63Y M w/hx of ESRD  admitted for SOB and generalized weakness and fatigue missed HD, now h/c c/b cardiogenic shock, on inotropes and CVVHD (10/6) tolerating well, net neg 2.8L continue CVVHD     Chart reviewed. Pt seen at bedside on 5 EA, on CVVHD. Currently ordered for renal restricted diet + 1 Ensure Enlive daily. Pt reported fair appetite & PO intake, pt with 100% completion of meals per nursing. No overt muscle wasting/subcutaneous losses noted per NFPE. Pt unsure of recent weight changes. Pt does not meet criteria for malnutrition per ASPEN guidelines. NKFA, no cultural/ethnic/Orthodox food preferences provided. No chew/swallow difficulty noted. Labs reviewed- Na 130 <L> [likely secondary to volume overload]. Meds- nephrovite & MVI, folic acid. RDN will continue to monitor, reassess, and intervene as appropriate.     Pain: no pain/discomfort noted  Skin: R elbow wound, Jon score 15  GI: soft, distended reducible umbilical hernia

## 2023-10-10 NOTE — PROGRESS NOTE ADULT - SUBJECTIVE AND OBJECTIVE BOX
Patient is a 63y Male seen and evaluated at bedside.       Meds:    atorvastatin 40 at bedtime  clopidogrel Tablet 75 daily  CRRT Treatment  <Continuous>  DOBUTamine Infusion 2.5 <Continuous>  folic acid 1 daily  heparin  Infusion 1000 <Continuous>  influenza  Vaccine (HIGH DOSE) 0.7 once  multivitamin 1 daily  Nephro-chichi 1 every 24 hours  ondansetron Injectable 4 every 12 hours PRN  pantoprazole    Tablet 40 before breakfast  Phoxillum Filtration BK 4 / 2.5 5000 <Continuous>  pregabalin 75 daily      T(C): , Max: 36.8 (10-09-23 @ 12:00)  T(F): , Max: 98.3 (10-09-23 @ 12:00)  HR: 98 (10-10-23 @ 10:00)  BP: 111/71 (10-10-23 @ 10:00)  BP(mean): 84 (10-10-23 @ 10:00)  RR: 17 (10-10-23 @ 10:00)  SpO2: 96% (10-10-23 @ 10:00)  Wt(kg): --    10-09 @ 07:01  -  10-10 @ 07:00  --------------------------------------------------------  IN: 2978.8 mL / OUT: 5312 mL / NET: -2333.2 mL    10-10 @ 07:01  -  10-10 @ 10:09  --------------------------------------------------------  IN: 790.1 mL / OUT: 752 mL / NET: 38.1 mL          Review of Systems:  ROS negative except as per HPI      PHYSICAL EXAM:  GENERAL: NAD  NECK: supple, No JVD  CHEST/LUNG: Clear to auscultation bilaterally  HEART: normal S1S2, RRR  ABDOMEN: Soft, Nontender, +BS, No flank tenderness bilateral  EXTREMITIES: No clubbing, cyanosis, or edema   NEUROLOGY: AAO x3, no focal neurological deficit  ACCESS: good thrill and bruit appreciated      LABS:                        9.7    4.35  )-----------( 74       ( 10 Oct 2023 05:30 )             30.7     10-09    127<L>  |  94<L>  |  8   ----------------------------<  169<H>  4.1   |  23  |  1.26    Ca    9.0      09 Oct 2023 17:22  Phos  3.5     10-10  Mg     2.1     10-10    TPro  6.5  /  Alb  3.1<L>  /  TBili  1.6<H>  /  DBili  x   /  AST  37  /  ALT  41  /  AlkPhos  239<H>  10-09    Hepatitis C Virus S/CO Ratio: 0.05 S/CO (10-10 @ 05:30)  Hepatitis C Virus Interpretation: Nonreact (10-10 @ 05:30)    PT/INR - ( 09 Oct 2023 05:30 )   PT: 16.4 sec;   INR: 1.45          PTT - ( 10 Oct 2023 05:30 )  PTT:96.4 sec  Urinalysis Basic - ( 09 Oct 2023 17:22 )    Color: x / Appearance: x / SG: x / pH: x  Gluc: 169 mg/dL / Ketone: x  / Bili: x / Urobili: x   Blood: x / Protein: x / Nitrite: x   Leuk Esterase: x / RBC: x / WBC x   Sq Epi: x / Non Sq Epi: x / Bacteria: x            RADIOLOGY & ADDITIONAL STUDIES:           Patient is a 63y Male seen and evaluated at bedside. No acute distress, tolerating CVVHD, off dobutamine. No complications with procedure.       Meds:    atorvastatin 40 at bedtime  clopidogrel Tablet 75 daily  CRRT Treatment  <Continuous>  DOBUTamine Infusion 2.5 <Continuous>  folic acid 1 daily  heparin  Infusion 1000 <Continuous>  influenza  Vaccine (HIGH DOSE) 0.7 once  multivitamin 1 daily  Nephro-chichi 1 every 24 hours  ondansetron Injectable 4 every 12 hours PRN  pantoprazole    Tablet 40 before breakfast  Phoxillum Filtration BK 4 / 2.5 5000 <Continuous>  pregabalin 75 daily      T(C): , Max: 36.8 (10-09-23 @ 12:00)  T(F): , Max: 98.3 (10-09-23 @ 12:00)  HR: 98 (10-10-23 @ 10:00)  BP: 111/71 (10-10-23 @ 10:00)  BP(mean): 84 (10-10-23 @ 10:00)  RR: 17 (10-10-23 @ 10:00)  SpO2: 96% (10-10-23 @ 10:00)  Wt(kg): --    10-09 @ 07:01  -  10-10 @ 07:00  --------------------------------------------------------  IN: 2978.8 mL / OUT: 5312 mL / NET: -2333.2 mL    10-10 @ 07:01  -  10-10 @ 10:09  --------------------------------------------------------  IN: 790.1 mL / OUT: 752 mL / NET: 38.1 mL          Review of Systems:  ROS negative except as per HPI      PHYSICAL EXAM:  Constitutional: awake, no acute distress, sitting in chair tolerating CVVHD, more awake and conversant   EENT: anicteric sclera; oropharynx clear  Neck: supple, midline trachea  Respiratory: CTAB no accessory muscle use  Cardiovascular: +S1/S2, irregular rate;  Gastrointestinal: soft, distended reducible umbilical hernia, abdominal striae  Extremities: warm, no edema   Neurological: ANOx3 no focal neurological deficits   Access: R TDC accessed       LABS:                        9.7    4.35  )-----------( 74       ( 10 Oct 2023 05:30 )             30.7     10-09    127<L>  |  94<L>  |  8   ----------------------------<  169<H>  4.1   |  23  |  1.26    Ca    9.0      09 Oct 2023 17:22  Phos  3.5     10-10  Mg     2.1     10-10    TPro  6.5  /  Alb  3.1<L>  /  TBili  1.6<H>  /  DBili  x   /  AST  37  /  ALT  41  /  AlkPhos  239<H>  10-09    Hepatitis C Virus S/CO Ratio: 0.05 S/CO (10-10 @ 05:30)  Hepatitis C Virus Interpretation: Nonreact (10-10 @ 05:30)    PT/INR - ( 09 Oct 2023 05:30 )   PT: 16.4 sec;   INR: 1.45          PTT - ( 10 Oct 2023 05:30 )  PTT:96.4 sec  Urinalysis Basic - ( 09 Oct 2023 17:22 )    Color: x / Appearance: x / SG: x / pH: x  Gluc: 169 mg/dL / Ketone: x  / Bili: x / Urobili: x   Blood: x / Protein: x / Nitrite: x   Leuk Esterase: x / RBC: x / WBC x   Sq Epi: x / Non Sq Epi: x / Bacteria: x            RADIOLOGY & ADDITIONAL STUDIES:

## 2023-10-10 NOTE — PROGRESS NOTE ADULT - ATTENDING COMMENTS
63M, poor historian, w/ pmh of ESRD on HD, h/o chronic EtOH w/ possible cirrhosis, HTN, HLD, AF, chronic systolic CHF initially admitted to medicine service w/ volume overload after missing HD -> however RRT called during first inpatient HD session d/t hypotension, pt. also found to have severe biventricular failure on TTE w/ clinical evidence of early low output state -> tx'd to CCU service    -CHF - Severe bi-ventricular failure 2/2 NICM likely EtOH induced; Mildly elevated lactate on arrival to CCU on 10/6 w/ cool extremities, started on  2.5mcg and CVVHD and pt. has evidence of significant volume overload, Lactate cleared prior to initiation of CVVHD. Pt. has now tolerated ~2.5-2.8L of volume removal/24 hrs for the past 72 hrs; Pt. is currently mildly overloaded but significantly improved, extremities are WWP, Pt. A&Ox3;  Midodrine 5mg TID dc'd yesterday and  2.5mcg now dc'd this AM; Will repeat PM labs, c/w CVVHD for one more day and will consider low dose PO Hydral starting tonight - 10 TID for afterload reduction  -AF - currently in rate controlled AF; c/w Heparin gtt for now; defer BB until euvolemic  -DASH/Renal diet  -OOB to chair  -DVT PPx  -Full Code  -Dispo: CCU    Itzel Dias MD  CCU Attending

## 2023-10-10 NOTE — DIETITIAN INITIAL EVALUATION ADULT - PROBLEM SELECTOR PLAN 10
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  with. Bilateral lower extremity 2+edema with topical dry skin, likely venous stasis dermatitis on due to peripheral artery disease vs. heart failure related edema changes. Likely from niacin deficiency (pellagra) as etiology for dermatitis as patient has 3 month anorexia, fatigue, numbness.  -Obtain collateral from [facility] Dermatologist; recently prescribed Cerave w/o use  -Caution with home cyproheptadine 4 mg home med with zofran to avoid serotonin syndrome  -Consider niacin supplementation (vitamin b3)

## 2023-10-10 NOTE — DIETITIAN INITIAL EVALUATION ADULT - PROBLEM SELECTOR PLAN 2
History of HF with recent TTE EF 30-40% with Dr. Jacobo Cain ([facility]), Dr. Corey Souza ([facility]). Bilateral 2+ pitting edema to midthigh and left sided crackles, concern for some fluid overload. ProBNP >68421. Home med Bumex 2 mg MWF; no GDMT listed.  -Continue home med Bumex 2 mg MWF  -Obtain collateral for [facility] Card/HF for GDMT, EF confirmation  -Strict I&O

## 2023-10-10 NOTE — DIETITIAN INITIAL EVALUATION ADULT - PERTINENT LABORATORY DATA
10-10    130<L>  |  97  |  8   ----------------------------<  109<H>  4.3   |  20<L>  |  1.18    Ca    8.9      10 Oct 2023 05:30  Phos  3.5     10-10  Mg     2.1     10-10    TPro  6.7  /  Alb  3.1<L>  /  TBili  1.6<H>  /  DBili  x   /  AST  36  /  ALT  40  /  AlkPhos  238<H>  10-10  A1C with Estimated Average Glucose Result: 5.4 % (10-04-23 @ 05:49)

## 2023-10-10 NOTE — DIETITIAN INITIAL EVALUATION ADULT - PROBLEM SELECTOR PLAN 8
At admission Hg 10.2, Hct 32, .6. No baseline Hg or iron studies for comparison. Home med iron 65 mg qd for LEMER, though MCV is macrocytic likely 2/2 folate or b12 deficiency. Etiology of anemia likely multifactorial of ELMER, AOCD, and vitb12/folate deficiency. No known history of GI bleeds, hemoptysis, hematochezia, melenic stool.  -Hold home med iron 65 mg  -Iron studies for morning labs  -F/u vit b12, folate levels  -F/u coag panel  -Maintain active type and screen

## 2023-10-10 NOTE — DIETITIAN INITIAL EVALUATION ADULT - PROBLEM SELECTOR PLAN 1
ESRD, started on HD 6 months ago, 4x week (M/T/Th/Sat), missed HD session on 10/3/23. Use R port on chest, with L AV fistula will be placed soon and vein was mapped last week. At admission, Cr 3.93 (un-established baseline Cr), K 3.4, Phos 4.4. Metabolic acidosis (AGAP 17) likely due to hypochloremic emesis.   -continue home nephro chichi 60 300mg; hold midodrine 5 mg TID i/s/o normotensive BP and missed HD session  -pending HD session in AM  -appreciate continued nephro recs  -consider VBG or ABG for metabolic acidosis  -avoid L hand blood draw as AV fistula will be placed there at [facility] in next couple of weeks

## 2023-10-10 NOTE — PROGRESS NOTE ADULT - ATTENDING COMMENTS
I:  Edema.  BP stable on CVVHD.   A: ESRD. Fluid overload.   P: Continue dialysis. Gentle UF, target 1 - 2 L/d. Keep MAP > 65.

## 2023-10-11 DIAGNOSIS — L30.9 DERMATITIS, UNSPECIFIED: ICD-10-CM

## 2023-10-11 DIAGNOSIS — K21.9 GASTRO-ESOPHAGEAL REFLUX DISEASE WITHOUT ESOPHAGITIS: ICD-10-CM

## 2023-10-11 DIAGNOSIS — I48.91 UNSPECIFIED ATRIAL FIBRILLATION: ICD-10-CM

## 2023-10-11 DIAGNOSIS — I50.23 ACUTE ON CHRONIC SYSTOLIC (CONGESTIVE) HEART FAILURE: ICD-10-CM

## 2023-10-11 DIAGNOSIS — N18.6 END STAGE RENAL DISEASE: ICD-10-CM

## 2023-10-11 DIAGNOSIS — K74.60 UNSPECIFIED CIRRHOSIS OF LIVER: ICD-10-CM

## 2023-10-11 DIAGNOSIS — I25.10 ATHEROSCLEROTIC HEART DISEASE OF NATIVE CORONARY ARTERY WITHOUT ANGINA PECTORIS: ICD-10-CM

## 2023-10-11 DIAGNOSIS — D64.9 ANEMIA, UNSPECIFIED: ICD-10-CM

## 2023-10-11 DIAGNOSIS — F10.20 ALCOHOL DEPENDENCE, UNCOMPLICATED: ICD-10-CM

## 2023-10-11 LAB
ALBUMIN SERPL ELPH-MCNC: 3.3 G/DL — SIGNIFICANT CHANGE UP (ref 3.3–5)
ALP SERPL-CCNC: 248 U/L — HIGH (ref 40–120)
ALT FLD-CCNC: 40 U/L — SIGNIFICANT CHANGE UP (ref 10–45)
ANION GAP SERPL CALC-SCNC: 13 MMOL/L — SIGNIFICANT CHANGE UP (ref 5–17)
APTT BLD: 109.9 SEC — HIGH (ref 24.5–35.6)
APTT BLD: 99.8 SEC — HIGH (ref 24.5–35.6)
AST SERPL-CCNC: 33 U/L — SIGNIFICANT CHANGE UP (ref 10–40)
BASOPHILS # BLD AUTO: 0.03 K/UL — SIGNIFICANT CHANGE UP (ref 0–0.2)
BASOPHILS NFR BLD AUTO: 0.6 % — SIGNIFICANT CHANGE UP (ref 0–2)
BILIRUB SERPL-MCNC: 1.9 MG/DL — HIGH (ref 0.2–1.2)
BUN SERPL-MCNC: 8 MG/DL — SIGNIFICANT CHANGE UP (ref 7–23)
CALCIUM SERPL-MCNC: 9 MG/DL — SIGNIFICANT CHANGE UP (ref 8.4–10.5)
CHLORIDE SERPL-SCNC: 96 MMOL/L — SIGNIFICANT CHANGE UP (ref 96–108)
CO2 SERPL-SCNC: 22 MMOL/L — SIGNIFICANT CHANGE UP (ref 22–31)
CREAT SERPL-MCNC: 1.13 MG/DL — SIGNIFICANT CHANGE UP (ref 0.5–1.3)
EGFR: 72 ML/MIN/1.73M2 — SIGNIFICANT CHANGE UP
EOSINOPHIL # BLD AUTO: 0.04 K/UL — SIGNIFICANT CHANGE UP (ref 0–0.5)
EOSINOPHIL NFR BLD AUTO: 0.8 % — SIGNIFICANT CHANGE UP (ref 0–6)
GAMMA INTERFERON BACKGROUND BLD IA-ACNC: 0.03 IU/ML — SIGNIFICANT CHANGE UP
GLUCOSE SERPL-MCNC: 103 MG/DL — HIGH (ref 70–99)
HCT VFR BLD CALC: 32.5 % — LOW (ref 39–50)
HGB BLD-MCNC: 10.2 G/DL — LOW (ref 13–17)
IMM GRANULOCYTES NFR BLD AUTO: 0.4 % — SIGNIFICANT CHANGE UP (ref 0–0.9)
LYMPHOCYTES # BLD AUTO: 1.04 K/UL — SIGNIFICANT CHANGE UP (ref 1–3.3)
LYMPHOCYTES # BLD AUTO: 21.8 % — SIGNIFICANT CHANGE UP (ref 13–44)
M TB IFN-G BLD-IMP: NEGATIVE — SIGNIFICANT CHANGE UP
M TB IFN-G CD4+ BCKGRND COR BLD-ACNC: -0.01 IU/ML — SIGNIFICANT CHANGE UP
M TB IFN-G CD4+CD8+ BCKGRND COR BLD-ACNC: 0 IU/ML — SIGNIFICANT CHANGE UP
MAGNESIUM SERPL-MCNC: 2.3 MG/DL — SIGNIFICANT CHANGE UP (ref 1.6–2.6)
MCHC RBC-ENTMCNC: 31.4 GM/DL — LOW (ref 32–36)
MCHC RBC-ENTMCNC: 32.7 PG — SIGNIFICANT CHANGE UP (ref 27–34)
MCV RBC AUTO: 104.2 FL — HIGH (ref 80–100)
MONOCYTES # BLD AUTO: 0.65 K/UL — SIGNIFICANT CHANGE UP (ref 0–0.9)
MONOCYTES NFR BLD AUTO: 13.7 % — SIGNIFICANT CHANGE UP (ref 2–14)
NEUTROPHILS # BLD AUTO: 2.98 K/UL — SIGNIFICANT CHANGE UP (ref 1.8–7.4)
NEUTROPHILS NFR BLD AUTO: 62.7 % — SIGNIFICANT CHANGE UP (ref 43–77)
NRBC # BLD: 0 /100 WBCS — SIGNIFICANT CHANGE UP (ref 0–0)
PHOSPHATE SERPL-MCNC: 3.5 MG/DL — SIGNIFICANT CHANGE UP (ref 2.5–4.5)
PLATELET # BLD AUTO: 78 K/UL — LOW (ref 150–400)
POTASSIUM SERPL-MCNC: 4.6 MMOL/L — SIGNIFICANT CHANGE UP (ref 3.5–5.3)
POTASSIUM SERPL-SCNC: 4.6 MMOL/L — SIGNIFICANT CHANGE UP (ref 3.5–5.3)
PROT SERPL-MCNC: 6.9 G/DL — SIGNIFICANT CHANGE UP (ref 6–8.3)
QUANT TB PLUS MITOGEN MINUS NIL: 0.68 IU/ML — SIGNIFICANT CHANGE UP
RBC # BLD: 3.12 M/UL — LOW (ref 4.2–5.8)
RBC # FLD: 18.3 % — HIGH (ref 10.3–14.5)
SODIUM SERPL-SCNC: 131 MMOL/L — LOW (ref 135–145)
WBC # BLD: 4.76 K/UL — SIGNIFICANT CHANGE UP (ref 3.8–10.5)
WBC # FLD AUTO: 4.76 K/UL — SIGNIFICANT CHANGE UP (ref 3.8–10.5)

## 2023-10-11 PROCEDURE — 99291 CRITICAL CARE FIRST HOUR: CPT

## 2023-10-11 PROCEDURE — 99232 SBSQ HOSP IP/OBS MODERATE 35: CPT | Mod: GC

## 2023-10-11 PROCEDURE — 71045 X-RAY EXAM CHEST 1 VIEW: CPT | Mod: 26

## 2023-10-11 RX ORDER — ASPIRIN/CALCIUM CARB/MAGNESIUM 324 MG
81 TABLET ORAL DAILY
Refills: 0 | Status: DISCONTINUED | OUTPATIENT
Start: 2023-10-12 | End: 2023-10-27

## 2023-10-11 RX ORDER — HEPARIN SODIUM 5000 [USP'U]/ML
900 INJECTION INTRAVENOUS; SUBCUTANEOUS
Qty: 25000 | Refills: 0 | Status: DISCONTINUED | OUTPATIENT
Start: 2023-10-11 | End: 2023-10-11

## 2023-10-11 RX ORDER — HEPARIN SODIUM 5000 [USP'U]/ML
5000 INJECTION INTRAVENOUS; SUBCUTANEOUS EVERY 8 HOURS
Refills: 0 | Status: DISCONTINUED | OUTPATIENT
Start: 2023-10-11 | End: 2023-10-27

## 2023-10-11 RX ADMIN — Medication 81 MILLIGRAM(S): at 11:23

## 2023-10-11 RX ADMIN — PANTOPRAZOLE SODIUM 40 MILLIGRAM(S): 20 TABLET, DELAYED RELEASE ORAL at 06:17

## 2023-10-11 RX ADMIN — HEPARIN SODIUM 9 UNIT(S)/HR: 5000 INJECTION INTRAVENOUS; SUBCUTANEOUS at 06:40

## 2023-10-11 RX ADMIN — ATORVASTATIN CALCIUM 40 MILLIGRAM(S): 80 TABLET, FILM COATED ORAL at 21:59

## 2023-10-11 RX ADMIN — Medication 1 MILLIGRAM(S): at 11:24

## 2023-10-11 RX ADMIN — Medication 1 TABLET(S): at 06:17

## 2023-10-11 RX ADMIN — HEPARIN SODIUM 5000 UNIT(S): 5000 INJECTION INTRAVENOUS; SUBCUTANEOUS at 22:00

## 2023-10-11 RX ADMIN — Medication 75 MILLIGRAM(S): at 11:23

## 2023-10-11 RX ADMIN — HEPARIN SODIUM 5000 UNIT(S): 5000 INJECTION INTRAVENOUS; SUBCUTANEOUS at 14:34

## 2023-10-11 NOTE — PROGRESS NOTE ADULT - SUBJECTIVE AND OBJECTIVE BOX
Patient is a 63y Male seen and evaluated at bedside. No acute distress, doing well. Tolerated CVVHD. Plan to stop CVVHD and transition back to iHD 10/12.       Meds:    aspirin  chewable 81 daily  atorvastatin 40 at bedtime  CRRT Treatment  <Continuous>  folic acid 1 daily  heparin   Injectable 5000 every 8 hours  influenza  Vaccine (HIGH DOSE) 0.7 once  Nephro-chichi 1 every 24 hours  ondansetron Injectable 4 every 12 hours PRN  pantoprazole    Tablet 40 before breakfast  Phoxillum Filtration BK 4 / 2.5 5000 <Continuous>      T(C): , Max: 37.3 (10-11-23 @ 09:00)  T(F): , Max: 99.2 (10-11-23 @ 09:00)  HR: 95 (10-11-23 @ 14:04)  BP: 90/62 (10-11-23 @ 14:04)  BP(mean): 62 (10-11-23 @ 14:00)  RR: 20 (10-11-23 @ 14:04)  SpO2: 94% (10-11-23 @ 14:04)  Wt(kg): --    10-10 @ 07:01  -  10-11 @ 07:00  --------------------------------------------------------  IN: 3516.4 mL / OUT: 6577 mL / NET: -3060.6 mL    10-11 @ 07:01  -  10-11 @ 14:28  --------------------------------------------------------  IN: 1115 mL / OUT: 1350 mL / NET: -235 mL          Review of Systems:  ROS negative except as per HPI      PHYSICAL EXAM:  Constitutional: awake, no acute distress, sitting in chair tolerating CVVHD, more awake and conversant   EENT: anicteric sclera; oropharynx clear  Neck: supple, midline trachea  Respiratory: CTAB no accessory muscle use  Cardiovascular: +S1/S2, irregular rate;  Gastrointestinal: soft, distended reducible umbilical hernia, abdominal striae  Extremities: warm, no edema   Neurological: ANOx3 no focal neurological deficits   Access: R TDC accessed     LABS:                        10.2   4.76  )-----------( 78       ( 11 Oct 2023 05:30 )             32.5     10-11    131<L>  |  96  |  8   ----------------------------<  103<H>  4.6   |  22  |  1.13    Ca    9.0      11 Oct 2023 05:30  Phos  3.5     10-11  Mg     2.3     10-11    TPro  6.9  /  Alb  3.3  /  TBili  1.9<H>  /  DBili  x   /  AST  33  /  ALT  40  /  AlkPhos  248<H>  10-11      PTT - ( 11 Oct 2023 11:42 )  PTT:99.8 sec  Urinalysis Basic - ( 11 Oct 2023 05:30 )    Color: x / Appearance: x / SG: x / pH: x  Gluc: 103 mg/dL / Ketone: x  / Bili: x / Urobili: x   Blood: x / Protein: x / Nitrite: x   Leuk Esterase: x / RBC: x / WBC x   Sq Epi: x / Non Sq Epi: x / Bacteria: x            RADIOLOGY & ADDITIONAL STUDIES:

## 2023-10-11 NOTE — PROGRESS NOTE ADULT - SUBJECTIVE AND OBJECTIVE BOX
***CCU STEPDOWN TO TELEMETRY ACCEPTANCE NOTE***    Hospital course: Patient is a 63 year old M PMH HFrEF (last known EF 30-40% a few weeks ago), AFib (s/p watchman), ERSD 2/2 IgA nephropathy on dialysis (M,T, TH, S) since February 2023, congestive hepatopathy, alcohol use disorder, who presented with one day SOB and leg weakness/heaviness iso missed dialysis, admitted for HD and transferred to CCU for concern of cardiogenic shock i/s/o volume overload. Started on dobutamine given concern of cardiogenic shock. Patient received CVVHD, now net negative approx 11L this hospitalization. Started on heparin gtt full AC while on CVVHD. Now off CVVHD, dobutamine drip, and heparin drip. Patient with watchman device for >6 months, discontinued Plavix and transitioned now to aspirin only. Patient with significant improvement in mentation, AAOx3, warm and well-perfused extremities. Now transitioned back to HD. Stable for stepdown to telemetry on 10/11/23.        Interventional Cardiology PA Adult Progress Note    Subjective Assessment: Patient seen and examined at bedside, no acute complaints; specifically denies CP, SOB, f/c/n/v/d. Endorses improvement in abdominal size and breathing since hospital admission.  	  MEDICATIONS:    ondansetron Injectable 4 milliGRAM(s) IV Push every 12 hours PRN    pantoprazole    Tablet 40 milliGRAM(s) Oral before breakfast    atorvastatin 40 milliGRAM(s) Oral at bedtime    aspirin  chewable 81 milliGRAM(s) Oral daily  folic acid 1 milliGRAM(s) Oral daily  heparin   Injectable 5000 Unit(s) SubCutaneous every 8 hours  influenza  Vaccine (HIGH DOSE) 0.7 milliLiter(s) IntraMuscular once  Nephro-chichi 1 Tablet(s) Oral every 24 hours        [PHYSICAL EXAM:  TELEMETRY:  T(C): 36.3 (10-11-23 @ 15:21), Max: 37.3 (10-11-23 @ 09:00)  HR: 73 (10-11-23 @ 15:21) (73 - 115)  BP: 89/65 (10-11-23 @ 15:21) (81/53 - 107/76)  RR: 18 (10-11-23 @ 15:21) (9 - 25)  SpO2: 100% (10-11-23 @ 15:21) (94% - 100%)  Wt(kg): --  I&O's Summary    10 Oct 2023 07:01  -  11 Oct 2023 07:00  --------------------------------------------------------  IN: 3516.4 mL / OUT: 6577 mL / NET: -3060.6 mL    11 Oct 2023 07:01  -  11 Oct 2023 17:52  --------------------------------------------------------  IN: 1115 mL / OUT: 1350 mL / NET: -235 mL        Norris:  Central/PICC/Mid Line:                                         Appearance: Pt seen in bed in NAD 	  HEENT:   moist oral mucosa, PERRL, EOMI, NCAT	; R eyelid slight ptosis   Neck: Supple  Cardiovascular: Irregular rhythm, +S1 S2, No JVD, + holosystolic murmur best @LUSB  Chest: R chest wall HD port seen, no erythema/purulence/edema/tenderness  Respiratory: + Bibasilar Rales; no Rhonchi, Wheezing; no increased respiratory effort or rate	  Gastrointestinal:  Soft, Non-tender, + BS, distended, + fluid wave, but without rebound or guarding; +reducible umbilical hernia midline lower quadrant, + abdominal striae to lower abdomen	  Skin: +bilateral upper extremity maculopapular rash with excoriations as well on scalp; venous stasis dermatitis bilateral ankles and lower calves. No jaundice. Very mild mottling of the hands.  Extremities: Moving all four extremities, 4+/5 all four extremities, No clubbing, cyanosis. Tense nonpitting edema to b/l LE  Vascular: Peripheral pulses palpable 2+ bilaterally  Neurologic: Non-focal  Psychiatry: A & O x 3, Mood & affect appropriate    	    LABS:	 	  CARDIAC MARKERS:                              10.2   4.76  )-----------( 78       ( 11 Oct 2023 05:30 )             32.5     10-11    131<L>  |  96  |  8   ----------------------------<  103<H>  4.6   |  22  |  1.13    Ca    9.0      11 Oct 2023 05:30  Phos  3.5     10-11  Mg     2.3     10-11    TPro  6.9  /  Alb  3.3  /  TBili  1.9<H>  /  DBili  x   /  AST  33  /  ALT  40  /  AlkPhos  248<H>  10-11    proBNP:   Lipid Profile:   HgA1c:   TSH:   PTT - ( 11 Oct 2023 11:42 )  PTT:99.8 sec

## 2023-10-11 NOTE — PROGRESS NOTE ADULT - ASSESSMENT
63Y M w/hx of ESRD  admitted for SOB and generalized weakness and fatigue missed HD, now h/c c/b cardiogenic shock, with marked clinical improvement off inotrope continue  CVVHD (10/6-10/11) total net neg 11.1L, plan to stop CVVHD and transition to iHD       #ESRD  Initiated CVVHD (10/6)   Last HD 10/4 tolerated 2L with no complications   10/7 CVVHD 2.7L net neg   10/8 CVVHD  2.8L net neg   10/9 CVVHD 2.3L  net neg   10/10 CVVHD 3L net neg   off inotropes hyponatremia 2/2 increased FW intake, would decrease free water  EDW: 91kg     Plan:  Discontinue CVVHD, reassess volume status 10/12 and plan for HD   Daily BMP   Daily Weights   Renal diet, Encourage po intake       Access:  R TDC c/d/i       HTN:  BP stable  UF with HD as tolerated       Anemia:  Hgb at goal 10.2  EPO with HD     MBD;  Calcium: 9.0  Phos: 3.5  Continue to hold phos binders

## 2023-10-11 NOTE — PROGRESS NOTE ADULT - ASSESSMENT
63 year old male with history of ESRD (HD 4x weekly M/T/TH/SAT), HLD, HTN, GERD, atrial fibrillation (s/p Watchman), admitted to CCU w concern for cardiogenic shock iso volume overload.    NEURO  AAOx3  -FLOR    CARDIOVASCULAR    #inadequate cardiac output  #acute-on-chronic HFrEDF  tissue hypoperfusion as evidenced by cyanotic/cool extremities on exam yesterday  bedside us dilated RV, ultimately c/f RV failure with a worsening pericardial effusion   substantial improvement today following overnight cvv hd & low-dose dobutamine  - discontinue dobutamine drip  - continue CVVHD for one more day today, 10/10 with plan to transition to intermittent HD tomorrow, 10/11    #Atrial fibrillation  Patient with chronic atrial fibrillation s/p watchman placement. Currently on plavix.   - Discontinue plavix  - Start aspirin 81mg     #HLD (hyperlipidemia).   History of HLD, home med atorvastatin 40 mg. Patient unsure if had stent after MI  - continue home med atorvastatin 40 mg.    RESPIRATORY  Patient OOBTC at time of examination, saturating well on room air.   - Continue to monitor oxygenation status     GASTROINTESTINAL  #GERD (gastroesophageal reflux disease).   History of GERD, no complaints of epigastric pain at this time.   -Continue pantoprazole 40 mg qd.    RENAL  #ESRD on dialysis.   Started on HD 6 months ago, 4x week (M/T/Th/Sat), missed HD session on 10/3/23. R port utilized. At admission, Cr 3.93 (un-established baseline Cr), K 3.4, Phos 4.4. Metabolic acidosis (AGAP 17) likely due to hypochloremic emesis / etoh leading to lactic acidosis. Dialysate changed to Phoxillum due to low phos.   - continue home nephro chichi 60 300mg   - continue CVVHD today, 10/10 with plan to start iHD tomorrow, 10/11  - BID BMP while on CVVHD,   - C/w Heparin drip to avoid CVVHD filter clotting- d/c heparin drip once off CVVHD tomorrow  - Hold phosphate binders while on CVVHD   - nephro following, appreciate recommendations    #hepatic cirrhosis  chronic, likely 2/2 chronic alcohol use., no suspicion for hepatic encephalopathy at this time - patient mentating AOx3 and conversant, w/o jaundice or non-icteric sclera  low concern for hepatorenal syndrome as remains hemodynamically stable. No known gallbladder pathology established.  Abdominal US (10/4) Cirrhotic morphology. Pulsatile flow in the main and left portal veins. Moderate ascites. Increased right renal cortical echogenicity compatible with medical renal disease.  - continue CVVHD today    HEME  #Anemia.   At admission Hg 10.2, Hct 32, .6. No baseline Hgb or iron studies for comparison. Home med iron 65 mg qd for ELMER, though MCV is macrocytic likely 2/2 folate deficiency. However, etiology of anemia likely multifactorial of ELMER, AOCD, and folate deficiency. No known history of GI bleeds, hemoptysis, hematochezia, melenic stool. B12 increased.   -Hold home med iron 65 mg  -C/w folic acid 1 mg daily   - EPO with HD   -Maintain active type and screen    ENDO  -FLOR    ID  -FLOR    PSYCH  #Moderate alcohol use disorder.   At admission BIENVENIDO 29. AST/AlT 45/43. Last drink 2 PM on 10/3/23, has 2-3 martinis daily. No history of alcohol withdrawals or withdrawal seizures. One non-bloody, clear/bilious emesis in past 24 hours. Qtc 397. 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   CIWA 0 x2 days  - continue Zofran 4 mg IV q12 PRN nausea, vomiting   - Thiamine, folic acid, multivitamin  - Home meds B1 100 mg OTC, D3 1000 IU OTC, B12 1000 mcg OTC    DERM  #Dermatitis.   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 B/l LE dry skin, likely venous stasis dermatitis on due to peripheral artery disease vs. heart failure related edema changes. Likely from niacin deficiency (pellagra) as etiology for dermatitis as patient has 3 month anorexia, fatigue, numbness. Recently prescribed Cerave w/o use  - Consider niacin supplementation (vitamin b3) or topical steroid  - lotion inpatient  - f/u outpt dermatologist.    PROPHYLACTIC MEASURE  F: none  E: Replete per HD with ESRD- CVVHD 10/06  N: renal restrictions  GI ppx: pantoprazole  DVT ppx: heparin drip; SCDs and on DAPT  Full code  Dispo: CCU 63 year old male with history of ESRD (HD 4x weekly M/T/TH/SAT), HLD, HTN, GERD, atrial fibrillation (s/p Watchman), admitted to CCU w concern for cardiogenic shock iso volume overload.    NEURO  AAOx3  -FLOR    CARDIOVASCULAR  #Acute-on-chronic HFrEDF  tissue hypoperfusion as evidenced by cyanotic/cool extremities on exam upon presentation to CCU. Improvement in perfusion and now with warm, well perfused extremities  S/p CVVHD and low-dose dobutamine. Discontinued midodrine and patient's blood pressures tolerating well.   -Transition back to iHD sessions   - Recommend adding hydralazine 10mg PO TID on non-dialysis days, hold on dialysis days     #Atrial fibrillation  Patient with chronic atrial fibrillation s/p watchman placement.  - Continue aspirin 81mg     #HLD (hyperlipidemia).   History of HLD, home med atorvastatin 40 mg. Patient unsure if had stent after MI  - Continue home med atorvastatin 40 mg.    RESPIRATORY  Patient OOBTC at time of examination, saturating well on room air.   - Continue to monitor oxygenation status     GASTROINTESTINAL  #GERD (gastroesophageal reflux disease).   History of GERD, no complaints of epigastric pain at this time.   -Continue pantoprazole 40 mg qd.    RENAL  #ESRD on dialysis.   Started on HD 6 months ago, 4x week (M/T/Th/Sat) at [facility], missed HD session on 10/3/23. R port utilized. At admission, Cr 3.93 (un-established baseline Cr), K 3.4, Phos 4.4. Metabolic acidosis (AGAP 17) likely due to hypochloremic emesis / etoh leading to lactic acidosis. Dialysate changed to Phoxillum due to low phos.   - continue home nephro chichi 60 300mg   - Restart intermittent hemodialysis on patient schedule M/T/Th/Sat  - Discontinue heparin drip   - Nephro following, appreciate recommendations    #Hepatic cirrhosis  chronic, likely 2/2 chronic alcohol use., no suspicion for hepatic encephalopathy at this time - patient mentating AOx3 and conversant, w/o jaundice or non-icteric sclera  low concern for hepatorenal syndrome as remains hemodynamically stable. No known gallbladder pathology established.  Abdominal US (10/4) Cirrhotic morphology. Pulsatile flow in the main and left portal veins. Moderate ascites. Increased right renal cortical echogenicity compatible with medical renal disease.  - continue CVVHD today    HEME  #Anemia.   At admission Hg 10.2, Hct 32, .6. No baseline Hgb or iron studies for comparison. Home med iron 65 mg qd for ELMER, though MCV is macrocytic likely 2/2 folate deficiency. However, etiology of anemia likely multifactorial of ELMER, AOCD, and folate deficiency. No known history of GI bleeds, hemoptysis, hematochezia, melenic stool. B12 increased.   -Hold home med iron 65 mg  -C/w folic acid 1 mg daily   - EPO with HD   -Maintain active type and screen    ENDO  -FLOR    ID  -FLOR    PSYCH  #Moderate alcohol use disorder.   At admission BIENVENIDO 29. AST/AlT 45/43. Last drink 2 PM on 10/3/23, has 2-3 martinis daily. No history of alcohol withdrawals or withdrawal seizures. Qtc 397. 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   CIWA 0 x2 days  - continue Zofran 4 mg IV q12 PRN nausea, vomiting   - Thiamine, folic acid, multivitamin  - Home meds B1 100 mg OTC, D3 1000 IU OTC, B12 1000 mcg OTC    DERM  #Dermatitis.   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 B/l LE dry skin, likely venous stasis dermatitis on due to peripheral artery disease vs. heart failure related edema changes. Likely from niacin deficiency (pellagra) as etiology for dermatitis as patient has 3 month anorexia, fatigue, numbness. Recently prescribed Cerave w/o use  - Consider niacin supplementation (vitamin b3) or topical steroid  - lotion inpatient  - f/u outpt dermatologist.    PROPHYLACTIC MEASURE  F: None  E: Replete per HD with ESRD  N: renal restrictions  GI ppx: pantoprazole  DVT ppx: heparin 5000u q8h; SCDs  Full code  Dispo: CCU--> TELE

## 2023-10-11 NOTE — PROGRESS NOTE ADULT - PROBLEM SELECTOR PLAN 7
-H/H stable at 10.2/32,  same as admission H/H.   No baseline Hgb or iron studies for comparison. Home med iron 65 mg qd for ELMER, though MCV is macrocytic likely 2/2 folate deficiency.   -Etiology likely multifactorial of ELMER, AOCD, and folate deficiency. No known history of GI bleeds, hemoptysis, hematochezia, melenic stool. B12 increased.   -Hold home med iron 65mg  -C/w folic acid 1mg daily   -EPO with HD   -Maintain active type and screen

## 2023-10-11 NOTE — PROGRESS NOTE ADULT - PROBLEM SELECTOR PLAN 6
-currently in rate controlled AF; s/p watchman device; Now that pt is off CVVHD, Hep gtt discontinued  -proceed w/ ASA monotherapy given presence of Watchman device

## 2023-10-11 NOTE — PROGRESS NOTE ADULT - ASSESSMENT
63M, poor historian, with PMHx of ESRD (HD 4x weekly M/T/TH/SAT), HLD, HTN, CHF, ?MI, GERD, atrial fibrillation (s/p Watchman),  h/o chronic EtOH w/ cirrhosis,  initially admitted to medicine service w/ volume overload after missing HD; course c/b RRT during first inpatient HD session d/t hypotension and pt was found to have severe biventricular failure on TTE w/ clinical evidence of early low output state; s/p CCU (10/6-10/11 afternoon) for concern for Cardiogenic shock, s/p CVVHD x 4 days and Dobutamine;  now off Dobutamine and stepped down to Telemetry with plan for intermittent HD per Nephro.

## 2023-10-11 NOTE — PROGRESS NOTE ADULT - PROBLEM SELECTOR PLAN 4
At admission BIENVENIDO 29. AST/AlT 45/43. Last drink 2 PM on 10/3/23, has 2-3 martinis daily. No history of alcohol withdrawals or withdrawal seizures. Qtc 397. 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   - S/p CIWA monitoring. CIWA 0 x2 days  - Thiamine, folic acid, multivitamin  - Home meds B1 100 mg OTC, D3 1000 IU OTC, B12 1000 mcg OTC

## 2023-10-11 NOTE — PROGRESS NOTE ADULT - PROBLEM SELECTOR PLAN 2
Started on HD 6 months ago, 4x week (M/T/Th/Sat) at [facility], missed HD session on 10/3/23. R chest port utilized. At admission, Cr 3.93 (un-established baseline Cr), K 3.4, Phos 4.4. Metabolic acidosis (AGAP 17) likely due to hypochloremic emesis / ETOH leading to lactic acidosis. Dialysate changed to Phoxillum due to low phos.   - s/p CVVHD 10/6-10/10/23 (received 4 sessions) w. Hep gtt in CCU, now plan to restart intermittent hemodialysis on patient schedule M/T/Th/Sat; plan for 10/12 tomw  - Nephro following, appreciate recommendations  - Trend BMP daily. c/w home Nephro-cihchi

## 2023-10-11 NOTE — PROGRESS NOTE ADULT - ATTENDING COMMENTS
63M, poor historian, w/ pmh of ESRD on HD, h/o chronic EtOH w/ possible cirrhosis, HTN, HLD, AF, chronic systolic CHF initially admitted to medicine service w/ volume overload after missing HD -> however RRT called during first inpatient HD session d/t hypotension, pt. also found to have severe biventricular failure on TTE w/ clinical evidence of early low output state -> tx'd to CCU service    -CHF - Severe bi-ventricular failure 2/2 NICM likely EtOH induced; Mildly elevated lactate on arrival to CCU on 10/6 w/ cool extremities, started on  2.5mcg and CVVHD and pt. has evidence of significant volume overload, Lactate cleared prior to initiation of CVVHD. Pt. has now tolerated 11.5L of volume removal over the past four days on CVVHD - CVVHD will be discontinued today and pt. will likely start IHD this week; Pt. is currently mildly overloaded but significantly improved, extremities are WWP, Pt. A&Ox3;  perfusion labs remain stable off  for now >36hrs; Midodrine should remain dc'd indefinitely, would also consider low dose Hydral 10 PO BID-TID on HD days  -AF - currently in rate controlled AF; s/p watchman device; Now that pt. is off CVVHD we can d/c Heparin gtt and proceed w/ ASA monotherapy given presence of Watchman device  -DASH/Renal diet  -OOB to chair  -DVT PPx  -Full Code  -Dispo: SD to Cardiac Tele    Itzel Dias MD  CCU Attending

## 2023-10-11 NOTE — PROGRESS NOTE ADULT - PROBLEM SELECTOR PLAN 1
-Presented with missed HD session causing volume overload and c/b concern for cardiogenic shock. Severe bi-ventricular failure 2/2 NICM likely EtOH induced; Mildly elevated lactate on arrival to CCU on 10/6 w/ cool extremities, started on  2.5mcg and CVVHD as pt had evidence of significant volume overload, Lactate cleared prior to initiation of CVVHD.   -s/p 11.5L of volume removal over the past four days on CVVHD - CVVHD will be discontinued 10/11 and pt stepped to Cardiac Telemetry with plan for intermittent HD this week; Nephro following   - S/p Dobutamine for >36hrs; Home midodrine should remain dc'd indefinitely;  SBP maintaining 80-90s mmHg off dobutamine; continue to monitor closely  -TTEs inpt 10/5 & 10/6/23: LVEF 35-40% with global hypokinesis.  Mildly dilated RV w/ mod reduced RVSF. Elevated RA pressure. SHALOM. Mod AR. mild to mod AS. Mod to severe TR. PASP 39 mmHg. Small-to-moderate pericardial effusion without echocardiographic evidence of cardiac tamponade physiology. Ascites present.  - Exam: Still slightly overloaded with bibasilar crackles and tense nonpitting b/l LE edema. Currently warm and well perfused all extremities.  - Volume removal: w/ intermittent HD per Nephro  - Meds: CCU recs consider low dose Hydral 10mg PO BID-TID on non-HD days  - O2: satting well on room air  - VSq4h, continuous pulse ox and telemetry; Core measures, strict i/o, daily standing weight, fluid restrict

## 2023-10-11 NOTE — PROGRESS NOTE ADULT - PROBLEM SELECTOR PLAN 8
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 B/l LE dry skin, likely venous stasis dermatitis on due to peripheral artery disease vs. heart failure related edema changes. Likely from niacin deficiency (pellagra) as etiology for dermatitis as patient has 3 month anorexia, fatigue, numbness. Recently prescribed Cerave w/o use  - Consider niacin supplementation (vitamin b3) or topical steroid  - lotion inpatient  - f/u outpt dermatologist.    #GERD (gastroesophageal reflux disease).   History of GERD, no complaints of epigastric pain at this time.     PROPHYLACTIC MEASURE  F: None  E: Replete per HD with ESRD  N: renal restrictions  GI ppx: pantoprazole  DVT ppx: heparin 5000u q8h; SCDs  Full code  Dispo: CCU--> TELE 10/11/23  Case discussed with CCU signout and tele attending Dr. Macias.   -Continue pantoprazole 40 mg qd.

## 2023-10-11 NOTE — PROGRESS NOTE ADULT - SUBJECTIVE AND OBJECTIVE BOX
INTERVAL HPI/OVERNIGHT EVENTS: PTT at 109, heparin 10->9.    SUBJECTIVE: Patient seen and examined at bedside.     DRIPS: Dobutamine 2.5; Heparin 10    ACCESS: R. forearm P IV 20g 10/03, R. AC P IV 18g 10/06, right subclavian vein HD catheter     OBJECTIVE:    VITAL SIGNS:  ICU Vital Signs Last 24 Hrs  T(C): 36.4 (11 Oct 2023 06:09), Max: 36.9 (10 Oct 2023 18:00)  T(F): 97.6 (11 Oct 2023 06:09), Max: 98.4 (10 Oct 2023 18:00)  HR: 91 (11 Oct 2023 07:00) (89 - 101)  BP: 90/67 (11 Oct 2023 07:00) (84/59 - 111/71)  BP(mean): 75 (11 Oct 2023 07:00) (67 - 88)  ABP: --  ABP(mean): --  RR: 10 (11 Oct 2023 07:00) (9 - 25)  SpO2: 94% (11 Oct 2023 05:00) (94% - 100%)    O2 Parameters below as of 11 Oct 2023 07:00  Patient On (Oxygen Delivery Method): room air    I&O's Detail    10 Oct 2023 07:01  -  11 Oct 2023 07:00  --------------------------------------------------------  IN:    DOBUTamine: 15.4 mL    Heparin: 239 mL    Oral Fluid: 3262 mL  Total IN: 3516.4 mL    OUT:    Other (mL): 6245 mL  Total OUT: 6245 mL    Total NET: -2728.6 mL    PHYSICAL EXAM:  CONSTITUTIONAL: NAD  EYES: PERRLA and symmetric, EOMI, no conjunctival or scleral injection, non-icteric; no nystagmus  HENMT: moist mucus membranes  NECK: Supple  RESP: on HF o2, no apparent respiratory distress/use of accessory muscles  CV: irregular heart rate, +S1S2, no MRG; warm extremities   GI: Soft, NT, distended, + fluid wave, but without rebound or guarding; +reducible umbilical hernia midline lower quadrant, + abdominal striae   MSK: Normal ROM without pain, no spinal tenderness, normal muscle strength/tone  SKIN: +bilateral upper extremity maculopapular rash with excoriations as well on scalp; venous stasis dermatitis bilateral ankles and lower calves. No jaundice. Very mild mottling of the hands.  NEURO: CN II-XII intact; normal reflexes in upper and lower extremities, sensation intact in upper and lower extremities b/l to light touch   PSYCH: Appropriate insight/judgment; AO x 3, mood and affect appropriate, recent/remote memory intact    MEDICATIONS:  MEDICATIONS  (STANDING):  atorvastatin 40 milliGRAM(s) Oral at bedtime  clopidogrel Tablet 75 milliGRAM(s) Oral daily  CRRT Treatment    <Continuous>  DOBUTamine Infusion 2.5 MICROgram(s)/kG/Min (7.66 mL/Hr) IV Continuous <Continuous>  folic acid 1 milliGRAM(s) Oral daily  heparin  Infusion 1600 Unit(s)/Hr (13 mL/Hr) IV Continuous <Continuous>  influenza  Vaccine (HIGH DOSE) 0.7 milliLiter(s) IntraMuscular once  midodrine. 5 milliGRAM(s) Oral every 8 hours  multivitamin 1 Tablet(s) Oral daily  Nephro-chichi 1 Tablet(s) Oral every 24 hours  pantoprazole    Tablet 40 milliGRAM(s) Oral before breakfast  pregabalin 75 milliGRAM(s) Oral daily  PureFlow Dialysate RFP-400 (K 2 / Ca 3) 5000 milliLiter(s) (2000 mL/Hr) CRRT <Continuous>  sevelamer carbonate 800 milliGRAM(s) Oral three times a day with meals    MEDICATIONS  (PRN):  LORazepam   Injectable 1 milliGRAM(s) IV Push every 1 hour PRN CIWA-Ar score 8 or greater  ondansetron Injectable 4 milliGRAM(s) IV Push every 12 hours PRN Nausea and/or Vomiting    ALLERGIES:  Allergies    penicillins (Unknown)    Intolerances    LABS:     .  LABS:                         10.2   4.76  )-----------( 78       ( 11 Oct 2023 05:30 )             32.5     10-11    131<L>  |  96  |  8   ----------------------------<  103<H>  4.6   |  22  |  1.13    Ca    9.0      11 Oct 2023 05:30  Phos  3.5     10-11  Mg     2.3     10-11    TPro  6.9  /  Alb  3.3  /  TBili  1.9<H>  /  DBili  x   /  AST  33  /  ALT  40  /  AlkPhos  248<H>  10-11    PTT - ( 11 Oct 2023 05:36 )  PTT:109.9 sec  Urinalysis Basic - ( 11 Oct 2023 05:30 )    Color: x / Appearance: x / SG: x / pH: x  Gluc: 103 mg/dL / Ketone: x  / Bili: x / Urobili: x   Blood: x / Protein: x / Nitrite: x   Leuk Esterase: x / RBC: x / WBC x   Sq Epi: x / Non Sq Epi: x / Bacteria: x      RADIOLOGY, EKG & ADDITIONAL TESTS: Reviewed.    INTERVAL HPI/OVERNIGHT EVENTS: PTT at 109, heparin 10->9.    SUBJECTIVE: Patient seen and examined at bedside.     DRIPS: Heparin 9    ACCESS: R. forearm P IV 20g 10/03, R. AC P IV 18g 10/06, right subclavian vein HD catheter     OBJECTIVE:    VITAL SIGNS:  ICU Vital Signs Last 24 Hrs  T(C): 36.4 (11 Oct 2023 06:09), Max: 36.9 (10 Oct 2023 18:00)  T(F): 97.6 (11 Oct 2023 06:09), Max: 98.4 (10 Oct 2023 18:00)  HR: 91 (11 Oct 2023 07:00) (89 - 101)  BP: 90/67 (11 Oct 2023 07:00) (84/59 - 111/71)  BP(mean): 75 (11 Oct 2023 07:00) (67 - 88)  ABP: --  ABP(mean): --  RR: 10 (11 Oct 2023 07:00) (9 - 25)  SpO2: 94% (11 Oct 2023 05:00) (94% - 100%)    O2 Parameters below as of 11 Oct 2023 07:00  Patient On (Oxygen Delivery Method): room air    I&O's Detail    10 Oct 2023 07:01  -  11 Oct 2023 07:00  --------------------------------------------------------  IN:    DOBUTamine: 15.4 mL    Heparin: 239 mL    Oral Fluid: 3262 mL  Total IN: 3516.4 mL    OUT:    Other (mL): 6245 mL  Total OUT: 6245 mL    Total NET: -2728.6 mL    PHYSICAL EXAM:  CONSTITUTIONAL: NAD  EYES: PERRLA and symmetric, EOMI, no conjunctival or scleral injection, non-icteric; no nystagmus  HENMT: moist mucus membranes  NECK: Supple  RESP: on HF o2, no apparent respiratory distress/use of accessory muscles  CV: irregular heart rate, +S1S2, no MRG; warm extremities   GI: Soft, NT, distended, + fluid wave, but without rebound or guarding; +reducible umbilical hernia midline lower quadrant, + abdominal striae   MSK: Normal ROM without pain, no spinal tenderness, normal muscle strength/tone  SKIN: +bilateral upper extremity maculopapular rash with excoriations as well on scalp; venous stasis dermatitis bilateral ankles and lower calves. No jaundice. Very mild mottling of the hands.  NEURO: CN II-XII intact; normal reflexes in upper and lower extremities, sensation intact in upper and lower extremities b/l to light touch   PSYCH: Appropriate insight/judgment; AO x 3, mood and affect appropriate, recent/remote memory intact    MEDICATIONS:  MEDICATIONS  (STANDING):  atorvastatin 40 milliGRAM(s) Oral at bedtime  clopidogrel Tablet 75 milliGRAM(s) Oral daily  CRRT Treatment    <Continuous>  DOBUTamine Infusion 2.5 MICROgram(s)/kG/Min (7.66 mL/Hr) IV Continuous <Continuous>  folic acid 1 milliGRAM(s) Oral daily  heparin  Infusion 1600 Unit(s)/Hr (13 mL/Hr) IV Continuous <Continuous>  influenza  Vaccine (HIGH DOSE) 0.7 milliLiter(s) IntraMuscular once  midodrine. 5 milliGRAM(s) Oral every 8 hours  multivitamin 1 Tablet(s) Oral daily  Nephro-chihci 1 Tablet(s) Oral every 24 hours  pantoprazole    Tablet 40 milliGRAM(s) Oral before breakfast  pregabalin 75 milliGRAM(s) Oral daily  PureFlow Dialysate RFP-400 (K 2 / Ca 3) 5000 milliLiter(s) (2000 mL/Hr) CRRT <Continuous>  sevelamer carbonate 800 milliGRAM(s) Oral three times a day with meals    MEDICATIONS  (PRN):  LORazepam   Injectable 1 milliGRAM(s) IV Push every 1 hour PRN CIWA-Ar score 8 or greater  ondansetron Injectable 4 milliGRAM(s) IV Push every 12 hours PRN Nausea and/or Vomiting    ALLERGIES:  Allergies    penicillins (Unknown)    Intolerances    LABS:     .  LABS:                         10.2   4.76  )-----------( 78       ( 11 Oct 2023 05:30 )             32.5     10-11    131<L>  |  96  |  8   ----------------------------<  103<H>  4.6   |  22  |  1.13    Ca    9.0      11 Oct 2023 05:30  Phos  3.5     10-11  Mg     2.3     10-11    TPro  6.9  /  Alb  3.3  /  TBili  1.9<H>  /  DBili  x   /  AST  33  /  ALT  40  /  AlkPhos  248<H>  10-11    PTT - ( 11 Oct 2023 05:36 )  PTT:109.9 sec  Urinalysis Basic - ( 11 Oct 2023 05:30 )    Color: x / Appearance: x / SG: x / pH: x  Gluc: 103 mg/dL / Ketone: x  / Bili: x / Urobili: x   Blood: x / Protein: x / Nitrite: x   Leuk Esterase: x / RBC: x / WBC x   Sq Epi: x / Non Sq Epi: x / Bacteria: x      RADIOLOGY, EKG & ADDITIONAL TESTS: Reviewed.    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 Started on dobutamine given concern of cardiogenic shock. Patient received CVVHD, now net negative approx 11L this hospitalization. Started on heparin gtt full AC while on CVVHD. Now off CVVHD, dobutamine drip, and heparin drip. Patient with watchman device for >6 months, discontinued Plavix and transitioned now to aspirin only. Patient with significant improvement in mentation, AAOx3, warm and well-perfused extremities. Now transitioned back to HD. Stable for stepdown.     INTERVAL HPI/OVERNIGHT EVENTS: PTT at 109, heparin 10->9.    SUBJECTIVE: Patient seen and examined at bedside. Patient denies any concerns this morning. Seen sitting upright and eating breakfast, states his appetite is slightly improved.     ACCESS: R. forearm P IV 20g 10/03, R. AC P IV 18g 10/06, right subclavian vein HD catheter     OBJECTIVE:  VITAL SIGNS:  ICU Vital Signs Last 24 Hrs  T(C): 36.4 (11 Oct 2023 06:09), Max: 36.9 (10 Oct 2023 18:00)  T(F): 97.6 (11 Oct 2023 06:09), Max: 98.4 (10 Oct 2023 18:00)  HR: 91 (11 Oct 2023 07:00) (89 - 101)  BP: 90/67 (11 Oct 2023 07:00) (84/59 - 111/71)  BP(mean): 75 (11 Oct 2023 07:00) (67 - 88)  ABP: --  ABP(mean): --  RR: 10 (11 Oct 2023 07:00) (9 - 25)  SpO2: 94% (11 Oct 2023 05:00) (94% - 100%)    O2 Parameters below as of 11 Oct 2023 07:00  Patient On (Oxygen Delivery Method): room air    I&O's Detail    10 Oct 2023 07:01  -  11 Oct 2023 07:00  --------------------------------------------------------  IN:    DOBUTamine: 15.4 mL    Heparin: 239 mL    Oral Fluid: 3262 mL  Total IN: 3516.4 mL    OUT:    Other (mL): 6245 mL  Total OUT: 6245 mL    Total NET: -2728.6 mL    PHYSICAL EXAM:  CONSTITUTIONAL: NAD  EYES: PERRLA and symmetric, EOMI, no conjunctival or scleral injection, non-icteric; no nystagmus  HENMT: moist mucus membranes  NECK: Supple  RESP: on room air, no apparent respiratory distress/use of accessory muscles  CV: irregular heart rate, +S1S2, no MRG; warm extremities   GI: Soft, NT, distended, + fluid wave, but without rebound or guarding; +reducible umbilical hernia midline lower quadrant, + abdominal striae   MSK: Normal ROM without pain, no spinal tenderness, normal muscle strength/tone  SKIN: +bilateral upper extremity maculopapular rash with excoriations as well on scalp; venous stasis dermatitis bilateral ankles and lower calves. No jaundice. Very mild mottling of the hands.  NEURO: CN II-XII intact; normal reflexes in upper and lower extremities, sensation intact in upper and lower extremities b/l to light touch   PSYCH: Appropriate insight/judgment; AO x 3, mood and affect appropriate, recent/remote memory intact    MEDICATIONS:  MEDICATIONS  (STANDING):  atorvastatin 40 milliGRAM(s) Oral at bedtime  clopidogrel Tablet 75 milliGRAM(s) Oral daily  CRRT Treatment    <Continuous>  DOBUTamine Infusion 2.5 MICROgram(s)/kG/Min (7.66 mL/Hr) IV Continuous <Continuous>  folic acid 1 milliGRAM(s) Oral daily  heparin  Infusion 1600 Unit(s)/Hr (13 mL/Hr) IV Continuous <Continuous>  influenza  Vaccine (HIGH DOSE) 0.7 milliLiter(s) IntraMuscular once  midodrine. 5 milliGRAM(s) Oral every 8 hours  multivitamin 1 Tablet(s) Oral daily  Nephro-chichi 1 Tablet(s) Oral every 24 hours  pantoprazole    Tablet 40 milliGRAM(s) Oral before breakfast  pregabalin 75 milliGRAM(s) Oral daily  PureFlow Dialysate RFP-400 (K 2 / Ca 3) 5000 milliLiter(s) (2000 mL/Hr) CRRT <Continuous>  sevelamer carbonate 800 milliGRAM(s) Oral three times a day with meals    MEDICATIONS  (PRN):  LORazepam   Injectable 1 milliGRAM(s) IV Push every 1 hour PRN CIWA-Ar score 8 or greater  ondansetron Injectable 4 milliGRAM(s) IV Push every 12 hours PRN Nausea and/or Vomiting    ALLERGIES:  Allergies    penicillins (Unknown)    Intolerances    LABS:                         10.2   4.76  )-----------( 78       ( 11 Oct 2023 05:30 )             32.5     10-11    131<L>  |  96  |  8   ----------------------------<  103<H>  4.6   |  22  |  1.13    Ca    9.0      11 Oct 2023 05:30  Phos  3.5     10-11  Mg     2.3     10-11    TPro  6.9  /  Alb  3.3  /  TBili  1.9<H>  /  DBili  x   /  AST  33  /  ALT  40  /  AlkPhos  248<H>  10-11    PTT - ( 11 Oct 2023 05:36 )  PTT:109.9 sec  Urinalysis Basic - ( 11 Oct 2023 05:30 )    Color: x / Appearance: x / SG: x / pH: x  Gluc: 103 mg/dL / Ketone: x  / Bili: x / Urobili: x   Blood: x / Protein: x / Nitrite: x   Leuk Esterase: x / RBC: x / WBC x   Sq Epi: x / Non Sq Epi: x / Bacteria: x      RADIOLOGY, EKG & ADDITIONAL TESTS: Reviewed.

## 2023-10-11 NOTE — PROGRESS NOTE ADULT - PROBLEM SELECTOR PLAN 3
Chronic, likely 2/2 chronic alcohol use; no suspicion for hepatic encephalopathy at this time - mentating AOx3 and conversant  -low concern for hepatorenal syndrome as remains hemodynamically stable. No known gallbladder pathology  -Abd US (10/4) Cirrhotic morphology. Pulsatile flow in the main and left portal veins. Moderate ascites. Increased right renal cortical echogenicity compatible w/ medical renal dz.  - s/p CVVHD daily in CCU, c/w volume removal by iHD per Nephro as above

## 2023-10-11 NOTE — PROGRESS NOTE ADULT - PROBLEM SELECTOR PLAN 5
History of HLD and MI, c/w home med atorvastatin 40 mg. Patient unsure if had stent after MI  - f/u AM Lipid panel

## 2023-10-12 LAB
ACANTHOCYTES BLD QL SMEAR: SLIGHT — SIGNIFICANT CHANGE UP
ALBUMIN SERPL ELPH-MCNC: 2.9 G/DL — LOW (ref 3.3–5)
ALP SERPL-CCNC: 218 U/L — HIGH (ref 40–120)
ALT FLD-CCNC: 34 U/L — SIGNIFICANT CHANGE UP (ref 10–45)
ANION GAP SERPL CALC-SCNC: 9 MMOL/L — SIGNIFICANT CHANGE UP (ref 5–17)
ANISOCYTOSIS BLD QL: SIGNIFICANT CHANGE UP
AST SERPL-CCNC: 28 U/L — SIGNIFICANT CHANGE UP (ref 10–40)
BASOPHILS # BLD AUTO: 0.14 K/UL — SIGNIFICANT CHANGE UP (ref 0–0.2)
BASOPHILS NFR BLD AUTO: 2.9 % — HIGH (ref 0–2)
BILIRUB SERPL-MCNC: 1.8 MG/DL — HIGH (ref 0.2–1.2)
BUN SERPL-MCNC: 16 MG/DL — SIGNIFICANT CHANGE UP (ref 7–23)
BURR CELLS BLD QL SMEAR: PRESENT — SIGNIFICANT CHANGE UP
CALCIUM SERPL-MCNC: 9.1 MG/DL — SIGNIFICANT CHANGE UP (ref 8.4–10.5)
CHLORIDE SERPL-SCNC: 92 MMOL/L — LOW (ref 96–108)
CHOLEST SERPL-MCNC: 61 MG/DL — SIGNIFICANT CHANGE UP
CO2 SERPL-SCNC: 21 MMOL/L — LOW (ref 22–31)
CREAT SERPL-MCNC: 2.01 MG/DL — HIGH (ref 0.5–1.3)
EGFR: 36 ML/MIN/1.73M2 — LOW
EOSINOPHIL # BLD AUTO: 0.04 K/UL — SIGNIFICANT CHANGE UP (ref 0–0.5)
EOSINOPHIL NFR BLD AUTO: 0.9 % — SIGNIFICANT CHANGE UP (ref 0–6)
GIANT PLATELETS BLD QL SMEAR: PRESENT — SIGNIFICANT CHANGE UP
GLUCOSE SERPL-MCNC: 122 MG/DL — HIGH (ref 70–99)
HCT VFR BLD CALC: 30.9 % — LOW (ref 39–50)
HDLC SERPL-MCNC: 34 MG/DL — LOW
HGB BLD-MCNC: 9.6 G/DL — LOW (ref 13–17)
LIPID PNL WITH DIRECT LDL SERPL: 15 MG/DL — SIGNIFICANT CHANGE UP
LYMPHOCYTES # BLD AUTO: 0.27 K/UL — LOW (ref 1–3.3)
LYMPHOCYTES # BLD AUTO: 5.7 % — LOW (ref 13–44)
MACROCYTES BLD QL: SIGNIFICANT CHANGE UP
MAGNESIUM SERPL-MCNC: 2.1 MG/DL — SIGNIFICANT CHANGE UP (ref 1.6–2.6)
MANUAL SMEAR VERIFICATION: SIGNIFICANT CHANGE UP
MCHC RBC-ENTMCNC: 31.1 GM/DL — LOW (ref 32–36)
MCHC RBC-ENTMCNC: 32.7 PG — SIGNIFICANT CHANGE UP (ref 27–34)
MCV RBC AUTO: 105.1 FL — HIGH (ref 80–100)
MONOCYTES # BLD AUTO: 0.27 K/UL — SIGNIFICANT CHANGE UP (ref 0–0.9)
MONOCYTES NFR BLD AUTO: 5.7 % — SIGNIFICANT CHANGE UP (ref 2–14)
NEUTROPHILS # BLD AUTO: 4.04 K/UL — SIGNIFICANT CHANGE UP (ref 1.8–7.4)
NEUTROPHILS NFR BLD AUTO: 84.8 % — HIGH (ref 43–77)
NON HDL CHOLESTEROL: 27 MG/DL — SIGNIFICANT CHANGE UP
PHOSPHATE SERPL-MCNC: 3.6 MG/DL — SIGNIFICANT CHANGE UP (ref 2.5–4.5)
PLAT MORPH BLD: ABNORMAL
PLATELET # BLD AUTO: 82 K/UL — LOW (ref 150–400)
POIKILOCYTOSIS BLD QL AUTO: SIGNIFICANT CHANGE UP
POLYCHROMASIA BLD QL SMEAR: SLIGHT — SIGNIFICANT CHANGE UP
POTASSIUM SERPL-MCNC: 4.8 MMOL/L — SIGNIFICANT CHANGE UP (ref 3.5–5.3)
POTASSIUM SERPL-SCNC: 4.8 MMOL/L — SIGNIFICANT CHANGE UP (ref 3.5–5.3)
PROT SERPL-MCNC: 6.4 G/DL — SIGNIFICANT CHANGE UP (ref 6–8.3)
RBC # BLD: 2.94 M/UL — LOW (ref 4.2–5.8)
RBC # FLD: 18.4 % — HIGH (ref 10.3–14.5)
RBC BLD AUTO: ABNORMAL
SCHISTOCYTES BLD QL AUTO: SLIGHT — SIGNIFICANT CHANGE UP
SMUDGE CELLS # BLD: PRESENT — SIGNIFICANT CHANGE UP
SODIUM SERPL-SCNC: 122 MMOL/L — LOW (ref 135–145)
SPHEROCYTES BLD QL SMEAR: SLIGHT — SIGNIFICANT CHANGE UP
TRIGL SERPL-MCNC: 58 MG/DL — SIGNIFICANT CHANGE UP
WBC # BLD: 4.77 K/UL — SIGNIFICANT CHANGE UP (ref 3.8–10.5)
WBC # FLD AUTO: 4.77 K/UL — SIGNIFICANT CHANGE UP (ref 3.8–10.5)

## 2023-10-12 PROCEDURE — 99233 SBSQ HOSP IP/OBS HIGH 50: CPT | Mod: GC

## 2023-10-12 PROCEDURE — 99233 SBSQ HOSP IP/OBS HIGH 50: CPT

## 2023-10-12 RX ORDER — ALBUMIN HUMAN 25 %
100 VIAL (ML) INTRAVENOUS ONCE
Refills: 0 | Status: DISCONTINUED | OUTPATIENT
Start: 2023-10-12 | End: 2023-10-13

## 2023-10-12 RX ORDER — MIDODRINE HYDROCHLORIDE 2.5 MG/1
5 TABLET ORAL EVERY 8 HOURS
Refills: 0 | Status: DISCONTINUED | OUTPATIENT
Start: 2023-10-12 | End: 2023-10-12

## 2023-10-12 RX ADMIN — Medication 81 MILLIGRAM(S): at 12:46

## 2023-10-12 RX ADMIN — ATORVASTATIN CALCIUM 40 MILLIGRAM(S): 80 TABLET, FILM COATED ORAL at 23:09

## 2023-10-12 RX ADMIN — Medication 1 TABLET(S): at 08:01

## 2023-10-12 RX ADMIN — HEPARIN SODIUM 5000 UNIT(S): 5000 INJECTION INTRAVENOUS; SUBCUTANEOUS at 07:26

## 2023-10-12 RX ADMIN — MIDODRINE HYDROCHLORIDE 5 MILLIGRAM(S): 2.5 TABLET ORAL at 12:46

## 2023-10-12 RX ADMIN — HEPARIN SODIUM 5000 UNIT(S): 5000 INJECTION INTRAVENOUS; SUBCUTANEOUS at 23:08

## 2023-10-12 RX ADMIN — PANTOPRAZOLE SODIUM 40 MILLIGRAM(S): 20 TABLET, DELAYED RELEASE ORAL at 08:02

## 2023-10-12 NOTE — PROGRESS NOTE ADULT - ASSESSMENT
63-year-old male with a PMHx of HFrEF (EF 30-40%), Afib (s/p watchman), ESRD 2/2 IgA nephropathy (on HD M,T,Th,S) and alcohol use disorder who presented with SOB in setting of missed HD, initially admitted to medicine for HD but was transferred to CCU for management of cardiogenic shock, s/p Dobutamine and CVVHD, stepped down to cardiac telemetry but planned for transfer back to ICU as patient is too hypotensive to resume iHD.     #HFrEF   -further management as per cardiology    -not currently on GDMT due to soft blood pressure, defer initiation to primary team    -volume removal with HD as per cardiology and nephrology      #ESRD   -further management as per nephrology    -plan to resume iHD with transfer back to ICU     #CAD   -continue with ASA 81mg daily and atorvastatin 40mg qhs      #Afib   -continue with ASA monotherapy, patient with Watchman device      #Macrocytic Anemia    -likely 2/2 ACD and ESRD   -iron studies suggestive of ACD, B12 and Folate within normal limits, T4 within normal limits    -continue with folic acid    -EPO with HD as per nephrology       #Alcohol Use Disorder    -s/p CIWA protocol, continue with thiamine     #Cirrhosis    #Thrombocytopenia and Bilirubinemia (stable)   -unclear if cirrhosis is 2/2 alcohol use or heart failure   -abdominal US with cirrhotic liver, moderate ascites, no biliary dilation, prior cholecystectomy and no concern for PVT   -no ascitic fluid analysis to help determine etiology, no current role for paracentesis but patient states he underwent a paracentesis on 3/2023 and was told his cirrhosis was due to his heart  -will need establish care with hepatology as outpatient      DVT PPx: SQH    Dispo: ICU

## 2023-10-12 NOTE — PROGRESS NOTE ADULT - ASSESSMENT
63Y M w/hx of ESRD admitted for SOB and generalized weakness and fatigue missed HD, now h/c c/b cardiogenic shock, with marked clinical improvement off inotrope continue  CVVHD (10/6-10/11) total net neg 11.1L, plan to stop CVVHD and transition to iHD     #ESRD with hyponatremia  Initiated CVVHD (10/6), now switching to iHD although limited by hypotension  Last HD 10/4 tolerated 2L with no complications   10/7 CVVHD 2.7L net neg   10/8 CVVHD  2.8L net neg   10/9 CVVHD 2.3L  net neg   10/10 CVVHD 3L net neg   off inotropes hyponatremia 2/2 increased FW intake  EDW: 91kg     Plan:  Discontinue CVVHD, will plan for slow, gentle HD today if patient able to tolerate, give midodrine 5mg prior to HD, may need albumin infusion to maintain pressure SBP >80  Hemodialysis Treatment.:     Schedule: Once, Modality: Hemodialysis, Access: Internal Jugular Central Venous Catheter    Dialyzer: Optiflux [id number], Time: 240 Min    Blood Flow: 300 mL/Min , Dialysate Flow: 300 mL/Min, Dialysate Temp: 35, Tubinmm (Adult)    Target Fluid Removal: 0 Liters    Dialysate Electrolytes (mEq/L): Potassium 3, Calcium 2.5, Sodium 138, Bicarbonate 35 (10-12-23 @ 13:37)  Daily BMP   Daily Weights   Renal diet, fluid restriction 800cc      Access:  R TDC c/d/i       Hypotension:  No UF with HD  Recommend midodrine 5mg prior to HD  Consideration for additional therapy such as inotrope per Cardiology team.    Anemia:  Hgb 9.6  EPO with HD - consider with next treatment if tolerated    MBD;  Calcium: 9.1  Phos: 3.6  Continue to hold phos binders

## 2023-10-12 NOTE — PROGRESS NOTE ADULT - SUBJECTIVE AND OBJECTIVE BOX
Subjective:  No acute events overnight.  Patient is doing well today without new complaints.  Denies HA, CP, SOB, abdominal pain, nausea, vomiting, fever, chills or diarrhea.     Objective:   Vital Signs:  T(C): 36.4 (12 Oct 2023 11:00), Max: 36.9 (11 Oct 2023 20:30)  T(F): 97.6 (12 Oct 2023 11:00), Max: 98.5 (11 Oct 2023 20:30)  HR: 80 (12 Oct 2023 09:00) (73 - 95)  BP: 89/70 (12 Oct 2023 09:00) (81/53 - 93/60)  BP(mean): 80 (12 Oct 2023 09:00) (62 - 80)  RR: 18 (12 Oct 2023 09:00) (17 - 20)  SpO2: 92% (12 Oct 2023 09:00) (92% - 100%)    Parameters below as of 12 Oct 2023 09:00  Patient On (Oxygen Delivery Method): room air    Physical Exam:   -Gen: NAD, resting in bed  -HEENT: EOMI, PERRL, no scleral icterus, no JVD, no bruits  -CV: normal S1 and S2  -Lungs: CTABL,, normal respiratory effort on RA  -Ab: obese, distended, umbilical hernia is non-tender, normal BS  -Ext: LE edema  -Neuro: A&O x 3, no focal deficits     Labs:                        9.6    4.77  )-----------( 82       ( 12 Oct 2023 05:30 )             30.9       10-12    122<L>  |  92<L>  |  16  ----------------------------<  122<H>  4.8   |  21<L>  |  2.01<H>    Ca    9.1      12 Oct 2023 05:30  Phos  3.6     10-12  Mg     2.1     10-12    TPro  6.4  /  Alb  2.9<L>  /  TBili  1.8<H>  /  DBili  x   /  AST  28  /  ALT  34  /  AlkPhos  218<H>  10-12    Medications:  MEDICATIONS  (STANDING):  aspirin enteric coated 81 milliGRAM(s) Oral daily  atorvastatin 40 milliGRAM(s) Oral at bedtime  folic acid 1 milliGRAM(s) Oral daily  heparin   Injectable 5000 Unit(s) SubCutaneous every 8 hours  influenza  Vaccine (HIGH DOSE) 0.7 milliLiter(s) IntraMuscular once  midodrine 5 milliGRAM(s) Oral every 8 hours  Nephro-chichi 1 Tablet(s) Oral every 24 hours  pantoprazole    Tablet 40 milliGRAM(s) Oral before breakfast  pregabalin 75 milliGRAM(s) Oral daily    MEDICATIONS  (PRN):  ondansetron Injectable 4 milliGRAM(s) IV Push every 12 hours PRN Nausea and/or Vomiting

## 2023-10-12 NOTE — PROGRESS NOTE ADULT - PROBLEM SELECTOR PLAN 2
Started on HD 6 months ago, 4x week (M/T/Th/Sat) at [facility], missed HD session on 10/3/23. R chest port utilized. At admission, Cr 3.93 (un-established baseline Cr), K 3.4, Phos 4.4. Metabolic acidosis (AGAP 17) likely due to hypochloremic emesis / ETOH leading to lactic acidosis. Dialysate changed to Phoxillum due to low phos.   - s/p CVVHD 10/6-10/10/23 (received 4 sessions) w. Hep gtt in CCU, now plan to restart intermittent hemodialysis on patient schedule M/T/Th/Sat; planned for iHD first session today 10/12 however resting SBP in 80s- d/w Nephrology, too hypotensive to initiate HD on floor and will transfer back to CCU/5E Mini ICU today.  - Per Nephro 10/12: Start 5mg TID Midodrine STAT  - Nephro following, appreciate recommendations  - Trend BMP daily. c/w home Nephro-chichi

## 2023-10-12 NOTE — PROGRESS NOTE ADULT - PROBLEM SELECTOR PLAN 7
-H/H stable at Hgb 9.6 (admit 10.2).   No baseline Hgb or iron studies for comparison. Home med iron 65 mg qd for ELMER, though MCV is macrocytic likely 2/2 folate deficiency.   -Etiology likely multifactorial of ELMER, AOCD, and folate deficiency. No known history of GI bleeds, hemoptysis, hematochezia, melenic stool. B12 increased.   -Hold home med iron 65mg  -C/w folic acid 1mg daily   -EPO with HD   -Maintain active type and screen

## 2023-10-12 NOTE — PROGRESS NOTE ADULT - ASSESSMENT
63M, poor historian, with PMHx of ESRD (HD 4x weekly M/T/TH/SAT), HLD, HTN, CHF, ?MI, GERD, atrial fibrillation (s/p Watchman),  h/o chronic EtOH w/ cirrhosis,  initially admitted to medicine service w/ volume overload after missing HD; course c/b RRT during first inpatient HD session d/t hypotension and pt was found to have severe biventricular failure on TTE w/ clinical evidence of early low output state; s/p CCU (10/6-10/11 afternoon) for concern for Cardiogenic shock, s/p CVVHD x 4 days and Dobutamine;  now off Dobutamine and stepped down to Telemetry with plan for intermittent HD per Nephro-- interval SBP 80smmHg, also c/b acute HypoNa to 122:  as discussed with Nephrology, pt too hypotensive to safely dialyze on floor; requires immediate transfer back to CCU / 5East boarding mini ICU for continuous HD with close monitoring and likely pressor support.    NEURO  AAOx3    CARDIO  Acute on chronic systolic congestive heart failure.   Presented with missed HD session causing volume overload and c/b concern for cardiogenic shock. Severe bi-ventricular failure 2/2 NICM likely EtOH induced; Mildly elevated lactate on arrival to CCU on 10/6 w/ cool extremities, started on  2.5mcg and CVVHD as pt had evidence of significant volume overload, Lactate cleared prior to initiation of CVVHD. s/p 11.5L of volume removal x4 days on CVVHD in CCU, last session on 10/10; 10/11, plan made to d/c CVVHD and pt stepped to Cardiac Telemetry with plan for intermittent HD starting 10/12 however c/b hypotension SBP 80s and acute hypoNa; Nephro following; transfer back to CCU/5E Mini ICU  - S/p Dobutamine for >36hrs; SBP maintaining 80s-90s mmHg off dobutamine;  s/p CCU &Nephro rec of holding home midodrine should indefinitely--> Now, per Nephro 10/12: Start 5mg TID Midodrine STAT; CCU to resume care.  -TTEs inpt 10/5 & 10/6/23: LVEF 35-40% with global hypokinesis.  Mildly dilated RV w/ mod reduced RVSF. Elevated RA pressure. SHALOM. Mod AR. mild to mod AS. Mod to severe TR. PASP 39 mmHg. Small-to-moderate pericardial effusion without echocardiographic evidence of cardiac tamponade physiology. Ascites present.  - Exam: Still slightly overloaded with bibasilar crackles and tense nonpitting b/l LE edema. Cooler BL LE extremities today with sensation and motion intact.  - Volume removal: Per Nephro. Return to CCU/5E mini ICU for closer monitoring, +/- continuous HD, +/- pressor support  - Meds: CCU recs consider low dose Hydral 10mg PO BID-TID on non-HD days  - O2: satting well on room air  - VSq4h, continuous pulse ox and telemetry; Core measures, strict i/o, daily standing weight, fluid restrict.    CAD (coronary artery disease).   #History of HLD and MI, c/w home med atorvastatin 40 mg. Patient unsure if had stent after MI  - F/u AM Lipid panel.    Atrial fibrillation.   Currently in rate controlled AF; s/p watchman device; Now that pt is off CVVHD, Hep gtt discontinued; reinitiate as determined by CCU and Nephro  -Proceed w/ ASA monotherapy given presence of Watchman device.    RESPIRATORY    GASTROINTESTINAL  #Hepatic cirrhosis.   At admission BIENVENIDO 29. AST/AlT 45/43. Last drink 2 PM on 10/3/23, has 2-3 martinis daily. No history of alcohol withdrawals or withdrawal seizures. Qtc 397. 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   - S/p CIWA monitoring. CIWA 0 x2 days  - Thiamine, folic acid, multivitamin  - Home meds B1 100 mg OTC, D3 1000 IU OTC, B12 1000 mcg OTC.    #GERD (gastroesophageal reflux disease).   History of GERD, no complaints of epigastric pain at this time  -C/w PPI Qd      RENAL  #ESRD on dialysis  Started on HD 6 months ago, 4x week (M/T/Th/Sat) at [facility], missed HD session on 10/3/23. R chest port utilized. At admission, Cr 3.93 (un-established baseline Cr), K 3.4, Phos 4.4. Metabolic acidosis (AGAP 17) likely due to hypochloremic emesis / ETOH leading to lactic acidosis. Dialysate changed to Phoxillum due to low phos.   - s/p CVVHD 10/6-10/10/23 (received 4 sessions) w. Hep gtt in CCU, now plan to restart intermittent hemodialysis on patient schedule M/T/Th/Sat; planned for iHD first session today 10/12 however resting SBP in 80s- d/w Nephrology, too hypotensive to initiate HD on floor and will transfer back to CCU/[address] [place] [address] ICU today.  - Per Nephro 10/12: Start 5mg TID Midodrine STAT  - Nephro following, appreciate recommendations  - Trend BMP daily. c/w home Nephro-chichi.    HEME  #Anemia.   H/H stable at Hgb 9.6 (admit 10.2).   No baseline Hgb or iron studies for comparison. Home med iron 65 mg qd for ELMER, though MCV is macrocytic likely 2/2 folate deficiency.   -Etiology likely multifactorial of ELMER, AOCD, and folate deficiency. No known history of GI bleeds, hemoptysis, hematochezia, melenic stool. B12 increased.   -Hold home med iron 65mg  -C/w folic acid 1mg daily   -EPO with HD   -Maintain active type and screen.    PSYCH  #Moderate alcohol use disorder.   Chronic, likely 2/2 chronic alcohol use; no suspicion for hepatic encephalopathy at this time - Mentating AOx3 and conversant  -low concern for hepatorenal syndrome as remains hemodynamically stable. No known gallbladder pathology  -Abd US (10/4) Cirrhotic morphology. Pulsatile flow in the main and left portal veins. Moderate ascites. Increased right renal cortical echogenicity compatible w/ medical renal dz.  -s/p CVVHD daily in CCU, c/w volume removal HD per Nephro as above.    DERM  #Dermatitis.   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 B/l LE dry skin, likely venous stasis dermatitis on due to peripheral artery disease vs. heart failure related edema changes. Likely from niacin deficiency (pellagra) as etiology for dermatitis as patient has 3 month anorexia, fatigue, numbness. Recently prescribed Cerave w/o use  - Consider niacin supplementation (vitamin b3) or topical steroid  - lotion inpatient  - f/u outpt dermatologist.    PROPHYLACTIC MEASURE  F: None  E: Replete per HD with ESRD  N: renal restrictions  GI ppx: pantoprazole  DVT ppx: heparin 5000u q8h; SCDs  Full code  Dispo: CCU--> TELE 10/11/23 PM --> CCU / 5E Mini ICU 10/12 AM  Case discussed with CCU signout and tele attending Dr. Macias.     63M, poor historian, with PMHx of ESRD (HD 4x weekly M/T/TH/SAT), HLD, HTN, CHF, ?MI, GERD, atrial fibrillation (s/p Watchman),  h/o chronic EtOH w/ cirrhosis,  initially admitted to medicine service w/ volume overload after missing HD; course c/b RRT during first inpatient HD session d/t hypotension and pt was found to have severe biventricular failure on TTE w/ clinical evidence of early low output state; s/p CCU (10/6-10/11 afternoon) for concern for Cardiogenic shock, s/p CVVHD x 4 days and Dobutamine;  now off Dobutamine and stepped down to Telemetry with plan for intermittent HD per Nephro-- interval SBP 80smmHg, also c/b acute HypoNa to 122:  as discussed with Nephrology, pt too hypotensive to safely dialyze on floor; requires immediate transfer back to CCU / 5East boarding mini ICU for continuous HD with close monitoring and likely pressor support.    NEURO  AAOx3    CARDIO  #Acute on chronic systolic congestive heart failure.   Presented with missed HD session causing volume overload and c/b concern for cardiogenic shock. Severe bi-ventricular failure 2/2 NICM likely EtOH induced; Mildly elevated lactate on arrival to CCU on 10/6 w/ cool extremities, started on  2.5mcg and CVVHD as pt had evidence of significant volume overload, Lactate cleared prior to initiation of CVVHD. s/p 11.5L of volume removal x4 days on CVVHD in CCU, last session on 10/10; 10/11, plan made to d/c CVVHD and pt stepped to Cardiac Telemetry with plan for intermittent HD starting 10/12 however c/b hypotension SBP 80s and acute hypoNa; Nephro following; transfer back to CCU/5E Mini ICU  - S/p Dobutamine for >36hrs; SBP maintaining 80s-90s mmHg off dobutamine;  s/p CCU &Nephro rec of holding home midodrine should indefinitely--> Now, per Nephro 10/12: Start 5mg TID Midodrine STAT; CCU to resume care.  -TTEs inpt 10/5 & 10/6/23: LVEF 35-40% with global hypokinesis.  Mildly dilated RV w/ mod reduced RVSF. Elevated RA pressure. SHALOM. Mod AR. mild to mod AS. Mod to severe TR. PASP 39 mmHg. Small-to-moderate pericardial effusion without echocardiographic evidence of cardiac tamponade physiology. Ascites present.  - Exam: Still slightly overloaded with bibasilar crackles and tense nonpitting b/l LE edema. Cooler BL LE extremities today with sensation and motion intact.  - Volume removal: Per Nephro. Return to CCU/5E mini ICU for closer monitoring, +/- continuous HD, +/- pressor support  - Meds: CCU recs consider low dose Hydral 10mg PO BID-TID on non-HD days  - O2: satting well on room air  - VSq4h, continuous pulse ox and telemetry; Core measures, strict i/o, daily standing weight, fluid restrict.    #CAD (coronary artery disease).   History of HLD and MI, c/w home med atorvastatin 40 mg. Patient unsure if had stent after MI  - F/u AM Lipid panel.    #Atrial fibrillation.   Currently in rate controlled AF; s/p watchman device; Now that pt is off CVVHD, Hep gtt discontinued; reinitiate as determined by CCU and Nephro  -Proceed w/ ASA monotherapy given presence of Watchman device.    RESPIRATORY  Patient OOBTC at time of examination, saturating well on room air.   - Continue to monitor oxygenation status     GASTROINTESTINAL  #Hepatic cirrhosis.   At admission BIENVENIDO 29. AST/AlT 45/43. Last drink 2 PM on 10/3/23, has 2-3 martinis daily. No history of alcohol withdrawals or withdrawal seizures. Qtc 397. 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   - S/p CIWA monitoring. CIWA 0 x2 days  - Thiamine, folic acid, multivitamin  - Home meds B1 100 mg OTC, D3 1000 IU OTC, B12 1000 mcg OTC.    #GERD (gastroesophageal reflux disease).   History of GERD, no complaints of epigastric pain at this time  -C/w PPI Qd      RENAL  #ESRD on dialysis  Started on HD 6 months ago, 4x week (M/T/Th/Sat) at [facility] dialysis center, missed HD session on 10/3/23. R chest port utilized. At admission, Cr 3.93 (un-established baseline Cr), K 3.4, Phos 4.4. Metabolic acidosis (AGAP 17) likely due to hypochloremic emesis / ETOH leading to lactic acidosis. Dialysate changed to Phoxillum due to low phos.   - s/p CVVHD 10/6-10/10/23 (received 4 sessions) w. Hep gtt in CCU, now plan to restart intermittent hemodialysis on patient schedule M/T/Th/Sat; planned for iHD first session today 10/12 however resting SBP in 80s- d/w Nephrology, too hypotensive to initiate HD on floor and will transfer back to CCU/5E Mini ICU today.  - Per Nephro 10/12: Start 5mg TID Midodrine STAT  - Nephro following, appreciate recommendations  - Trend BMP daily. c/w home Nephro-chichi.    HEME  #Anemia.   H/H stable at Hgb 9.6 (admit 10.2).   No baseline Hgb or iron studies for comparison. Home med iron 65 mg qd for ELMER, though MCV is macrocytic likely 2/2 folate deficiency.   -Etiology likely multifactorial of ELMER, AOCD, and folate deficiency. No known history of GI bleeds, hemoptysis, hematochezia, melenic stool. B12 increased.   -Hold home med iron 65mg  -C/w folic acid 1mg daily   -EPO with HD   -Maintain active type and screen.    PSYCH  #Moderate alcohol use disorder.   Chronic, likely 2/2 chronic alcohol use; no suspicion for hepatic encephalopathy at this time - Mentating AOx3 and conversant  -low concern for hepatorenal syndrome as remains hemodynamically stable. No known gallbladder pathology  -Abd US (10/4) Cirrhotic morphology. Pulsatile flow in the main and left portal veins. Moderate ascites. Increased right renal cortical echogenicity compatible w/ medical renal dz.  -s/p CVVHD daily in CCU, c/w volume removal HD per Nephro as above.    DERM  #Dermatitis.   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 B/l LE dry skin, likely venous stasis dermatitis on due to peripheral artery disease vs. heart failure related edema changes. Likely from niacin deficiency (pellagra) as etiology for dermatitis as patient has 3 month anorexia, fatigue, numbness. Recently prescribed Cerave w/o use  - Consider niacin supplementation (vitamin b3) or topical steroid  - lotion inpatient  - f/u outpt dermatologist.    PROPHYLACTIC MEASURE  F: None  E: Replete per HD with ESRD  N: renal restrictions  GI ppx: pantoprazole  DVT ppx: heparin 5000u q8h; SCDs  Full code  Dispo: CCU--> TELE 10/11/23 PM --> CCU / 5E Mini ICU 10/12 AM  Case discussed with CCU signout and tele attending Dr. Macias.     63M, poor historian, with PMHx of ESRD (HD 4x weekly M/T/TH/SAT), HLD, HTN, CHF, ?MI, GERD, atrial fibrillation (s/p Watchman),  h/o chronic EtOH w/ cirrhosis,  initially admitted to medicine service w/ volume overload after missing HD; course c/b RRT during first inpatient HD session d/t hypotension and pt was found to have severe biventricular failure on TTE w/ clinical evidence of early low output state; s/p CCU (10/6-10/11 afternoon) for concern for Cardiogenic shock, s/p CVVHD x 4 days and Dobutamine;  now off Dobutamine and stepped down to Telemetry with plan for intermittent HD per Nephro-- interval SBP 80smmHg, also c/b acute HypoNa to 122:  as discussed with Nephrology, pt too hypotensive to safely dialyze on floor; requires immediate transfer back to CCU / 5East boarding mini ICU for continuous HD with close monitoring and likely pressor support.    NEURO  AAOx3    CARDIO  #Acute on chronic systolic congestive heart failure.   Presented with missed HD session causing volume overload and c/b concern for cardiogenic shock. Severe bi-ventricular failure 2/2 NICM likely EtOH induced; Mildly elevated lactate on arrival to CCU on 10/6 w/ cool extremities, started on  2.5mcg and CVVHD as pt had evidence of significant volume overload, Lactate cleared prior to initiation of CVVHD. TTEs inpt 10/5 & 10/6/23: LVEF 35-40% with global hypokinesis.  Mildly dilated RV w/ mod reduced RVSF. Elevated RA pressure. SHALOM. Mod AR. mild to mod AS. Mod to severe TR. PASP 39 mmHg. Small-to-moderate pericardial effusion without echocardiographic evidence of cardiac tamponade physiology. Ascites present.s/p 11.5L of volume removal x4 days on CVVHD in CCU, last session on 10/10; 10/11, plan made to d/c CVVHD and pt stepped to Cardiac Telemetry with plan for intermittent HD starting 10/12 however c/b hypotension SBP 80s and acute hypoNa; Nephro following; transferred back to CCU/5E Mini ICU. S/p Dobutamine for >36hrs; SBP maintaining 80s-90s mmHg off dobutamine. s/p CCU &Nephro rec of holding home midodrine.   - Restart 5mg TID Midodrine  -Continue intermittent dialysis on CCU floor   - VSq4h, continuous pulse ox and telemetry; Core measures, strict i/o, daily standing weight, fluid restrict.    #CAD (coronary artery disease).   History of HLD and MI, c/w home med atorvastatin 40 mg. Patient unsure if had stent after MI  - F/u AM Lipid panel.    #Atrial fibrillation.   Currently in rate controlled AF; s/p watchman device; Now that pt is off CVVHD, Hep gtt discontinued; reinitiate as determined by CCU and Nephro  -Proceed w/ ASA monotherapy given presence of Watchman device.    RESPIRATORY  Patient OOBTC at time of examination, saturating well on room air.   - Continue to monitor oxygenation status     GASTROINTESTINAL  #Hepatic cirrhosis.   At admission BIENVENIDO 29. AST/AlT 45/43. Last drink 2 PM on 10/3/23, has 2-3 martinis daily. No history of alcohol withdrawals or withdrawal seizures. Qtc 397. 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   - S/p CIWA monitoring. CIWA 0 x2 days  - Thiamine, folic acid, multivitamin  - Home meds B1 100 mg OTC, D3 1000 IU OTC, B12 1000 mcg OTC.    #GERD (gastroesophageal reflux disease).   History of GERD, no complaints of epigastric pain at this time  -C/w PPI Qd      RENAL  #ESRD on dialysis  Started on HD 6 months ago, 4x week (M/T/Th/Sat) at [facility] dialysis center, missed HD session on 10/3/23. R chest port utilized. At admission, Cr 3.93 (un-established baseline Cr), K 3.4, Phos 4.4. Metabolic acidosis (AGAP 17) likely due to hypochloremic emesis / ETOH leading to lactic acidosis. Dialysate changed to Phoxillum due to low phos.   - s/p CVVHD 10/6-10/10/23 (received 4 sessions) w. Hep gtt in CCU, now plan to restart intermittent hemodialysis on patient schedule M/T/Th/Sat; planned for iHD first session today 10/12 however resting SBP in 80s. Patient too hypotensive for HD on floor, continue dialysis once transferred back to CCU   - Per Nephro 10/12: Start 5mg TID Midodrine STAT  - Nephro following, appreciate recommendations  - Trend BMP daily. c/w home Nephro-chichi.    HEME  #Anemia.   H/H stable at Hgb 9.6 (admit 10.2).   No baseline Hgb or iron studies for comparison. Home med iron 65 mg qd for ELMER, though MCV is macrocytic likely 2/2 folate deficiency.   -Etiology likely multifactorial of ELMER, AOCD, and folate deficiency. No known history of GI bleeds, hemoptysis, hematochezia, melenic stool. B12 increased.   -Hold home med iron 65mg  -C/w folic acid 1mg daily   -EPO with HD   -Maintain active type and screen.    PSYCH  #Moderate alcohol use disorder.   Chronic, likely 2/2 chronic alcohol use; no suspicion for hepatic encephalopathy at this time - Mentating AOx3 and conversant  -low concern for hepatorenal syndrome as remains hemodynamically stable. No known gallbladder pathology  -Abd US (10/4) Cirrhotic morphology. Pulsatile flow in the main and left portal veins. Moderate ascites. Increased right renal cortical echogenicity compatible w/ medical renal dz.  -s/p CVVHD daily in CCU, c/w volume removal HD per Nephro as above.    DERM  #Dermatitis.   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 B/l LE dry skin, likely venous stasis dermatitis on due to peripheral artery disease vs. heart failure related edema changes. Likely from niacin deficiency (pellagra) as etiology for dermatitis as patient has 3 month anorexia, fatigue, numbness. Recently prescribed Cerave w/o use  - Consider niacin supplementation (vitamin b3) or topical steroid  - lotion inpatient  - f/u outpt dermatologist.    PROPHYLACTIC MEASURE  F: None  E: Replete per HD with ESRD  N: renal restrictions  GI ppx: pantoprazole  DVT ppx: heparin 5000u q8h; SCDs  Full code  Dispo: Floors -> CCU

## 2023-10-12 NOTE — PROGRESS NOTE ADULT - PROBLEM SELECTOR PLAN 3
Chronic, likely 2/2 chronic alcohol use; no suspicion for hepatic encephalopathy at this time - mentating AOx3 and conversant  -low concern for hepatorenal syndrome as remains hemodynamically stable. No known gallbladder pathology  -Abd US (10/4) Cirrhotic morphology. Pulsatile flow in the main and left portal veins. Moderate ascites. Increased right renal cortical echogenicity compatible w/ medical renal dz.  -s/p CVVHD daily in CCU, c/w volume removal HD per Nephro as above

## 2023-10-12 NOTE — PROGRESS NOTE ADULT - NS ATTEND AMEND GEN_ALL_CORE FT
I saw, evaluated, and examined the patient at the bedside. I discussed the patient’s case with the ACP and agree with ACP’s note, ROS findings, physical exam, assessment, and plan as documented in the ACP’s note, with the following addendum.     62 yo man PMHx of ESRD, HTN, paroxysmal Afib s/p Watchman, alcohol use disorder, HTN, HLD, BiV failure, moderate AR, mild-moderate AS, and moderate-severe TR who was admitted for hypervolemia after missing HD c/b cardiogenic shock requiring CCU admission requiring CVVHD and dobutamine, now transferred out of CCU c/b persistent hypotension, with plan for CCU transfer.    Assessment  1) BiV failure will concern for low flow state  2) Moderate AR, mild-moderate AS, and moderate-severe TR  3) ESRD on HD as outpatient, required CVVHD here, currently unable to tolerate iHD due to persistent hypotension  4) Paroxysmal Afib s/p Watchman  5) Alcohol use disorder, no concern for withdrawal    Plan  1) Plan to transfer to CCU as patient cannot tolerate iHD due to persistent hypotension but has worsening hypervolemia, hyponatremia, hyperkalemia, and metabolic acidosis  2) Cannot be on midodrine due to HFrEF  3) GOC, palliative care consult    I spent 50 minutes in total time. During non face-to-face time, I reviewed relevant portions of the patient’s medical record. During face-to-face time, I took a relevant history and examined the patient. I also explained differential diagnoses, relevant cardiac diagnoses, workup, and management plan. I answered all questions related to the patient's medical conditions.     Elio Macias M.D.  CARDIOLOGY ATTENDING

## 2023-10-12 NOTE — PROGRESS NOTE ADULT - SUBJECTIVE AND OBJECTIVE BOX
Nephrology progress note    Seen at bedside. No acute complaint. States he drinks a lot of water and does not want fluid restriction. Endorses weakness. SBP in 80s overnight and this morning.    Allergies:  penicillins (Unknown)    Hospital Medications:   MEDICATIONS  (STANDING):  aspirin enteric coated 81 milliGRAM(s) Oral daily  atorvastatin 40 milliGRAM(s) Oral at bedtime  folic acid 1 milliGRAM(s) Oral daily  heparin   Injectable 5000 Unit(s) SubCutaneous every 8 hours  influenza  Vaccine (HIGH DOSE) 0.7 milliLiter(s) IntraMuscular once  midodrine 5 milliGRAM(s) Oral every 8 hours  Nephro-chichi 1 Tablet(s) Oral every 24 hours  pantoprazole    Tablet 40 milliGRAM(s) Oral before breakfast  pregabalin 75 milliGRAM(s) Oral daily    REVIEW OF SYSTEMS:  All other review of systems is negative unless indicated above.    VITALS:  T(F): 97.8 (10-12-23 @ 14:00), Max: 98.5 (10-11-23 @ 20:30)  HR: 89 (10-12-23 @ 14:00)  BP: 93/69 (10-12-23 @ 14:00)  RR: 16 (10-12-23 @ 14:00)  SpO2: 92% (10-12-23 @ 09:00)  Wt(kg): --    10-10 @ 07:01  -  10-11 @ 07:00  --------------------------------------------------------  IN: 3516.4 mL / OUT: 6577 mL / NET: -3060.6 mL    10-11 @ 07:01  -  10-12 @ 07:00  --------------------------------------------------------  IN: 1315 mL / OUT: 1351 mL / NET: -36 mL        PHYSICAL EXAM:  Constitutional: NAD, sitting at edge of bed drinking water  HEENT: NCAT, EOMI  Respiratory: CTAB, normal resp effort  Cardiovascular: irregular  Gastrointestinal: soft, distended reducible umbilical hernia, abdominal striae  Extremities: warm, no edema, red-blue mottling noted on legs from knee  Neurological: Awake, alert, unable to rise from bed without assistance   Access: R TDC c/d/i    LABS:  10-12    122<L>  |  92<L>  |  16  ----------------------------<  122<H>  4.8   |  21<L>  |  2.01<H>    Ca    9.1      12 Oct 2023 05:30  Phos  3.6     10-12  Mg     2.1     10-12    TPro  6.4  /  Alb  2.9<L>  /  TBili  1.8<H>  /  DBili      /  AST  28  /  ALT  34  /  AlkPhos  218<H>  10-12                          9.6    4.77  )-----------( 82       ( 12 Oct 2023 05:30 )             30.9       Urine Studies:  Creatinine Trend: 2.01<--, 1.13<--, 1.16<--, 1.18<--, 1.26<--, 1.45<--  Urinalysis Basic - ( 12 Oct 2023 05:30 )    Color:  / Appearance:  / SG:  / pH:   Gluc: 122 mg/dL / Ketone:   / Bili:  / Urobili:    Blood:  / Protein:  / Nitrite:    Leuk Esterase:  / RBC:  / WBC    Sq Epi:  / Non Sq Epi:  / Bacteria:         RADIOLOGY & ADDITIONAL STUDIES:  Reviewed

## 2023-10-12 NOTE — PROGRESS NOTE ADULT - SUBJECTIVE AND OBJECTIVE BOX
TRANSFER NOTE TO CCU/MINI ICU ON 5-EAST    Subjective Assessment: Patient seen and examined at bedside. Denies CP, SOB, HA, dysarthria, lightheadedness, abd pain, n/v/d, f/c.     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 Started on dobutamine given concern of cardiogenic shock. Patient received CVVHD, now net negative approx 11L this hospitalization. Started on heparin gtt full AC while on CVVHD. Now off CVVHD, dobutamine drip, and heparin drip. Patient with watchman device for >6 months, discontinued Plavix and transitioned now to aspirin only. Patient with significant improvement in mentation, AAOx3, warm and well-perfused extremities. Planned for transition back to intermittent HD plan for first session 10/12. Stable for stepdown to telemetry on 10/11/23 late afternoon. Interval SBP 80smmHg, also c/b acute HypoNa to 122:  as discussed with Nephrology, pt too hypotensive to safely dialyze on floor; requires immediate transfer back to CCU / [facility] boarding mini ICU for continuous HD with close monitoring and likely pressor support.    	  MEDICATIONS:    ondansetron Injectable 4 milliGRAM(s) IV Push every 12 hours PRN  pregabalin 75 milliGRAM(s) Oral daily    pantoprazole    Tablet 40 milliGRAM(s) Oral before breakfast    atorvastatin 40 milliGRAM(s) Oral at bedtime    aspirin enteric coated 81 milliGRAM(s) Oral daily  folic acid 1 milliGRAM(s) Oral daily  heparin   Injectable 5000 Unit(s) SubCutaneous every 8 hours  influenza  Vaccine (HIGH DOSE) 0.7 milliLiter(s) IntraMuscular once  Nephro-chichi 1 Tablet(s) Oral every 24 hours        [PHYSICAL EXAM:  TELEMETRY: 6 beats NSVT seen otherwise in rate controlled AFib  T(C): 36.7 (10-12-23 @ 05:53), Max: 36.9 (10-11-23 @ 20:30)  HR: 92 (10-12-23 @ 05:53) (73 - 115)  BP: 86/61 (10-12-23 @ 05:53) (81/53 - 106/67)  RR: 18 (10-12-23 @ 05:53) (14 - 20)  SpO2: 96% (10-12-23 @ 05:53) (94% - 100%)  Wt(kg): --  I&O's Summary    11 Oct 2023 07:01  -  12 Oct 2023 07:00  --------------------------------------------------------  IN: 1315 mL / OUT: 1351 mL / NET: -36 mL      Appearance: Pt seen in bed in NAD 	  HEENT:   moist oral mucosa, PERRL, EOMI, NCAT	; R eyelid slight ptosis   Neck: Supple  Cardiovascular: Irregular rhythm, +S1 S2, No JVD, + holosystolic murmur best @LUSB  Chest: R chest wall HD port seen, no erythema/purulence/edema/tenderness  Respiratory: + Bibasilar Rales; no Rhonchi, Wheezing; no increased respiratory effort or rate	  Gastrointestinal:  Soft, Non-tender, + BS, distended, + fluid wave, but without rebound or guarding; +reducible umbilical hernia midline lower quadrant, + abdominal striae to lower abdomen  Skin: +bilateral upper extremity maculopapular rash with excoriations as well on scalp; venous stasis dermatitis bilateral ankles and lower calves. No jaundice. Very mild mottling of the hands.  Extremities: Moving all four extremities, 4+/5 all four extremities, No clubbing, cyanosis. Tense nonpitting edema to b/l LE  Vascular: Cool b/l LE; sensation intact all 4 extremities  Neurologic: Non-focal  Psychiatry: A & O x 3, Mood & affect appropriate        LABS:	 	  CARDIAC MARKERS:                          9.6    4.77  )-----------( 82       ( 12 Oct 2023 05:30 )             30.9     10-12    122<L>  |  92<L>  |  16  ----------------------------<  122<H>  4.8   |  21<L>  |  2.01<H>    Ca    9.1      12 Oct 2023 05:30  Phos  3.6     10-12  Mg     2.1     10-12    TPro  6.4  /  Alb  2.9<L>  /  TBili  1.8<H>  /  DBili  x   /  AST  28  /  ALT  34  /  AlkPhos  218<H>  10-12    proBNP:   Lipid Profile:   HgA1c:   TSH:   PTT - ( 11 Oct 2023 11:42 )  PTT:99.8 sec     TRANSFER NOTE TO CCU/MINI ICU ON 5-EAST    Subjective Assessment: Patient seen and examined at bedside. Denies CP, SOB, HA, dysarthria, lightheadedness, abd pain, n/v/d, f/c.     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 Started on dobutamine given concern of cardiogenic shock. Patient received CVVHD, now net negative approx 11L this hospitalization. Started on heparin gtt full AC while on CVVHD. Now off CVVHD, dobutamine drip, and heparin drip. Patient with watchman device for >6 months, discontinued Plavix and transitioned now to aspirin only. Patient with significant improvement in mentation, AAOx3, warm and well-perfused extremities. Planned for transition back to intermittent HD plan for first session 10/12. Stable for stepdown to telemetry on 10/11/23 late afternoon. Interval SBP 80smmHg, also c/b acute HypoNa to 122:  as discussed with Nephrology, pt too hypotensive to safely dialyze on floor; requires immediate transfer back to CCU / [facility] boarding mini ICU for continuous HD with close monitoring and likely pressor support.    	  MEDICATIONS:    ondansetron Injectable 4 milliGRAM(s) IV Push every 12 hours PRN  pregabalin 75 milliGRAM(s) Oral daily    pantoprazole    Tablet 40 milliGRAM(s) Oral before breakfast    atorvastatin 40 milliGRAM(s) Oral at bedtime    aspirin enteric coated 81 milliGRAM(s) Oral daily  folic acid 1 milliGRAM(s) Oral daily  heparin   Injectable 5000 Unit(s) SubCutaneous every 8 hours  influenza  Vaccine (HIGH DOSE) 0.7 milliLiter(s) IntraMuscular once  Nephro-chichi 1 Tablet(s) Oral every 24 hours    ICU Vital Signs Last 24 Hrs  T(C): 36.6 (12 Oct 2023 14:40), Max: 36.9 (11 Oct 2023 20:30)  T(F): 97.8 (12 Oct 2023 14:40), Max: 98.5 (11 Oct 2023 20:30)  HR: 83 (12 Oct 2023 16:00) (80 - 93)  BP: 93/66 (12 Oct 2023 16:00) (82/61 - 99/67)  BP(mean): 75 (12 Oct 2023 16:00) (68 - 80)  ABP: --  ABP(mean): --  RR: 16 (12 Oct 2023 16:00) (16 - 18)  SpO2: 100% (12 Oct 2023 16:00) (92% - 100%)    O2 Parameters below as of 12 Oct 2023 17:00  Patient On (Oxygen Delivery Method): room air    Wt(kg): --    I&O's Detail  11 Oct 2023 07:01  -  12 Oct 2023 07:00  --------------------------------------------------------  IN:    Heparin: 45 mL    Oral Fluid: 1270 mL  Total IN: 1315 mL    OUT:    Other (mL): 1350 mL    Stool (mL): 0 mL    Voided (mL): 1 mL  Total OUT: 1351 mL    Total NET: -36 mL    Physical Exam:   Appearance: Pt seen in bed in NAD 	  HEENT:   moist oral mucosa, PERRL, EOMI, NCAT	; R eyelid slight ptosis   Neck: Supple  Cardiovascular: Irregular rhythm, +S1 S2, No JVD, + holosystolic murmur best @LUSB  Chest: R chest wall HD port seen, no erythema/purulence/edema/tenderness  Respiratory: + Bibasilar Rales; no Rhonchi, Wheezing; no increased respiratory effort or rate	  Gastrointestinal:  Soft, Non-tender, + BS, distended, + fluid wave, but without rebound or guarding; +reducible umbilical hernia midline lower quadrant, + abdominal striae to lower abdomen  Skin: +bilateral upper extremity maculopapular rash with excoriations as well on scalp; venous stasis dermatitis bilateral ankles and lower calves. No jaundice. Very mild mottling of the hands.  Extremities: Moving all four extremities, 4+/5 all four extremities, No clubbing, cyanosis. Tense nonpitting edema to b/l LE  Vascular: Cool b/l LE; sensation intact all 4 extremities  Neurologic: Non-focal  Psychiatry: A & O x 3, Mood & affect appropriate        LABS:	 	  CARDIAC MARKERS:                          9.6    4.77  )-----------( 82       ( 12 Oct 2023 05:30 )             30.9     10-12    122<L>  |  92<L>  |  16  ----------------------------<  122<H>  4.8   |  21<L>  |  2.01<H>    Ca    9.1      12 Oct 2023 05:30  Phos  3.6     10-12  Mg     2.1     10-12    TPro  6.4  /  Alb  2.9<L>  /  TBili  1.8<H>  /  DBili  x   /  AST  28  /  ALT  34  /  AlkPhos  218<H>  10-12

## 2023-10-12 NOTE — PROGRESS NOTE ADULT - ATTENDING COMMENTS
I:   Sodium down to 122. BP 80s. Feels well. No lightheadedness.   A: ESRD. High water intake. Hypotension.   P: Restart low dose midodrine. Attempt HD today with low BFR and low DFR. No UF. Follow sodium. Fluid restrict.

## 2023-10-12 NOTE — PROGRESS NOTE ADULT - PROBLEM SELECTOR PLAN 6
-currently in rate controlled AF; s/p watchman device; Now that pt is off CVVHD, Hep gtt discontinued; reinitiate as determined by CCU and Nephro  -proceed w/ ASA monotherapy given presence of Watchman device

## 2023-10-12 NOTE — PROGRESS NOTE ADULT - PROBLEM SELECTOR PLAN 8
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 B/l LE dry skin, likely venous stasis dermatitis on due to peripheral artery disease vs. heart failure related edema changes. Likely from niacin deficiency (pellagra) as etiology for dermatitis as patient has 3 month anorexia, fatigue, numbness. Recently prescribed Cerave w/o use  - Consider niacin supplementation (vitamin b3) or topical steroid  - lotion inpatient  - f/u outpt dermatologist.    #GERD (gastroesophageal reflux disease).   History of GERD, no complaints of epigastric pain at this time. C/w PPI Qd    PROPHYLACTIC MEASURE  F: None  E: Replete per HD with ESRD  N: renal restrictions  GI ppx: pantoprazole  DVT ppx: heparin 5000u q8h; SCDs  Full code  Dispo: CCU--> TELE 10/11/23 PM --> CCU / [facility] Mini ICU 10/12 AM  Case discussed with CCU signout and tele attending Dr. Macias.

## 2023-10-12 NOTE — PROGRESS NOTE ADULT - ASSESSMENT
63M, poor historian, with PMHx of ESRD (HD 4x weekly M/T/TH/SAT), HLD, HTN, CHF, ?MI, GERD, atrial fibrillation (s/p Watchman),  h/o chronic EtOH w/ cirrhosis,  initially admitted to medicine service w/ volume overload after missing HD; course c/b RRT during first inpatient HD session d/t hypotension and pt was found to have severe biventricular failure on TTE w/ clinical evidence of early low output state; s/p CCU (10/6-10/11 afternoon) for concern for Cardiogenic shock, s/p CVVHD x 4 days and Dobutamine;  now off Dobutamine and stepped down to Telemetry with plan for intermittent HD per Nephro-- interval SBP 80smmHg, also c/b acute HypoNa to 122:  as discussed with Nephrology, pt too hypotensive to safely dialyze on floor; requires immediate transfer back to CCU / [facility] boarding mini ICU for continuous HD with close monitoring and likely pressor support.

## 2023-10-12 NOTE — PROGRESS NOTE ADULT - PROBLEM SELECTOR PLAN 1
-Presented with missed HD session causing volume overload and c/b concern for cardiogenic shock. Severe bi-ventricular failure 2/2 NICM likely EtOH induced; Mildly elevated lactate on arrival to CCU on 10/6 w/ cool extremities, started on  2.5mcg and CVVHD as pt had evidence of significant volume overload, Lactate cleared prior to initiation of CVVHD.   -s/p 11.5L of volume removal x4 days on CVVHD in CCU, last session on 10/10; 10/11, plan made to d/c CVVHD and pt stepped to Cardiac Telemetry with plan for intermittent HD starting 10/12 however c/b hypotension SBP 80s and acute hypoNa; Nephro following; transfer back to CCU/[facility] Mini ICU  - S/p Dobutamine for >36hrs; SBP maintaining 80s-90s mmHg off dobutamine;  s/p CCU &Nephro rec of holding home midodrine should indefinitely--> Now, per Nephro 10/12: Start 5mg TID Midodrine STAT; CCU to resume care.  -TTEs inpt 10/5 & 10/6/23: LVEF 35-40% with global hypokinesis.  Mildly dilated RV w/ mod reduced RVSF. Elevated RA pressure. SHALOM. Mod AR. mild to mod AS. Mod to severe TR. PASP 39 mmHg. Small-to-moderate pericardial effusion without echocardiographic evidence of cardiac tamponade physiology. Ascites present.  - Exam: Still slightly overloaded with bibasilar crackles and tense nonpitting b/l LE edema. Cooler BL LE extremities today with sensation and motion intact.  - Volume removal: Per Nephro. Return to CCU/[facility] mini ICU for closer monitoring, +/- continuous HD, +/- pressor support  - Meds: CCU recs consider low dose Hydral 10mg PO BID-TID on non-HD days  - O2: satting well on room air  - VSq4h, continuous pulse ox and telemetry; Core measures, strict i/o, daily standing weight, fluid restrict

## 2023-10-12 NOTE — PROGRESS NOTE ADULT - SUBJECTIVE AND OBJECTIVE BOX
Interventional Cardiology PA Adult Progress Note & TRANSFER NOTE TO CCU/MINI ICU ON 5-EAST    Subjective Assessment: Patient seen and examined at bedside. Denies CP, SOB, HA, dysarthria, lightheadedness, abd pain, n/v/d, f/c.     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 Started on dobutamine given concern of cardiogenic shock. Patient received CVVHD, now net negative approx 11L this hospitalization. Started on heparin gtt full AC while on CVVHD. Now off CVVHD, dobutamine drip, and heparin drip. Patient with watchman device for >6 months, discontinued Plavix and transitioned now to aspirin only. Patient with significant improvement in mentation, AAOx3, warm and well-perfused extremities. Planned for transition back to intermittent HD plan for first session 10/12. Stable for stepdown to telemetry on 10/11/23 late afternoon. Interval SBP 80smmHg, also c/b acute HypoNa to 122:  as discussed with Nephrology, pt too hypotensive to safely dialyze on floor; requires immediate transfer back to CCU / [facility] boarding mini ICU for continuous HD with close monitoring and likely pressor support.    	  MEDICATIONS:    ondansetron Injectable 4 milliGRAM(s) IV Push every 12 hours PRN  pregabalin 75 milliGRAM(s) Oral daily    pantoprazole    Tablet 40 milliGRAM(s) Oral before breakfast    atorvastatin 40 milliGRAM(s) Oral at bedtime    aspirin enteric coated 81 milliGRAM(s) Oral daily  folic acid 1 milliGRAM(s) Oral daily  heparin   Injectable 5000 Unit(s) SubCutaneous every 8 hours  influenza  Vaccine (HIGH DOSE) 0.7 milliLiter(s) IntraMuscular once  Nephro-chichi 1 Tablet(s) Oral every 24 hours        [PHYSICAL EXAM:  TELEMETRY: 6 beats NSVT seen otherwise in rate controlled AFib  T(C): 36.7 (10-12-23 @ 05:53), Max: 36.9 (10-11-23 @ 20:30)  HR: 92 (10-12-23 @ 05:53) (73 - 115)  BP: 86/61 (10-12-23 @ 05:53) (81/53 - 106/67)  RR: 18 (10-12-23 @ 05:53) (14 - 20)  SpO2: 96% (10-12-23 @ 05:53) (94% - 100%)  Wt(kg): --  I&O's Summary    11 Oct 2023 07:01  -  12 Oct 2023 07:00  --------------------------------------------------------  IN: 1315 mL / OUT: 1351 mL / NET: -36 mL      Appearance: Pt seen in bed in NAD 	  HEENT:   moist oral mucosa, PERRL, EOMI, NCAT	; R eyelid slight ptosis   Neck: Supple  Cardiovascular: Irregular rhythm, +S1 S2, No JVD, + holosystolic murmur best @LUSB  Chest: R chest wall HD port seen, no erythema/purulence/edema/tenderness  Respiratory: + Bibasilar Rales; no Rhonchi, Wheezing; no increased respiratory effort or rate	  Gastrointestinal:  Soft, Non-tender, + BS, distended, + fluid wave, but without rebound or guarding; +reducible umbilical hernia midline lower quadrant, + abdominal striae to lower abdomen  Skin: +bilateral upper extremity maculopapular rash with excoriations as well on scalp; venous stasis dermatitis bilateral ankles and lower calves. No jaundice. Very mild mottling of the hands.  Extremities: Moving all four extremities, 4+/5 all four extremities, No clubbing, cyanosis. Tense nonpitting edema to b/l LE  Vascular: Cool b/l LE; sensation intact all 4 extremities  Neurologic: Non-focal  Psychiatry: A & O x 3, Mood & affect appropriate        LABS:	 	  CARDIAC MARKERS:                          9.6    4.77  )-----------( 82       ( 12 Oct 2023 05:30 )             30.9     10-12    122<L>  |  92<L>  |  16  ----------------------------<  122<H>  4.8   |  21<L>  |  2.01<H>    Ca    9.1      12 Oct 2023 05:30  Phos  3.6     10-12  Mg     2.1     10-12    TPro  6.4  /  Alb  2.9<L>  /  TBili  1.8<H>  /  DBili  x   /  AST  28  /  ALT  34  /  AlkPhos  218<H>  10-12    proBNP:   Lipid Profile:   HgA1c:   TSH:   PTT - ( 11 Oct 2023 11:42 )  PTT:99.8 sec

## 2023-10-13 LAB
ALBUMIN SERPL ELPH-MCNC: 2.7 G/DL — LOW (ref 3.3–5)
ALP SERPL-CCNC: 211 U/L — HIGH (ref 40–120)
ALT FLD-CCNC: 31 U/L — SIGNIFICANT CHANGE UP (ref 10–45)
ANION GAP SERPL CALC-SCNC: 10 MMOL/L — SIGNIFICANT CHANGE UP (ref 5–17)
APTT BLD: 38 SEC — HIGH (ref 24.5–35.6)
AST SERPL-CCNC: 27 U/L — SIGNIFICANT CHANGE UP (ref 10–40)
BASOPHILS # BLD AUTO: 0.03 K/UL — SIGNIFICANT CHANGE UP (ref 0–0.2)
BASOPHILS NFR BLD AUTO: 0.7 % — SIGNIFICANT CHANGE UP (ref 0–2)
BILIRUB SERPL-MCNC: 1.8 MG/DL — HIGH (ref 0.2–1.2)
BLD GP AB SCN SERPL QL: NEGATIVE — SIGNIFICANT CHANGE UP
BUN SERPL-MCNC: 13 MG/DL — SIGNIFICANT CHANGE UP (ref 7–23)
CALCIUM SERPL-MCNC: 9.1 MG/DL — SIGNIFICANT CHANGE UP (ref 8.4–10.5)
CHLORIDE SERPL-SCNC: 92 MMOL/L — LOW (ref 96–108)
CO2 SERPL-SCNC: 25 MMOL/L — SIGNIFICANT CHANGE UP (ref 22–31)
CREAT SERPL-MCNC: 1.87 MG/DL — HIGH (ref 0.5–1.3)
EGFR: 39 ML/MIN/1.73M2 — LOW
EOSINOPHIL # BLD AUTO: 0.04 K/UL — SIGNIFICANT CHANGE UP (ref 0–0.5)
EOSINOPHIL NFR BLD AUTO: 0.9 % — SIGNIFICANT CHANGE UP (ref 0–6)
GLUCOSE SERPL-MCNC: 128 MG/DL — HIGH (ref 70–99)
HCT VFR BLD CALC: 30.6 % — LOW (ref 39–50)
HGB BLD-MCNC: 9.7 G/DL — LOW (ref 13–17)
IMM GRANULOCYTES NFR BLD AUTO: 0.7 % — SIGNIFICANT CHANGE UP (ref 0–0.9)
LYMPHOCYTES # BLD AUTO: 0.79 K/UL — LOW (ref 1–3.3)
LYMPHOCYTES # BLD AUTO: 17.7 % — SIGNIFICANT CHANGE UP (ref 13–44)
MAGNESIUM SERPL-MCNC: 1.9 MG/DL — SIGNIFICANT CHANGE UP (ref 1.6–2.6)
MCHC RBC-ENTMCNC: 31.7 GM/DL — LOW (ref 32–36)
MCHC RBC-ENTMCNC: 32.7 PG — SIGNIFICANT CHANGE UP (ref 27–34)
MCV RBC AUTO: 103 FL — HIGH (ref 80–100)
MONOCYTES # BLD AUTO: 0.65 K/UL — SIGNIFICANT CHANGE UP (ref 0–0.9)
MONOCYTES NFR BLD AUTO: 14.6 % — HIGH (ref 2–14)
NEUTROPHILS # BLD AUTO: 2.92 K/UL — SIGNIFICANT CHANGE UP (ref 1.8–7.4)
NEUTROPHILS NFR BLD AUTO: 65.4 % — SIGNIFICANT CHANGE UP (ref 43–77)
NRBC # BLD: 0 /100 WBCS — SIGNIFICANT CHANGE UP (ref 0–0)
PHOSPHATE SERPL-MCNC: 3.5 MG/DL — SIGNIFICANT CHANGE UP (ref 2.5–4.5)
PLATELET # BLD AUTO: 69 K/UL — LOW (ref 150–400)
POTASSIUM SERPL-MCNC: 4 MMOL/L — SIGNIFICANT CHANGE UP (ref 3.5–5.3)
POTASSIUM SERPL-SCNC: 4 MMOL/L — SIGNIFICANT CHANGE UP (ref 3.5–5.3)
PROT SERPL-MCNC: 6.2 G/DL — SIGNIFICANT CHANGE UP (ref 6–8.3)
RBC # BLD: 2.97 M/UL — LOW (ref 4.2–5.8)
RBC # FLD: 18.3 % — HIGH (ref 10.3–14.5)
RH IG SCN BLD-IMP: NEGATIVE — SIGNIFICANT CHANGE UP
SODIUM SERPL-SCNC: 127 MMOL/L — LOW (ref 135–145)
WBC # BLD: 4.46 K/UL — SIGNIFICANT CHANGE UP (ref 3.8–10.5)
WBC # FLD AUTO: 4.46 K/UL — SIGNIFICANT CHANGE UP (ref 3.8–10.5)

## 2023-10-13 PROCEDURE — 99232 SBSQ HOSP IP/OBS MODERATE 35: CPT

## 2023-10-13 PROCEDURE — 71045 X-RAY EXAM CHEST 1 VIEW: CPT | Mod: 26

## 2023-10-13 PROCEDURE — 90935 HEMODIALYSIS ONE EVALUATION: CPT | Mod: GC

## 2023-10-13 PROCEDURE — 93010 ELECTROCARDIOGRAM REPORT: CPT

## 2023-10-13 RX ORDER — MIDODRINE HYDROCHLORIDE 2.5 MG/1
5 TABLET ORAL ONCE
Refills: 0 | Status: COMPLETED | OUTPATIENT
Start: 2023-10-13 | End: 2023-10-13

## 2023-10-13 RX ORDER — ERYTHROPOIETIN 10000 [IU]/ML
10000 INJECTION, SOLUTION INTRAVENOUS; SUBCUTANEOUS ONCE
Refills: 0 | Status: COMPLETED | OUTPATIENT
Start: 2023-10-13 | End: 2023-10-13

## 2023-10-13 RX ADMIN — ERYTHROPOIETIN 10000 UNIT(S): 10000 INJECTION, SOLUTION INTRAVENOUS; SUBCUTANEOUS at 12:25

## 2023-10-13 RX ADMIN — Medication 81 MILLIGRAM(S): at 15:22

## 2023-10-13 RX ADMIN — Medication 75 MILLIGRAM(S): at 15:21

## 2023-10-13 RX ADMIN — Medication 1 MILLIGRAM(S): at 15:21

## 2023-10-13 RX ADMIN — Medication 1 TABLET(S): at 15:22

## 2023-10-13 RX ADMIN — HEPARIN SODIUM 5000 UNIT(S): 5000 INJECTION INTRAVENOUS; SUBCUTANEOUS at 05:59

## 2023-10-13 RX ADMIN — HEPARIN SODIUM 5000 UNIT(S): 5000 INJECTION INTRAVENOUS; SUBCUTANEOUS at 15:48

## 2023-10-13 RX ADMIN — PANTOPRAZOLE SODIUM 40 MILLIGRAM(S): 20 TABLET, DELAYED RELEASE ORAL at 04:52

## 2023-10-13 RX ADMIN — HEPARIN SODIUM 5000 UNIT(S): 5000 INJECTION INTRAVENOUS; SUBCUTANEOUS at 22:01

## 2023-10-13 RX ADMIN — MIDODRINE HYDROCHLORIDE 5 MILLIGRAM(S): 2.5 TABLET ORAL at 11:01

## 2023-10-13 RX ADMIN — ATORVASTATIN CALCIUM 40 MILLIGRAM(S): 80 TABLET, FILM COATED ORAL at 22:00

## 2023-10-13 NOTE — PROGRESS NOTE ADULT - ASSESSMENT
63M, poor historian, with PMHx of ESRD (HD 4x weekly M/T/TH/SAT), HLD, HTN, CHF, ?MI, GERD, atrial fibrillation (s/p Watchman),  h/o chronic EtOH w/ cirrhosis,  initially admitted to medicine service w/ volume overload after missing HD; course c/b RRT during first inpatient HD session d/t hypotension and pt was found to have severe biventricular failure on TTE w/ clinical evidence of early low output state; s/p CCU (10/6-10/11 afternoon) for concern for Cardiogenic shock, s/p CVVHD x 4 days and Dobutamine;  now off Dobutamine and stepped down to Telemetry with plan for intermittent HD per Nephro-- interval SBP 80smmHg, also c/b acute HypoNa to 122:  as discussed with Nephrology, pt too hypotensive to safely dialyze on floor; requires immediate transfer back to CCU / [facility] boarding mini ICU for continuous HD with close monitoring and likely pressor support. 63M, poor historian, with PMHx of ESRD (HD 4x weekly M/T/TH/SAT), HLD, HTN, CHF, ?MI, GERD, atrial fibrillation (s/p Watchman),  h/o chronic EtOH w/ cirrhosis,  initially admitted to medicine service w/ volume overload after missing HD; course c/b RRT during first inpatient HD session d/t hypotension and pt was found to have severe biventricular failure on TTE w/ clinical evidence of early low output state; s/p CCU (10/6-10/11 afternoon) for concern for Cardiogenic shock, s/p CVVHD x 4 days and Dobutamine;  now off Dobutamine and stepped down to Telemetry with plan for intermittent HD per Nephro-- interval SBP 80smmHg, also c/b acute HypoNa to 122:  as discussed with Nephrology, pt too hypotensive to safely dialyze on floor so transferred to CCU for HD sessions on 10/12- 10/13. Initially plan for continuous HD with close monitoring in CCU, however, his BP was stable enough after midodrine to stay with plan for intermittent HD. Now stable for stepdown to cardiac tele today 10/13/23 evening.             63M, poor historian, with PMHx of ESRD (HD 4x weekly M/T/TH/SAT), HLD, HTN, CHF (EF 35-40%), ?MI, GERD, atrial fibrillation (s/p Watchman),  h/o chronic EtOH w/ cirrhosis,  initially admitted to medicine service w/ volume overload after missing HD; course c/b RRT during first inpatient HD session d/t hypotension and pt was found to have severe biventricular failure on TTE w/ clinical evidence of early low output state; s/p CCU (10/6-10/11 afternoon) for concern for Cardiogenic shock, s/p CVVHD x 4 days and Dobutamine;  now off Dobutamine and stepped down to Telemetry with plan for intermittent HD per Nephro-- interval SBP 80smmHg, also c/b acute HypoNa to 122:  as discussed with Nephrology, pt too hypotensive to safely dialyze on floor so transferred to CCU for HD sessions on 10/12- 10/13. Initially plan for continuous HD with close monitoring in CCU, however, his BP was stable enough after midodrine to stay with plan for intermittent HD. Now stable for stepdown to cardiac tele today 10/13/23 evening.

## 2023-10-13 NOTE — CHART NOTE - NSCHARTNOTEFT_GEN_A_CORE
Admitting Diagnosis:   Patient is a 63y old  Male who presents with a chief complaint of missed HD for ESRD (13 Oct 2023 12:07)      PAST MEDICAL & SURGICAL HISTORY:  HTN (hypertension)      HLD (hyperlipidemia)      Chronic kidney disease      H/O bilateral hip replacements          Current Nutrition Order: PO Renal Restriction diet [no concentrated K or Phos, Low sodium, no protein restriction] + 800mL fluid restriction    PO Intake: Good (%) [ X  ]  Fair (50-75%) [   ] Poor (<25%) [   ]    GI Issues: soft, distended reducible umbilical hernia. last BM 10/13    Pain: no pain/discomfort noted    Skin Integrity: L elbow abrasion, R shoulder papule. Jon score 20    Labs:   10-13    127<L>  |  92<L>  |  13  ----------------------------<  128<H>  4.0   |  25  |  1.87<H>    Ca    9.1      13 Oct 2023 04:55  Phos  3.5     10-13  Mg     1.9     10-13    TPro  6.2  /  Alb  2.7<L>  /  TBili  1.8<H>  /  DBili  x   /  AST  27  /  ALT  31  /  AlkPhos  211<H>  10-13    CAPILLARY BLOOD GLUCOSE          Medications:  MEDICATIONS  (STANDING):  aspirin enteric coated 81 milliGRAM(s) Oral daily  atorvastatin 40 milliGRAM(s) Oral at bedtime  folic acid 1 milliGRAM(s) Oral daily  heparin   Injectable 5000 Unit(s) SubCutaneous every 8 hours  influenza  Vaccine (HIGH DOSE) 0.7 milliLiter(s) IntraMuscular once  Nephro-chichi 1 Tablet(s) Oral every 24 hours  pantoprazole    Tablet 40 milliGRAM(s) Oral before breakfast  pregabalin 75 milliGRAM(s) Oral daily    MEDICATIONS  (PRN):  albumin human 25% IVPB 100 milliLiter(s) IV Intermittent once PRN Hypotension during HD 10/12  ondansetron Injectable 4 milliGRAM(s) IV Push every 12 hours PRN Nausea and/or Vomiting      Daily Weight in k.7 (13 Oct 2023 11:00)  Daily 100.4kg [12 Oct 2023]  Daily 114kg [11 Oct 2023]  Daily 114.7kg [10 Oct 2023]  Daily 116.1kg [09 Oct 2023]    Weight Change: large weight loss noted x <1 week, likely secondary to fluid shifts/HD. Continue with daily weights for trending / ensuring adequacy of nutrition provision    Estimated energy needs:   Ideal body weight (83.4kg) used for calculations as pt >120% of IBW. Needs estimated for age and adjusted for current clinical status, increased needs for clinical status, dialysis    Calories: 2642-9673 kcals based on 25-35 kcals/kg  Protein: 100.1-141.8g based on 1.2-1.7g protein/kg  *Fluid needs per team    Subjective:   63Y M w/hx of ESRD  admitted for SOB and generalized weakness and fatigue missed HD, now h/c c/b cardiogenic shock, on inotropes and CVVHD (10/6) tolerating well, net neg 2.8L continue CVVHD     Chart reviewed. Pt seen at bedside on 5 LA, on room air. Currently on Renal restriction diet, 800mL fluid restriction. Now on iHD, last HD today, next planned for 10/16. On fluid restriction for hyponatremia as pt unable to remove free water per MD. Pt endorses good appetite & PO intake, requesting items not in line with current diet order & extra fluids. Diet education provided on current nutrition Rx, pt verbalized understanding. Labs reviewed- Na 127 <L>, chloride 92 <L>, Creat 1.87 <H>, BUN WNL. Alb 2.7 <L>, HDL 34 <L>, other lipids WNL. Meds: on nephrovite, zofran inj. prn, folic acid, protonix. RDN will continue to monitor, reassess, and intervene as appropriate.     Previous Nutrition Diagnosis:  Altered Nutrition Related Lab Values related to ESRD as evidenced by low sodium, elevated BUN & Creat    Active [ X  ]  Resolved [   ]    If resolved, new PES:   Pt will meet at least 75% of protein & energy needs via most appropriate route for nutrition     Goal:  Pt will meet at least 75% of protein & energy needs via most appropriate route for nutrition     Recommendations:  1. Continue with renal restricted diet  - fluid restriction per team to improve fluid/volume status  2. Ongoing diet education prn  3. c/w Nephrovite  4. Monitor lytes closely  - replenish / correct prn  5. Monitor chemistry, GI function, skin integrity  6. Pain & bowel regimen prn    Education: current diet order, fluid restriction    Risk Level: High [   ] Moderate [ X  ] Low [   ]

## 2023-10-13 NOTE — PROGRESS NOTE ADULT - SUBJECTIVE AND OBJECTIVE BOX
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 Started on dobutamine given concern of cardiogenic shock. Patient received CVVHD, now net negative approx 11L this hospitalization. Started on heparin gtt full AC while on CVVHD. Now off CVVHD, dobutamine drip, and heparin drip. Patient with watchman device for >6 months, discontinued Plavix and transitioned now to aspirin only. Patient with significant improvement in mentation, AAOx3, warm and well-perfused extremities. Planned for transition back to intermittent HD plan for first session 10/12. Stable for stepdown to telemetry on 10/11/23 late afternoon. Interval SBP 80smmHg, also c/b acute HypoNa to 122:  as discussed with Nephrology, pt too hypotensive to safely dialyze on floor; transferred back to CCU / 5[facility] boarding mini ICU for continuous HD with close monitoring. Underwent HD 10/13 (target fluid removal 0L) and 10/14 (target fluid removal 1L) Received midodrine 5mg with dialysis sessions. Likely no plan for dialysis over the weekend and patient stable for stepdown while awaiting PT/OT eval and dispo planning.         MEDICATIONS:  ondansetron Injectable 4 milliGRAM(s) IV Push every 12 hours PRN  pregabalin 75 milliGRAM(s) Oral daily  pantoprazole    Tablet 40 milliGRAM(s) Oral before breakfast  atorvastatin 40 milliGRAM(s) Oral at bedtime  aspirin enteric coated 81 milliGRAM(s) Oral daily  folic acid 1 milliGRAM(s) Oral daily  heparin   Injectable 5000 Unit(s) SubCutaneous every 8 hours  influenza  Vaccine (HIGH DOSE) 0.7 milliLiter(s) IntraMuscular once  Nephro-chichi 1 Tablet(s) Oral every 24 hours    	  VITAL SIGNS:  T(C): 37.1 (10-13-23 @ 18:00), Max: 37.2 (10-13-23 @ 09:10)  HR: 86 (10-13-23 @ 16:00) (80 - 101)  BP: 83/63 (10-13-23 @ 16:00) (78/60 - 111/59)  RR: 18 (10-13-23 @ 16:00) (11 - 22)  SpO2: 100% (10-13-23 @ 16:00) (92% - 100%)  Wt(kg): --    I&O's Summary    12 Oct 2023 07:01  -  13 Oct 2023 07:00  --------------------------------------------------------  IN: 360 mL / OUT: 0 mL / NET: 360 mL    13 Oct 2023 07:01  -  13 Oct 2023 18:26  --------------------------------------------------------  IN: 680 mL / OUT: 1000 mL / NET: -320 mL                                           PHYSICAL EXAM:  Appearance: Normal	  HEENT: Normal oral mucosa, PERRL, EOMI	  Neck: Supple, + JVD/ - JVD; ___ Carotid Bruit   Cardiovascular: Normal S1 S2, No murmurs  Respiratory: Lungs clear to auscultation/Decreased Breath Sounds/No Rales, Rhonchi, Wheezing	  Gastrointestinal:  Soft, Non-tender, + BS	  Skin: No rashes, No ecchymoses, No cyanosis  Extremities: Normal range of motion, No clubbing, cyanosis or edema  Vascular: Peripheral pulses palpable 2+ bilaterally  Neurologic: Non-focal  Psychiatry: A & O x 3, Mood & affect appropriate    LABS:	 	                                  9.7    4.46  )-----------( 69       ( 13 Oct 2023 04:55 )             30.6     10-13    127<L>  |  92<L>  |  13  ----------------------------<  128<H>  4.0   |  25  |  1.87<H>    Ca    9.1      13 Oct 2023 04:55  Phos  3.5     10-13  Mg     1.9     10-13    TPro  6.2  /  Alb  2.7<L>  /  TBili  1.8<H>  /  DBili  x   /  AST  27  /  ALT  31  /  AlkPhos  211<H>  10-13      PTT - ( 13 Oct 2023 04:56 )  PTT:38.0 sec Cardiology PA Adult Progress Note    SUBJECTIVE ASSESSMENT:  	  Patient is a 63 year old M PMH HFrEF (last known EF 30-40% a few weeks ago), AFib (s/p watchman), 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 Started on dobutamine given concern of cardiogenic shock. Patient received CVVHD, now net negative approx 11L this hospitalization. Started on heparin gtt full AC while on CVVHD. Now off CVVHD, dobutamine drip, and heparin drip. Patient with watchman device for >6 months, discontinued Plavix and transitioned now to aspirin only. Patient with significant improvement in mentation, AAOx3, warm and well-perfused extremities. Planned for transition back to intermittent HD plan for first session 10/12. Stable for stepdown to telemetry on 10/11/23 late afternoon. Interval SBP 80smmHg, also c/b acute HypoNa to 122:  as discussed with Nephrology, pt too hypotensive to safely dialyze on floor; transferred back to CCU / [facility] boarding mini ICU for plan for continuous HD with close monitoring. However, his BP was stable enough after midodrine to stay with plan for intermittent HD. Patient Underwent HD 10/13 with 1L UF with assistance with midodrine. Received midodrine 5mg with dialysis sessions. Likely no plan for dialysis over the weekend and patient stable for stepdown to cardiac tele today 10/13/23 evening while awaiting PT/OT eval and dispo planning.         MEDICATIONS:  ondansetron Injectable 4 milliGRAM(s) IV Push every 12 hours PRN  pregabalin 75 milliGRAM(s) Oral daily  pantoprazole    Tablet 40 milliGRAM(s) Oral before breakfast  atorvastatin 40 milliGRAM(s) Oral at bedtime  aspirin enteric coated 81 milliGRAM(s) Oral daily  folic acid 1 milliGRAM(s) Oral daily  heparin   Injectable 5000 Unit(s) SubCutaneous every 8 hours  influenza  Vaccine (HIGH DOSE) 0.7 milliLiter(s) IntraMuscular once  Nephro-chichi 1 Tablet(s) Oral every 24 hours    	  VITAL SIGNS:  T(C): 37.1 (10-13-23 @ 18:00), Max: 37.2 (10-13-23 @ 09:10)  HR: 86 (10-13-23 @ 16:00) (80 - 101)  BP: 83/63 (10-13-23 @ 16:00) (78/60 - 111/59)  RR: 18 (10-13-23 @ 16:00) (11 - 22)  SpO2: 100% (10-13-23 @ 16:00) (92% - 100%)  Wt(kg): --    I&O's Summary    12 Oct 2023 07:01  -  13 Oct 2023 07:00  --------------------------------------------------------  IN: 360 mL / OUT: 0 mL / NET: 360 mL    13 Oct 2023 07:01  -  13 Oct 2023 18:26  --------------------------------------------------------  IN: 680 mL / OUT: 1000 mL / NET: -320 mL                                           PHYSICAL EXAM:  Appearance: Normal	  HEENT: Normal oral mucosa, PERRL, EOMI	  Neck: Supple, + JVD/ - JVD; ___ Carotid Bruit   Cardiovascular: Normal S1 S2, No murmurs  Respiratory: Lungs clear to auscultation/Decreased Breath Sounds/No Rales, Rhonchi, Wheezing	  Gastrointestinal:  Soft, Non-tender, + BS	  Skin: No rashes, No ecchymoses, No cyanosis  Extremities: Normal range of motion, No clubbing, cyanosis or edema  Vascular: Peripheral pulses palpable 2+ bilaterally  Neurologic: Non-focal  Psychiatry: A & O x 3, Mood & affect appropriate    LABS:	 	                                  9.7    4.46  )-----------( 69       ( 13 Oct 2023 04:55 )             30.6     10-13    127<L>  |  92<L>  |  13  ----------------------------<  128<H>  4.0   |  25  |  1.87<H>    Ca    9.1      13 Oct 2023 04:55  Phos  3.5     10-13  Mg     1.9     10-13    TPro  6.2  /  Alb  2.7<L>  /  TBili  1.8<H>  /  DBili  x   /  AST  27  /  ALT  31  /  AlkPhos  211<H>  10-13      PTT - ( 13 Oct 2023 04:56 )  PTT:38.0 sec Cardiology PA Adult Progress Note    SUBJECTIVE ASSESSMENT:  	  Patient is a 63 year old M, uses cane, PMH HFrEF (last known EF 30-40% a few weeks ago), AFib (s/p watchman), 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 Started on dobutamine given concern of cardiogenic shock. Patient received CVVHD, now net negative approx 11L this hospitalization. Started on heparin gtt full AC while on CVVHD. Now off CVVHD, dobutamine drip, and heparin drip. Patient with watchman device for >6 months, discontinued Plavix and transitioned now to aspirin only. Patient with significant improvement in mentation, AAOx3, warm and well-perfused extremities. Planned for transition back to intermittent HD plan for first session 10/12. Stable for stepdown to telemetry on 10/11/23 late afternoon. Interval SBP 80smmHg, also c/b acute HypoNa to 122:  as discussed with Nephrology, pt too hypotensive to safely dialyze on floor; transferred back to CCU / [facility] boarding mini ICU for plan for continuous HD with close monitoring. However, his BP was stable enough after midodrine to stay with plan for intermittent HD. Patient Underwent HD 10/13 with 1L UF with assistance with midodrine. Received midodrine 5mg with dialysis sessions. Likely no plan for dialysis over the weekend and patient stable for stepdown to cardiac tele today 10/13/23 evening while awaiting PT/OT eval and dispo planning.         MEDICATIONS:  ondansetron Injectable 4 milliGRAM(s) IV Push every 12 hours PRN  pregabalin 75 milliGRAM(s) Oral daily  pantoprazole    Tablet 40 milliGRAM(s) Oral before breakfast  atorvastatin 40 milliGRAM(s) Oral at bedtime  aspirin enteric coated 81 milliGRAM(s) Oral daily  folic acid 1 milliGRAM(s) Oral daily  heparin   Injectable 5000 Unit(s) SubCutaneous every 8 hours  influenza  Vaccine (HIGH DOSE) 0.7 milliLiter(s) IntraMuscular once  Nephro-chichi 1 Tablet(s) Oral every 24 hours    	  VITAL SIGNS:  T(C): 37.1 (10-13-23 @ 18:00), Max: 37.2 (10-13-23 @ 09:10)  HR: 86 (10-13-23 @ 16:00) (80 - 101)  BP: 83/63 (10-13-23 @ 16:00) (78/60 - 111/59)  RR: 18 (10-13-23 @ 16:00) (11 - 22)  SpO2: 100% (10-13-23 @ 16:00) (92% - 100%)  Wt(kg): --    I&O's Summary    12 Oct 2023 07:01  -  13 Oct 2023 07:00  --------------------------------------------------------  IN: 360 mL / OUT: 0 mL / NET: 360 mL    13 Oct 2023 07:01  -  13 Oct 2023 18:26  --------------------------------------------------------  IN: 680 mL / OUT: 1000 mL / NET: -320 mL                                           PHYSICAL EXAM:  Appearance: Normal	  HEENT: Normal oral mucosa, PERRL, EOMI	  Cardiovascular: Normal S1 S2, No murmurs  Respiratory: Decreased Breath Sounds B/L	  Gastrointestinal:  Soft, Non-tender  Extremities: +1 pitting edema LE B/L  Neurologic: Non-focal  Psychiatry: A & O x 3, Mood & affect appropriate    LABS:	 	                                  9.7    4.46  )-----------( 69       ( 13 Oct 2023 04:55 )             30.6     10-13    127<L>  |  92<L>  |  13  ----------------------------<  128<H>  4.0   |  25  |  1.87<H>    Ca    9.1      13 Oct 2023 04:55  Phos  3.5     10-13  Mg     1.9     10-13    TPro  6.2  /  Alb  2.7<L>  /  TBili  1.8<H>  /  DBili  x   /  AST  27  /  ALT  31  /  AlkPhos  211<H>  10-13      PTT - ( 13 Oct 2023 04:56 )  PTT:38.0 sec Cardiology PA Adult Progress Note    SUBJECTIVE ASSESSMENT:  	  Patient is a 63 year old M, uses cane, PMH HFrEF (EF 35-40% ), AFib (s/p watchman), 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 Started on dobutamine given concern of cardiogenic shock. Patient received CVVHD, now net negative approx 11L this hospitalization. Started on heparin gtt full AC while on CVVHD. Now off CVVHD, dobutamine drip, and heparin drip. Patient with watchman device for >6 months, discontinued Plavix and transitioned now to aspirin only. Patient with significant improvement in mentation, AAOx3, warm and well-perfused extremities. Planned for transition back to intermittent HD plan for first session 10/12. Stable for stepdown to telemetry on 10/11/23 late afternoon. Interval SBP 80smmHg, also c/b acute HypoNa to 122:  as discussed with Nephrology, pt too hypotensive to safely dialyze on floor; transferred back to CCU / [facility] boarding mini ICU for plan for continuous HD with close monitoring. However, his BP was stable enough after midodrine to stay with plan for intermittent HD. Patient Underwent HD 10/13 with 1L UF with assistance with midodrine. Received midodrine 5mg with dialysis sessions. Likely no plan for dialysis over the weekend and patient stable for stepdown to cardiac tele today 10/13/23 evening while awaiting PT/OT eval and dispo planning. Give 5mg Midodrine on HD days only prior to dialysis.           MEDICATIONS:  ondansetron Injectable 4 milliGRAM(s) IV Push every 12 hours PRN  pregabalin 75 milliGRAM(s) Oral daily  pantoprazole    Tablet 40 milliGRAM(s) Oral before breakfast  atorvastatin 40 milliGRAM(s) Oral at bedtime  aspirin enteric coated 81 milliGRAM(s) Oral daily  folic acid 1 milliGRAM(s) Oral daily  heparin   Injectable 5000 Unit(s) SubCutaneous every 8 hours  influenza  Vaccine (HIGH DOSE) 0.7 milliLiter(s) IntraMuscular once  Nephro-chichi 1 Tablet(s) Oral every 24 hours    	  VITAL SIGNS:  T(C): 37.1 (10-13-23 @ 18:00), Max: 37.2 (10-13-23 @ 09:10)  HR: 86 (10-13-23 @ 16:00) (80 - 101)  BP: 83/63 (10-13-23 @ 16:00) (78/60 - 111/59)  RR: 18 (10-13-23 @ 16:00) (11 - 22)  SpO2: 100% (10-13-23 @ 16:00) (92% - 100%)  Wt(kg): --    I&O's Summary    12 Oct 2023 07:01  -  13 Oct 2023 07:00  --------------------------------------------------------  IN: 360 mL / OUT: 0 mL / NET: 360 mL    13 Oct 2023 07:01  -  13 Oct 2023 18:26  --------------------------------------------------------  IN: 680 mL / OUT: 1000 mL / NET: -320 mL                                           PHYSICAL EXAM:  Appearance: Normal	  HEENT: Normal oral mucosa, PERRL, EOMI	  Cardiovascular: Normal S1 S2, No murmurs  Respiratory: Decreased Breath Sounds B/L	  Gastrointestinal:  Soft, Non-tender  Extremities: +1 pitting edema LE B/L  Neurologic: Non-focal  Psychiatry: A & O x 3, Mood & affect appropriate    LABS:	 	                                  9.7    4.46  )-----------( 69       ( 13 Oct 2023 04:55 )             30.6     10-13    127<L>  |  92<L>  |  13  ----------------------------<  128<H>  4.0   |  25  |  1.87<H>    Ca    9.1      13 Oct 2023 04:55  Phos  3.5     10-13  Mg     1.9     10-13    TPro  6.2  /  Alb  2.7<L>  /  TBili  1.8<H>  /  DBili  x   /  AST  27  /  ALT  31  /  AlkPhos  211<H>  10-13      PTT - ( 13 Oct 2023 04:56 )  PTT:38.0 sec

## 2023-10-13 NOTE — PROGRESS NOTE ADULT - PROBLEM SELECTOR PLAN 6
-currently in rate controlled AF; s/p watchman device; Now that pt is off CVVHD, Hep gtt discontinued; reinitiate as determined by CCU and Nephro  -proceed w/ ASA monotherapy given presence of Watchman device -currently in rate controlled AF; s/p watchman device; Now that pt is off CVVHD, Hep gtt discontinued  -continue w/ ASA monotherapy given presence of Watchman device

## 2023-10-13 NOTE — PROGRESS NOTE ADULT - PROBLEM SELECTOR PLAN 7
-H/H stable at Hgb 9.6 (admit 10.2).   No baseline Hgb or iron studies for comparison. Home med iron 65 mg qd for ELMER, though MCV is macrocytic likely 2/2 folate deficiency.   -Etiology likely multifactorial of ELMER, AOCD, and folate deficiency. No known history of GI bleeds, hemoptysis, hematochezia, melenic stool. B12 increased.   -Hold home med iron 65mg  -C/w folic acid 1mg daily   -EPO with HD   -Maintain active type and screen -H/H stable at Hgb 9.7 (admit 10.2).   No baseline Hgb or iron studies for comparison. Home med iron 65 mg qd for ELMER, though MCV is macrocytic likely 2/2 folate deficiency.   -Etiology likely multifactorial of ELMER, AOCD, and folate deficiency. No known history of GI bleeds, hemoptysis, hematochezia, melenic stool. B12 increased.   -Hold home med iron 65mg  -C/w folic acid 1mg daily   -EPO with HD   -Maintain active type and screen (last 10/13/23)

## 2023-10-13 NOTE — PROGRESS NOTE ADULT - PROBLEM SELECTOR PLAN 4
At admission BIENVENIDO 29. AST/AlT 45/43. Last drink 2 PM on 10/3/23, has 2-3 martinis daily. No history of alcohol withdrawals or withdrawal seizures. Qtc 397. 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   - S/p CIWA monitoring. CIWA 0 x2 days  - Thiamine, folic acid, multivitamin  - Home meds B1 100 mg OTC, D3 1000 IU OTC, B12 1000 mcg OTC At admission BIENVENIDO 29. AST/AlT 45/43. Last drink 2 PM on 10/3/23, has 2-3 martinis daily. No history of alcohol withdrawals or withdrawal seizures. Qtc 397. 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   -s/p Thiamine IVP on 10/4/23  - Home meds B1 100 mg OTC, D3 1000 IU OTC, B12 1000 mcg OTC

## 2023-10-13 NOTE — PROGRESS NOTE ADULT - PROBLEM SELECTOR PLAN 2
Started on HD 6 months ago, 4x week (M/T/Th/Sat) at [facility], missed HD session on 10/3/23. R chest port utilized. At admission, Cr 3.93 (un-established baseline Cr), K 3.4, Phos 4.4. Metabolic acidosis (AGAP 17) likely due to hypochloremic emesis / ETOH leading to lactic acidosis. Dialysate changed to Phoxillum due to low phos.   - s/p CVVHD 10/6-10/10/23 (received 4 sessions) w. Hep gtt in CCU, now plan to restart intermittent hemodialysis on patient schedule M/T/Th/Sat; planned for iHD first session today 10/12 however resting SBP in 80s- d/w Nephrology, too hypotensive to initiate HD on floor and will transfer back to CCU/5E Mini ICU today.  - Per Nephro 10/12: Start 5mg TID Midodrine STAT  - Nephro following, appreciate recommendations  - Trend BMP daily. c/w home Nephro-chichi Started on HD 6 months ago, 4x week (M/T/Th/Sat) at [facility], missed HD session on 10/3/23. R chest port utilized. At admission, Cr 3.93 (un-established baseline Cr), K 3.4, Phos 4.4. Metabolic acidosis (AGAP 17) likely due to hypochloremic emesis / ETOH leading to lactic acidosis. Dialysate changed to Phoxillum due to low phos.   - s/p CVVHD 10/6-10/10/23 (received 4 sessions) w. Hep gtt in CCU now d/c  - Nephro recs: Give 5mg Midodrine on HD days only prior to dialysis.   - Nephro following, appreciate recommendations  - Trend BMP daily. c/w home Nephro-chichi, 1L fluid restriction

## 2023-10-13 NOTE — PROGRESS NOTE ADULT - SUBJECTIVE AND OBJECTIVE BOX
Patient seen on HD tolerating procedure well. Patient does not offer any complaints and is hemodynamically stable.  Given 5mg Midodrine, Continue HD as prescribed.    Hemodialysis Treatment.:     Schedule: Once, Modality: Hemodialysis, Access: Internal Jugular Central Venous Catheter    Dialyzer: Optiflux [id number], Time: 240 Min    Blood Flow: 300 mL/Min , Dialysate Flow: 300 mL/Min, Dialysate Temp: 35, Tubinmm (Adult)    Target Fluid Removal: 1 Liters    Dialysate Electrolytes (mEq/L): Potassium 3, Calcium 2.5, Sodium 138, Bicarbonate 35    Additional Instructions: Albumin PRN   Midodrine 5mg Prior HD   Hold UF if SBP <70 (10-13-23 @ 09:36) [Active]        Vitals   T(F): 98.9  HR: 96  BP: 84/58  RR: 22  SpO2: 98%            PHYSICAL EXAM:  GENERAL: Alert, awake, NAD laying in bed tolerating HD   HEENT: VIPIN, EOMI, neck supple, no JVP  CHEST/LUNG: B/l rales   HEART: irregular irregular, +reducible umbilical hernia, abdominal striae   ABDOMEN: Soft, nontender, non distended  EXTREMITIES: no pedal edema  Neurology: AAOx3, no focal neurological deficit  SKIN: No rash or skin lesion   ACCESS: Left TDC accessed

## 2023-10-13 NOTE — PROGRESS NOTE ADULT - PROBLEM SELECTOR PLAN 9
#GERD (gastroesophageal reflux disease).   History of GERD, no complaints of epigastric pain at this time.   -Continue Pantoprazole 40mg qd.       F: None  E: Replete per HD with ESRD  N: renal restrictions  GI ppx: pantoprazole  DVT ppx: heparin 5000u q8h; SCDs  Full code  Dispo: CCU--> TELE 10/11/23 PM --> CCU / 5E Mini ICU 10/12 AM --> now again stepdown Tele 10/13/23 evening  PT: #GERD (gastroesophageal reflux disease).   History of GERD, no complaints of epigastric pain at this time.   -Continue Pantoprazole 40mg qd.       F: None  E: Replete per HD with ESRD  N: renal restrictions  GI ppx: pantoprazole  DVT ppx: heparin 5000u q8h; SCDs  Full code  Dispo: CCU--> TELE 10/11/23 PM --> CCU / 5E Mini ICU 10/12 AM --> now again stepdown Tele 10/13/23 evening  PT: advised GENE (10/11

## 2023-10-13 NOTE — PROGRESS NOTE ADULT - ASSESSMENT
63M, poor historian, with PMHx of ESRD (HD 4x weekly M/T/TH/SAT), HLD, HTN, CHF, ?MI, GERD, atrial fibrillation (s/p Watchman),  h/o chronic EtOH w/ cirrhosis,  initially admitted to medicine service w/ volume overload after missing HD; course c/b RRT during first inpatient HD session d/t hypotension and pt was found to have severe biventricular failure on TTE w/ clinical evidence of early low output state; s/p CCU (10/6-10/11 afternoon) for concern for Cardiogenic shock, s/p CVVHD x 4 days and Dobutamine;  now off Dobutamine and stepped down to Telemetry with plan for intermittent HD per Nephro-- interval SBP 80smmHg, also c/b acute HypoNa to 122:  as discussed with Nephrology, pt too hypotensive to safely dialyze on floor; requires immediate transfer back to CCU / 5[facility] boarding mini ICU for continuous HD with close monitoring and likely pressor support.    NEURO  AAOx3    CARDIO  #Acute on chronic systolic congestive heart failure.   Presented with missed HD session causing volume overload and c/b concern for cardiogenic shock. Severe bi-ventricular failure 2/2 NICM likely EtOH induced; Mildly elevated lactate on arrival to CCU on 10/6 w/ cool extremities, started on  2.5mcg and CVVHD as pt had evidence of significant volume overload, Lactate cleared prior to initiation of CVVHD. TTEs inpt 10/5 & 10/6/23: LVEF 35-40% with global hypokinesis.  Mildly dilated RV w/ mod reduced RVSF. Elevated RA pressure. SHALOM. Mod AR. mild to mod AS. Mod to severe TR. PASP 39 mmHg. Small-to-moderate pericardial effusion without echocardiographic evidence of cardiac tamponade physiology. Ascites present.s/p 11.5L of volume removal x4 days on CVVHD in CCU, last session on 10/10; 10/11, plan made to d/c CVVHD and pt stepped to Cardiac Telemetry with plan for intermittent HD starting 10/12 however c/b hypotension SBP 80s and acute hypoNa; Nephro following; transferred back to CCU/5E Mini ICU. S/p Dobutamine for >36hrs; SBP maintaining 80s-90s mmHg off dobutamine. s/p CCU &Nephro rec of holding home midodrine.   - Restart 5mg TID Midodrine  -Continue intermittent dialysis on CCU floor   - VSq4h, continuous pulse ox and telemetry; Core measures, strict i/o, daily standing weight, fluid restrict.    #CAD (coronary artery disease).   History of HLD and MI, c/w home med atorvastatin 40 mg. Patient unsure if had stent after MI  - F/u AM Lipid panel.    #Atrial fibrillation.   Currently in rate controlled AF; s/p watchman device; Now that pt is off CVVHD, Hep gtt discontinued; reinitiate as determined by CCU and Nephro  -Proceed w/ ASA monotherapy given presence of Watchman device.    RESPIRATORY  Patient OOBTC at time of examination, saturating well on room air.   - Continue to monitor oxygenation status     GASTROINTESTINAL  #Hepatic cirrhosis.   At admission BIENVENIDO 29. AST/AlT 45/43. Last drink 2 PM on 10/3/23, has 2-3 martinis daily. No history of alcohol withdrawals or withdrawal seizures. Qtc 397. 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   - S/p CIWA monitoring. CIWA 0 x2 days  - Thiamine, folic acid, multivitamin  - Home meds B1 100 mg OTC, D3 1000 IU OTC, B12 1000 mcg OTC.    #GERD (gastroesophageal reflux disease).   History of GERD, no complaints of epigastric pain at this time  -C/w PPI Qd      RENAL  #ESRD on dialysis  Started on HD 6 months ago, 4x week (M/T/Th/Sat) at [facility], missed HD session on 10/3/23. R chest port utilized. At admission, Cr 3.93 (un-established baseline Cr), K 3.4, Phos 4.4. Metabolic acidosis (AGAP 17) likely due to hypochloremic emesis / ETOH leading to lactic acidosis. Dialysate changed to Phoxillum due to low phos.   - s/p CVVHD 10/6-10/10/23 (received 4 sessions) w. Hep gtt in CCU, now plan to restart intermittent hemodialysis on patient schedule M/T/Th/Sat; planned for iHD first session today 10/12 however resting SBP in 80s. Patient too hypotensive for HD on floor, continue dialysis once transferred back to CCU   - Per Nephro 10/12: Start 5mg TID Midodrine STAT  - Nephro following, appreciate recommendations  - Trend BMP daily. c/w home Nephro-chichi.    HEME  #Anemia.   H/H stable at Hgb 9.6 (admit 10.2).   No baseline Hgb or iron studies for comparison. Home med iron 65 mg qd for ELMER, though MCV is macrocytic likely 2/2 folate deficiency.   -Etiology likely multifactorial of ELMER, AOCD, and folate deficiency. No known history of GI bleeds, hemoptysis, hematochezia, melenic stool. B12 increased.   -Hold home med iron 65mg  -C/w folic acid 1mg daily   -EPO with HD   -Maintain active type and screen.    PSYCH  #Moderate alcohol use disorder.   Chronic, likely 2/2 chronic alcohol use; no suspicion for hepatic encephalopathy at this time - Mentating AOx3 and conversant  -low concern for hepatorenal syndrome as remains hemodynamically stable. No known gallbladder pathology  -Abd US (10/4) Cirrhotic morphology. Pulsatile flow in the main and left portal veins. Moderate ascites. Increased right renal cortical echogenicity compatible w/ medical renal dz.  -s/p CVVHD daily in CCU, c/w volume removal HD per Nephro as above.    DERM  #Dermatitis.   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 B/l LE dry skin, likely venous stasis dermatitis on due to peripheral artery disease vs. heart failure related edema changes. Likely from niacin deficiency (pellagra) as etiology for dermatitis as patient has 3 month anorexia, fatigue, numbness. Recently prescribed Cerave w/o use  - Consider niacin supplementation (vitamin b3) or topical steroid  - lotion inpatient  - f/u outpt dermatologist.    PROPHYLACTIC MEASURE  F: None  E: Replete per HD with ESRD  N: renal restrictions  GI ppx: pantoprazole  DVT ppx: heparin 5000u q8h; SCDs  Full code  Dispo: Floors -> CCU          63M, poor historian, with PMHx of ESRD (HD 4x weekly M/T/TH/SAT), HLD, HTN, CHF, ?MI, GERD, atrial fibrillation (s/p Watchman),  h/o chronic EtOH w/ cirrhosis,  initially admitted to medicine service w/ volume overload after missing HD; course c/b RRT during first inpatient HD session d/t hypotension and pt was found to have severe biventricular failure on TTE w/ clinical evidence of early low output state; s/p CCU (10/6-10/11 afternoon) for concern for Cardiogenic shock, s/p CVVHD x 4 days and Dobutamine;  now off Dobutamine and stepped down to Telemetry with plan for intermittent HD per Nephro-- interval SBP 80smmHg, also c/b acute HypoNa to 122:  as discussed with Nephrology, pt too hypotensive to safely dialyze on floor so transferred to CCU for HD sessions on 10/12- 10/13. Now stable for stepdown to cardiac tele.     NEURO  AAOx3    CARDIO  #Acute on chronic systolic congestive heart failure.   Presented with missed HD session causing volume overload and c/b concern for cardiogenic shock. Severe bi-ventricular failure 2/2 NICM likely EtOH induced; Mildly elevated lactate on arrival to CCU on 10/6 w/ cool extremities, started on  2.5mcg and CVVHD as pt had evidence of significant volume overload, Lactate cleared prior to initiation of CVVHD. TTEs inpt 10/5 & 10/6/23: LVEF 35-40% with global hypokinesis.  Mildly dilated RV w/ mod reduced RVSF. Elevated RA pressure. SHALOM. Mod AR. mild to mod AS. Mod to severe TR. PASP 39 mmHg. Small-to-moderate pericardial effusion without echocardiographic evidence of cardiac tamponade physiology. Ascites present.s/p 11.5L of volume removal x4 days on CVVHD in CCU, last session on 10/10; 10/11, plan made to d/c CVVHD and pt stepped to Cardiac Telemetry with plan for intermittent HD starting 10/12 however c/b hypotension SBP 80s and acute hypoNa; Nephro following; transferred back to CCU/5E [facility] ICU. S/p Dobutamine for >36hrs; SBP maintaining 80s-90s mmHg off dobutamine. s/p CCU &Nephro rec of holding home midodrine.   - Restart 5mg TID Midodrine on dialysis days only   -Continue intermittent dialysis on CCU floor   - VSq4h, continuous pulse ox and telemetry; Core measures, strict i/o, daily standing weight, fluid restrict.    #CAD (coronary artery disease).   History of HLD and MI. Patient unsure if had stent after MI  - Continue with atorvastatin 40mg     #Atrial fibrillation.   Currently in rate controlled AF; s/p watchman device; Now that pt is off CVVHD, Hep gtt discontinued; reinitiate as determined by CCU and Nephro  -Proceed w/ ASA monotherapy given presence of Watchman device.    RESPIRATORY  Patient OOBTC at time of examination, saturating well on room air.   - Continue to monitor oxygenation status     GASTROINTESTINAL  #Hepatic cirrhosis.   At admission BIENVENIDO 29. AST/AlT 45/43. Last drink 2 PM on 10/3/23, has 2-3 martinis daily. No history of alcohol withdrawals or withdrawal seizures. Qtc 397. 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   - S/p CIWA monitoring. CIWA 0 x2 days  - Thiamine, folic acid, multivitamin  - Home meds B1 100 mg OTC, D3 1000 IU OTC, B12 1000 mcg OTC.    #GERD (gastroesophageal reflux disease).   History of GERD, no complaints of epigastric pain at this time  -Continue PPI QD    RENAL  #ESRD on dialysis  Started on HD 6 months ago, 4x week (M/T/Th/Sat) at [facility] dialysis center, missed HD session on 10/3/23. R chest port utilized. At admission, Cr 3.93 (un-established baseline Cr), K 3.4, Phos 4.4. Metabolic acidosis (AGAP 17) likely due to hypochloremic emesis / ETOH leading to lactic acidosis. Dialysate changed to Phoxillum due to low phos.   - s/p CVVHD 10/6-10/10/23 (received 4 sessions) w. Hep gtt in CCU, now plan to restart intermittent hemodialysis on patient schedule M/T/Th/Sat; planned for iHD on 10/12 however resting SBP in 80s. Patient too hypotensive for HD on floor, transferred back to CCU for continued dialysis   - Start midodrine 5mg TID on dialysis days only  - Fluid restriction 1L as will also help with Hyponatremia as patient unable to remove FW   - Monitor volume status over weekend with next HD 10/16  - Trend BMP daily  - Continue home Nephro-chichi  - Nephro following, appreciate recommendations    HEME  #Anemia.   H/H stable at Hgb 9.6 (admit 10.2).   No baseline Hgb or iron studies for comparison. Home med iron 65 mg qd for ELMER, though MCV is macrocytic likely 2/2 folate deficiency.   -Etiology likely multifactorial of ELMER, AOCD, and folate deficiency. No known history of GI bleeds, hemoptysis, hematochezia, melenic stool. B12 increased.   -Hold home med iron 65mg  -C/w folic acid 1mg daily   -EPO with HD   -Maintain active type and screen.    PSYCH  #Moderate alcohol use disorder.   Chronic, likely 2/2 chronic alcohol use; no suspicion for hepatic encephalopathy at this time - Mentating AOx3 and conversant  -low concern for hepatorenal syndrome as remains hemodynamically stable. No known gallbladder pathology  -Abd US (10/4) Cirrhotic morphology. Pulsatile flow in the main and left portal veins. Moderate ascites. Increased right renal cortical echogenicity compatible w/ medical renal dz.  -s/p CVVHD daily in CCU, c/w volume removal HD per Nephro as above.    DERM  #Dermatitis.   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 B/l LE dry skin, likely venous stasis dermatitis on due to peripheral artery disease vs. heart failure related edema changes. Likely from niacin deficiency (pellagra) as etiology for dermatitis as patient has 3 month anorexia, fatigue, numbness. Recently prescribed Cerave w/o use  - Consider niacin supplementation (vitamin b3) or topical steroid  - lotion inpatient  - f/u outpt dermatologist.    PROPHYLACTIC MEASURE  F: None  E: Replete per HD with ESRD  N: renal restrictions  GI ppx: pantoprazole  DVT ppx: heparin 5000u q8h; SCDs  Full code  Dispo: CCU -> Cardiac tele

## 2023-10-13 NOTE — PROGRESS NOTE ADULT - PROBLEM SELECTOR PLAN 1
-Presented with missed HD session causing volume overload and c/b concern for cardiogenic shock. Severe bi-ventricular failure 2/2 NICM likely EtOH induced; Mildly elevated lactate on arrival to CCU on 10/6 w/ cool extremities, started on  2.5mcg and CVVHD as pt had evidence of significant volume overload, Lactate cleared prior to initiation of CVVHD.   -s/p 11.5L of volume removal x4 days on CVVHD in CCU, last session on 10/10; 10/11, plan made to d/c CVVHD and pt stepped to Cardiac Telemetry with plan for intermittent HD starting 10/12 however c/b hypotension SBP 80s and acute hypoNa; Nephro following; transfer back to CCU/5E Mini ICU  - S/p Dobutamine for >36hrs; SBP maintaining 80s-90s mmHg off dobutamine;  s/p CCU &Nephro rec of holding home midodrine should indefinitely--> Now, per Nephro 10/12: Start 5mg TID Midodrine STAT; CCU to resume care.  -TTEs inpt 10/5 & 10/6/23: LVEF 35-40% with global hypokinesis.  Mildly dilated RV w/ mod reduced RVSF. Elevated RA pressure. SHALOM. Mod AR. mild to mod AS. Mod to severe TR. PASP 39 mmHg. Small-to-moderate pericardial effusion without echocardiographic evidence of cardiac tamponade physiology. Ascites present.  - Exam: Still slightly overloaded with bibasilar crackles and tense nonpitting b/l LE edema. Cooler BL LE extremities today with sensation and motion intact.  - Volume removal: Per Nephro. Return to CCU/5E mini ICU for closer monitoring, +/- continuous HD, +/- pressor support  - Meds: CCU recs consider low dose Hydral 10mg PO BID-TID on non-HD days  - O2: satting well on room air  - VSq4h, continuous pulse ox and telemetry; Core measures, strict i/o, daily standing weight, fluid restrict -Presented with missed HD session causing volume overload and c/b concern for cardiogenic shock. Severe bi-ventricular failure 2/2 NICM likely EtOH induced; Mildly elevated lactate on arrival to CCU on 10/6 w/ cool extremities, started on  2.5mcg and CVVHD as pt had evidence of significant volume overload, Lactate cleared prior to initiation of CVVHD.   -s/p 11.5L of volume removal x4 days on CVVHD in CCU, last session on 10/10; 10/11, plan made to d/c CVVHD and pt stepped to Cardiac Telemetry with plan for intermittent HD starting 10/12 however c/b hypotension SBP 80s and acute hypoNa; Nephro following; transfer back to CCU/5E Mini ICU  - S/p Dobutamine for >36hrs; SBP maintaining 80s-90s mmHg off dobutamine;  s/p CCU &Nephro rec of holding home midodrine should indefinitely--> Now, per Nephro 10/12: Start 5mg TID Midodrine STAT; CCU to resume care.  -TTEs inpt 10/5 & 10/6/23: LVEF 35-40% with global hypokinesis.  Mildly dilated RV w/ mod reduced RVSF. Elevated RA pressure. SHALOM. Mod AR. mild to mod AS. Mod to severe TR. PASP 39 mmHg. Small-to-moderate pericardial effusion without echocardiographic evidence of cardiac tamponade physiology. Ascites present.  - Exam: Still slightly overloaded with bibasilar crackles and tense nonpitting b/l LE edema. Cooler BL LE extremities today with sensation and motion intact.  - Volume removal: Per Nephro. Return to CCU/5E mini ICU for closer monitoring, +/- continuous HD, +/- pressor support  - Meds: CCU recs consider low dose Hydral 10mg PO BID-TID on non-HD days  - O2: satting well on room air.  - VSq4h, continuous pulse ox and telemetry; Core measures, strict i/o, daily standing weight, fluid restrict -Presented with missed HD session causing volume overload and c/b concern for cardiogenic shock. Severe bi-ventricular failure 2/2 NICM likely EtOH induced; Mildly elevated lactate on arrival to CCU on 10/6 w/ cool extremities, started on  2.5mcg and CVVHD as pt had evidence of significant volume overload, Lactate cleared prior to initiation of CVVHD.   -s/p 11.5L of volume removal x4 days on CVVHD in CCU, last session on 10/10; 10/11, plan made to d/c CVVHD, stepped down to Cardiac tele with plan for intermittent HD starting 10/12 however c/b hypotension SBP 80s and acute hypoNa 122; Nephro following; transferred back to CCU/5E Mini ICU 10/12/23 with plan for HCCV, however, BP stabilized enough with midodrine for stay w/ the intermittent HD. (Last dialysis 10/13/23) now pt again stable for step down to Cardiac tele 10/13/23.   -TTEs inpt 10/5 & 10/6/23: LVEF 35-40% with global hypokinesis.  Mildly dilated RV w/ mod reduced RVSF. Elevated RA pressure. SHALOM. Mod AR. mild to mod AS. Mod to severe TR. PASP 39 mmHg. Small-to-moderate pericardial effusion without echocardiographic evidence of cardiac tamponade physiology. Ascites present.  - Exam: Still slightly overloaded with bibasilar crackles and tense nonpitting b/l LE edema. Cooler BL LE extremities today with sensation and motion intact_________________________________  - Volume removal: HD 10/13 with 1L UF with assistance with midodrine. Next dialysis scheduled 10/16.   - O2: ___________________________  - continuous pulse ox and telemetry; Core measures, strict i/o, daily standing weight, fluid restrict -Presented with missed HD session causing volume overload and c/b concern for cardiogenic shock. Severe bi-ventricular failure 2/2 NICM likely EtOH induced; Mildly elevated lactate on arrival to CCU on 10/6 w/ cool extremities, started on  2.5mcg and CVVHD as pt had evidence of significant volume overload, Lactate cleared prior to initiation of CVVHD.   -s/p 11.5L of volume removal x4 days on CVVHD in CCU, last session on 10/10; 10/11, plan made to d/c CVVHD, stepped down to Cardiac tele with plan for intermittent HD starting 10/12 however c/b hypotension SBP 80s and acute hypoNa 122; Nephro following; transferred back to CCU/5E Mini ICU 10/12/23 with plan for HCCV, however, BP stabilized enough with midodrine for stay w/ the intermittent HD. (Last dialysis 10/13/23) now pt again stable for step down to Cardiac tele 10/13/23.   -TTEs inpt 10/5 & 10/6/23: LVEF 35-40% with global hypokinesis.  Mildly dilated RV w/ mod reduced RVSF. Elevated RA pressure. SHALOM. Mod AR. mild to mod AS. Mod to severe TR. PASP 39 mmHg. Small-to-moderate pericardial effusion without echocardiographic evidence of cardiac tamponade physiology. Ascites present.  - Volume removal: HD 10/13 with 1L UF with assistance with midodrine. Next dialysis scheduled 10/16.   - continuous pulse ox and telemetry; Core measures, strict i/o, daily standing weight, fluid restrict

## 2023-10-13 NOTE — PROGRESS NOTE ADULT - SUBJECTIVE AND OBJECTIVE BOX
OVERNIGHT: NAEON    SUBJECTIVE: Patient seen and examined at bedside.     ICU Vital Signs Last 24 Hrs  T(C): 37 (13 Oct 2023 05:13), Max: 37 (13 Oct 2023 05:13)  T(F): 98.6 (13 Oct 2023 05:13), Max: 98.6 (13 Oct 2023 05:13)  HR: 85 (13 Oct 2023 07:00) (80 - 95)  BP: 83/61 (13 Oct 2023 07:00) (78/60 - 111/59)  BP(mean): 68 (13 Oct 2023 07:00) (65 - 88)  ABP: --  ABP(mean): --  RR: 22 (13 Oct 2023 07:00) (11 - 22)  SpO2: 93% (13 Oct 2023 07:00) (92% - 100%)    O2 Parameters below as of 13 Oct 2023 07:00  Patient On (Oxygen Delivery Method): room air  	  MEDICATIONS:    ondansetron Injectable 4 milliGRAM(s) IV Push every 12 hours PRN  pregabalin 75 milliGRAM(s) Oral daily    pantoprazole    Tablet 40 milliGRAM(s) Oral before breakfast    atorvastatin 40 milliGRAM(s) Oral at bedtime    aspirin enteric coated 81 milliGRAM(s) Oral daily  folic acid 1 milliGRAM(s) Oral daily  heparin   Injectable 5000 Unit(s) SubCutaneous every 8 hours  influenza  Vaccine (HIGH DOSE) 0.7 milliLiter(s) IntraMuscular once  Nephro-chichi 1 Tablet(s) Oral every 24 hours    Wt(kg): --    I&O's Detail  11 Oct 2023 07:01  -  12 Oct 2023 07:00  --------------------------------------------------------  IN:    Heparin: 45 mL    Oral Fluid: 1270 mL  Total IN: 1315 mL    OUT:    Other (mL): 1350 mL    Stool (mL): 0 mL    Voided (mL): 1 mL  Total OUT: 1351 mL    Total NET: -36 mL    Physical Exam:   Appearance: Pt seen in bed in NAD 	  HEENT: moist oral mucosa, PERRL, EOMI, NCAT	; R eyelid slight ptosis   Neck: Supple  Cardiovascular: Irregular rhythm, +S1 S2, No JVD, + holosystolic murmur best @LUSB  Chest: R chest wall HD port seen, no erythema/purulence/edema/tenderness  Respiratory: + Bibasilar Rales; no Rhonchi, Wheezing; no increased respiratory effort or rate	  Gastrointestinal:  Soft, Non-tender, + BS, distended, + fluid wave, but without rebound or guarding; +reducible umbilical hernia midline lower quadrant, + abdominal striae to lower abdomen  Skin: +bilateral upper extremity maculopapular rash with excoriations as well on scalp; venous stasis dermatitis bilateral ankles and lower calves. No jaundice. Very mild mottling of the hands.  Extremities: Moving all four extremities, 4+/5 all four extremities, No clubbing, cyanosis. Tense nonpitting edema to b/l LE  Vascular: Cool b/l LE; sensation intact all 4 extremities  Neurologic: Non-focal  Psychiatry: A & O x 3, Mood & affect appropriate      LABS:	 	                          9.7    4.46  )-----------( 69       ( 13 Oct 2023 04:55 )             30.6     10-13    127<L>  |  92<L>  |  13  ----------------------------<  128<H>  4.0   |  25  |  1.87<H>    Ca    9.1      13 Oct 2023 04:55  Phos  3.5     10-13  Mg     1.9     10-13    TPro  6.2  /  Alb  2.7<L>  /  TBili  1.8<H>  /  DBili  x   /  AST  27  /  ALT  31  /  AlkPhos  211<H>  10-13    PTT - ( 13 Oct 2023 04:56 )  PTT:38.0 sec  Urinalysis Basic - ( 13 Oct 2023 04:55 )    Color: x / Appearance: x / SG: x / pH: x  Gluc: 128 mg/dL / Ketone: x  / Bili: x / Urobili: x   Blood: x / Protein: x / Nitrite: x   Leuk Esterase: x / RBC: x / WBC x   Sq Epi: x / Non Sq Epi: x / Bacteria: x          RADIOLOGY, EKG & ADDITIONAL TESTS: Reviewed.        OVERNIGHT: NAEON    SUBJECTIVE: Patient seen and examined at bedside. Appears well, states his appetite has returned.     ICU Vital Signs Last 24 Hrs  T(C): 37 (13 Oct 2023 05:13), Max: 37 (13 Oct 2023 05:13)  T(F): 98.6 (13 Oct 2023 05:13), Max: 98.6 (13 Oct 2023 05:13)  HR: 85 (13 Oct 2023 07:00) (80 - 95)  BP: 83/61 (13 Oct 2023 07:00) (78/60 - 111/59)  BP(mean): 68 (13 Oct 2023 07:00) (65 - 88)  ABP: --  ABP(mean): --  RR: 22 (13 Oct 2023 07:00) (11 - 22)  SpO2: 93% (13 Oct 2023 07:00) (92% - 100%)    O2 Parameters below as of 13 Oct 2023 07:00  Patient On (Oxygen Delivery Method): room air  	  MEDICATIONS:    ondansetron Injectable 4 milliGRAM(s) IV Push every 12 hours PRN  pregabalin 75 milliGRAM(s) Oral daily    pantoprazole    Tablet 40 milliGRAM(s) Oral before breakfast    atorvastatin 40 milliGRAM(s) Oral at bedtime    aspirin enteric coated 81 milliGRAM(s) Oral daily  folic acid 1 milliGRAM(s) Oral daily  heparin   Injectable 5000 Unit(s) SubCutaneous every 8 hours  influenza  Vaccine (HIGH DOSE) 0.7 milliLiter(s) IntraMuscular once  Nephro-chichi 1 Tablet(s) Oral every 24 hours    Wt(kg): --    I&O's Detail  11 Oct 2023 07:01  -  12 Oct 2023 07:00  --------------------------------------------------------  IN:    Heparin: 45 mL    Oral Fluid: 1270 mL  Total IN: 1315 mL    OUT:    Other (mL): 1350 mL    Stool (mL): 0 mL    Voided (mL): 1 mL  Total OUT: 1351 mL    Total NET: -36 mL    Physical Exam:   Appearance: Pt seen in bed in NAD 	  HEENT: moist oral mucosa, PERRL, EOMI, NCAT	; R eyelid slight ptosis   Neck: Supple  Cardiovascular: Irregular rhythm, +S1 S2, No JVD, + holosystolic murmur best @LUSB  Chest: R chest wall HD port seen, no erythema/purulence/edema/tenderness  Respiratory: + Bibasilar Rales; no Rhonchi, Wheezing; no increased respiratory effort or rate	  Gastrointestinal:  Soft, Non-tender, + BS, distended, + fluid wave, but without rebound or guarding; +reducible umbilical hernia midline lower quadrant, + abdominal striae to lower abdomen  Skin: +bilateral upper extremity maculopapular rash with excoriations as well on scalp; venous stasis dermatitis bilateral ankles and lower calves. No jaundice. Very mild mottling of the hands.  Extremities: Moving all four extremities, 4+/5 all four extremities, No clubbing, cyanosis. Tense nonpitting edema to b/l LE  Vascular: Cool b/l LE; sensation intact all 4 extremities  Neurologic: Non-focal  Psychiatry: A & O x 3, Mood & affect appropriate      LABS:	 	                          9.7    4.46  )-----------( 69       ( 13 Oct 2023 04:55 )             30.6     10-13    127<L>  |  92<L>  |  13  ----------------------------<  128<H>  4.0   |  25  |  1.87<H>    Ca    9.1      13 Oct 2023 04:55  Phos  3.5     10-13  Mg     1.9     10-13    TPro  6.2  /  Alb  2.7<L>  /  TBili  1.8<H>  /  DBili  x   /  AST  27  /  ALT  31  /  AlkPhos  211<H>  10-13    PTT - ( 13 Oct 2023 04:56 )  PTT:38.0 sec  Urinalysis Basic - ( 13 Oct 2023 04:55 )    Color: x / Appearance: x / SG: x / pH: x  Gluc: 128 mg/dL / Ketone: x  / Bili: x / Urobili: x   Blood: x / Protein: x / Nitrite: x   Leuk Esterase: x / RBC: x / WBC x   Sq Epi: x / Non Sq Epi: x / Bacteria: x          RADIOLOGY, EKG & ADDITIONAL TESTS: Reviewed.        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 Started on dobutamine given concern of cardiogenic shock. Patient received CVVHD, now net negative approx 11L this hospitalization. Started on heparin gtt full AC while on CVVHD. Now off CVVHD, dobutamine drip, and heparin drip. Patient with watchman device for >6 months, discontinued Plavix and transitioned now to aspirin only. Patient with significant improvement in mentation, AAOx3, warm and well-perfused extremities. Planned for transition back to intermittent HD plan for first session 10/12. Stable for stepdown to telemetry on 10/11/23 late afternoon. Interval SBP 80smmHg, also c/b acute HypoNa to 122:  as discussed with Nephrology, pt too hypotensive to safely dialyze on floor; transferred back to CCU / [facility] boarding mini ICU for continuous HD with close monitoring. Underwent HD 10/13 (target fluid removal 0L) and 10/14 (target fluid removal 1L) Received midodrine 5mg with dialysis sessions. Likely no plan for dialysis over the weekend and patient stable for stepdown while awaiting PT/OT eval and dispo planning.     OVERNIGHT: NAEON    SUBJECTIVE: Patient seen and examined at bedside. Appears well, states his appetite has returned. Denies any concerns this morning    ICU Vital Signs Last 24 Hrs  T(C): 37 (13 Oct 2023 05:13), Max: 37 (13 Oct 2023 05:13)  T(F): 98.6 (13 Oct 2023 05:13), Max: 98.6 (13 Oct 2023 05:13)  HR: 85 (13 Oct 2023 07:00) (80 - 95)  BP: 83/61 (13 Oct 2023 07:00) (78/60 - 111/59)  BP(mean): 68 (13 Oct 2023 07:00) (65 - 88)  ABP: --  ABP(mean): --  RR: 22 (13 Oct 2023 07:00) (11 - 22)  SpO2: 93% (13 Oct 2023 07:00) (92% - 100%)    O2 Parameters below as of 13 Oct 2023 07:00  Patient On (Oxygen Delivery Method): room air  	  MEDICATIONS:    ondansetron Injectable 4 milliGRAM(s) IV Push every 12 hours PRN  pregabalin 75 milliGRAM(s) Oral daily    pantoprazole    Tablet 40 milliGRAM(s) Oral before breakfast    atorvastatin 40 milliGRAM(s) Oral at bedtime    aspirin enteric coated 81 milliGRAM(s) Oral daily  folic acid 1 milliGRAM(s) Oral daily  heparin   Injectable 5000 Unit(s) SubCutaneous every 8 hours  influenza  Vaccine (HIGH DOSE) 0.7 milliLiter(s) IntraMuscular once  Nephro-chichi 1 Tablet(s) Oral every 24 hours    Wt(kg): --    I&O's Detail  11 Oct 2023 07:01  -  12 Oct 2023 07:00  --------------------------------------------------------  IN:    Heparin: 45 mL    Oral Fluid: 1270 mL  Total IN: 1315 mL    OUT:    Other (mL): 1350 mL    Stool (mL): 0 mL    Voided (mL): 1 mL  Total OUT: 1351 mL    Total NET: -36 mL    Physical Exam:   Appearance: Pt seen in bed in NAD 	  HEENT: moist oral mucosa, PERRL, EOMI, NCAT	; R eyelid slight ptosis   Neck: Supple  Cardiovascular: Irregular rhythm, +S1 S2, No JVD, + holosystolic murmur best @LUSB  Chest: R chest wall HD port seen, no erythema/purulence/edema/tenderness  Respiratory: + Bibasilar Rales; no Rhonchi, Wheezing; no increased respiratory effort or rate	  Gastrointestinal:  Soft, Non-tender, + BS, distended, + fluid wave, but without rebound or guarding; +reducible umbilical hernia midline lower quadrant, + abdominal striae to lower abdomen  Skin: +bilateral upper extremity maculopapular rash with excoriations as well on scalp; venous stasis dermatitis bilateral ankles and lower calves. No jaundice. Very mild mottling of the hands.  Extremities: Moving all four extremities, 4+/5 all four extremities, No clubbing, cyanosis. Tense nonpitting edema to b/l LE  Vascular: Cool b/l LE; sensation intact all 4 extremities  Neurologic: Non-focal  Psychiatry: A & O x 3, Mood & affect appropriate      LABS:	 	                          9.7    4.46  )-----------( 69       ( 13 Oct 2023 04:55 )             30.6     10-13    127<L>  |  92<L>  |  13  ----------------------------<  128<H>  4.0   |  25  |  1.87<H>    Ca    9.1      13 Oct 2023 04:55  Phos  3.5     10-13  Mg     1.9     10-13    TPro  6.2  /  Alb  2.7<L>  /  TBili  1.8<H>  /  DBili  x   /  AST  27  /  ALT  31  /  AlkPhos  211<H>  10-13    PTT - ( 13 Oct 2023 04:56 )  PTT:38.0 sec  Urinalysis Basic - ( 13 Oct 2023 04:55 )    Color: x / Appearance: x / SG: x / pH: x  Gluc: 128 mg/dL / Ketone: x  / Bili: x / Urobili: x   Blood: x / Protein: x / Nitrite: x   Leuk Esterase: x / RBC: x / WBC x   Sq Epi: x / Non Sq Epi: x / Bacteria: x          RADIOLOGY, EKG & ADDITIONAL TESTS: Reviewed.

## 2023-10-13 NOTE — PROGRESS NOTE ADULT - ATTENDING COMMENTS
63M, poor historian, w/ pmh of ESRD on HD, h/o chronic EtOH w/ possible cirrhosis, HTN, HLD, AF, chronic systolic CHF initially admitted to medicine service w/ volume overload after missing HD -> however RRT called during first inpatient HD session d/t hypotension, pt. also found to have severe biventricular failure on TTE w/ clinical evidence of early low output state -> tx'd to CCU service underwent large volume removal via CVVHD x four days, stepped down briefly but escalted to CCU service for concern for potential hypotension during IHD    -CVS - Severe bi-ventricular failure 2/2 NICM likely EtOH induced; Mildly elevated lactate on arrival to CCU on 10/6 w/ cool extremities, started on  2.5mcg and CVVHD and pt. has evidence of significant volume overload, Lactate cleared prior to initiation of CVVHD. Pt. has now tolerated 11.5L of volume removal over the past four days on CVVHD,  dc'd on 10/10; was stepped down after discontinuation of CVVHD; Pt. was stepped back up for possible concern of not tolerating IHD d/t low BPs; Pt. tolerated IHD(filtration only) yesterday w/ only Midodrine 5mg PO x 1; Today getting repeat session of IHD w/ 1L removal; recived Midodrine 5mg PO x 1; MAPs have always remained above 65 and pt. is otherwise asymptomatic, WWP A&Ox3 and saturating well on RA; Will obtain repeat TTE now that pt. has improved volume status; Would utilize Midodrine 5mg PO TID on HD days only; Overall pt. should be expected to have low BPs given advanced CM and cirrhosis but currently no longer displays any signs of end-organ hypoperfusion  -AF - currently in rate controlled AF; s/p watchman device; c/w ASA monotherapy  -DASH/Renal diet  -OOB to chair / PT eval / may need GENE placement  -DVT PPx  -Full Code  -Dispo: SD to Cardiac Tele    Itzel Dias MD  CCU Attending

## 2023-10-13 NOTE — PROGRESS NOTE ADULT - PROBLEM SELECTOR PLAN 8
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 B/l LE dry skin, likely venous stasis dermatitis on due to peripheral artery disease vs. heart failure related edema changes. Likely from niacin deficiency (pellagra) as etiology for dermatitis as patient has 3 month anorexia, fatigue, numbness. Recently prescribed Cerave w/o use  - Consider niacin supplementation (vitamin b3) or topical steroid  - lotion inpatient  - f/u outpt dermatologist.    #GERD (gastroesophageal reflux disease).   History of GERD, no complaints of epigastric pain at this time. C/w PPI Qd    PROPHYLACTIC MEASURE  F: None  E: Replete per HD with ESRD  N: renal restrictions  GI ppx: pantoprazole  DVT ppx: heparin 5000u q8h; SCDs  Full code  Dispo: CCU--> TELE 10/11/23 PM --> CCU / 5E Mini ICU 10/12 AM  Case discussed with CCU signout and tele attending Dr. Macias. 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 B/l LE dry skin, likely venous stasis dermatitis on due to peripheral artery disease vs. heart failure related edema changes. Likely from niacin deficiency (pellagra) as etiology for dermatitis as patient has 3 month anorexia, fatigue, numbness. Recently prescribed Cerave w/o use  - Consider niacin supplementation (vitamin b3) or topical steroid  - lotion inpatient  - f/u outpt dermatologist.

## 2023-10-13 NOTE — PROGRESS NOTE ADULT - SUBJECTIVE AND OBJECTIVE BOX
Patient is a 63y Male seen and evaluated at bedside. No acute distress, does not offer any complaints. Seen on HD tolerating procedure well. Continue HD with goal for 1LUF       Meds:    albumin human 25% IVPB 100 once PRN  aspirin enteric coated 81 daily  atorvastatin 40 at bedtime  epoetin sriram-epbx (RETACRIT) Injectable 15236 once  folic acid 1 daily  heparin   Injectable 5000 every 8 hours  influenza  Vaccine (HIGH DOSE) 0.7 once  Nephro-chichi 1 every 24 hours  ondansetron Injectable 4 every 12 hours PRN  pantoprazole    Tablet 40 before breakfast  pregabalin 75 daily      T(C): , Max: 37.2 (10-13-23 @ 09:10)  T(F): , Max: 98.9 (10-13-23 @ 09:10)  HR: 96 (10-13-23 @ 10:30)  BP: 84/58 (10-13-23 @ 10:30)  BP(mean): 68 (10-13-23 @ 10:30)  RR: 22 (10-13-23 @ 10:30)  SpO2: 98% (10-13-23 @ 10:30)  Wt(kg): --    10-12 @ 07:01  -  10-13 @ 07:00  --------------------------------------------------------  IN: 360 mL / OUT: 0 mL / NET: 360 mL    10-13 @ 07:01  -  10-13 @ 12:07  --------------------------------------------------------  IN: 380 mL / OUT: 0 mL / NET: 380 mL          Review of Systems:  ROS negative except as per HPI      PHYSICAL EXAM:  GENERAL: Alert, awake, NAD laying in bed tolerating HD   HEENT: VIPIN, EOMI, neck supple, no JVP  CHEST/LUNG: B/l rales   HEART: irregular irregular, +reducible umbilical hernia, abdominal striae   ABDOMEN: Soft, nontender, non distended  EXTREMITIES: no pedal edema  Neurology: AAOx3, no focal neurological deficit  SKIN: No rash or skin lesion   ACCESS: Left TDC accessed       LABS:                        9.7    4.46  )-----------( 69       ( 13 Oct 2023 04:55 )             30.6     10-13    127<L>  |  92<L>  |  13  ----------------------------<  128<H>  4.0   |  25  |  1.87<H>    Ca    9.1      13 Oct 2023 04:55  Phos  3.5     10-13  Mg     1.9     10-13    TPro  6.2  /  Alb  2.7<L>  /  TBili  1.8<H>  /  DBili  x   /  AST  27  /  ALT  31  /  AlkPhos  211<H>  10-13      PTT - ( 13 Oct 2023 04:56 )  PTT:38.0 sec  Urinalysis Basic - ( 13 Oct 2023 04:55 )    Color: x / Appearance: x / SG: x / pH: x  Gluc: 128 mg/dL / Ketone: x  / Bili: x / Urobili: x   Blood: x / Protein: x / Nitrite: x   Leuk Esterase: x / RBC: x / WBC x   Sq Epi: x / Non Sq Epi: x / Bacteria: x            RADIOLOGY & ADDITIONAL STUDIES:

## 2023-10-13 NOTE — PROGRESS NOTE ADULT - ASSESSMENT
63Y M w/hx of ESRD admitted for acute on chronic HFpEF  h/c c/b cardiogenic shock, requiring CVVHD (10/6-10/11) and inotrope, UFed total 11.2L with clinical improvement, transitioned back to iHD (10/12) for clearance no UF 2/2 hypotension, attempt iHD 10/13 with 1L UF with aid of midodrine,      #ESRD with hyponatremia  Initiated CVVHD (10/6), now switching to iHD although limited by hypotension  Last HD 10/4 tolerated 2L with no complications   CVVHD 10/6-10/11 11.2L UF   HD 10/13 with 1L UF with assistance with midodrine to help a      Plan:  HD today with goal 1L UF, and midodrine 5mg with HD only   Daily BMP   Daily Weights   Renal diet  fluid restriction 1L as will also help with Hyponatremia as patient unable to remove FW   Electrolytes to correct with HD   Monitor volume status over weekend with next HD 10/16      Access:  R TDC functional       Hypotension:  UF with HD as above   Recommend midodrine 5mg prior to HD only       Anemia:  Hgb at goal 9.7  EPO with HD       MBD;  Calcium: 9.1  Phos: 3.5  Continue to hold phos binders

## 2023-10-13 NOTE — PROGRESS NOTE ADULT - PROBLEM SELECTOR PLAN 5
History of HLD and MI, c/w home med atorvastatin 40 mg. Patient unsure if had stent after MI  - f/u AM Lipid panel History of HLD and MI, c/w home med atorvastatin 40 mg. Patient unsure if had stent after MI  - LDL 15

## 2023-10-14 LAB
ALBUMIN SERPL ELPH-MCNC: 2.7 G/DL — LOW (ref 3.3–5)
ALP SERPL-CCNC: 220 U/L — HIGH (ref 40–120)
ALT FLD-CCNC: 30 U/L — SIGNIFICANT CHANGE UP (ref 10–45)
ANION GAP SERPL CALC-SCNC: 11 MMOL/L — SIGNIFICANT CHANGE UP (ref 5–17)
ANISOCYTOSIS BLD QL: SIGNIFICANT CHANGE UP
AST SERPL-CCNC: 28 U/L — SIGNIFICANT CHANGE UP (ref 10–40)
BASOPHILS # BLD AUTO: 0.09 K/UL — SIGNIFICANT CHANGE UP (ref 0–0.2)
BASOPHILS NFR BLD AUTO: 1.7 % — SIGNIFICANT CHANGE UP (ref 0–2)
BILIRUB SERPL-MCNC: 1.9 MG/DL — HIGH (ref 0.2–1.2)
BUN SERPL-MCNC: 13 MG/DL — SIGNIFICANT CHANGE UP (ref 7–23)
BURR CELLS BLD QL SMEAR: PRESENT — SIGNIFICANT CHANGE UP
CALCIUM SERPL-MCNC: 9 MG/DL — SIGNIFICANT CHANGE UP (ref 8.4–10.5)
CHLORIDE SERPL-SCNC: 92 MMOL/L — LOW (ref 96–108)
CO2 SERPL-SCNC: 23 MMOL/L — SIGNIFICANT CHANGE UP (ref 22–31)
CREAT SERPL-MCNC: 1.93 MG/DL — HIGH (ref 0.5–1.3)
EGFR: 38 ML/MIN/1.73M2 — LOW
EOSINOPHIL # BLD AUTO: 0.05 K/UL — SIGNIFICANT CHANGE UP (ref 0–0.5)
EOSINOPHIL NFR BLD AUTO: 0.9 % — SIGNIFICANT CHANGE UP (ref 0–6)
GIANT PLATELETS BLD QL SMEAR: PRESENT — SIGNIFICANT CHANGE UP
GLUCOSE SERPL-MCNC: 136 MG/DL — HIGH (ref 70–99)
HCT VFR BLD CALC: 32.9 % — LOW (ref 39–50)
HGB BLD-MCNC: 10.4 G/DL — LOW (ref 13–17)
LYMPHOCYTES # BLD AUTO: 0.28 K/UL — LOW (ref 1–3.3)
LYMPHOCYTES # BLD AUTO: 5.2 % — LOW (ref 13–44)
MACROCYTES BLD QL: SIGNIFICANT CHANGE UP
MAGNESIUM SERPL-MCNC: 1.8 MG/DL — SIGNIFICANT CHANGE UP (ref 1.6–2.6)
MANUAL SMEAR VERIFICATION: SIGNIFICANT CHANGE UP
MCHC RBC-ENTMCNC: 31.6 GM/DL — LOW (ref 32–36)
MCHC RBC-ENTMCNC: 32.8 PG — SIGNIFICANT CHANGE UP (ref 27–34)
MCV RBC AUTO: 103.8 FL — HIGH (ref 80–100)
METAMYELOCYTES # FLD: 0.9 % — HIGH (ref 0–0)
MONOCYTES # BLD AUTO: 0.52 K/UL — SIGNIFICANT CHANGE UP (ref 0–0.9)
MONOCYTES NFR BLD AUTO: 9.6 % — SIGNIFICANT CHANGE UP (ref 2–14)
NEUTROPHILS # BLD AUTO: 4.4 K/UL — SIGNIFICANT CHANGE UP (ref 1.8–7.4)
NEUTROPHILS NFR BLD AUTO: 81.7 % — HIGH (ref 43–77)
OVALOCYTES BLD QL SMEAR: SLIGHT — SIGNIFICANT CHANGE UP
PHOSPHATE SERPL-MCNC: 3 MG/DL — SIGNIFICANT CHANGE UP (ref 2.5–4.5)
PLAT MORPH BLD: ABNORMAL
PLATELET # BLD AUTO: 73 K/UL — LOW (ref 150–400)
POIKILOCYTOSIS BLD QL AUTO: SLIGHT — SIGNIFICANT CHANGE UP
POLYCHROMASIA BLD QL SMEAR: SLIGHT — SIGNIFICANT CHANGE UP
POTASSIUM SERPL-MCNC: 3.9 MMOL/L — SIGNIFICANT CHANGE UP (ref 3.5–5.3)
POTASSIUM SERPL-SCNC: 3.9 MMOL/L — SIGNIFICANT CHANGE UP (ref 3.5–5.3)
PROT SERPL-MCNC: 6.6 G/DL — SIGNIFICANT CHANGE UP (ref 6–8.3)
RBC # BLD: 3.17 M/UL — LOW (ref 4.2–5.8)
RBC # FLD: 18.5 % — HIGH (ref 10.3–14.5)
RBC BLD AUTO: ABNORMAL
SCHISTOCYTES BLD QL AUTO: SLIGHT — SIGNIFICANT CHANGE UP
SMUDGE CELLS # BLD: PRESENT — SIGNIFICANT CHANGE UP
SODIUM SERPL-SCNC: 126 MMOL/L — LOW (ref 135–145)
WBC # BLD: 5.39 K/UL — SIGNIFICANT CHANGE UP (ref 3.8–10.5)
WBC # FLD AUTO: 5.39 K/UL — SIGNIFICANT CHANGE UP (ref 3.8–10.5)

## 2023-10-14 PROCEDURE — 99233 SBSQ HOSP IP/OBS HIGH 50: CPT

## 2023-10-14 PROCEDURE — 93306 TTE W/DOPPLER COMPLETE: CPT | Mod: 26

## 2023-10-14 RX ADMIN — HEPARIN SODIUM 5000 UNIT(S): 5000 INJECTION INTRAVENOUS; SUBCUTANEOUS at 13:37

## 2023-10-14 RX ADMIN — Medication 1 MILLIGRAM(S): at 12:25

## 2023-10-14 RX ADMIN — Medication 81 MILLIGRAM(S): at 12:24

## 2023-10-14 RX ADMIN — HEPARIN SODIUM 5000 UNIT(S): 5000 INJECTION INTRAVENOUS; SUBCUTANEOUS at 06:33

## 2023-10-14 RX ADMIN — HEPARIN SODIUM 5000 UNIT(S): 5000 INJECTION INTRAVENOUS; SUBCUTANEOUS at 22:38

## 2023-10-14 RX ADMIN — Medication 1 TABLET(S): at 12:29

## 2023-10-14 RX ADMIN — PANTOPRAZOLE SODIUM 40 MILLIGRAM(S): 20 TABLET, DELAYED RELEASE ORAL at 06:33

## 2023-10-14 RX ADMIN — ATORVASTATIN CALCIUM 40 MILLIGRAM(S): 80 TABLET, FILM COATED ORAL at 22:38

## 2023-10-14 RX ADMIN — Medication 75 MILLIGRAM(S): at 12:25

## 2023-10-14 NOTE — PROGRESS NOTE ADULT - SUBJECTIVE AND OBJECTIVE BOX
Cardiology PA Adult Progress Note    SUBJECTIVE ASSESSMENT: Patient laying in bed. Irritated about fluid restriction, states he would rather "call the morgue" than be restricted to "2 little cups a day" and be "tortured, live like an animal". Explained to patient he is not limited to 2 cups but rather 1L a day, and that is because of his current condition and being admitted with fluid overload. Patient very unhappy. Currently denies CP, dizziness, palpitations, SOB, dyspnea, coughing, headaches, N/V/D/abdominal pain. Patient understands plan for the day.   	  MEDICATIONS:  ondansetron Injectable 4 milliGRAM(s) IV Push every 12 hours PRN  pregabalin 75 milliGRAM(s) Oral daily  pantoprazole    Tablet 40 milliGRAM(s) Oral before breakfast  atorvastatin 40 milliGRAM(s) Oral at bedtime  aspirin enteric coated 81 milliGRAM(s) Oral daily  folic acid 1 milliGRAM(s) Oral daily  heparin   Injectable 5000 Unit(s) SubCutaneous every 8 hours  influenza  Vaccine (HIGH DOSE) 0.7 milliLiter(s) IntraMuscular once  Nephro-chichi 1 Tablet(s) Oral every 24 hours    	  VITAL SIGNS:  T(C): 36.8 (10-14-23 @ 05:33), Max: 37.1 (10-13-23 @ 18:00)  HR: 83 (10-14-23 @ 08:51) (73 - 122)  BP: 87/68 (10-14-23 @ 08:51) (83/63 - 98/70)  RR: 16 (10-14-23 @ 08:51) (15 - 22)  SpO2: 94% (10-14-23 @ 08:51) (93% - 100%)  Wt(kg): --    I&O's Summary    13 Oct 2023 07:01  -  14 Oct 2023 07:00  --------------------------------------------------------  IN: 880 mL / OUT: 1000 mL / NET: -120 mL    14 Oct 2023 07:01  -  14 Oct 2023 13:26  --------------------------------------------------------  IN: 430 mL / OUT: 0 mL / NET: 430 mL                                     PHYSICAL EXAM:  Appearance: Normal	  HEENT: Normal oral mucosa, PERRL, EOMI	  Neck: Supple, - JVD; no Carotid Bruit   Cardiovascular: Normal S1 S2, No murmurs  Respiratory: Lungs clear to auscultation, no Rales, Rhonchi, Wheezing	  Gastrointestinal:  distended abdomen; Soft, Non-tender, + BS	  Skin: No rashes, No ecchymoses, No cyanosis  Extremities: Normal range of motion, No clubbing, cyanosis; 2+ pitting edema  Vascular: Peripheral pulses palpable bilaterally  Neurologic: Non-focal  Psychiatry: A & O x 3, Mood & affect appropriate    LABS:	 	                      10.4   5.39  )-----------( 73       ( 14 Oct 2023 05:30 )             32.9     10-14    126<L>  |  92<L>  |  13  ----------------------------<  136<H>  3.9   |  23  |  1.93<H>    Ca    9.0      14 Oct 2023 05:30  Phos  3.0     10-14  Mg     1.8     10-14    TPro  6.6  /  Alb  2.7<L>  /  TBili  1.9<H>  /  DBili  x   /  AST  28  /  ALT  30  /  AlkPhos  220<H>  10-14  PTT - ( 13 Oct 2023 04:56 )  PTT:38.0 sec

## 2023-10-14 NOTE — PROGRESS NOTE ADULT - ASSESSMENT
63M, poor historian, PMHx of HTN, HLD, CAD (MI, unsure when, unsure if had stent placed), CHF (EF 35-40%), AFib (s/p watchman), ESRD (M/T/Th/Sat), Chronic EtOH abuse w/ cirrhosis, GERD, initially admitted to medicine service w/ volume overload after missing HD; course c/b RRT during first inpatient HD session d/t hypotension and pt was found to have severe biventricular failure on TTE w/ clinical evidence of early low output state; was upgraded to CCU (10/6-10/11) for concern for Cardiogenic shock, s/p CVVHD x 4 days and Dobutamine; stepped down to Telemetry with plan for intermittent HD per Nephro but remained hypotensive while off dobutamine, and was upgraded back to CCU (10/12-10/13) per nephros concerns for safe dialysis plan. Now stepped back down to tele for further dispo planning. Pending GENE placement with HD access.

## 2023-10-14 NOTE — PROGRESS NOTE ADULT - PROBLEM SELECTOR PLAN 5
History of HLD and MI, unsure if had stent placed  - c/w ASA 81 mg QD, lipitor 40 mg QHS  - unable to tolerate BBL/ACE/ARB from BP standpoint  - LDL 15 at goal

## 2023-10-14 NOTE — PROGRESS NOTE ADULT - PROBLEM SELECTOR PLAN 9
#GERD (gastroesophageal reflux disease).   History of GERD, no complaints of epigastric pain at this time.   -Continue Pantoprazole 40mg qd.       F: None  E: Replete per HD with ESRD  N: renal restrictions  GI ppx: pantoprazole  DVT ppx: heparin 5000u q8h; SCDs  Full code  Dispo: CCU--> TELE 10/11/23 PM --> CCU / 5E Mini ICU 10/12 AM --> now again stepdown Tele 10/13/23 evening  PT: advised GENE (10/11 #GERD (gastroesophageal reflux disease).   History of GERD, no complaints of epigastric pain at this time.   -Continue Pantoprazole 40mg qd.       F: None  E: Replete per HD with ESRD  N: renal restrictions  GI ppx: pantoprazole  DVT ppx: heparin 5000u q8h; SCDs  Full code  Dispo: pending GENE w/ HD access    Case discussed w/ Dr James

## 2023-10-14 NOTE — PROGRESS NOTE ADULT - PROBLEM SELECTOR PLAN 7
-H/H stable at Hgb 9.7 (admit 10.2).   No baseline Hgb or iron studies for comparison. Home med iron 65 mg qd for ELMER, though MCV is macrocytic likely 2/2 folate deficiency.   -Etiology likely multifactorial of ELMER, AOCD, and folate deficiency. No known history of GI bleeds, hemoptysis, hematochezia, melenic stool. B12 increased.   -Hold home med iron 65mg  -C/w folic acid 1mg daily   -EPO with HD   -Maintain active type and screen (last 10/13/23) -H/H stable at Hgb 10.4. No baseline Hgb or iron studies for comparison. Home med iron 65 mg qd for ELMER, though MCV is macrocytic likely 2/2 chronic EtOH use.   -Etiology likely multifactorial of ELMER, AOCD, EtOH use. No known history of GI bleeds, hemoptysis, hematochezia, melenic stool. B12 increased.   -Hold home med iron 65mg  -C/w folic acid 1mg daily   -EPO with HD   -Maintain active type and screen (last 10/13/23)

## 2023-10-14 NOTE — PROGRESS NOTE ADULT - PROBLEM SELECTOR PLAN 8
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 B/l LE dry skin, likely venous stasis dermatitis on due to peripheral artery disease vs. heart failure related edema changes. Likely from niacin deficiency (pellagra) as etiology for dermatitis as patient has 3 month anorexia, fatigue, numbness. Recently prescribed Cerave w/o use  - Consider niacin supplementation (vitamin b3) or topical steroid  - lotion inpatient  - f/u outpt dermatologist. 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 B/l LE dry skin, likely venous stasis dermatitis on due to peripheral artery disease vs. heart failure related edema changes. Likely from niacin deficiency (pellagra) as etiology for dermatitis as patient has 3 month anorexia, fatigue, numbness. Recently prescribed Cerave w/o use  - f/u outpt dermatologist.

## 2023-10-14 NOTE — PROGRESS NOTE ADULT - PROBLEM SELECTOR PLAN 1
NICM (likely EtOH induced) admitted with volume overload i/s/o missed HD, course c/b cardiogenic shock, now s/p CVVHD and pressors with net negative 10.6L. Remains peripherally overloaded with distended abdomen, 2+ LE edema, and orthopnea, however unable to tolerate further volume removal.  -TTEs inpt 10/5 & 10/6/23: LVEF 35-40% with global hypokinesis.  Mildly dilated RV w/ mod reduced RVSF. Elevated RA pressure. SHALOM. Mod AR. mild to mod AS. Mod to severe TR. PASP 39 mmHg. Small-to-moderate pericardial effusion without echocardiographic evidence of cardiac tamponade physiology. Ascites present.  -GDMT not initiated at this time as patients BPs consistently hypotensive 70s-90s, no room to initiate.  -Further volume optimization with HD as per nephro, next session Monday 10/16. Giving midodrine prior to HD session due to hypotensive episodes while undergoing dialysis.  -continuous pulse ox and telemetry; Core measures, strict i/o, daily standing weight, fluid restrict

## 2023-10-14 NOTE — PROGRESS NOTE ADULT - PROBLEM SELECTOR PLAN 2
Started on HD 6 months ago, 4x week (M/T/Th/Sat) at [facility], missed HD session on 10/3/23. R chest port utilized. At admission, Cr 3.93 (un-established baseline Cr), K 3.4, Phos 4.4. Metabolic acidosis (AGAP 17) likely due to hypochloremic emesis / ETOH leading to lactic acidosis. Dialysate changed to Phoxillum due to low phos.   - Plan as per nephro recs  - s/p CVVHD 10/6-10/10/23 (received 4 sessions) w. Hep gtt in CCU now d/c  - 5mg Midodrine on HD days only prior to dialysis.   - c/w home Nephro-chichi, 1L fluid restriction  - next session 10/16

## 2023-10-14 NOTE — PROGRESS NOTE ADULT - PROBLEM SELECTOR PLAN 3
Chronic EtOH abuse with cirrhosis on Abd US; discussed with patient the impact his current EtOH use has on his condition, and patient strongly endorsed he has no plans to decrease EtOH use.   -Out of withdrawal window, not on CIWA protocol moving further  -low concern for hepatorenal syndrome as remains hemodynamically stable. No known gallbladder pathology  -Abd US (10/4) Cirrhotic morphology. Pulsatile flow in the main and left portal veins. Moderate ascites. Increased right renal cortical echogenicity compatible w/ medical renal dz.  -s/p IV thiamine; c/w folate 1mg daily

## 2023-10-15 LAB
ANION GAP SERPL CALC-SCNC: 11 MMOL/L — SIGNIFICANT CHANGE UP (ref 5–17)
BUN SERPL-MCNC: 18 MG/DL — SIGNIFICANT CHANGE UP (ref 7–23)
CALCIUM SERPL-MCNC: 9 MG/DL — SIGNIFICANT CHANGE UP (ref 8.4–10.5)
CHLORIDE SERPL-SCNC: 89 MMOL/L — LOW (ref 96–108)
CO2 SERPL-SCNC: 25 MMOL/L — SIGNIFICANT CHANGE UP (ref 22–31)
CREAT SERPL-MCNC: 2.73 MG/DL — HIGH (ref 0.5–1.3)
EGFR: 25 ML/MIN/1.73M2 — LOW
GLUCOSE SERPL-MCNC: 121 MG/DL — HIGH (ref 70–99)
HCT VFR BLD CALC: 31.4 % — LOW (ref 39–50)
HGB BLD-MCNC: 9.7 G/DL — LOW (ref 13–17)
MAGNESIUM SERPL-MCNC: 1.8 MG/DL — SIGNIFICANT CHANGE UP (ref 1.6–2.6)
MCHC RBC-ENTMCNC: 30.9 GM/DL — LOW (ref 32–36)
MCHC RBC-ENTMCNC: 32.2 PG — SIGNIFICANT CHANGE UP (ref 27–34)
MCV RBC AUTO: 104.3 FL — HIGH (ref 80–100)
NRBC # BLD: 0 /100 WBCS — SIGNIFICANT CHANGE UP (ref 0–0)
PLATELET # BLD AUTO: 94 K/UL — LOW (ref 150–400)
POTASSIUM SERPL-MCNC: 4.3 MMOL/L — SIGNIFICANT CHANGE UP (ref 3.5–5.3)
POTASSIUM SERPL-SCNC: 4.3 MMOL/L — SIGNIFICANT CHANGE UP (ref 3.5–5.3)
RBC # BLD: 3.01 M/UL — LOW (ref 4.2–5.8)
RBC # FLD: 18 % — HIGH (ref 10.3–14.5)
SODIUM SERPL-SCNC: 125 MMOL/L — LOW (ref 135–145)
WBC # BLD: 6 K/UL — SIGNIFICANT CHANGE UP (ref 3.8–10.5)
WBC # FLD AUTO: 6 K/UL — SIGNIFICANT CHANGE UP (ref 3.8–10.5)

## 2023-10-15 PROCEDURE — 99233 SBSQ HOSP IP/OBS HIGH 50: CPT

## 2023-10-15 PROCEDURE — 99222 1ST HOSP IP/OBS MODERATE 55: CPT

## 2023-10-15 RX ORDER — MIDODRINE HYDROCHLORIDE 2.5 MG/1
5 TABLET ORAL ONCE
Refills: 0 | Status: COMPLETED | OUTPATIENT
Start: 2023-10-16 | End: 2023-10-16

## 2023-10-15 RX ADMIN — Medication 1 MILLIGRAM(S): at 11:18

## 2023-10-15 RX ADMIN — Medication 75 MILLIGRAM(S): at 11:18

## 2023-10-15 RX ADMIN — Medication 1 TABLET(S): at 15:34

## 2023-10-15 RX ADMIN — Medication 81 MILLIGRAM(S): at 11:18

## 2023-10-15 RX ADMIN — ATORVASTATIN CALCIUM 40 MILLIGRAM(S): 80 TABLET, FILM COATED ORAL at 22:16

## 2023-10-15 RX ADMIN — HEPARIN SODIUM 5000 UNIT(S): 5000 INJECTION INTRAVENOUS; SUBCUTANEOUS at 05:53

## 2023-10-15 RX ADMIN — HEPARIN SODIUM 5000 UNIT(S): 5000 INJECTION INTRAVENOUS; SUBCUTANEOUS at 15:34

## 2023-10-15 RX ADMIN — HEPARIN SODIUM 5000 UNIT(S): 5000 INJECTION INTRAVENOUS; SUBCUTANEOUS at 22:17

## 2023-10-15 RX ADMIN — PANTOPRAZOLE SODIUM 40 MILLIGRAM(S): 20 TABLET, DELAYED RELEASE ORAL at 05:53

## 2023-10-15 NOTE — PROGRESS NOTE ADULT - PROBLEM SELECTOR PLAN 9
#GERD (gastroesophageal reflux disease).   History of GERD, no complaints of epigastric pain at this time.   -Continue Pantoprazole 40mg qd.       F: None  E: Replete per HD with ESRD  N: renal restrictions  GI ppx: pantoprazole  DVT ppx: heparin 5000u q8h; SCDs  Full code  Dispo: pending GENE w/ HD access    Case discussed w/ Dr James

## 2023-10-15 NOTE — PROGRESS NOTE ADULT - PROBLEM SELECTOR PLAN 1
NICM (likely EtOH induced) admitted with volume overload i/s/o missed HD, course c/b cardiogenic shock, now s/p CVVHD and pressors with net negative 10.6L. Remains peripherally overloaded with distended abdomen, 2+ LE edema, and orthopnea, however unable to tolerate further volume removal.  -TTEs inpt 10/5 & 10/6/23: LVEF 35-40% with global hypokinesis.  Mildly dilated RV w/ mod reduced RVSF. Elevated RA pressure. SHALOM. Mod AR. mild to mod AS. Mod to severe TR. PASP 39 mmHg. Small-to-moderate pericardial effusion without echocardiographic evidence of cardiac tamponade physiology. Ascites present.  -Repeat TTE 10/14 largely unchanged, w/ LVEF remaining 35-40% and global hypokinesis despite volume optimization  -GDMT not initiated at this time as patients BPs consistently hypotensive 70s-90s, no room to initiate.  -Further volume optimization with HD as per nephro, next session Monday 10/16. Giving midodrine prior to HD session due to hypotensive episodes while undergoing dialysis.  -continuous pulse ox and telemetry; Core measures, strict i/o, daily standing weight, fluid restrict

## 2023-10-15 NOTE — GOALS OF CARE CONVERSATION - ADVANCED CARE PLANNING - CONVERSATION DETAILS
Patient making multiple comments about how he would rather go to the [facility] than continue with fluid restrictions, so explored GOC with patient. He was previously full code on this admission based on MOLST discussed one week ago; patient now endorsing desire to be DNR/DNI. States that he is "ready to meet god" when his time comes, and that he has lived a "fulfilling" life, and he does not wish for interventions that can result in poorer quality of life. Discussed with him that if his heart was to stop, what he would like for us to do, and he stated "nothing". Reiterated that this entails no CPR, which he is in agreement with. Further confirmed he does not want to be intubated, and does not want any invasive measures aside from dialysis that he is already undergoing; confirmed that if his heart was to stop he does not want any lines, tubes, antibiotics, fluids.

## 2023-10-15 NOTE — PROGRESS NOTE ADULT - PROBLEM SELECTOR PLAN 2
Started on HD 6 months ago, 4x week (M/T/Th/Sat) at [facility] dialysis center, missed HD session on 10/3/23. R chest port utilized. At admission, Cr 3.93 (un-established baseline Cr), K 3.4, Phos 4.4. Metabolic acidosis (AGAP 17) likely due to hypochloremic emesis / ETOH leading to lactic acidosis. Dialysate changed to Phoxillum due to low phos.   - Plan as per nephro recs  - s/p CVVHD 10/6-10/10/23 (received 4 sessions) w. Hep gtt in CCU now d/c  - 5mg Midodrine on HD days only prior to dialysis.   - c/w home Nephro-chichi, 1L fluid restriction  - next session 10/16

## 2023-10-15 NOTE — CONSULT NOTE ADULT - SUBJECTIVE AND OBJECTIVE BOX
HPI:  63M w/ HFrEF, ESRD on HD, cirrhosis 2/2 EtOH, receives most of care at [facility] (previously declined transplant?) who was initially admitted to medicine service for volume overload after missing outpatient HD sessions. However, RRT called during 1st HD session due to asymptomatic hypotension. He was found to have severe biventricular dysfunction, with concerns for low output, and was transferred to the CCU where he underwent CVVHD for 4 days. He was stepped down briefly, but returned to CCU due to hypotension with iHD. After initiation of midodrine during HD, his BP remained stable (MAPs >65) and has been stepped back down to telemetry. Current plans for midodrine 5 mg TID during HD days only. At present, he feels well without complaints. He states that he was asymptomatic with each hypotensive episode.     ROS: A 10-point review of systems was otherwise negative.    PAST MEDICAL & SURGICAL HISTORY:  HTN (hypertension)  HLD (hyperlipidemia)  Chronic kidney disease  H/O bilateral hip replacements      SOCIAL HISTORY: +ETOH use (2-3 drinks a day)  FAMILY HISTORY: noncontributory    ALLERGIES: 	  penicillins (Unknown)  MEDICATIONS:  aspirin enteric coated 81 milliGRAM(s) Oral daily  atorvastatin 40 milliGRAM(s) Oral at bedtime  folic acid 1 milliGRAM(s) Oral daily  heparin   Injectable 5000 Unit(s) SubCutaneous every 8 hours  influenza  Vaccine (HIGH DOSE) 0.7 milliLiter(s) IntraMuscular once  Nephro-chichi 1 Tablet(s) Oral every 24 hours  ondansetron Injectable 4 milliGRAM(s) IV Push every 12 hours PRN  pantoprazole    Tablet 40 milliGRAM(s) Oral before breakfast  pregabalin 75 milliGRAM(s) Oral daily      PHYSICAL EXAM:  T(C): 36.9 (10-15-23 @ 08:55), Max: 36.9 (10-15-23 @ 08:55)  HR: 90 (10-15-23 @ 08:55) (72 - 90)  BP: 101/71 (10-15-23 @ 08:55) (84/62 - 101/71)  RR: 18 (10-15-23 @ 08:55) (16 - 19)  SpO2: 98% (10-15-23 @ 08:55) (92% - 98%)  Wt(kg): --    GEN: Awake, comfortable. NAD.   HEENT: NCAT, Mucosa moist. No JVD.   RESP: CTA b/l  CV: RRR, normal s1/s2. No m/r/g.  ABD: Soft, NTND. BS+  EXT: Warm. No edema, R temp HD cath   NEURO: AAOx3. No focal deficits.    I&O's Summary    14 Oct 2023 07:01  -  15 Oct 2023 07:00  --------------------------------------------------------  IN: 670 mL / OUT: 0 mL / NET: 670 mL    15 Oct 2023 07:01  -  15 Oct 2023 12:57  --------------------------------------------------------  IN: 225 mL / OUT: 0 mL / NET: 225 mL  	  LABS:	 	                        9.7    6.00  )-----------( 94       ( 15 Oct 2023 05:30 )             31.4     10-15    125<L>  |  89<L>  |  18  ----------------------------<  121<H>  4.3   |  25  |  2.73<H>    Ca    9.0      15 Oct 2023 05:30  Phos  3.0     10-14  Mg     1.8     10-15    TPro  6.6  /  Alb  2.7<L>  /  TBili  1.9<H>  /  DBili  x   /  AST  28  /  ALT  30  /  AlkPhos  220<H>  10-14

## 2023-10-15 NOTE — PROGRESS NOTE ADULT - PROBLEM SELECTOR PLAN 7
-H/H stable at Hgb 10.4. No baseline Hgb or iron studies for comparison. Home med iron 65 mg qd for ELMER, though MCV is macrocytic likely 2/2 chronic EtOH use.   -Etiology likely multifactorial of ELMER, AOCD, EtOH use. No known history of GI bleeds, hemoptysis, hematochezia, melenic stool. B12 increased.   -Hold home med iron 65mg  -C/w folic acid 1mg daily   -EPO with HD   -Maintain active type and screen (last 10/13/23, next due 10/16)

## 2023-10-15 NOTE — CONSULT NOTE ADULT - ASSESSMENT
3M w/ HFrEF, ESRD on HD, cirrhosis 2/2 EtOH, receives most of care at [facility] (previously declined transplant?) who was initially admitted to medicine service for volume overload after missing outpatient HD sessions. During initial iHD while inpatient, he became hypotensive and required transfer to CCU given concerns for low output state requiring  and CVVHD. He was briefly stepped down, but returned to CCU for hypotension again during iHD which appears to have stabilized after initiating midodrine on iHD days. At present, no complaints and medicine is following for co-management.     #ESRD:   - HD via temporary HD cath, will need outpatient vein mapping and AVF  - hypotensive (asymptomatic) during HD requiring CCU  - appears to have stabilized with 5 mg PO midodrine TID on HD days only  - HD per nephrology  - f/u with [facility] on discharge    #HFrEF:  - suspected NICM 2/2 EtOH use  - GDMT likely limited by hypotension and renal dysfunction  - minimal urine output  - volume removal with HD    #Anemia: suspect AOCD 2/2 ESRD    #Dyslipidemia:   - c/w atorvastatin    #GERD:   -c/w home PPI formulary equivalent)    #Cirrhosis:   - Etoh vs cardiac etiology  - volume removal with iHD  - ensure outpatient hepatology follow up (EGD/varice/HCC screening)    Awaiting GENE placement

## 2023-10-15 NOTE — PROGRESS NOTE ADULT - PROBLEM SELECTOR PLAN 8
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 B/l LE dry skin, likely venous stasis dermatitis on due to peripheral artery disease vs. heart failure related edema changes. Likely from niacin deficiency (pellagra) as etiology for dermatitis as patient has 3 month anorexia, fatigue, numbness. Recently prescribed Cerave w/o use  - f/u outpt dermatologist.

## 2023-10-15 NOTE — PROGRESS NOTE ADULT - ASSESSMENT
63M, DNR/DNI, poor historian, PMHx of HTN, HLD, CAD (MI, unsure when, unsure if had stent placed), CHF (EF 35-40%), AFib (s/p watchman), ESRD (M/T/Th/Sat), Chronic EtOH abuse w/ cirrhosis, GERD, initially admitted to medicine service w/ volume overload after missing HD; course c/b RRT during first inpatient HD session d/t hypotension and pt was found to have severe biventricular failure on TTE w/ clinical evidence of early low output state; was upgraded to CCU (10/6-10/11) for concern for Cardiogenic shock, s/p CVVHD x 4 days and Dobutamine; stepped down to Telemetry with plan for intermittent HD per Nephro but remained hypotensive while off dobutamine, and was upgraded back to CCU (10/12-10/13) per nephros concerns for safe dialysis plan. Now stepped back down to tele for further dispo planning. Pending GENE placement with HD access.

## 2023-10-15 NOTE — PROGRESS NOTE ADULT - SUBJECTIVE AND OBJECTIVE BOX
Cardiology PA Adult Progress Note    SUBJECTIVE ASSESSMENT: Overnight no acute events; Patient laying comfortably in bed. Again irritated about fluid restriction and asking why - discussed in detail his diagnosis and the role fluid restriction has in it. Patient in understanding of what heart failure entails; given discussion about his prognosis, also reiterated his current GOC which patient now wished to change to DNR/DNI (see GOC note). Denies CP, dizziness, palpitations, SOB, dyspnea, coughing, headaches, N/V/D/abdominal pain. Patient understands plan for the day (GENE placement)  	  MEDICATIONS:  ondansetron Injectable 4 milliGRAM(s) IV Push every 12 hours PRN  pregabalin 75 milliGRAM(s) Oral daily  pantoprazole    Tablet 40 milliGRAM(s) Oral before breakfast  atorvastatin 40 milliGRAM(s) Oral at bedtime  aspirin enteric coated 81 milliGRAM(s) Oral daily  folic acid 1 milliGRAM(s) Oral daily  heparin   Injectable 5000 Unit(s) SubCutaneous every 8 hours  influenza  Vaccine (HIGH DOSE) 0.7 milliLiter(s) IntraMuscular once  Nephro-chichi 1 Tablet(s) Oral every 24 hours    	  VITAL SIGNS:  T(C): 36.9 (10-15-23 @ 08:55), Max: 36.9 (10-15-23 @ 08:55)  HR: 90 (10-15-23 @ 08:55) (72 - 90)  BP: 101/71 (10-15-23 @ 08:55) (84/62 - 101/71)  RR: 18 (10-15-23 @ 08:55) (16 - 19)  SpO2: 98% (10-15-23 @ 08:55) (92% - 98%)  Wt(kg): --    I&O's Summary    14 Oct 2023 07:01  -  15 Oct 2023 07:00  --------------------------------------------------------  IN: 670 mL / OUT: 0 mL / NET: 670 mL    15 Oct 2023 07:01  -  15 Oct 2023 12:53  --------------------------------------------------------  IN: 225 mL / OUT: 0 mL / NET: 225 mL                                           PHYSICAL EXAM:  Appearance: Normal	  HEENT: Normal oral mucosa, PERRL, EOMI	  Neck: Supple, - JVD; no Carotid Bruit   Cardiovascular: Normal S1 S2, No murmurs  Respiratory: Lungs with some crackles, but exam limited by poor patient participation  Gastrointestinal:  distended abdomen, otherwise soft, Non-tender,   Skin: No rashes, No ecchymoses, No cyanosis  Extremities: Normal range of motion, No clubbing, cyanosis; pitting edema present in b/l LE  Vascular: Peripheral pulses palpable bilaterally, 1+  Neurologic: Non-focal  Psychiatry: A & O x 3, Mood & affect appropriate    LABS:	 	                        9.7    6.00  )-----------( 94       ( 15 Oct 2023 05:30 )             31.4     10-15    125<L>  |  89<L>  |  18  ----------------------------<  121<H>  4.3   |  25  |  2.73<H>    Ca    9.0      15 Oct 2023 05:30  Phos  3.0     10-14  Mg     1.8     10-15    TPro  6.6  /  Alb  2.7<L>  /  TBili  1.9<H>  /  DBili  x   /  AST  28  /  ALT  30  /  AlkPhos  220<H>  10-14

## 2023-10-16 LAB
ANION GAP SERPL CALC-SCNC: 13 MMOL/L — SIGNIFICANT CHANGE UP (ref 5–17)
BLD GP AB SCN SERPL QL: NEGATIVE — SIGNIFICANT CHANGE UP
BUN SERPL-MCNC: 24 MG/DL — HIGH (ref 7–23)
CALCIUM SERPL-MCNC: 9.2 MG/DL — SIGNIFICANT CHANGE UP (ref 8.4–10.5)
CHLORIDE SERPL-SCNC: 87 MMOL/L — LOW (ref 96–108)
CO2 SERPL-SCNC: 21 MMOL/L — LOW (ref 22–31)
CREAT SERPL-MCNC: 3.43 MG/DL — HIGH (ref 0.5–1.3)
EGFR: 19 ML/MIN/1.73M2 — LOW
GLUCOSE SERPL-MCNC: 120 MG/DL — HIGH (ref 70–99)
HCT VFR BLD CALC: 30.8 % — LOW (ref 39–50)
HGB BLD-MCNC: 9.5 G/DL — LOW (ref 13–17)
MAGNESIUM SERPL-MCNC: 1.9 MG/DL — SIGNIFICANT CHANGE UP (ref 1.6–2.6)
MCHC RBC-ENTMCNC: 30.8 GM/DL — LOW (ref 32–36)
MCHC RBC-ENTMCNC: 32.2 PG — SIGNIFICANT CHANGE UP (ref 27–34)
MCV RBC AUTO: 104.4 FL — HIGH (ref 80–100)
NRBC # BLD: 0 /100 WBCS — SIGNIFICANT CHANGE UP (ref 0–0)
PHOSPHATE SERPL-MCNC: 3.7 MG/DL — SIGNIFICANT CHANGE UP (ref 2.5–4.5)
PLATELET # BLD AUTO: 107 K/UL — LOW (ref 150–400)
POTASSIUM SERPL-MCNC: 4.4 MMOL/L — SIGNIFICANT CHANGE UP (ref 3.5–5.3)
POTASSIUM SERPL-SCNC: 4.4 MMOL/L — SIGNIFICANT CHANGE UP (ref 3.5–5.3)
RBC # BLD: 2.95 M/UL — LOW (ref 4.2–5.8)
RBC # FLD: 17.7 % — HIGH (ref 10.3–14.5)
RH IG SCN BLD-IMP: NEGATIVE — SIGNIFICANT CHANGE UP
SODIUM SERPL-SCNC: 121 MMOL/L — LOW (ref 135–145)
WBC # BLD: 6.47 K/UL — SIGNIFICANT CHANGE UP (ref 3.8–10.5)
WBC # FLD AUTO: 6.47 K/UL — SIGNIFICANT CHANGE UP (ref 3.8–10.5)

## 2023-10-16 PROCEDURE — 99233 SBSQ HOSP IP/OBS HIGH 50: CPT | Mod: GC

## 2023-10-16 PROCEDURE — 99233 SBSQ HOSP IP/OBS HIGH 50: CPT

## 2023-10-16 RX ORDER — MIDODRINE HYDROCHLORIDE 2.5 MG/1
5 TABLET ORAL
Refills: 0 | Status: DISCONTINUED | OUTPATIENT
Start: 2023-10-17 | End: 2023-10-17

## 2023-10-16 RX ORDER — ERYTHROPOIETIN 10000 [IU]/ML
10000 INJECTION, SOLUTION INTRAVENOUS; SUBCUTANEOUS ONCE
Refills: 0 | Status: COMPLETED | OUTPATIENT
Start: 2023-10-16 | End: 2023-10-16

## 2023-10-16 RX ADMIN — HEPARIN SODIUM 5000 UNIT(S): 5000 INJECTION INTRAVENOUS; SUBCUTANEOUS at 06:48

## 2023-10-16 RX ADMIN — Medication 1 MILLIGRAM(S): at 12:07

## 2023-10-16 RX ADMIN — MIDODRINE HYDROCHLORIDE 5 MILLIGRAM(S): 2.5 TABLET ORAL at 06:47

## 2023-10-16 RX ADMIN — Medication 75 MILLIGRAM(S): at 12:07

## 2023-10-16 RX ADMIN — ATORVASTATIN CALCIUM 40 MILLIGRAM(S): 80 TABLET, FILM COATED ORAL at 21:45

## 2023-10-16 RX ADMIN — Medication 81 MILLIGRAM(S): at 12:07

## 2023-10-16 RX ADMIN — PANTOPRAZOLE SODIUM 40 MILLIGRAM(S): 20 TABLET, DELAYED RELEASE ORAL at 06:50

## 2023-10-16 RX ADMIN — HEPARIN SODIUM 5000 UNIT(S): 5000 INJECTION INTRAVENOUS; SUBCUTANEOUS at 21:45

## 2023-10-16 RX ADMIN — ERYTHROPOIETIN 10000 UNIT(S): 10000 INJECTION, SOLUTION INTRAVENOUS; SUBCUTANEOUS at 20:30

## 2023-10-16 NOTE — PROGRESS NOTE ADULT - ASSESSMENT
63M, DNR/DNI, poor historian, PMHx of HTN, HLD, CAD (MI, unsure when, unsure if had stent placed), CHF (EF 35-40%), AFib (s/p watchman), ESRD (M/T/Th/Sat), Chronic EtOH abuse w/ cirrhosis, GERD, initially admitted to medicine service w/ volume overload after missing HD; course c/b RRT during first inpatient HD session d/t hypotension and pt was found to have severe biventricular failure on TTE w/ clinical evidence of early low output state; was upgraded to CCU (10/6-10/11) for concern for Cardiogenic shock, s/p CVVHD x 4 days and Dobutamine; stepped down to Telemetry with plan for intermittent HD per Nephro but remained hypotensive while off dobutamine, and was upgraded back to CCU (10/12-10/13) per nephros concerns for safe dialysis plan. Now stepped back down to tele for further dispo planning. Pending GENE placement with HD access.          63M, DNR/DNI, poor historian, PMHx of HTN, HLD, CAD (MI, unsure when, unsure if had stent placed), CHF (EF 35-40%), AFib (s/p watchman), ESRD (M/T/Th/Sat), Chronic EtOH abuse w/ cirrhosis, GERD, initially admitted to medicine service w/ volume overload after missing HD; course c/b RRT during first inpatient HD session d/t hypotension and pt was found to have severe biventricular failure on TTE w/ clinical evidence of early low output state; was upgraded to CCU (10/6-10/11) for concern for Cardiogenic shock, s/p CVVHD x 4 days and Dobutamine; stepped down to Telemetry with plan for intermittent HD per Nephro but remained hypotensive while off dobutamine, and was upgraded back to CCU (10/12-10/13) per Nephro concerns for safe dialysis plan given hypotension. On 10/13 pt was able to tolerate full iHD session with reinitiation of Midodrine; therefore since 10/13 PM has been stepped back down to Tele for further HD and dispo planning for GENE placement with HD.

## 2023-10-16 NOTE — PROGRESS NOTE ADULT - SUBJECTIVE AND OBJECTIVE BOX
Interventional Cardiology PA Adult Progress Note    Subjective Assessment: Patient seen and examined at bedside. Denies any acute events, reports feeling better. denies CP/SOB, F/C, N/V/D.  	  MEDICATIONS:  ondansetron Injectable 4 milliGRAM(s) IV Push every 12 hours PRN  pregabalin 75 milliGRAM(s) Oral daily    pantoprazole    Tablet 40 milliGRAM(s) Oral before breakfast    atorvastatin 40 milliGRAM(s) Oral at bedtime    aspirin enteric coated 81 milliGRAM(s) Oral daily  epoetin sriram-epbx (RETACRIT) Injectable 74701 Unit(s) SubCutaneous once  folic acid 1 milliGRAM(s) Oral daily  heparin   Injectable 5000 Unit(s) SubCutaneous every 8 hours  influenza  Vaccine (HIGH DOSE) 0.7 milliLiter(s) IntraMuscular once  Nephro-chichi 1 Tablet(s) Oral every 24 hours        [PHYSICAL EXAM:  TELEMETRY: rate controlled afib  T(C): 36.4 (10-16-23 @ 08:46), Max: 36.6 (10-15-23 @ 16:37)  HR: 90 (10-16-23 @ 12:21) (85 - 95)  BP: 96/65 (10-16-23 @ 12:21) (86/62 - 96/65)  RR: 18 (10-16-23 @ 12:21) (14 - 18)  SpO2: 99% (10-16-23 @ 12:21) (92% - 99%)  Wt(kg): --  I&O's Summary    15 Oct 2023 07:01  -  16 Oct 2023 07:00  --------------------------------------------------------  IN: 645 mL / OUT: 1 mL / NET: 644 mL    16 Oct 2023 07:01  -  16 Oct 2023 13:53  --------------------------------------------------------  IN: 180 mL / OUT: 0 mL / NET: 180 mL      Appearance: Pt seen in bed in NAD 	  HEENT:   moist oral mucosa, PERRL, EOMI, NCAT	; R eyelid slight ptosis   Neck: Supple  Cardiovascular: Irregular rhythm, +S1 S2, No JVD, + holosystolic murmur best @LUSB  Chest: R chest wall HD port seen, no erythema/purulence/edema/tenderness  Respiratory: + Mild Bibasilar Rales with decreased breath sounds w/ Left base less than R; no Rhonchi, Wheezing; no increased respiratory effort or rate	  Gastrointestinal:  Soft, Non-tender, + BS, distended though improving; lower quadrants (while sitting upright) w/ slight fluid wave, but without rebound or guarding; +reducible umbilical hernia midline lower quadrant, + abdominal striae to lower abdomen  Skin: +bilateral upper extremity maculopapular rash with excoriations as well on scalp; venous stasis dermatitis bilateral ankles and lower calves. No jaundice. Very mild mottling of the hands.  Extremities: Moving all four extremities, 4+/5 all four extremities, No clubbing, cyanosis.  Improving tense nonpitting edema to b/l LE  Vascular: Warm & sensation intact all 4 extremities  Neurologic: Non-focal  Psychiatry: A & O x 3, Mood & affect appropriate    	    LABS:	 	  CARDIAC MARKERS:                          9.5    6.47  )-----------( 107      ( 16 Oct 2023 05:30 )             30.8     10-16    121<L>  |  87<L>  |  24<H>  ----------------------------<  120<H>  4.4   |  21<L>  |  3.43<H>    Ca    9.2      16 Oct 2023 05:30  Phos  3.7     10-16  Mg     1.9     10-16      proBNP:   Lipid Profile:   HgA1c:   TSH:

## 2023-10-16 NOTE — PROGRESS NOTE ADULT - PROBLEM SELECTOR PLAN 7
-H/H stable at Hgb 10.4. No baseline Hgb or iron studies for comparison. Home med iron 65 mg qd for ELMER, though MCV is macrocytic likely 2/2 chronic EtOH use.   -Etiology likely multifactorial of ELMER, AOCD, EtOH use. No known history of GI bleeds, hemoptysis, hematochezia, melenic stool. B12 increased.   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 B/l LE dry skin, likely venous stasis dermatitis on due to peripheral artery disease vs. heart failure related edema changes. Likely from niacin deficiency (pellagra) as etiology for dermatitis as patient has 3 month anorexia, fatigue, numbness. Recently prescribed Cerave w/o use  - f/u outpt dermatologist.

## 2023-10-16 NOTE — PROGRESS NOTE ADULT - ASSESSMENT
63-year-old male with a PMHx of HFrEF (EF 30-40%), Afib (s/p watchman), ESRD 2/2 IgA nephropathy (on HD M,T,Th,S) and alcohol use disorder who presented with SOB in setting of missed HD, initially admitted to medicine for HD but was transferred to CCU for management of cardiogenic shock, s/p dobutamine and CVVHD, now stepped down to cardiac telemetry.     #HFrEF    -further management as per cardiology     -not currently on GDMT due to soft blood pressure, defer initiation to primary team     -volume removal with HD as per cardiology and nephrology       #ESRD    -further management as per nephrology   -continue with midodrine 5mg TID on HD days      #CAD    -continue with ASA 81mg daily and atorvastatin 40mg qhs       #Afib    -continue with ASA monotherapy, patient with Watchman device       #Macrocytic Anemia     -likely 2/2 ACD and ESRD    -iron studies suggestive of ACD, B12 and Folate within normal limits, T4 within normal limits    -continue with folic acid and EPO with HD as per nephrology        #Cirrhosis     #Thrombocytopenia and Bilirubinemia (stable)    -unclear if cirrhosis is 2/2 alcohol use or heart failure    -abdominal US with cirrhotic liver, moderate ascites, no biliary dilation, prior cholecystectomy and no concern for PVT    -no ascitic fluid analysis to help determine etiology, no current role for paracentesis but patient states he underwent a paracentesis on 3/2023 and was told his cirrhosis was due to his heart   -will need follow up with Hepatologist at [facility]    DVT PPx: SQH    Dispo: GENE with HD

## 2023-10-16 NOTE — PROGRESS NOTE ADULT - SUBJECTIVE AND OBJECTIVE BOX
Subjective:  No acute events overnight.  Patient is doing well today without new complaints.  Tolerated last HD session without issue. Denies HA, CP, SOB, abdominal pain, nausea, vomiting, fever, chills or diarrhea.     Objective:   Vital Signs:  T(C): 36.4 (16 Oct 2023 08:46), Max: 36.8 (15 Oct 2023 12:45)  T(F): 97.6 (16 Oct 2023 08:46), Max: 98.2 (15 Oct 2023 12:45)  HR: 90 (16 Oct 2023 12:21) (85 - 95)  BP: 96/65 (16 Oct 2023 12:21) (86/62 - 100/82)  BP(mean): 72 (16 Oct 2023 00:10) (72 - 78)  RR: 18 (16 Oct 2023 12:21) (14 - 18)  SpO2: 99% (16 Oct 2023 12:21) (92% - 99%)    Parameters below as of 16 Oct 2023 12:21  Patient On (Oxygen Delivery Method): room air    Physical Exam:   -Gen: NAD, resting in bed  -HEENT: EOMI, PERRL, no scleral icterus, no JVD, no bruits  -CV: normal S1 and S2  -Lungs: CTABL,, normal respiratory effort on RA  -Ab: obese, distended, umbilical hernia is non-tender, normal BS  -Ext: + LE edema  -Neuro: A&O x 3, no focal deficits     Labs:                        9.5    6.47  )-----------( 107      ( 16 Oct 2023 05:30 )             30.8       10-16    121<L>  |  87<L>  |  24<H>  ----------------------------<  120<H>  4.4   |  21<L>  |  3.43<H>    Ca    9.2      16 Oct 2023 05:30  Phos  3.7     10-16  Mg     1.9     10-16    Medications:  MEDICATIONS  (STANDING):  aspirin enteric coated 81 milliGRAM(s) Oral daily  atorvastatin 40 milliGRAM(s) Oral at bedtime  epoetin sriram-epbx (RETACRIT) Injectable 35370 Unit(s) SubCutaneous once  folic acid 1 milliGRAM(s) Oral daily  heparin   Injectable 5000 Unit(s) SubCutaneous every 8 hours  influenza  Vaccine (HIGH DOSE) 0.7 milliLiter(s) IntraMuscular once  Nephro-chichi 1 Tablet(s) Oral every 24 hours  pantoprazole    Tablet 40 milliGRAM(s) Oral before breakfast  pregabalin 75 milliGRAM(s) Oral daily    MEDICATIONS  (PRN):  ondansetron Injectable 4 milliGRAM(s) IV Push every 12 hours PRN Nausea and/or Vomiting

## 2023-10-16 NOTE — PROGRESS NOTE ADULT - SUBJECTIVE AND OBJECTIVE BOX
Patient is a 63y Male seen and evaluated at bedside. No acute distress, does not offer any complaints at this time. HD today per schedule.       Meds:    aspirin enteric coated 81 daily  atorvastatin 40 at bedtime  epoetin sriram-epbx (RETACRIT) Injectable 18526 once  folic acid 1 daily  heparin   Injectable 5000 every 8 hours  influenza  Vaccine (HIGH DOSE) 0.7 once  Nephro-chichi 1 every 24 hours  ondansetron Injectable 4 every 12 hours PRN  pantoprazole    Tablet 40 before breakfast  pregabalin 75 daily      T(C): , Max: 36.6 (10-15-23 @ 16:37)  T(F): , Max: 97.9 (10-15-23 @ 16:37)  HR: 80 (10-16-23 @ 13:55)  BP: 91/60 (10-16-23 @ 13:55)  BP(mean): 71 (10-16-23 @ 13:55)  RR: 18 (10-16-23 @ 13:55)  SpO2: 99% (10-16-23 @ 13:55)  Wt(kg): --    10-15 @ 07:01  -  10-16 @ 07:00  --------------------------------------------------------  IN: 645 mL / OUT: 1 mL / NET: 644 mL    10-16 @ 07:01  -  10-16 @ 15:13  --------------------------------------------------------  IN: 180 mL / OUT: 0 mL / NET: 180 mL          Review of Systems:  ROS negative except as per HPI      PHYSICAL EXAM:  GENERAL: Alert, awake, NAD   HEENT: VIPIN, EOMI, neck supple, no JVP  CHEST/LUNG: B/l rales   HEART: irregular irregular, +reducible umbilical hernia, abdominal striae   ABDOMEN: Soft, nontender, non distended  EXTREMITIES: no pedal edema  Neurology: AAOx3, no focal neurological deficit  SKIN: No rash or skin lesion   ACCESS: Left TDC c/d/i       LABS:                        9.5    6.47  )-----------( 107      ( 16 Oct 2023 05:30 )             30.8     10-16    121<L>  |  87<L>  |  24<H>  ----------------------------<  120<H>  4.4   |  21<L>  |  3.43<H>    Ca    9.2      16 Oct 2023 05:30  Phos  3.7     10-16  Mg     1.9     10-16          Urinalysis Basic - ( 16 Oct 2023 05:30 )    Color: x / Appearance: x / SG: x / pH: x  Gluc: 120 mg/dL / Ketone: x  / Bili: x / Urobili: x   Blood: x / Protein: x / Nitrite: x   Leuk Esterase: x / RBC: x / WBC x   Sq Epi: x / Non Sq Epi: x / Bacteria: x            RADIOLOGY & ADDITIONAL STUDIES:

## 2023-10-16 NOTE — PROGRESS NOTE ADULT - PROBLEM SELECTOR PLAN 9
#GERD (gastroesophageal reflux disease).   History of GERD, no complaints of epigastric pain at this time.   -Continue Pantoprazole 40mg qd.       F: None  E: Replete per HD with ESRD  N: renal restrictions  GI ppx: pantoprazole  DVT ppx: heparin 5000u q8h; SCDs  Full code  Dispo: pending [facility]ENE w/ HD access    Case discussed w/ Dr Burns

## 2023-10-16 NOTE — PROGRESS NOTE ADULT - SUBJECTIVE AND OBJECTIVE BOX
Patient seen on HD tolerating procedure well. Patient does not offer any complaints and is hemodynamically stable.  Continue HD as prescribed.   Hemodialysis Treatment.:     Schedule: Once, Modality: Hemodialysis, Access: Internal Jugular Central Venous Catheter    Dialyzer: Optiflux [id number], Time: 240 Min    Blood Flow: 400 mL/Min , Dialysate Flow: 500 mL/Min, Dialysate Temp: 35, Tubinmm (Adult)    Target Fluid Removal: 2 Liters    Dialysate Electrolytes (mEq/L): Potassium 2, Calcium 2.5, Sodium 138, Bicarbonate 35    Additional Instructions: Albumin PRN   Midodrine 5mg Prior HD   Hold UF if SBP <70       Vitals   T(F): 97.5  HR: 94  BP: 91/66  RR: 18  SpO2: 97%          PHYSICAL EXAM:  GENERAL: Alert, awake, NAD, tolerating HD   HEENT: VIPIN, EOMI, neck supple, no JVP  CHEST/LUNG: B/l rales   HEART: irregular irregular, +reducible umbilical hernia, abdominal striae   ABDOMEN: Soft, nontender, non distended  EXTREMITIES: no pedal edema  Neurology: AAOx3, no focal neurological deficit  SKIN: No rash or skin lesion   ACCESS: Left TDC accessed

## 2023-10-16 NOTE — PROGRESS NOTE ADULT - ATTENDING COMMENTS
I:  Asymptomatic. 90 - 110 BP. Noncompliant with fluid restriction. Sodium 120s.   A: ESRD. Cirrhosis. High oral fluid intake.   P: Continue dialysis. Midodrine on dialysis days. UF as tolerated.

## 2023-10-16 NOTE — PROGRESS NOTE ADULT - ASSESSMENT
63Y M w/hx of ESRD admitted for acute on chronic HFpEF  h/c c/b cardiogenic shock, requiring CVVHD (10/6-10/11) and inotrope, total 11.2L , transitioned back to iHD (10/12), HD today for clearance and volume removal,     #ESRD with hyponatremia  Initiated CVVHD (10/6), now switching to iHD although limited by hypotension  Last HD 10/4 tolerated 2L with no complications   CVVHD 10/6-10/11 11.2L UF   HD 10/13 with 1L UF with assistance with midodrine       Plan:  HD today as below   Hemodialysis Treatment.:     Schedule: Once, Modality: Hemodialysis, Access: Internal Jugular Central Venous Catheter    Dialyzer: Optiflux [id number], Time: 240 Min    Blood Flow: 400 mL/Min , Dialysate Flow: 500 mL/Min, Dialysate Temp: 35, Tubinmm (Adult)    Target Fluid Removal: 2 Liters    Dialysate Electrolytes (mEq/L): Potassium 2, Calcium 2.5, Sodium 138, Bicarbonate 35    Additional Instructions: Albumin PRN   Midodrine 5mg Prior HD   Hold UF if SBP <70   Daily BMP   Daily Weights   Renal diet  fluid restriction 1L as will also help with Hyponatremia as patient unable to remove FW   Electrolytes to correct with HD   Next HD 10/17 likely isolated UF       Access:  R TDC functional       Hypotension:  UF with HD as above   Recommend midodrine 5mg prior to HD only       Anemia:  Hgb at goal 9.5  EPO with HD       MBD;  Calcium: 9.2  Phos: 3.7  Continue to hold phos binders

## 2023-10-16 NOTE — PROGRESS NOTE ADULT - PROBLEM SELECTOR PLAN 1
NICM (likely EtOH induced) admitted with volume overload i/s/o missed HD, course c/b cardiogenic shock, now s/p CVVHD and pressors with net negative 10.6L. Remains peripherally overloaded with distended abdomen, 2+ LE edema, and orthopnea, however unable to tolerate further volume removal.  -TTEs inpt 10/5 & 10/6/23: LVEF 35-40% with global hypokinesis.  Mildly dilated RV w/ mod reduced RVSF. Elevated RA pressure. SAHLOM. Mod AR. mild to mod AS. Mod to severe TR. PASP 39 mmHg. Small-to-moderate pericardial effusion without echocardiographic evidence of cardiac tamponade physiology. Ascites present.  -Repeat TTE 10/14 largely unchanged, w/ LVEF remaining 35-40% and global hypokinesis despite volume optimization  -GDMT not initiated at this time as patients BPs consistently hypotensive 70s-90s, no room to initiate.  -Further volume optimization with HD as per nephro, next session Monday 10/16. Giving midodrine prior to HD session due to hypotensive episodes while undergoing dialysis.  -continuous pulse ox and telemetry; Core measures, strict i/o, daily standing weight, fluid restrict NICM (likely EtOH induced) admitted with volume overload i/s/o missed HD, course c/b cardiogenic shock, now s/p CVVHD and pressors with net negative 10.6L. Remains peripherally overloaded with distended abdomen, 2+ LE edema, and orthopnea, however unable to tolerate further volume removal.  -TTEs inpt 10/5 & 10/6/23: LVEF 35-40% with global hypokinesis.  Mildly dilated RV w/ mod reduced RVSF. Elevated RA pressure. SHALOM. Mod AR. mild to mod AS. Mod to severe TR. PASP 39 mmHg. Small-to-moderate pericardial effusion without echocardiographic evidence of cardiac tamponade physiology. Ascites present.  -Repeat TTE 10/14 largely unchanged, w/ LVEF remaining 35-40% and global hypokinesis despite volume optimization  -GDMT not initiated at this time as patients BPs consistently hypotensive 70s-90s, no room to initiate.  -Further volume optimization with HD as per nephro, s/p session Monday 10/16, next likely 10/17. Pt requires Midodrine 5mg qAM on HD days prior to HD due to persistent hypotension  -continuous pulse ox and telemetry; Core measures, strict i/o, daily standing weight, fluid restrict

## 2023-10-16 NOTE — PROGRESS NOTE ADULT - PROBLEM SELECTOR PLAN 3
Chronic EtOH abuse with cirrhosis on Abd US; discussed with patient the impact his current EtOH use has on his condition, and patient strongly endorsed he has no plans to decrease EtOH use.   -Out of withdrawal window, not on CIWA protocol moving further  -low concern for hepatorenal syndrome as remains hemodynamically stable. No known gallbladder pathology  -Abd US (10/4) Cirrhotic morphology. Pulsatile flow in the main and left portal veins. Moderate ascites. Increased right renal cortical echogenicity compatible w/ medical renal dz.  -s/p IV thiamine; c/w folate 1mg daily Chronic EtOH abuse with cirrhosis on Abd US; discussed with patient the impact his current EtOH use has on his condition, and patient strongly endorsed he has no plans to decrease EtOH use.   -Out of withdrawal window, not on CIWA protocol moving further  -low concern for hepatorenal syndrome as remains hemodynamically stable. No known gallbladder pathology  -Abd US (10/4) Cirrhotic morphology. Pulsatile flow in the main and left portal veins. Moderate ascites. Increased right renal cortical echogenicity compatible w/ medical renal dz.  -s/p IV thiamine; c/w folate 1mg daily    #Hepatic Cirrhosis as above  - Encourage outpt Hepatology f/u post-dc

## 2023-10-16 NOTE — PROGRESS NOTE ADULT - PROBLEM SELECTOR PLAN 4
see above History of HLD and MI, unsure if had stent placed  - c/w ASA 81 mg QD, lipitor 40 mg QHS  - unable to tolerate BBL/ACE/ARB from BP standpoint  - LDL 15 at goal

## 2023-10-16 NOTE — PROGRESS NOTE ADULT - PROBLEM SELECTOR PLAN 6
-currently in rate controlled AF; s/p watchman device  -continue w/ ASA monotherapy -H/H stable at Hgb 10.4. No baseline Hgb or iron studies for comparison. Home med iron 65 mg qd for ELMER, though MCV is macrocytic likely 2/2 chronic EtOH use.   -Etiology likely multifactorial of ELMER, AOCD, EtOH use. No known history of GI bleeds, hemoptysis, hematochezia, melenic stool. B12 increased.   -Hold home med iron 65mg  -C/w folic acid 1mg daily   -EPO with HD   -Maintain active type and screen (last drawn 10/16)

## 2023-10-16 NOTE — PROGRESS NOTE ADULT - NS ATTEND AMEND GEN_ALL_CORE FT
63M, DNR/DNI, poor historian, PMHx of HTN, HLD, CAD (MI, unsure when, unsure if had stent placed), CHF (EF 35-40%), AFib (s/p watchman), ESRD (M/T/Th/Sat), Chronic EtOH abuse w/ cirrhosis, GERD, initially admitted to medicine service w/ volume overload after missing HD; course c/b RRT during first inpatient HD session d/t hypotension and pt was found to have severe biventricular failure on TTE w/ clinical evidence of early low output state; was upgraded to CCU (10/6-10/11) for concern for Cardiogenic shock, s/p CVVHD x 4 days and Dobutamine; stepped down to Telemetry with plan for intermittent HD per Nephro but remained hypotensive while off dobutamine, and was upgraded back to CCU (10/12-10/13) per Nephro concerns for safe dialysis plan given hypotension.     1. HFrEF  Acute on chronic decompensated congestive heart failure with low systolic function. Stage D AHA.ACC, low cardiac output failure  On midodrine and CVVH.    2. CAD  Continue aspirin, asymptomatic.    3. AF  S/p watchman    4. CKD ESRD HD  CVVH on 5 LAC.

## 2023-10-16 NOTE — PROGRESS NOTE ADULT - PROBLEM SELECTOR PLAN 2
Started on HD 6 months ago, 4x week (M/T/Th/Sat) at [facility], missed HD session on 10/3/23. R chest port utilized. At admission, Cr 3.93 (un-established baseline Cr), K 3.4, Phos 4.4. Metabolic acidosis (AGAP 17) likely due to hypochloremic emesis / ETOH leading to lactic acidosis. Dialysate changed to Phoxillum due to low phos.   - Plan as per nephro recs  - s/p CVVHD 10/6-10/10/23 (received 4 sessions) w. Hep gtt in CCU now d/c  - 5mg Midodrine on HD days only prior to dialysis.   - c/w home Nephro-chichi, 1L fluid restriction  - next session today 10/16/23, transferred to [facility] wing for room capable of monitoring during HD Started on HD 6 months ago, 4x week (M/T/Th/Sat) at [facility], missed HD session on 10/3/23. R chest port utilized. At admission, Cr 3.93 (un-established baseline Cr), K 3.4, Phos 4.4. Metabolic acidosis (AGAP 17) likely due to hypochloremic emesis / ETOH leading to lactic acidosis. Dialysate changed to Phoxillum due to low phos.   - Plan as per nephro recs  - s/p CVVHD 10/6-10/10/23 (received 4 sessions)   - 5mg Midodrine on HD days only prior to dialysis.   - c/w home Nephro-chichi, 1L fluid restriction  - s/p HD session today 10/16/23, transferred to [facility] for room capable of monitoring during HD - likely next HD on 10/17

## 2023-10-16 NOTE — PROGRESS NOTE ADULT - PROBLEM SELECTOR PLAN 5
History of HLD and MI, unsure if had stent placed  - c/w ASA 81 mg QD, lipitor 40 mg QHS  - unable to tolerate BBL/ACE/ARB from BP standpoint  - LDL 15 at goal -currently in rate controlled AF; s/p watchman device  -continue w/ ASA monotherapy

## 2023-10-16 NOTE — PROGRESS NOTE ADULT - PROBLEM SELECTOR PLAN 8
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 B/l LE dry skin, likely venous stasis dermatitis on due to peripheral artery disease vs. heart failure related edema changes. Likely from niacin deficiency (pellagra) as etiology for dermatitis as patient has 3 month anorexia, fatigue, numbness. Recently prescribed Cerave w/o use  - f/u outpt dermatologist. #GERD (gastroesophageal reflux disease).   History of GERD, no complaints of epigastric pain at this time.   -Continue Pantoprazole 40mg qd.       F: None  E: Replete per HD with ESRD  N: renal restrictions  GI ppx: pantoprazole  DVT ppx: heparin 5000u q8h; SCDs  Full code  Dispo: pending [facility] w/ HD in-house    Case discussed w/ Dr Burns

## 2023-10-17 DIAGNOSIS — Z29.9 ENCOUNTER FOR PROPHYLACTIC MEASURES, UNSPECIFIED: ICD-10-CM

## 2023-10-17 LAB
ANION GAP SERPL CALC-SCNC: 10 MMOL/L — SIGNIFICANT CHANGE UP (ref 5–17)
ANION GAP SERPL CALC-SCNC: 10 MMOL/L — SIGNIFICANT CHANGE UP (ref 5–17)
ANION GAP SERPL CALC-SCNC: 14 MMOL/L — SIGNIFICANT CHANGE UP (ref 5–17)
ANION GAP SERPL CALC-SCNC: 14 MMOL/L — SIGNIFICANT CHANGE UP (ref 5–17)
BUN SERPL-MCNC: 18 MG/DL — SIGNIFICANT CHANGE UP (ref 7–23)
BUN SERPL-MCNC: 18 MG/DL — SIGNIFICANT CHANGE UP (ref 7–23)
BUN SERPL-MCNC: 8 MG/DL — SIGNIFICANT CHANGE UP (ref 7–23)
BUN SERPL-MCNC: 8 MG/DL — SIGNIFICANT CHANGE UP (ref 7–23)
CALCIUM SERPL-MCNC: 8.3 MG/DL — LOW (ref 8.4–10.5)
CALCIUM SERPL-MCNC: 8.3 MG/DL — LOW (ref 8.4–10.5)
CALCIUM SERPL-MCNC: 8.8 MG/DL — SIGNIFICANT CHANGE UP (ref 8.4–10.5)
CALCIUM SERPL-MCNC: 8.8 MG/DL — SIGNIFICANT CHANGE UP (ref 8.4–10.5)
CHLORIDE SERPL-SCNC: 88 MMOL/L — LOW (ref 96–108)
CHLORIDE SERPL-SCNC: 88 MMOL/L — LOW (ref 96–108)
CHLORIDE SERPL-SCNC: 91 MMOL/L — LOW (ref 96–108)
CHLORIDE SERPL-SCNC: 91 MMOL/L — LOW (ref 96–108)
CO2 SERPL-SCNC: 16 MMOL/L — LOW (ref 22–31)
CO2 SERPL-SCNC: 16 MMOL/L — LOW (ref 22–31)
CO2 SERPL-SCNC: 27 MMOL/L — SIGNIFICANT CHANGE UP (ref 22–31)
CO2 SERPL-SCNC: 27 MMOL/L — SIGNIFICANT CHANGE UP (ref 22–31)
CREAT SERPL-MCNC: 1.64 MG/DL — HIGH (ref 0.5–1.3)
CREAT SERPL-MCNC: 1.64 MG/DL — HIGH (ref 0.5–1.3)
CREAT SERPL-MCNC: 2.76 MG/DL — HIGH (ref 0.5–1.3)
CREAT SERPL-MCNC: 2.76 MG/DL — HIGH (ref 0.5–1.3)
EGFR: 25 ML/MIN/1.73M2 — LOW
EGFR: 25 ML/MIN/1.73M2 — LOW
EGFR: 46 ML/MIN/1.73M2 — LOW
EGFR: 46 ML/MIN/1.73M2 — LOW
GLUCOSE SERPL-MCNC: 108 MG/DL — HIGH (ref 70–99)
GLUCOSE SERPL-MCNC: 108 MG/DL — HIGH (ref 70–99)
GLUCOSE SERPL-MCNC: 130 MG/DL — HIGH (ref 70–99)
GLUCOSE SERPL-MCNC: 130 MG/DL — HIGH (ref 70–99)
HCT VFR BLD CALC: 35 % — LOW (ref 39–50)
HCT VFR BLD CALC: 35 % — LOW (ref 39–50)
HGB BLD-MCNC: 10.5 G/DL — LOW (ref 13–17)
HGB BLD-MCNC: 10.5 G/DL — LOW (ref 13–17)
MAGNESIUM SERPL-MCNC: 1.9 MG/DL — SIGNIFICANT CHANGE UP (ref 1.6–2.6)
MAGNESIUM SERPL-MCNC: 1.9 MG/DL — SIGNIFICANT CHANGE UP (ref 1.6–2.6)
MCHC RBC-ENTMCNC: 30 GM/DL — LOW (ref 32–36)
MCHC RBC-ENTMCNC: 30 GM/DL — LOW (ref 32–36)
MCHC RBC-ENTMCNC: 33 PG — SIGNIFICANT CHANGE UP (ref 27–34)
MCHC RBC-ENTMCNC: 33 PG — SIGNIFICANT CHANGE UP (ref 27–34)
MCV RBC AUTO: 110.1 FL — HIGH (ref 80–100)
MCV RBC AUTO: 110.1 FL — HIGH (ref 80–100)
NRBC # BLD: 0 /100 WBCS — SIGNIFICANT CHANGE UP (ref 0–0)
NRBC # BLD: 0 /100 WBCS — SIGNIFICANT CHANGE UP (ref 0–0)
PHOSPHATE SERPL-MCNC: 3.6 MG/DL — SIGNIFICANT CHANGE UP (ref 2.5–4.5)
PHOSPHATE SERPL-MCNC: 3.6 MG/DL — SIGNIFICANT CHANGE UP (ref 2.5–4.5)
PLATELET # BLD AUTO: 100 K/UL — LOW (ref 150–400)
PLATELET # BLD AUTO: 100 K/UL — LOW (ref 150–400)
POTASSIUM SERPL-MCNC: 3.4 MMOL/L — LOW (ref 3.5–5.3)
POTASSIUM SERPL-MCNC: 3.4 MMOL/L — LOW (ref 3.5–5.3)
POTASSIUM SERPL-MCNC: 4.7 MMOL/L — SIGNIFICANT CHANGE UP (ref 3.5–5.3)
POTASSIUM SERPL-MCNC: 4.7 MMOL/L — SIGNIFICANT CHANGE UP (ref 3.5–5.3)
POTASSIUM SERPL-SCNC: 3.4 MMOL/L — LOW (ref 3.5–5.3)
POTASSIUM SERPL-SCNC: 3.4 MMOL/L — LOW (ref 3.5–5.3)
POTASSIUM SERPL-SCNC: 4.7 MMOL/L — SIGNIFICANT CHANGE UP (ref 3.5–5.3)
POTASSIUM SERPL-SCNC: 4.7 MMOL/L — SIGNIFICANT CHANGE UP (ref 3.5–5.3)
RBC # BLD: 3.18 M/UL — LOW (ref 4.2–5.8)
RBC # BLD: 3.18 M/UL — LOW (ref 4.2–5.8)
RBC # FLD: 18.3 % — HIGH (ref 10.3–14.5)
RBC # FLD: 18.3 % — HIGH (ref 10.3–14.5)
SODIUM SERPL-SCNC: 118 MMOL/L — CRITICAL LOW (ref 135–145)
SODIUM SERPL-SCNC: 118 MMOL/L — CRITICAL LOW (ref 135–145)
SODIUM SERPL-SCNC: 128 MMOL/L — LOW (ref 135–145)
SODIUM SERPL-SCNC: 128 MMOL/L — LOW (ref 135–145)
WBC # BLD: 6.62 K/UL — SIGNIFICANT CHANGE UP (ref 3.8–10.5)
WBC # BLD: 6.62 K/UL — SIGNIFICANT CHANGE UP (ref 3.8–10.5)
WBC # FLD AUTO: 6.62 K/UL — SIGNIFICANT CHANGE UP (ref 3.8–10.5)
WBC # FLD AUTO: 6.62 K/UL — SIGNIFICANT CHANGE UP (ref 3.8–10.5)

## 2023-10-17 PROCEDURE — 99223 1ST HOSP IP/OBS HIGH 75: CPT

## 2023-10-17 PROCEDURE — 90937 HEMODIALYSIS REPEATED EVAL: CPT

## 2023-10-17 PROCEDURE — 99233 SBSQ HOSP IP/OBS HIGH 50: CPT

## 2023-10-17 PROCEDURE — 99497 ADVNCD CARE PLAN 30 MIN: CPT | Mod: 25

## 2023-10-17 RX ORDER — MIDODRINE HYDROCHLORIDE 2.5 MG/1
10 TABLET ORAL
Refills: 0 | Status: DISCONTINUED | OUTPATIENT
Start: 2023-10-17 | End: 2023-10-27

## 2023-10-17 RX ADMIN — Medication 1 TABLET(S): at 09:08

## 2023-10-17 RX ADMIN — Medication 75 MILLIGRAM(S): at 09:08

## 2023-10-17 RX ADMIN — PANTOPRAZOLE SODIUM 40 MILLIGRAM(S): 20 TABLET, DELAYED RELEASE ORAL at 06:25

## 2023-10-17 RX ADMIN — Medication 1 MILLIGRAM(S): at 09:08

## 2023-10-17 RX ADMIN — HEPARIN SODIUM 5000 UNIT(S): 5000 INJECTION INTRAVENOUS; SUBCUTANEOUS at 06:25

## 2023-10-17 RX ADMIN — Medication 81 MILLIGRAM(S): at 09:09

## 2023-10-17 RX ADMIN — MIDODRINE HYDROCHLORIDE 5 MILLIGRAM(S): 2.5 TABLET ORAL at 07:28

## 2023-10-17 NOTE — CONSULT NOTE ADULT - SUBJECTIVE AND OBJECTIVE BOX
[facility] Geriatrics and Palliative Care  Yuval Duval, Palliative Care Attending  Contact Info: Call [phone] (HEAL Line) or message on Microsoft Teams (Yuval Duval)    HPI:  CC: missed HD for ESRD  HPI: 63 year old males with history of ESRD (HD 4 x M/T/TH/SAT)ELMER HLD, GERD, CAD presents after missed HD on 10/3/23 with increased shortness of breath at rest and brief bilateral leg weakness and two episodes of non-bloody emesis. He began HD 6 months prior with R chest port use, with recent left hand mapping 1-2 weeks ago for upcoming L AV fistula placement. He noticed increased shortness of breath without any recent travel or sick contacts. At baseline he breathes comfortably on room air, but required 3 L nasal cannula for 85% oxygen saturation in ED. He felt like his legs gave out earlier in the day and at baseline ambulates with a cane. He denies recent falls, but has fall history with most recent fall 3-4 weeks ago with no acute head injuries. He also chronically drinks alcohol, with 2-3 martini's per day with last drink at 2 pm on 10/3/23. He denies alcohol withdrawal hospitalizations in the past and no withdrawal seizures. He endorses some mid epigastric chest pain that does not radiate. He denies fever, chills, diarrhea, abdominal pain, headaches, neck pain, dizziness, or syncope. denies smoking. He receives all his care at [facility] and is compliant with medications. He took last took all his medications at home at 10/3/23 AM and takes all medications together in the morning to prevent forgetting taking his medications.     Patient seen and examined at bedside. Appears overtly comfortable. Denies any significant complaint. Comprehensive symptom assessment and GOC exploration as noted below. Further collateral obtained from primary team, chart, and family.    PERTINENT PM/SXH:   HTN (hypertension)  HLD (hyperlipidemia)  Chronic kidney disease  H/O bilateral hip replacements    FAMILY HISTORY:  No history of CHF in first degree relatives    ITEMS NOT CHECKED ARE NOT PRESENT  SOCIAL HISTORY:   Significant other/partner:  []  Children:  []  Substance hx:  []   Tobacco hx:  []   Alcohol hx: []   Home Opioid hx:  [] I-Stop Reference No:  - no active Rx's / see chart note  Living Situation: []Home  []Long term care  []Rehab []Other  Orthodox/Spiritual practice: ; Role of organized Protestant [] important [] some [] unable to assess  Coping: [] well [] with difficulty [] poor coping [] unable to assess  Support system: [] strong [] adequate [] inadequate    ADVANCE DIRECTIVES:    []MOLST  []Living Will  DECISION MAKER(s):  [] Health Care Proxy(s)  [] Surrogate(s)  [] Guardian           Name(s)/Phone Number(s):     BASELINE (I)ADLs (prior to admission):  [place]: []Total  [x] Moderate []Dependent    ALLERGIES:  penicillins (Unknown)    MEDICATIONS  (STANDING):  aspirin enteric coated 81 milliGRAM(s) Oral daily  atorvastatin 40 milliGRAM(s) Oral at bedtime  folic acid 1 milliGRAM(s) Oral daily  heparin   Injectable 5000 Unit(s) SubCutaneous every 8 hours  influenza  Vaccine (HIGH DOSE) 0.7 milliLiter(s) IntraMuscular once  midodrine 10 milliGRAM(s) Oral <User Schedule>  Nephro-chichi 1 Tablet(s) Oral every 24 hours  pantoprazole    Tablet 40 milliGRAM(s) Oral before breakfast  pregabalin 75 milliGRAM(s) Oral daily    MEDICATIONS  (PRN):  ondansetron Injectable 4 milliGRAM(s) IV Push every 12 hours PRN Nausea and/or Vomiting    Analgesic Use (Scheduled and PRNs) for past 24 hours:  pregabalin   75 milliGRAM(s) Oral (10-17-23 @ 09:08)    PRESENT SYMPTOMS: []Unable to obtain due to poor mentation/encephalopathy  Source if other than patient:  []Family   []Team     Pain: [] yes [] no  QOL impact -   Location -                    Aggravating Factors -  Quality -  Radiation -  Timing -  Severity (0-10 scale) -   Minimal Acceptable Level (0-10 scale) -    Dyspnea:                           []Mild  []Moderate []Severe  Anxiety:                             []Mild []Moderate []Severe  Fatigue:                             []Mild []Moderate []Severe  Nausea:                             []Mild []Moderate []Severe  Loss of Appetite:              []Mild []Moderate []Severe  Constipation:                    []Mild []Moderate []Severe    Other Symptoms:  [x]All other review of systems negative     Palliative Performance Status Version 2:  %  (Functional Assessment Tool)    GENERAL:  [] NAD []Alert []Lethargic  []Cachexia  []Unarousable  []Verbal  []Non-Verbal  BEHAVIORAL:   []Anxiety  []Delirium []Agitation []Cooperative []Oriented x  HEENT:  []Normal  [] Moist Mucous Membranes []Dry mouth   []ET Tube/Trach  []Oral lesions  PULMONARY:   []Clear []Tachypnea  []Audible excessive secretions  []Normal Work of Breathing []Labored Breathing  []Rhonchi []Crackles []Wheezing  CARDIOVASCULAR:    []Regular Rate []Regular Rhythm []Irregular []Tachy  []Patricio  GASTROINTESTINAL:  []Soft  []Distended   []+BS  []Non tender []Tender  []PEG []OGT/ NGT  Last BM:  GENITOURINARY:  []Normal [] Incontinent   []Oliguria/Anuria   []Norris  MUSCULOSKELETAL:   []Normal Extremities  []Weakness  []Bed/Wheelchair bound []Edema  NEUROLOGIC:   []No focal deficits  []Cognitive impairment  []Dysphagia []Dysarthria []Paresis []Encephalopathic  SKIN:   []Normal   []Pressure ulcer(s)  []Rash    Vital Signs Last 24 Hrs  T(C): 37 (17 Oct 2023 17:15), Max: 37 (17 Oct 2023 17:15)  T(F): 98.6 (17 Oct 2023 17:15), Max: 98.6 (17 Oct 2023 17:15)  HR: 89 (17 Oct 2023 15:00) (87 - 100)  BP: 98/69 (17 Oct 2023 15:00) (87/62 - 102/74)  BP(mean): 84 (17 Oct 2023 08:20) (70 - 84)  RR: 18 (17 Oct 2023 15:00) (14 - 21)  SpO2: 95% (17 Oct 2023 15:00) (94% - 98%)    Parameters below as of 17 Oct 2023 15:00  Patient On (Oxygen Delivery Method): room air    LABS: Personally reviewed and interpreted                      10.5   6.62  )-----------( 100      ( 17 Oct 2023 05:30 )             35.0   10-17    128<L>  |  91<L>  |  8   ----------------------------<  108<H>  3.4<L>   |  27  |  1.64<H>    Ca    8.3<L>      17 Oct 2023 15:35  Phos  3.6     10-17  Mg     1.9     10-17    RADIOLOGY & ADDITIONAL STUDIES: Personally reviewed and interpreted  < from: TTE Echo Complete w/o Contrast w/ Doppler (10.14.23 @ 14:48) >   1. Normal left ventricular size.   2. Moderately reduced left ventricular systolic function with global hypokinesis.   3. Severely dilated right ventricular size.   4. Severely reduced right ventricular systolic function.   5. Mild aortic stenosis, possible bicuspid valve.   6. Moderate tricuspid regurgitation.   7. Mild pulmonary hypertension.   8. Moderate pericardial effusion localized primarily around the left ventricle.    REFERRALS:  [x]Social Work/Case management [x]PT/OT []Chaplaincy  []Hospice  []Patient/Family Support []Massage Therapy []Music Therapy []Holistic RN []Ethics    DISCUSSION OF CASE: Sister - to provide updates and emotional support; Primary Team/RN - to discuss plan of care [facility] Geriatrics and Palliative Care  Yuval Duval, Palliative Care Attending  Contact Info: Call [phone] (HEAL Line) or message on Microsoft Teams (Yuval Duval)    HPI:  CC: missed HD for ESRD  HPI: 63 year old males with history of ESRD (HD 4 x M/T/TH/SAT)ELMER HLD, GERD, CAD presents after missed HD on 10/3/23 with increased shortness of breath at rest and brief bilateral leg weakness and two episodes of non-bloody emesis. He began HD 6 months prior with R chest port use, with recent left hand mapping 1-2 weeks ago for upcoming L AV fistula placement. He noticed increased shortness of breath without any recent travel or sick contacts. At baseline he breathes comfortably on room air, but required 3 L nasal cannula for 85% oxygen saturation in ED. He felt like his legs gave out earlier in the day and at baseline ambulates with a cane. He denies recent falls, but has fall history with most recent fall 3-4 weeks ago with no acute head injuries. He also chronically drinks alcohol, with 2-3 martini's per day with last drink at 2 pm on 10/3/23. He denies alcohol withdrawal hospitalizations in the past and no withdrawal seizures. He endorses some mid epigastric chest pain that does not radiate. He denies fever, chills, diarrhea, abdominal pain, headaches, neck pain, dizziness, or syncope. denies smoking. He receives all his care at [facility] and is compliant with medications. He took last took all his medications at home at 10/3/23 AM and takes all medications together in the morning to prevent forgetting taking his medications.     Patient seen and examined at bedside. Appears overtly comfortable. Denies any significant complaint. Comprehensive symptom assessment and GOC exploration as noted below. Further collateral obtained from primary team, chart, and family.    PERTINENT PM/SXH:   HTN (hypertension)  HLD (hyperlipidemia)  Chronic kidney disease  H/O bilateral hip replacements    FAMILY HISTORY:  No history of CHF in first degree relatives    ITEMS NOT CHECKED ARE NOT PRESENT  SOCIAL HISTORY:   Significant other/partner:  []  Children:  []  Substance hx:  []   Tobacco hx:  []   Alcohol hx: []   Home Opioid hx:  [] I-Stop Reference No:  - no active Rx's / see chart note  Living Situation: []Home  []Long term care  []Rehab []Other  Yarsanism/Spiritual practice: ; Role of organized Baptism [] important [] some [] unable to assess  Coping: [] well [] with difficulty [] poor coping [] unable to assess  Support system: [] strong [] adequate [] inadequate    ADVANCE DIRECTIVES:    []MOLST  []Living Will  DECISION MAKER(s):  [] Health Care Proxy(s)  [] Surrogate(s)  [] Guardian           Name(s)/Phone Number(s):     BASELINE (I)ADLs (prior to admission):  [place]: []Total  [x] Moderate []Dependent    ALLERGIES:  penicillins (Unknown)    MEDICATIONS  (STANDING):  aspirin enteric coated 81 milliGRAM(s) Oral daily  atorvastatin 40 milliGRAM(s) Oral at bedtime  folic acid 1 milliGRAM(s) Oral daily  heparin   Injectable 5000 Unit(s) SubCutaneous every 8 hours  influenza  Vaccine (HIGH DOSE) 0.7 milliLiter(s) IntraMuscular once  midodrine 10 milliGRAM(s) Oral <User Schedule>  Nephro-chichi 1 Tablet(s) Oral every 24 hours  pantoprazole    Tablet 40 milliGRAM(s) Oral before breakfast  pregabalin 75 milliGRAM(s) Oral daily    MEDICATIONS  (PRN):  ondansetron Injectable 4 milliGRAM(s) IV Push every 12 hours PRN Nausea and/or Vomiting    Analgesic Use (Scheduled and PRNs) for past 24 hours:  pregabalin   75 milliGRAM(s) Oral (10-17-23 @ 09:08)    PRESENT SYMPTOMS: []Unable to obtain due to poor mentation/encephalopathy  Source if other than patient:  []Family   []Team     Pain: [] yes [x] no  QOL impact -   Location -                    Aggravating Factors -  Quality -  Radiation -  Timing -  Severity (0-10 scale) -   Minimal Acceptable Level (0-10 scale) -    Dyspnea:                           []Mild  []Moderate []Severe  Anxiety:                             []Mild []Moderate []Severe  Fatigue:                             []Mild []Moderate []Severe  Nausea:                             []Mild []Moderate []Severe  Loss of Appetite:              []Mild []Moderate []Severe  Constipation:                    []Mild []Moderate []Severe    Other Symptoms:  [x]All other review of systems negative     Palliative Performance Status Version 2:  40%  (Functional Assessment Tool)    GENERAL:  [x] NAD [x]Alert []Lethargic  []Cachexia  []Unarousable  [x]Verbal  []Non-Verbal  BEHAVIORAL:   []Anxiety  []Delirium []Agitation [x]Cooperative []Oriented x3  HEENT:  [x]Normal  [] Moist Mucous Membranes []Dry mouth   []ET Tube/Trach  []Oral lesions  PULMONARY:   []Clear []Tachypnea  []Audible excessive secretions  [x]Normal Work of Breathing []Labored Breathing  []Rhonchi [x]Crackles []Wheezing  CARDIOVASCULAR:    [x]Regular Rate [x]Regular Rhythm []Irregular []Tachy  []Patricio  GASTROINTESTINAL:  [x]Soft  [x]Distended   [x]+BS  [x]Non tender []Tender  []PEG []OGT/ NGT  Last BM:  GENITOURINARY:  []Normal [] Incontinent   [x]Oliguria/Anuria   []Norris  MUSCULOSKELETAL:   [x]Normal Extremities  [x]Weakness  []Bed/Wheelchair bound []Edema  NEUROLOGIC:   [x]No focal deficits  []Cognitive impairment  []Dysphagia []Dysarthria []Paresis []Encephalopathic  SKIN:   [x]Normal   []Pressure ulcer(s)  []Rash    Vital Signs Last 24 Hrs  T(C): 37 (17 Oct 2023 17:15), Max: 37 (17 Oct 2023 17:15)  T(F): 98.6 (17 Oct 2023 17:15), Max: 98.6 (17 Oct 2023 17:15)  HR: 89 (17 Oct 2023 15:00) (87 - 100)  BP: 98/69 (17 Oct 2023 15:00) (87/62 - 102/74)  BP(mean): 84 (17 Oct 2023 08:20) (70 - 84)  RR: 18 (17 Oct 2023 15:00) (14 - 21)  SpO2: 95% (17 Oct 2023 15:00) (94% - 98%)    Parameters below as of 17 Oct 2023 15:00  Patient On (Oxygen Delivery Method): room air    LABS: Personally reviewed and interpreted                      10.5   6.62  )-----------( 100      ( 17 Oct 2023 05:30 )             35.0   10-17    128<L>  |  91<L>  |  8   ----------------------------<  108<H>  3.4<L>   |  27  |  1.64<H>    Ca    8.3<L>      17 Oct 2023 15:35  Phos  3.6     10-17  Mg     1.9     10-17    RADIOLOGY & ADDITIONAL STUDIES: Personally reviewed and interpreted  < from: TTE Echo Complete w/o Contrast w/ Doppler (10.14.23 @ 14:48) >   1. Normal left ventricular size.   2. Moderately reduced left ventricular systolic function with global hypokinesis.   3. Severely dilated right ventricular size.   4. Severely reduced right ventricular systolic function.   5. Mild aortic stenosis, possible bicuspid valve.   6. Moderate tricuspid regurgitation.   7. Mild pulmonary hypertension.   8. Moderate pericardial effusion localized primarily around the left ventricle.    REFERRALS:  [x]Social Work/Case management [x]PT/OT []Chaplaincy  []Hospice  []Patient/Family Support []Massage Therapy []Music Therapy []Holistic RN []Ethics    DISCUSSION OF CASE: Sister - to provide updates and emotional support; Primary Team/RN - to discuss plan of care

## 2023-10-17 NOTE — CONSULT NOTE ADULT - NS ATTEST RISK PROBLEM GEN_ALL_CORE FT
Acute Illness That Poses A Threat To Life  Chronic Illness With Severe Exacerbation/Progression  Decision Made To Not Resuscitate (DNR)

## 2023-10-17 NOTE — PROGRESS NOTE ADULT - PROBLEM SELECTOR PLAN 6
-H/H stable at Hgb 10.4. No baseline Hgb or iron studies for comparison. Home med iron 65 mg qd for ELMER, though MCV is macrocytic likely 2/2 chronic EtOH use.   -Etiology likely multifactorial of ELMER, AOCD, EtOH use. No known history of GI bleeds, hemoptysis, hematochezia, melenic stool. B12 increased.   -Hold home med iron 65mg  -C/w folic acid 1mg daily   -EPO with HD   -Maintain active type and screen (last drawn 10/16) Home med iron 65 mg qd for ELMER    - Hold home med iron 65mg  - C/w folic acid 1mg daily   - EPO with HD   - Maintain active type and screen (last drawn 10/16)

## 2023-10-17 NOTE — CONSULT NOTE ADULT - ASSESSMENT
62yo M with PMH of CHF, AFib, ESRD (due to IgA Nephropathy), and EtOH Use Disorder p/w SOB and found to have cardiogenic shock. Palliative consulted for complex medical decision making in the setting of advanced illness.    ·	patient is motivated to work on functional recovery  ·	hospice eligible but would need to forego HD which is not within GOC  ·	will continue to follow for support    In addition to the E/M visit, an advance care planning meeting was performed. Start time: 5:00PM; End time: 5:16PM; Total time: 16min. A face to face meeting to discuss advance care planning was held today regarding: SHEYLA SHER. Primary decision maker: Patient is able to participate in decision making; Alternate/surrogate: . Discussed advance directives including, but not limited to, healthcare proxy and code status. Decision regarding code status: DNR/DNI; Documentation completed today: GOC note

## 2023-10-17 NOTE — PROGRESS NOTE ADULT - SUBJECTIVE AND OBJECTIVE BOX
Nephrology progress note    Seen again on HD. Resting in bed comfortably. No complaints. BP stable in 90s-low 100s. Continue HD as ordered.    Allergies:  penicillins (Unknown)    Hospital Medications:   MEDICATIONS  (STANDING):  aspirin enteric coated 81 milliGRAM(s) Oral daily  atorvastatin 40 milliGRAM(s) Oral at bedtime  folic acid 1 milliGRAM(s) Oral daily  heparin   Injectable 5000 Unit(s) SubCutaneous every 8 hours  influenza  Vaccine (HIGH DOSE) 0.7 milliLiter(s) IntraMuscular once  midodrine 10 milliGRAM(s) Oral <User Schedule>  Nephro-chichi 1 Tablet(s) Oral every 24 hours  pantoprazole    Tablet 40 milliGRAM(s) Oral before breakfast  pregabalin 75 milliGRAM(s) Oral daily    REVIEW OF SYSTEMS:   All other review of systems is negative unless indicated above.    VITALS:  T(F): 97.5 (10-17-23 @ 14:24), Max: 98.3 (10-17-23 @ 02:41)  HR: 87 (10-17-23 @ 11:00)  BP: 98/74 (10-17-23 @ 11:00)  RR: 17 (10-17-23 @ 11:00)  SpO2: 95% (10-17-23 @ 11:00)  Wt(kg): --    10-15 @ 07:01  -  10-16 @ 07:00  --------------------------------------------------------  IN: 645 mL / OUT: 1 mL / NET: 644 mL    10-16 @ 07:01  -  10-17 @ 07:00  --------------------------------------------------------  IN: 630 mL / OUT: 2040 mL / NET: -1410 mL    10-17 @ 07:01  -  10-17 @ 15:26  --------------------------------------------------------  IN: 150 mL / OUT: 0 mL / NET: 150 mL        PHYSICAL EXAM:  GENERAL: NAD laying in bed tolerating HD   HEENT: NCAT, EOMI  CHEST/LUNG: Normal respiratory effort  HEART: Regular rate  ABDOMEN: Mildly distended  EXTREMITIES: trace pedal edema  Neurology: Somnolent but arousable  SKIN: No rash or skin lesion on exposed skin   ACCESS: TDC connected to HD    LABS:  10-17    118<LL>  |  88<L>  |  18  ----------------------------<  130<H>  4.7   |  16<L>  |  2.76<H>    Ca    8.8      17 Oct 2023 05:30  Phos  3.6     10-17  Mg     1.9     10-17                            10.5   6.62  )-----------( 100      ( 17 Oct 2023 05:30 )             35.0       Urine Studies:  Creatinine Trend: 2.76<--, 3.43<--, 2.73<--, 1.93<--, 1.87<--, 2.01<--  Urinalysis Basic - ( 17 Oct 2023 05:30 )    Color:  / Appearance:  / SG:  / pH:   Gluc: 130 mg/dL / Ketone:   / Bili:  / Urobili:    Blood:  / Protein:  / Nitrite:    Leuk Esterase:  / RBC:  / WBC    Sq Epi:  / Non Sq Epi:  / Bacteria:         RADIOLOGY & ADDITIONAL STUDIES:  Reviewed

## 2023-10-17 NOTE — PROGRESS NOTE ADULT - SUBJECTIVE AND OBJECTIVE BOX
Subjective:  No acute events overnight.  Patient is doing well today - tolerated HD well yesterday.  Denies HA, CP, SOB, abdominal pain, nausea, vomiting, fever, chills or diarrhea.     Objective:   Vital Signs:  T(C): 36.2 (17 Oct 2023 11:00), Max: 36.8 (17 Oct 2023 02:41)  T(F): 97.2 (17 Oct 2023 11:00), Max: 98.3 (17 Oct 2023 02:41)  HR: 87 (17 Oct 2023 11:00) (84 - 100)  BP: 98/74 (17 Oct 2023 11:00) (87/62 - 105/73)  BP(mean): 84 (17 Oct 2023 08:20) (70 - 84)  RR: 17 (17 Oct 2023 11:00) (14 - 21)  SpO2: 95% (17 Oct 2023 11:00) (94% - 98%)    Parameters below as of 17 Oct 2023 11:00  Patient On (Oxygen Delivery Method): room air    Physical Exam:   -Gen: NAD, resting in bed  -HEENT: EOMI, PERRL, no scleral icterus, no JVD, no bruits  -CV: normal S1 and S2  -Lungs: CTABL,, normal respiratory effort on RA  -Ab: obese, distended, umbilical hernia is non-tender, normal BS  -Ext: +1 LE edema  -Neuro: A&O x 3, no focal deficits     Labs:                        10.5   6.62  )-----------( 100      ( 17 Oct 2023 05:30 )             35.0       10-17    118<LL>  |  88<L>  |  18  ----------------------------<  130<H>  4.7   |  16<L>  |  2.76<H>    Ca    8.8      17 Oct 2023 05:30  Phos  3.6     10-17  Mg     1.9     10-17    Medications:  MEDICATIONS  (STANDING):  aspirin enteric coated 81 milliGRAM(s) Oral daily  atorvastatin 40 milliGRAM(s) Oral at bedtime  folic acid 1 milliGRAM(s) Oral daily  heparin   Injectable 5000 Unit(s) SubCutaneous every 8 hours  influenza  Vaccine (HIGH DOSE) 0.7 milliLiter(s) IntraMuscular once  midodrine 5 milliGRAM(s) Oral <User Schedule>  Nephro-chichi 1 Tablet(s) Oral every 24 hours  pantoprazole    Tablet 40 milliGRAM(s) Oral before breakfast  pregabalin 75 milliGRAM(s) Oral daily    MEDICATIONS  (PRN):  ondansetron Injectable 4 milliGRAM(s) IV Push every 12 hours PRN Nausea and/or Vomiting

## 2023-10-17 NOTE — PROGRESS NOTE ADULT - SUBJECTIVE AND OBJECTIVE BOX
INTERVAL HPI/OVERNIGHT EVENTS: jose    SUBJECTIVE: Patient seen and examined at bedside, resting comfortably in bed, and does not appear to be in any acute distress. Patient states that he feels short of breath. Denies any chest pain, nausea, or abdominal pain.      MEDICATIONS  (STANDING):  aspirin enteric coated 81 milliGRAM(s) Oral daily  atorvastatin 40 milliGRAM(s) Oral at bedtime  folic acid 1 milliGRAM(s) Oral daily  heparin   Injectable 5000 Unit(s) SubCutaneous every 8 hours  influenza  Vaccine (HIGH DOSE) 0.7 milliLiter(s) IntraMuscular once  midodrine 5 milliGRAM(s) Oral <User Schedule>  Nephro-chichi 1 Tablet(s) Oral every 24 hours  pantoprazole    Tablet 40 milliGRAM(s) Oral before breakfast  pregabalin 75 milliGRAM(s) Oral daily    MEDICATIONS  (PRN):  ondansetron Injectable 4 milliGRAM(s) IV Push every 12 hours PRN Nausea and/or Vomiting      Vital Signs Last 24 Hrs  T(C): 36.3 (17 Oct 2023 09:50), Max: 36.8 (17 Oct 2023 02:41)  T(F): 97.3 (17 Oct 2023 09:50), Max: 98.3 (17 Oct 2023 02:41)  HR: 92 (17 Oct 2023 08:20) (80 - 100)  BP: 102/74 (17 Oct 2023 08:20) (87/62 - 105/73)  BP(mean): 84 (17 Oct 2023 08:20) (70 - 84)  RR: 16 (17 Oct 2023 08:20) (14 - 21)  SpO2: 94% (17 Oct 2023 08:20) (94% - 99%)    Parameters below as of 17 Oct 2023 08:20  Patient On (Oxygen Delivery Method): room air        PHYSICAL EXAM:  General: in no acute distress  Eyes: EOMI intact bilaterally  HENT: Moist mucous membranes  Neck: Trachea midline, supple  Lungs: CTA B/L. No wheezes, rales, or rhonchi  Cardiovascular: irregular. No murmurs, rubs, or gallops  Abdomen: Obese abdomen, soft nontender  Extremities: WWP, No clubbing, cyanosis or edema  Neurological: Alert and oriented  Skin: Warm and dry. No obvious rash     LABS:                        10.5   6.62  )-----------( 100      ( 17 Oct 2023 05:30 )             35.0     10-17    118<LL>  |  88<L>  |  18  ----------------------------<  130<H>  4.7   |  16<L>  |  2.76<H>    Ca    8.8      17 Oct 2023 05:30  Phos  3.6     10-17  Mg     1.9     10-17        Urinalysis Basic - ( 17 Oct 2023 05:30 )    Color: x / Appearance: x / SG: x / pH: x  Gluc: 130 mg/dL / Ketone: x  / Bili: x / Urobili: x   Blood: x / Protein: x / Nitrite: x   Leuk Esterase: x / RBC: x / WBC x   Sq Epi: x / Non Sq Epi: x / Bacteria: x        MICROBIOLOGY:    RADIOLOGY & ADDITIONAL STUDIES:

## 2023-10-17 NOTE — PROGRESS NOTE ADULT - PROBLEM SELECTOR PLAN 9
F: None  E: Replete per HD with ESRD  N: renal restrictions  GI ppx: pantoprazole  DVT ppx: heparin 5000u q8h  Dispo: 5lach

## 2023-10-17 NOTE — CONSULT NOTE ADULT - CONVERSATION DETAILS
Reviewed patient's hospital course and interval developments. Discussed advanced directives including code status, HCP completion, and hospice eligibility. Patient reaffirms his desire for DNR/DNI. He is accepting of his current level of care and would like to continue HD as long as he is tolerating and it is being offered. Patient would be hospice eligible if he decided to forego RRT or if he was no longer tolerating.

## 2023-10-17 NOTE — PROGRESS NOTE ADULT - PROBLEM SELECTOR PLAN 1
NICM (likely EtOH induced) admitted with volume overload i/s/o missed HD, course c/b cardiogenic shock, now s/p CVVHD and pressors with net negative 10.6L. Remains peripherally overloaded with distended abdomen, 2+ LE edema, and orthopnea, however unable to tolerate further volume removal.  -TTEs inpt 10/5 & 10/6/23: LVEF 35-40% with global hypokinesis.  Mildly dilated RV w/ mod reduced RVSF. Elevated RA pressure. SHALOM. Mod AR. mild to mod AS. Mod to severe TR. PASP 39 mmHg. Small-to-moderate pericardial effusion without echocardiographic evidence of cardiac tamponade physiology. Ascites present.  -Repeat TTE 10/14 largely unchanged, w/ LVEF remaining 35-40% and global hypokinesis despite volume optimization  -GDMT not initiated at this time as patients BPs consistently hypotensive 70s-90s, no room to initiate.  -Further volume optimization with HD as per nephro, s/p session Monday 10/16, next likely 10/17. Pt requires Midodrine 5mg qAM on HD days prior to HD due to persistent hypotension  -continuous pulse ox and telemetry; Core measures, strict i/o, daily standing weight, fluid restrict NICM (likely EtOH induced) admitted with volume overload i/s/o missed HD, course c/b cardiogenic shock, now s/p CVVHD and pressors with net negative 10.6L. Remains peripherally overloaded with distended abdomen, 2+ LE edema, and orthopnea, however unable to tolerate further volume removal.  TTEs inpt 10/5 & 10/6/23: LVEF 35-40% with global hypokinesis.  Mildly dilated RV w/ mod reduced RVSF. Elevated RA pressure. SHALOM. Mod AR. mild to mod AS. Mod to severe TR. PASP 39 mmHg. Small-to-moderate pericardial effusion without echocardiographic evidence of cardiac tamponade physiology. Ascites present.  Repeat TTE 10/14 largely unchanged, w/ LVEF remaining 35-40% and global hypokinesis despite volume optimization  GDMT not initiated at this time as patients BPs consistently hypotensive 70s-90s, no room to initiate.    - Further volume optimization with HD as per nephro, Pt requires Midodrine 10mg qAM on HD days prior to HD due to persistent hypotension  -continuous pulse ox and telemetry; Core measures, strict i/o, daily standing weight, fluid restrict NICM (likely EtOH induced) admitted with volume overload i/s/o missed HD, course c/b cardiogenic shock, now s/p CVVHD and pressors with net negative 10.6L. Remains peripherally overloaded with distended abdomen, 2+ LE edema, and orthopnea, however unable to tolerate further volume removal.  TTEs inpt 10/5 & 10/6/23: LVEF 35-40% with global hypokinesis. Mildly dilated RV w/ mod reduced RVSF. Elevated RA pressure. SHALOM. Mod AR. mild to mod AS. Mod to severe TR. PASP 39 mmHg. Small-to-moderate pericardial effusion without echocardiographic evidence of cardiac tamponade physiology. Ascites present.  Repeat TTE 10/14 largely unchanged, w/ LVEF remaining 35-40% and global hypokinesis despite volume optimization  GDMT not initiated at this time as patients BPs consistently hypotensive 70s-90s, no room to initiate.    - Further volume optimization with HD as per nephro, Pt requires Midodrine 10mg qAM on HD days prior to HD due to persistent hypotension  -continuous pulse ox and telemetry; Core measures, strict i/o, daily standing weight, fluid restrict

## 2023-10-17 NOTE — CONSULT NOTE ADULT - PROBLEM SELECTOR RECOMMENDATION 3
.  Overtly tolerating at this time  -intermittent intra-dialytic hypotension  -patient has no intention of stopping HD/RRT at this time

## 2023-10-17 NOTE — PROGRESS NOTE ADULT - ASSESSMENT
63M, DNR/DNI, poor historian, PMHx of HTN, HLD, CAD (MI, unsure when, unsure if had stent placed), CHF (EF 35-40%), AFib (s/p watchman), ESRD (M/T/Th/Sat), Chronic EtOH abuse w/ cirrhosis, GERD, initially admitted to medicine service w/ volume overload after missing HD; course c/b RRT during first inpatient HD session d/t hypotension and pt was found to have severe biventricular failure on TTE w/ clinical evidence of early low output state; was upgraded to CCU (10/6-10/11) for concern for Cardiogenic shock, s/p CVVHD x 4 days and Dobutamine; stepped down to Telemetry with plan for intermittent HD per Nephro but remained hypotensive while off dobutamine, and was upgraded back to CCU (10/12-10/13) per Nephro concerns for safe dialysis plan given hypotension. On 10/13 pt was able to tolerate full iHD session with reinitiation of Midodrine; therefore since 10/13 PM has been stepped back down to Tele for further HD and dispo planning for GENE placement with HD.

## 2023-10-17 NOTE — PROGRESS NOTE ADULT - ATTENDING COMMENTS
seen and evaluated again while on dialysis  BP 90s  able to maintain UF target of 2L-  hemodynamically tolerating HD  c/w full HD prescription as outlined above

## 2023-10-17 NOTE — PROGRESS NOTE ADULT - NS ATTEND AMEND GEN_ALL_CORE FT
63M, DNR/DNI, poor historian, PMHx of HTN, HLD, CAD (MI, unsure when, unsure if had stent placed), CHF (EF 35-40%), AFib (s/p watchman), ESRD (M/T/Th/Sat), Chronic EtOH abuse w/ cirrhosis, GERD, initially admitted to medicine service w/ volume overload after missing HD; course c/b RRT during first inpatient HD session d/t hypotension and pt was found to have severe biventricular failure on TTE w/ clinical evidence of early low output state; was upgraded to CCU (10/6-10/11) for concern for Cardiogenic shock, s/p CVVHD x 4 days and Dobutamine; stepped down to Telemetry with plan for intermittent HD per Nephro but remained hypotensive while off dobutamine, and was upgraded back to CCU (10/12-10/13) per Nephro concerns for safe dialysis plan given hypotension.     1. HFrEF 35-40%, acute on chronic systolic.  Acute on chronic decompensated congestive heart failure with low systolic function. Stage D AHA.ACC, low cardiac output failure  On midodrine and CVVH.  Likely mixed etiology (hx of MI and ETOH).    2. CAD  Continue aspirin, asymptomatic.    3. AF  S/p watchman, rate controlled.    4. CKD ESRD HD  CVVH on 5 LAC.    5. Bicuspid aortic valve with mild to moderate AS/ Moderate AI.  Clinical monitoring, not indication for surgical replacement. 63M, DNR/DNI, poor historian, PMHx of HTN, HLD, CAD (MI, unsure when, unsure if had stent placed), CHF (EF 35-40%), AFib (s/p watchman), ESRD (M/T/Th/Sat), Chronic EtOH abuse w/ cirrhosis, GERD, initially admitted to medicine service w/ volume overload after missing HD; course c/b RRT during first inpatient HD session d/t hypotension and pt was found to have severe biventricular failure on TTE w/ clinical evidence of early low output state; was upgraded to CCU (10/6-10/11) for concern for Cardiogenic shock, s/p CVVHD x 4 days and Dobutamine; stepped down to Telemetry with plan for intermittent HD per Nephro but remained hypotensive while off dobutamine, and was upgraded back to CCU (10/12-10/13) per Nephro concerns for safe dialysis plan given hypotension.     1. HFrEF 35-40%, acute on chronic systolic.  Acute on chronic decompensated congestive heart failure with low systolic function. Stage D AHA.ACC, low cardiac output failure  Severe RV dilation and hypokinesis, severe TR.  On midodrine and CVVH.  Likely mixed etiology (hx of MI and ETOH).    2. CAD  Continue aspirin, asymptomatic.    3. AF  S/p watchman, rate controlled.    4. CKD ESRD HD  CVVH on 5 LAC.    5. Mild to moderate AS/ Moderate AI.  Clinical monitoring, not indication for surgical replacement.

## 2023-10-17 NOTE — PROGRESS NOTE ADULT - ATTENDING COMMENTS
Bed: ED09  Expected date:   Expected time:   Means of arrival:   Comments:  T   events noted, chart reviewed  seen and evaluated while on dialysis  BP 84-88  tolerating the procedure  NAD  will maintain BP > = 80  volume overloaded- to optimize fluid status my need to move [facility] CCU to restart CVVHD or use Aquapheresis- to d/w cardio  re goals of care

## 2023-10-17 NOTE — PROGRESS NOTE ADULT - ASSESSMENT
63Y M w/hx of ESRD admitted for acute on chronic HFpEF  h/c c/b cardiogenic shock, requiring CVVHD (10/6-10/11) and inotrope, total 11.2L , transitioned back to iHD (10/12), HD today for clearance and volume removal,     #ESRD with hyponatremia  Initiated CVVHD (10/6), now on iHD although limited by hypotension  Last HD 10/16 tolerated 2L with midodrine with no complications  CVVHD 10/6-10/11 11.2L UF     Plan:  Continue HD as ordered  Hemodialysis Treatment.:     Schedule: Once, Modality: Hemodialysis, Access: Internal Jugular Central Venous Catheter    Dialyzer: Optiflux [id number], Time: 240 Min    Blood Flow: 300 mL/Min , Dialysate Flow: 500 mL/Min, Dialysate Temp: 36, Tubinmm (Adult)    Target Fluid Removal: 2 Liters    Dialysate Electrolytes (mEq/L): Potassium 3, Calcium 2.5, Sodium 130, Bicarbonate 35 (10-17-23 @ 10:28) [Completed]  Midodrine 5mg prior to HD   Hold UF if SBP <80   Daily BMP   Daily Weights   Renal diet  fluid restriction 1L as will also help with Hyponatremia  Electrolytes to correct with HD   Assess tomorrow 10/18, may need additional session of HD for volume management and hyponatremia    Access:  R TDC functional     Hypotension:  UF with HD as above   Recommend midodrine 5mg prior to HD only     Anemia:  Hgb at goal 10.5  EPO with HD - received 10/16    MBD;  Calcium: 8.8  Phos: 3.6  Continue to hold phos binders

## 2023-10-17 NOTE — PROGRESS NOTE ADULT - ASSESSMENT
63-year-old male with a PMHx of HFrEF (EF 30-40%), Afib (s/p watchman), ESRD 2/2 IgA nephropathy (on HD M,T,Th,S) and alcohol use disorder who presented with SOB in setting of missed HD, initially admitted to medicine for HD but was transferred to CCU for management of cardiogenic shock, s/p dobutamine and CVVHD, now stepped down to cardiac telemetry.      #HFrEF     -further management as per cardiology      -not currently on GDMT due to soft blood pressure, defer initiation to primary team      -volume removal with HD as per cardiology and nephrology      -recommend palliative care consult      #ESRD     #Hyponatremia    -further management as per nephrology    -continue with midodrine 5mg TID on HD days     -continue with 1L fluid restriction    -follow up PM BMP post HD    #CAD     -continue with ASA 81mg daily and atorvastatin 40mg qhs        #Afib     -continue with ASA monotherapy, patient with Watchman device        #Macrocytic Anemia      -likely 2/2 ACD and ESRD     -iron studies suggestive of ACD, B12 and Folate within normal limits, T4 within normal limits     -continue with folic acid and EPO with HD as per nephrology         #Cirrhosis     #Thrombocytopenia and Bilirubinemia (stable)     -unclear if cirrhosis is 2/2 alcohol use or heart failure     -abdominal US with cirrhotic liver, moderate ascites, no biliary dilation, prior cholecystectomy and no concern for PVT     -no ascitic fluid analysis to help determine etiology, no current role for paracentesis but patient states he underwent a paracentesis on 3/2023 and was told his cirrhosis was due to his heart    -will need follow up with Hepatologist at [facility]    DVT PPx: SQH    Dispo: GENE with HD

## 2023-10-17 NOTE — CONSULT NOTE ADULT - PROBLEM SELECTOR RECOMMENDATION 5
.  Complex medical decision making / symptom management in the setting of advanced illness.    Will continue to follow for ongoing GOC discussion / titration of palliative regimen. Emotional support provided, questions answered.  Active Psychosocial Referrals:  [x]Social Work/Case management [x]PT/OT []Chaplaincy []Hospice  []Patient/Family Support []Holistic RN []Massage Therapy []Music Therapy []Ethics  Coping: [] well [x] with difficulty [] poor coping [] unable to assess  Support system: [] strong [x] adequate [] inadequate    For new or uncontrolled symptoms, please call Palliative Care at [phone]). The service is available 24/7 (including nights & weekends) to provide symptom management recommendations over the phone as appropriate

## 2023-10-17 NOTE — PROGRESS NOTE ADULT - PROBLEM SELECTOR PLAN 2
Started on HD 6 months ago, 4x week (M/T/Th/Sat) at [facility], missed HD session on 10/3/23. R chest port utilized. At admission, Cr 3.93 (un-established baseline Cr), K 3.4, Phos 4.4. Metabolic acidosis (AGAP 17) likely due to hypochloremic emesis / ETOH leading to lactic acidosis. Dialysate changed to Phoxillum due to low phos.   - Plan as per nephro recs  - s/p CVVHD 10/6-10/10/23 (received 4 sessions)   - 5mg Midodrine on HD days only prior to dialysis.   - c/w home Nephro-chicih, 1L fluid restriction  - s/p HD session today 10/16/23, transferred to [facility] wing for room capable of monitoring during HD - likely next HD on 10/17 Started on HD 6 months ago, 4x week (M/T/Th/Sat) at [facility] dialysis center, missed HD session on 10/3/23. R chest port utilized. At admission, Cr 3.93 (un-established baseline Cr), K 3.4, Phos 4.4. Metabolic acidosis (AGAP 17) likely due to hypochloremic emesis / ETOH leading to lactic acidosis. Dialysate changed to Phoxillum due to low phos.     - nephrology consulted, follow up recs  - 10mg Midodrine on HD days only prior to dialysis.   - c/w home Nephro-chichi, 1L fluid restriction

## 2023-10-17 NOTE — PROGRESS NOTE ADULT - SUBJECTIVE AND OBJECTIVE BOX
Patient seen on HD tolerating procedure well. Patient does not offer any complaints and is hemodynamically stable. Continue HD as prescribed. Maintain SBP >80.    Hemodialysis Treatment.:     Schedule: Once, Modality: Hemodialysis, Access: Internal Jugular Central Venous Catheter    Dialyzer: Optiflux [id number], Time: 240 Min    Blood Flow: 300 mL/Min , Dialysate Flow: 500 mL/Min, Dialysate Temp: 36, Tubinmm (Adult)    Target Fluid Removal: 2 Liters    Dialysate Electrolytes (mEq/L): Potassium 3, Calcium 2.5, Sodium 130, Bicarbonate 35 (10-17-23 @ 10:28) [Completed]    Vitals   T(F): 97.2  HR: 87  BP: 98/74  RR: 17  SpO2: 95%    Review of Systems:  As above    PHYSICAL EXAM:  GENERAL: NAD laying in bed tolerating HD   HEENT: NCAT, EOMI  CHEST/LUNG: Normal respiratory effort  HEART: Regular rate  ABDOMEN: Mildly distended  EXTREMITIES: trace pedal edema  Neurology: Somnolent  SKIN: No rash or skin lesion on exposed skin   ACCESS: TDC connected to HD

## 2023-10-17 NOTE — PROGRESS NOTE ADULT - PROBLEM SELECTOR PLAN 3
Chronic EtOH abuse with cirrhosis on Abd US; discussed with patient the impact his current EtOH use has on his condition, and patient strongly endorsed he has no plans to decrease EtOH use.   -Out of withdrawal window, not on CIWA protocol moving further  -low concern for hepatorenal syndrome as remains hemodynamically stable. No known gallbladder pathology  -Abd US (10/4) Cirrhotic morphology. Pulsatile flow in the main and left portal veins. Moderate ascites. Increased right renal cortical echogenicity compatible w/ medical renal dz.  -s/p IV thiamine; c/w folate 1mg daily    #Hepatic Cirrhosis as above  - Encourage outpt Hepatology f/u post-dc Chronic EtOH abuse with cirrhosis on Abd US; discussed with patient the impact his current EtOH use has on his condition, and patient strongly endorsed he has no plans to decrease EtOH use. Out of withdrawal window, not on CIWA protocol moving further.  -low concern for hepatorenal syndrome as remains hemodynamically stable. No known gallbladder pathology  -Abd US (10/4) Cirrhotic morphology. Pulsatile flow in the main and left portal veins. Moderate ascites. Increased right renal cortical echogenicity compatible w/ medical renal dz.  -s/p IV thiamine; c/w folate 1mg daily    #Hepatic Cirrhosis as above  - Encourage outpt Hepatology f/u post-dc

## 2023-10-17 NOTE — PROGRESS NOTE ADULT - PROBLEM SELECTOR PLAN 8
#GERD (gastroesophageal reflux disease).   History of GERD, no complaints of epigastric pain at this time.   -Continue Pantoprazole 40mg qd.       F: None  E: Replete per HD with ESRD  N: renal restrictions  GI ppx: pantoprazole  DVT ppx: heparin 5000u q8h; SCDs  Full code  Dispo: pending GENE w/ HD in-house    Case discussed w/ Dr Bruns #GERD (gastroesophageal reflux disease).   History of GERD, no complaints of epigastric pain at this time.    -Continue Pantoprazole 40mg qd

## 2023-10-17 NOTE — CONSULT NOTE ADULT - PROBLEM SELECTOR RECOMMENDATION 9
.  PPSV = 40%, requires assistance for most ADLs  -PT Salmaal recommending GENE, patient would like to work on functional recovery  -c/w supportive care, OOB, encourage movement

## 2023-10-18 LAB
ALBUMIN SERPL ELPH-MCNC: 2.7 G/DL — LOW (ref 3.3–5)
ALBUMIN SERPL ELPH-MCNC: 2.7 G/DL — LOW (ref 3.3–5)
ALBUMIN SERPL ELPH-MCNC: 2.8 G/DL — LOW (ref 3.3–5)
ALBUMIN SERPL ELPH-MCNC: 2.8 G/DL — LOW (ref 3.3–5)
ALP SERPL-CCNC: 235 U/L — HIGH (ref 40–120)
ALP SERPL-CCNC: 235 U/L — HIGH (ref 40–120)
ALP SERPL-CCNC: 241 U/L — HIGH (ref 40–120)
ALP SERPL-CCNC: 241 U/L — HIGH (ref 40–120)
ALT FLD-CCNC: 23 U/L — SIGNIFICANT CHANGE UP (ref 10–45)
ALT FLD-CCNC: 23 U/L — SIGNIFICANT CHANGE UP (ref 10–45)
ALT FLD-CCNC: 24 U/L — SIGNIFICANT CHANGE UP (ref 10–45)
ALT FLD-CCNC: 24 U/L — SIGNIFICANT CHANGE UP (ref 10–45)
ANION GAP SERPL CALC-SCNC: 10 MMOL/L — SIGNIFICANT CHANGE UP (ref 5–17)
ANION GAP SERPL CALC-SCNC: 10 MMOL/L — SIGNIFICANT CHANGE UP (ref 5–17)
ANION GAP SERPL CALC-SCNC: 13 MMOL/L — SIGNIFICANT CHANGE UP (ref 5–17)
ANION GAP SERPL CALC-SCNC: 13 MMOL/L — SIGNIFICANT CHANGE UP (ref 5–17)
AST SERPL-CCNC: 25 U/L — SIGNIFICANT CHANGE UP (ref 10–40)
AST SERPL-CCNC: 25 U/L — SIGNIFICANT CHANGE UP (ref 10–40)
AST SERPL-CCNC: 27 U/L — SIGNIFICANT CHANGE UP (ref 10–40)
AST SERPL-CCNC: 27 U/L — SIGNIFICANT CHANGE UP (ref 10–40)
BILIRUB SERPL-MCNC: 1.9 MG/DL — HIGH (ref 0.2–1.2)
BILIRUB SERPL-MCNC: 1.9 MG/DL — HIGH (ref 0.2–1.2)
BILIRUB SERPL-MCNC: 2 MG/DL — HIGH (ref 0.2–1.2)
BILIRUB SERPL-MCNC: 2 MG/DL — HIGH (ref 0.2–1.2)
BUN SERPL-MCNC: 11 MG/DL — SIGNIFICANT CHANGE UP (ref 7–23)
BUN SERPL-MCNC: 11 MG/DL — SIGNIFICANT CHANGE UP (ref 7–23)
BUN SERPL-MCNC: 14 MG/DL — SIGNIFICANT CHANGE UP (ref 7–23)
BUN SERPL-MCNC: 14 MG/DL — SIGNIFICANT CHANGE UP (ref 7–23)
CALCIUM SERPL-MCNC: 8.9 MG/DL — SIGNIFICANT CHANGE UP (ref 8.4–10.5)
CALCIUM SERPL-MCNC: 8.9 MG/DL — SIGNIFICANT CHANGE UP (ref 8.4–10.5)
CALCIUM SERPL-MCNC: 9.1 MG/DL — SIGNIFICANT CHANGE UP (ref 8.4–10.5)
CALCIUM SERPL-MCNC: 9.1 MG/DL — SIGNIFICANT CHANGE UP (ref 8.4–10.5)
CHLORIDE SERPL-SCNC: 87 MMOL/L — LOW (ref 96–108)
CO2 SERPL-SCNC: 26 MMOL/L — SIGNIFICANT CHANGE UP (ref 22–31)
CREAT SERPL-MCNC: 2.35 MG/DL — HIGH (ref 0.5–1.3)
CREAT SERPL-MCNC: 2.35 MG/DL — HIGH (ref 0.5–1.3)
CREAT SERPL-MCNC: 2.59 MG/DL — HIGH (ref 0.5–1.3)
CREAT SERPL-MCNC: 2.59 MG/DL — HIGH (ref 0.5–1.3)
EGFR: 27 ML/MIN/1.73M2 — LOW
EGFR: 27 ML/MIN/1.73M2 — LOW
EGFR: 30 ML/MIN/1.73M2 — LOW
EGFR: 30 ML/MIN/1.73M2 — LOW
GLUCOSE SERPL-MCNC: 119 MG/DL — HIGH (ref 70–99)
GLUCOSE SERPL-MCNC: 119 MG/DL — HIGH (ref 70–99)
GLUCOSE SERPL-MCNC: 126 MG/DL — HIGH (ref 70–99)
GLUCOSE SERPL-MCNC: 126 MG/DL — HIGH (ref 70–99)
HCT VFR BLD CALC: 31.9 % — LOW (ref 39–50)
HCT VFR BLD CALC: 31.9 % — LOW (ref 39–50)
HGB BLD-MCNC: 10.1 G/DL — LOW (ref 13–17)
HGB BLD-MCNC: 10.1 G/DL — LOW (ref 13–17)
MAGNESIUM SERPL-MCNC: 1.9 MG/DL — SIGNIFICANT CHANGE UP (ref 1.6–2.6)
MAGNESIUM SERPL-MCNC: 1.9 MG/DL — SIGNIFICANT CHANGE UP (ref 1.6–2.6)
MCHC RBC-ENTMCNC: 31.7 GM/DL — LOW (ref 32–36)
MCHC RBC-ENTMCNC: 31.7 GM/DL — LOW (ref 32–36)
MCHC RBC-ENTMCNC: 32.3 PG — SIGNIFICANT CHANGE UP (ref 27–34)
MCHC RBC-ENTMCNC: 32.3 PG — SIGNIFICANT CHANGE UP (ref 27–34)
MCV RBC AUTO: 101.9 FL — HIGH (ref 80–100)
MCV RBC AUTO: 101.9 FL — HIGH (ref 80–100)
NRBC # BLD: 0 /100 WBCS — SIGNIFICANT CHANGE UP (ref 0–0)
NRBC # BLD: 0 /100 WBCS — SIGNIFICANT CHANGE UP (ref 0–0)
PHOSPHATE SERPL-MCNC: 3.3 MG/DL — SIGNIFICANT CHANGE UP (ref 2.5–4.5)
PHOSPHATE SERPL-MCNC: 3.3 MG/DL — SIGNIFICANT CHANGE UP (ref 2.5–4.5)
PLATELET # BLD AUTO: 116 K/UL — LOW (ref 150–400)
PLATELET # BLD AUTO: 116 K/UL — LOW (ref 150–400)
POTASSIUM SERPL-MCNC: 3.8 MMOL/L — SIGNIFICANT CHANGE UP (ref 3.5–5.3)
POTASSIUM SERPL-MCNC: 3.8 MMOL/L — SIGNIFICANT CHANGE UP (ref 3.5–5.3)
POTASSIUM SERPL-MCNC: 4.2 MMOL/L — SIGNIFICANT CHANGE UP (ref 3.5–5.3)
POTASSIUM SERPL-MCNC: 4.2 MMOL/L — SIGNIFICANT CHANGE UP (ref 3.5–5.3)
POTASSIUM SERPL-SCNC: 3.8 MMOL/L — SIGNIFICANT CHANGE UP (ref 3.5–5.3)
POTASSIUM SERPL-SCNC: 3.8 MMOL/L — SIGNIFICANT CHANGE UP (ref 3.5–5.3)
POTASSIUM SERPL-SCNC: 4.2 MMOL/L — SIGNIFICANT CHANGE UP (ref 3.5–5.3)
POTASSIUM SERPL-SCNC: 4.2 MMOL/L — SIGNIFICANT CHANGE UP (ref 3.5–5.3)
PROT SERPL-MCNC: 6.4 G/DL — SIGNIFICANT CHANGE UP (ref 6–8.3)
PROT SERPL-MCNC: 6.4 G/DL — SIGNIFICANT CHANGE UP (ref 6–8.3)
PROT SERPL-MCNC: 6.5 G/DL — SIGNIFICANT CHANGE UP (ref 6–8.3)
PROT SERPL-MCNC: 6.5 G/DL — SIGNIFICANT CHANGE UP (ref 6–8.3)
RBC # BLD: 3.13 M/UL — LOW (ref 4.2–5.8)
RBC # BLD: 3.13 M/UL — LOW (ref 4.2–5.8)
RBC # FLD: 17.8 % — HIGH (ref 10.3–14.5)
RBC # FLD: 17.8 % — HIGH (ref 10.3–14.5)
SODIUM SERPL-SCNC: 123 MMOL/L — LOW (ref 135–145)
SODIUM SERPL-SCNC: 123 MMOL/L — LOW (ref 135–145)
SODIUM SERPL-SCNC: 126 MMOL/L — LOW (ref 135–145)
SODIUM SERPL-SCNC: 126 MMOL/L — LOW (ref 135–145)
WBC # BLD: 6.98 K/UL — SIGNIFICANT CHANGE UP (ref 3.8–10.5)
WBC # BLD: 6.98 K/UL — SIGNIFICANT CHANGE UP (ref 3.8–10.5)
WBC # FLD AUTO: 6.98 K/UL — SIGNIFICANT CHANGE UP (ref 3.8–10.5)
WBC # FLD AUTO: 6.98 K/UL — SIGNIFICANT CHANGE UP (ref 3.8–10.5)

## 2023-10-18 PROCEDURE — 99233 SBSQ HOSP IP/OBS HIGH 50: CPT

## 2023-10-18 PROCEDURE — 90937 HEMODIALYSIS REPEATED EVAL: CPT

## 2023-10-18 PROCEDURE — 99232 SBSQ HOSP IP/OBS MODERATE 35: CPT

## 2023-10-18 RX ADMIN — HEPARIN SODIUM 5000 UNIT(S): 5000 INJECTION INTRAVENOUS; SUBCUTANEOUS at 16:02

## 2023-10-18 RX ADMIN — ATORVASTATIN CALCIUM 40 MILLIGRAM(S): 80 TABLET, FILM COATED ORAL at 00:53

## 2023-10-18 RX ADMIN — HEPARIN SODIUM 5000 UNIT(S): 5000 INJECTION INTRAVENOUS; SUBCUTANEOUS at 22:30

## 2023-10-18 RX ADMIN — HEPARIN SODIUM 5000 UNIT(S): 5000 INJECTION INTRAVENOUS; SUBCUTANEOUS at 06:21

## 2023-10-18 RX ADMIN — Medication 75 MILLIGRAM(S): at 12:39

## 2023-10-18 RX ADMIN — Medication 1 MILLIGRAM(S): at 12:39

## 2023-10-18 RX ADMIN — Medication 1 TABLET(S): at 12:38

## 2023-10-18 RX ADMIN — PANTOPRAZOLE SODIUM 40 MILLIGRAM(S): 20 TABLET, DELAYED RELEASE ORAL at 06:21

## 2023-10-18 RX ADMIN — ATORVASTATIN CALCIUM 40 MILLIGRAM(S): 80 TABLET, FILM COATED ORAL at 22:30

## 2023-10-18 RX ADMIN — HEPARIN SODIUM 5000 UNIT(S): 5000 INJECTION INTRAVENOUS; SUBCUTANEOUS at 00:53

## 2023-10-18 RX ADMIN — Medication 81 MILLIGRAM(S): at 12:38

## 2023-10-18 NOTE — PROGRESS NOTE ADULT - PROBLEM SELECTOR PLAN 1
NICM (likely EtOH induced) admitted with volume overload i/s/o missed HD, course c/b cardiogenic shock, now s/p CVVHD and pressors with net negative 10.6L. Remains peripherally overloaded with distended abdomen, 2+ LE edema, and orthopnea, however unable to tolerate further volume removal.  TTEs inpt 10/5 & 10/6/23: LVEF 35-40% with global hypokinesis. Mildly dilated RV w/ mod reduced RVSF. Elevated RA pressure. SHALOM. Mod AR. mild to mod AS. Mod to severe TR. PASP 39 mmHg. Small-to-moderate pericardial effusion without echocardiographic evidence of cardiac tamponade physiology. Ascites present.  Repeat TTE 10/14 largely unchanged, w/ LVEF remaining 35-40% and global hypokinesis despite volume optimization  GDMT not initiated at this time as patients BPs consistently hypotensive 70s-90s, no room to initiate.    - Further volume optimization with HD as per nephro, Pt requires Midodrine 10mg qAM on HD days prior to HD due to persistent hypotension  -continuous pulse ox and telemetry; Core measures, strict i/o, daily standing weight, fluid restrict

## 2023-10-18 NOTE — PROGRESS NOTE ADULT - PROBLEM SELECTOR PLAN 5
.  Complex medical decision making / symptom management in the setting of advanced illness.    Will SIGN OFF. Emotional support provided, questions answered.  Active Psychosocial Referrals:  [x]Social Work/Case management [x]PT/OT []Chaplaincy []Hospice  []Patient/Family Support []Holistic RN []Massage Therapy []Music Therapy []Ethics  Coping: [] well [x] with difficulty [] poor coping [] unable to assess  Support system: [] strong [x] adequate [] inadequate    For new or uncontrolled symptoms, please call Palliative Care at [phone] ([phone]). The service is available 24/7 (including nights & weekends) to provide symptom management recommendations over the phone as appropriate

## 2023-10-18 NOTE — PROGRESS NOTE ADULT - ASSESSMENT
63Y M w/hx of ESRD admitted for acute on chronic HFpEF  h/c c/b cardiogenic shock, requiring CVVHD (10/6-10/11) and inotrope, total 11.2L , transitioned back to iHD (10/12), HD today for clearance and volume removal, volume overloaded- to optimize fluid status my need to move [facility] CCU to restart CVVHD or use Aquapheresis    #ESRD with hyponatremia  Initiated CVVHD (10/6), now on iHD although limited by hypotension  Last HD 10/16 tolerated 2L with midodrine with no complications  CVVHD 10/6-10/11 11.2L UF       Plan:  HD today will aim for 2L  Hold UF if SBP <80   Daily BMP   Daily Weights   Renal diet  fluid restriction 1L as will also help with Hyponatremia  Electrolytes to correct with HD   Assess tomorrow 10/19, for RRT     Access:  R TDC functional     Hypotension:  UF with HD as above   Recommend midodrine 5mg prior to HD only     Anemia:  Hgb at goal 10.1  EPO with HD    MBD;  Calcium: 9.1  Phos: 3.3  Continue to hold phos binders

## 2023-10-18 NOTE — PROGRESS NOTE ADULT - PROBLEM SELECTOR PLAN 2
Started on HD 6 months ago, 4x week (M/T/Th/Sat) at [facility], missed HD session on 10/3/23. R chest port utilized. At admission, Cr 3.93 (un-established baseline Cr), K 3.4, Phos 4.4. Metabolic acidosis (AGAP 17) likely due to hypochloremic emesis / ETOH leading to lactic acidosis. Dialysate changed to Phoxillum due to low phos.     - nephrology consulted, follow up recs  - 10mg Midodrine on HD days only prior to dialysis.   - c/w home Nephro-chichi, 1L fluid restriction

## 2023-10-18 NOTE — PROGRESS NOTE ADULT - SUBJECTIVE AND OBJECTIVE BOX
Patient is a 63y Male seen and evaluated at bedside. No acute distress, sNa 123 this AM plan for HD today.       Meds:    aspirin enteric coated 81 daily  atorvastatin 40 at bedtime  folic acid 1 daily  heparin   Injectable 5000 every 8 hours  influenza  Vaccine (HIGH DOSE) 0.7 once  midodrine 10 <User Schedule>  Nephro-chichi 1 every 24 hours  ondansetron Injectable 4 every 12 hours PRN  pantoprazole    Tablet 40 before breakfast  pregabalin 75 daily      T(C): , Max: 37 (10-17-23 @ 17:15)  T(F): , Max: 98.6 (10-17-23 @ 17:15)  HR: 89 (10-18-23 @ 13:15)  BP: 116/77 (10-18-23 @ 13:15)  BP(mean): 91 (10-18-23 @ 13:15)  RR: 20 (10-18-23 @ 13:15)  SpO2: 100% (10-18-23 @ 13:15)  Wt(kg): --    10-17 @ 07:01  -  10-18 @ 07:00  --------------------------------------------------------  IN: 150 mL / OUT: 2120 mL / NET: -1970 mL    10-18 @ 07:01  -  10-18 @ 15:34  --------------------------------------------------------  IN: 118 mL / OUT: 1500 mL / NET: -1382 mL          Review of Systems:  ROS negative except as per HPI      PHYSICAL EXAM:  GENERAL: NAD laying in bed   HEENT: NCAT, EOMI  CHEST/LUNG: Normal respiratory effort  HEART: Regular rate  ABDOMEN: Mildly distended  EXTREMITIES: trace pedal edema  Neurology: Somnolent  SKIN: No rash or skin lesion on exposed skin   ACCESS: TDC c/d/i     LABS:                        10.1   6.98  )-----------( 116      ( 18 Oct 2023 05:30 )             31.9     10-18    123<L>  |  87<L>  |  14  ----------------------------<  119<H>  4.2   |  26  |  2.59<H>    Ca    9.1      18 Oct 2023 05:30  Phos  3.3     10-18  Mg     1.9     10-18    TPro  6.4  /  Alb  2.8<L>  /  TBili  2.0<H>  /  DBili  x   /  AST  25  /  ALT  23  /  AlkPhos  235<H>  10-18        Urinalysis Basic - ( 18 Oct 2023 05:30 )    Color: x / Appearance: x / SG: x / pH: x  Gluc: 119 mg/dL / Ketone: x  / Bili: x / Urobili: x   Blood: x / Protein: x / Nitrite: x   Leuk Esterase: x / RBC: x / WBC x   Sq Epi: x / Non Sq Epi: x / Bacteria: x            RADIOLOGY & ADDITIONAL STUDIES:

## 2023-10-18 NOTE — PROGRESS NOTE ADULT - PROBLEM SELECTOR PLAN 4
.  Patient is DNR/DNI, MOLST in chart  -hospice eligible but not appropriate at this time given GOC  -see prior GOC notes

## 2023-10-18 NOTE — PROGRESS NOTE ADULT - ASSESSMENT
64yo M with PMH of CHF, AFib, ESRD (due to IgA Nephropathy), and EtOH Use Disorder p/w SOB and found to have cardiogenic shock. Palliative consulted for complex medical decision making in the setting of advanced illness.

## 2023-10-18 NOTE — PROGRESS NOTE ADULT - ASSESSMENT
63-year-old male with a PMHx of HFrEF (EF 30-40%), Afib (s/p watchman), ESRD (on HD MTThS) and alcohol use disorder who presented with SOB in setting of missed HD, initially admitted to medicine for HD but was transferred to CCU for management of cardiogenic shock, s/p dobutamine and CVVHD, now stepped down to cardiac telemetry.       #HFrEF      -further management as per cardiology       -not currently on GDMT due to soft blood pressure, defer initiation to primary team       -volume removal with HD as per cardiology and nephrology       -palliative care consulted, appreciate recommendations      #ESRD      #Hyponatremia     -further management as per nephrology     -continue with midodrine 10mg TID on HD days      -continue with 1L fluid restriction       #CAD     -continue with ASA 81mg daily and atorvastatin 40mg qhs         #Afib      -continue with ASA monotherapy, patient with Watchman device         #Macrocytic Anemia       -likely 2/2 ACD and ESRD      -iron studies suggestive of ACD, B12 and Folate within normal limits, T4 within normal limits      -continue with folic acid and EPO with HD as per nephrology          #Cirrhosis      #Thrombocytopenia and Bilirubinemia (stable)      -unclear if cirrhosis is 2/2 alcohol use or heart failure      -abdominal US with cirrhotic liver, moderate ascites, no biliary dilation, prior cholecystectomy and no concern for PVT      -no ascitic fluid analysis to help determine etiology, no current role for paracentesis but patient states he underwent a paracentesis on 3/2023 and was told his cirrhosis was due to his heart     -will need follow up with Hepatologist at [facility]     DVT PPx: SQH    Dispo: GENE with HD

## 2023-10-18 NOTE — PROGRESS NOTE ADULT - PROBLEM SELECTOR PLAN 8
#GERD (gastroesophageal reflux disease).   History of GERD, no complaints of epigastric pain at this time.    -Continue Pantoprazole 40mg qd

## 2023-10-18 NOTE — PROGRESS NOTE ADULT - SUBJECTIVE AND OBJECTIVE BOX
CC: Patient is a 63y old  Male who presents with a chief complaint of missed HD for ESRD (18 Oct 2023 11:57)      INTERVAL EVENTS: ADEN    SUBJECTIVE / INTERVAL HPI: Patient seen and examined at bedside.     ROS: negative unless otherwise stated above.    VITAL SIGNS:  Vitals Interpretation review:  Vital Signs Last 24 Hrs  T(C): 36.2 (18 Oct 2023 10:15), Max: 37 (17 Oct 2023 17:15)  T(F): 97.1 (18 Oct 2023 10:15), Max: 98.6 (17 Oct 2023 17:15)  HR: 84 (18 Oct 2023 12:46) (84 - 97)  BP: 100/74 (18 Oct 2023 12:46) (91/75 - 106/70)  BP(mean): 83 (18 Oct 2023 12:46) (69 - 84)  RR: 17 (18 Oct 2023 12:46) (13 - 22)  SpO2: 99% (18 Oct 2023 12:46) (91% - 99%)    Parameters below as of 18 Oct 2023 12:46  Patient On (Oxygen Delivery Method): nasal cannula  O2 Flow (L/min): 2        10-17-23 @ 07:01  -  10-18-23 @ 07:00  --------------------------------------------------------  IN: 150 mL / OUT: 2120 mL / NET: -1970 mL    10-18-23 @ 07:01  -  10-18-23 @ 13:03  --------------------------------------------------------  IN: 118 mL / OUT: 0 mL / NET: 118 mL        PHYSICAL EXAM:    General: NAD  HEENT: MMM  Neck: supple  Cardiovascular: +S1/S2; RRR  Respiratory: CTA B/L; no W/R/R  Gastrointestinal: soft, NT/ND  Extremities: WWP; no edema, clubbing or cyanosis  Vascular: 2+ radial, DP/PT pulses B/L  Neurological: AAOx3; no focal deficits    MEDICATIONS:  MEDICATIONS  (STANDING):  aspirin enteric coated 81 milliGRAM(s) Oral daily  atorvastatin 40 milliGRAM(s) Oral at bedtime  folic acid 1 milliGRAM(s) Oral daily  heparin   Injectable 5000 Unit(s) SubCutaneous every 8 hours  influenza  Vaccine (HIGH DOSE) 0.7 milliLiter(s) IntraMuscular once  midodrine 10 milliGRAM(s) Oral <User Schedule>  Nephro-chichi 1 Tablet(s) Oral every 24 hours  pantoprazole    Tablet 40 milliGRAM(s) Oral before breakfast  pregabalin 75 milliGRAM(s) Oral daily    MEDICATIONS  (PRN):  ondansetron Injectable 4 milliGRAM(s) IV Push every 12 hours PRN Nausea and/or Vomiting      ALLERGIES:  Allergies    penicillins (Unknown)    Intolerances        LABS:  Labs Interpretation:                         10.1   6.98  )-----------( 116      ( 18 Oct 2023 05:30 )             31.9     10-18    123<L>  |  87<L>  |  14  ----------------------------<  119<H>  4.2   |  26  |  2.59<H>    Ca    9.1      18 Oct 2023 05:30  Phos  3.3     10-18  Mg     1.9     10-18    TPro  6.4  /  Alb  2.8<L>  /  TBili  2.0<H>  /  DBili  x   /  AST  25  /  ALT  23  /  AlkPhos  235<H>  10-18      Urinalysis Basic - ( 18 Oct 2023 05:30 )    Color: x / Appearance: x / SG: x / pH: x  Gluc: 119 mg/dL / Ketone: x  / Bili: x / Urobili: x   Blood: x / Protein: x / Nitrite: x   Leuk Esterase: x / RBC: x / WBC x   Sq Epi: x / Non Sq Epi: x / Bacteria: x      CAPILLARY BLOOD GLUCOSE                RADIOLOGY & ADDITIONAL TESTS: Reviewed.  CXR  EKG    Imaging Interpretation Review:    FMHx  Surgical Hx

## 2023-10-18 NOTE — PROGRESS NOTE ADULT - ATTENDING COMMENTS
63M, DNR/DNI, poor historian, PMHx of HTN, HLD, CAD (MI, unsure when, unsure if had stent placed), CHF (EF 35-40%), AFib (s/p watchman), ESRD (M/T/Th/Sat), Chronic EtOH abuse w/ cirrhosis, GERD, initially admitted to medicine service w/ volume overload after missing HD; course c/b RRT during first inpatient HD session d/t hypotension and pt was found to have severe biventricular failure on TTE w/ clinical evidence of early low output state; was upgraded to CCU (10/6-10/11) for concern for Cardiogenic shock, s/p CVVHD x 4 days and Dobutamine; stepped down to Telemetry with plan for intermittent HD per Nephro but remained hypotensive while off dobutamine, and was upgraded back to CCU (10/12-10/13) per Nephro concerns for safe dialysis plan given hypotension.     1. HFrEF 35-40%, acute on chronic systolic.  Acute on chronic decompensated congestive heart failure with low systolic function. Stage D AHA/ACC, low cardiac output failure  Severe RV dilation and hypokinesis, severe TR.  On midodrine and CVVH.  Likely mixed etiology (hx of MI and ETOH).  Added aldactone yesterday, K stable.  Unable to tolerate BB/ARNI/SGTI due to ESRD and hypotension    2. CAD  Continue aspirin, asymptomatic.    3. AF  S/p watchman, rate controlled.    4. CKD ESRD HD  CVVH with midodrine. Appreciate Dr. Sampson input: will reduce UF goal to 1.5 L and reduce Rx time to 3H  Would be able to optimize fluid removal if transferred back to CCU for CVVHD or aquapheresis  Pt amenable to continuing with dialysis treatments at this time  will discuss with palliative team re goals of care and plan for outpt RRT- unstable for that at this time    - We will discuss with CCU, if bed available, no issues transferring to CCU from CV standpoint. Otherwise, continue on 5 LAC -stable.    5. Mild to moderate AS/ Moderate AI.  Clinical monitoring, not indication for surgical replacement.

## 2023-10-18 NOTE — PROGRESS NOTE ADULT - SUBJECTIVE AND OBJECTIVE BOX
seen and evaluated again while on dialysis  BPs low but stable  will reduce UF goal to 1.5 L and reduce Rx time to 3H  Would be able to optimize fluid removal if transferred back to CCU for CVVHD or aquapheresis  will review with cardio team  pt amenable to continuing with dialysis treatments at this time  will discuss with palliative team re goals of care and plan for outpt RRT- unstable for that at this time

## 2023-10-18 NOTE — PROGRESS NOTE ADULT - SUBJECTIVE AND OBJECTIVE BOX
62 yo man with ESRD HFrEF alcohol use disorder  volume overload, hyponatremia  Patient was seen and evaluated on dialysis.   tolerated HD well yesterday- N back down- consuming relative too much water  HR: 89 (10-18-23 @ 10:15)  BP: 106/70 (10-18-23 @ 10:15)  Wt(kg): --  10-18    123<L>  |  87<L>  |  14  ----------------------------<  119<H>  4.2   |  26  |  2.59<H>    Ca    9.1      18 Oct 2023 05:30  Phos  3.3     10-18  Mg     1.9     10-18    TPro  6.4  /  Alb  2.8<L>  /  TBili  2.0<H>  /  DBili  x   /  AST  25  /  ALT  23  /  AlkPhos  235<H>  10-18    Continue dialysis:   Dialyzer:          QB:  300 ml/min  K bath:  3K,   Goal UF: 2L  Duration: 4H  Lower dialysate Na to 130  Patient is tolerating the procedure well.   Continue full hemodialysis treatment as prescribed.

## 2023-10-18 NOTE — PROGRESS NOTE ADULT - PROBLEM SELECTOR PLAN 3
Chronic EtOH abuse with cirrhosis on Abd US; discussed with patient the impact his current EtOH use has on his condition, and patient strongly endorsed he has no plans to decrease EtOH use. Out of withdrawal window, not on CIWA protocol moving further.  -low concern for hepatorenal syndrome as remains hemodynamically stable. No known gallbladder pathology  -Abd US (10/4) Cirrhotic morphology. Pulsatile flow in the main and left portal veins. Moderate ascites. Increased right renal cortical echogenicity compatible w/ medical renal dz.  -s/p IV thiamine; c/w folate 1mg daily    #Hepatic Cirrhosis as above  - Encourage outpt Hepatology f/u post-dc

## 2023-10-18 NOTE — PROGRESS NOTE ADULT - SUBJECTIVE AND OBJECTIVE BOX
[facility] Geriatrics and Palliative Care  Yuval Duval, Palliative Care Attending  Contact Info: Call [phone] (HEAL Line) or message on Microsoft Teams (Yuval Duval)    SUBJECTIVE AND OBJECTIVE:  INTERVAL HPI/OVERNIGHT EVENTS: Interval events noted. See patient's PRN use for the past 24hrs noted below. Comprehensive symptom assessment and GOC exploration as noted below. Extensive time spent discussing plan of care with patient.    ALLERGIES:  penicillins (Unknown)    MEDICATIONS  (STANDING):  aspirin enteric coated 81 milliGRAM(s) Oral daily  atorvastatin 40 milliGRAM(s) Oral at bedtime  folic acid 1 milliGRAM(s) Oral daily  heparin   Injectable 5000 Unit(s) SubCutaneous every 8 hours  influenza  Vaccine (HIGH DOSE) 0.7 milliLiter(s) IntraMuscular once  midodrine 10 milliGRAM(s) Oral <User Schedule>  Nephro-chichi 1 Tablet(s) Oral every 24 hours  pantoprazole    Tablet 40 milliGRAM(s) Oral before breakfast  pregabalin 75 milliGRAM(s) Oral daily    MEDICATIONS  (PRN):  ondansetron Injectable 4 milliGRAM(s) IV Push every 12 hours PRN Nausea and/or Vomiting      Analgesic Use (Scheduled and PRNs) for past 24 hours:    pregabalin   75 milliGRAM(s) Oral (10-18-23 @ 12:39)      ITEMS UNCHECKED ARE NOT PRESENT  PRESENT SYMPTOMS/REVIEW OF SYSTEMS: []Unable to obtain due to poor mentation   Source if other than patient:  []Family   []Team         Vital Signs Last 24 Hrs  T(C): 36.3 (18 Oct 2023 14:00), Max: 36.8 (18 Oct 2023 05:14)  T(F): 97.3 (18 Oct 2023 14:00), Max: 98.2 (18 Oct 2023 05:14)  HR: 89 (18 Oct 2023 13:15) (84 - 97)  BP: 116/77 (18 Oct 2023 13:15) (91/75 - 116/77)  BP(mean): 91 (18 Oct 2023 13:15) (69 - 91)  RR: 20 (18 Oct 2023 13:15) (13 - 22)  SpO2: 100% (18 Oct 2023 13:15) (91% - 100%)    Parameters below as of 18 Oct 2023 13:15  Patient On (Oxygen Delivery Method): nasal cannula  O2 Flow (L/min): 2      LABS: Personally reviewed and interpreted                        10.1   6.98  )-----------( 116      ( 18 Oct 2023 05:30 )             31.9   10-18    123<L>  |  87<L>  |  14  ----------------------------<  119<H>  4.2   |  26  |  2.59<H>    Ca    9.1      18 Oct 2023 05:30  Phos  3.3     10-18  Mg     1.9     10-18    TPro  6.4  /  Alb  2.8<L>  /  TBili  2.0<H>  /  DBili  x   /  AST  25  /  ALT  23  /  AlkPhos  235<H>  10-18      RADIOLOGY & ADDITIONAL STUDIES: None new    DISCUSSION OF CASE: Family - to provide updates and emotional support; Primary Team/RN - to discuss plan of care [facility]s and Palliative Care  Yuval Duval, Palliative Care Attending  Contact Info: Call [phone] (HEAL Line) or message on Microsoft Teams (Yuval Duval)    SUBJECTIVE AND OBJECTIVE:  INTERVAL HPI/OVERNIGHT EVENTS: Interval events noted. See patient's PRN use for the past 24hrs noted below. Comprehensive symptom assessment and GOC exploration as noted below. Extensive time spent discussing plan of care with patient.    ALLERGIES:  penicillins (Unknown)    MEDICATIONS  (STANDING):  aspirin enteric coated 81 milliGRAM(s) Oral daily  atorvastatin 40 milliGRAM(s) Oral at bedtime  folic acid 1 milliGRAM(s) Oral daily  heparin   Injectable 5000 Unit(s) SubCutaneous every 8 hours  influenza  Vaccine (HIGH DOSE) 0.7 milliLiter(s) IntraMuscular once  midodrine 10 milliGRAM(s) Oral <User Schedule>  Nephro-chichi 1 Tablet(s) Oral every 24 hours  pantoprazole    Tablet 40 milliGRAM(s) Oral before breakfast  pregabalin 75 milliGRAM(s) Oral daily    MEDICATIONS  (PRN):  ondansetron Injectable 4 milliGRAM(s) IV Push every 12 hours PRN Nausea and/or Vomiting      Analgesic Use (Scheduled and PRNs) for past 24 hours:    pregabalin   75 milliGRAM(s) Oral (10-18-23 @ 12:39)      ITEMS UNCHECKED ARE NOT PRESENT  PRESENT SYMPTOMS/REVIEW OF SYSTEMS: []Unable to obtain due to poor mentation   Source if other than patient:  []Family   []Team     Pain: [] yes [x] no  QOL impact -   Location -                    Aggravating Factors -  Quality -  Radiation -  Timing -  Severity (0-10 scale) -   Minimal Acceptable Level (0-10 scale) -    Dyspnea:                           []Mild  []Moderate []Severe  Anxiety:                             []Mild []Moderate []Severe  Fatigue:                             []Mild []Moderate []Severe  Nausea:                             []Mild []Moderate []Severe  Loss of Appetite:              []Mild []Moderate []Severe  Constipation:                    []Mild []Moderate []Severe    Other Symptoms:  [x]All other review of systems negative     Palliative Performance Status Version 2:  40%  (Functional Assessment Tool)    GENERAL:  [x] NAD [x]Alert []Lethargic  []Cachexia  []Unarousable  [x]Verbal  []Non-Verbal  BEHAVIORAL:   []Anxiety  []Delirium []Agitation [x]Cooperative []Oriented x3  HEENT:  [x]Normal  [] Moist Mucous Membranes []Dry mouth   []ET Tube/Trach  []Oral lesions  PULMONARY:   []Clear []Tachypnea  []Audible excessive secretions  [x]Normal Work of Breathing []Labored Breathing  []Rhonchi [x]Crackles []Wheezing  CARDIOVASCULAR:    [x]Regular Rate [x]Regular Rhythm []Irregular []Tachy  []Patricio  GASTROINTESTINAL:  [x]Soft  [x]Distended   [x]+BS  [x]Non tender []Tender  []PEG []OGT/ NGT  Last BM:  GENITOURINARY:  []Normal [] Incontinent   [x]Oliguria/Anuria   []Norris  MUSCULOSKELETAL:   [x]Normal Extremities  [x]Weakness  []Bed/Wheelchair bound []Edema  NEUROLOGIC:   [x]No focal deficits  []Cognitive impairment  []Dysphagia []Dysarthria []Paresis []Encephalopathic  SKIN:   [x]Normal   []Pressure ulcer(s)  []Rash    Vital Signs Last 24 Hrs  T(C): 36.3 (18 Oct 2023 14:00), Max: 36.8 (18 Oct 2023 05:14)  T(F): 97.3 (18 Oct 2023 14:00), Max: 98.2 (18 Oct 2023 05:14)  HR: 89 (18 Oct 2023 13:15) (84 - 97)  BP: 116/77 (18 Oct 2023 13:15) (91/75 - 116/77)  BP(mean): 91 (18 Oct 2023 13:15) (69 - 91)  RR: 20 (18 Oct 2023 13:15) (13 - 22)  SpO2: 100% (18 Oct 2023 13:15) (91% - 100%)    Parameters below as of 18 Oct 2023 13:15  Patient On (Oxygen Delivery Method): nasal cannula  O2 Flow (L/min): 2      LABS: Personally reviewed and interpreted                        10.1   6.98  )-----------( 116      ( 18 Oct 2023 05:30 )             31.9   10-18    123<L>  |  87<L>  |  14  ----------------------------<  119<H>  4.2   |  26  |  2.59<H>    Ca    9.1      18 Oct 2023 05:30  Phos  3.3     10-18  Mg     1.9     10-18    TPro  6.4  /  Alb  2.8<L>  /  TBili  2.0<H>  /  DBili  x   /  AST  25  /  ALT  23  /  AlkPhos  235<H>  10-18      RADIOLOGY & ADDITIONAL STUDIES: None new    DISCUSSION OF CASE: Family - to provide updates and emotional support; Primary Team/RN - to discuss plan of care

## 2023-10-18 NOTE — PROGRESS NOTE ADULT - PROBLEM SELECTOR PLAN 6
Home med iron 65 mg qd for ELMER    - Hold home med iron 65mg  - C/w folic acid 1mg daily   - EPO with HD   - Maintain active type and screen (last drawn 10/16)

## 2023-10-19 LAB
ACANTHOCYTES BLD QL SMEAR: SLIGHT — SIGNIFICANT CHANGE UP
ACANTHOCYTES BLD QL SMEAR: SLIGHT — SIGNIFICANT CHANGE UP
ALBUMIN SERPL ELPH-MCNC: 2.5 G/DL — LOW (ref 3.3–5)
ALBUMIN SERPL ELPH-MCNC: 2.5 G/DL — LOW (ref 3.3–5)
ALP SERPL-CCNC: 233 U/L — HIGH (ref 40–120)
ALP SERPL-CCNC: 233 U/L — HIGH (ref 40–120)
ALT FLD-CCNC: 21 U/L — SIGNIFICANT CHANGE UP (ref 10–45)
ALT FLD-CCNC: 21 U/L — SIGNIFICANT CHANGE UP (ref 10–45)
ANION GAP SERPL CALC-SCNC: 14 MMOL/L — SIGNIFICANT CHANGE UP (ref 5–17)
ANION GAP SERPL CALC-SCNC: 14 MMOL/L — SIGNIFICANT CHANGE UP (ref 5–17)
ANISOCYTOSIS BLD QL: SIGNIFICANT CHANGE UP
ANISOCYTOSIS BLD QL: SIGNIFICANT CHANGE UP
AST SERPL-CCNC: 31 U/L — SIGNIFICANT CHANGE UP (ref 10–40)
AST SERPL-CCNC: 31 U/L — SIGNIFICANT CHANGE UP (ref 10–40)
BASOPHILS # BLD AUTO: 0.07 K/UL — SIGNIFICANT CHANGE UP (ref 0–0.2)
BASOPHILS # BLD AUTO: 0.07 K/UL — SIGNIFICANT CHANGE UP (ref 0–0.2)
BASOPHILS NFR BLD AUTO: 1 % — SIGNIFICANT CHANGE UP (ref 0–2)
BASOPHILS NFR BLD AUTO: 1 % — SIGNIFICANT CHANGE UP (ref 0–2)
BILIRUB SERPL-MCNC: 1.8 MG/DL — HIGH (ref 0.2–1.2)
BILIRUB SERPL-MCNC: 1.8 MG/DL — HIGH (ref 0.2–1.2)
BUN SERPL-MCNC: 12 MG/DL — SIGNIFICANT CHANGE UP (ref 7–23)
BUN SERPL-MCNC: 12 MG/DL — SIGNIFICANT CHANGE UP (ref 7–23)
BURR CELLS BLD QL SMEAR: PRESENT — SIGNIFICANT CHANGE UP
BURR CELLS BLD QL SMEAR: PRESENT — SIGNIFICANT CHANGE UP
CALCIUM SERPL-MCNC: 8.8 MG/DL — SIGNIFICANT CHANGE UP (ref 8.4–10.5)
CALCIUM SERPL-MCNC: 8.8 MG/DL — SIGNIFICANT CHANGE UP (ref 8.4–10.5)
CHLORIDE SERPL-SCNC: 87 MMOL/L — LOW (ref 96–108)
CHLORIDE SERPL-SCNC: 87 MMOL/L — LOW (ref 96–108)
CO2 SERPL-SCNC: 20 MMOL/L — LOW (ref 22–31)
CO2 SERPL-SCNC: 20 MMOL/L — LOW (ref 22–31)
CREAT SERPL-MCNC: 2.57 MG/DL — HIGH (ref 0.5–1.3)
CREAT SERPL-MCNC: 2.57 MG/DL — HIGH (ref 0.5–1.3)
EGFR: 27 ML/MIN/1.73M2 — LOW
EGFR: 27 ML/MIN/1.73M2 — LOW
ELLIPTOCYTES BLD QL SMEAR: SLIGHT — SIGNIFICANT CHANGE UP
ELLIPTOCYTES BLD QL SMEAR: SLIGHT — SIGNIFICANT CHANGE UP
EOSINOPHIL # BLD AUTO: 0.07 K/UL — SIGNIFICANT CHANGE UP (ref 0–0.5)
EOSINOPHIL # BLD AUTO: 0.07 K/UL — SIGNIFICANT CHANGE UP (ref 0–0.5)
EOSINOPHIL NFR BLD AUTO: 1 % — SIGNIFICANT CHANGE UP (ref 0–6)
EOSINOPHIL NFR BLD AUTO: 1 % — SIGNIFICANT CHANGE UP (ref 0–6)
GIANT PLATELETS BLD QL SMEAR: PRESENT — SIGNIFICANT CHANGE UP
GIANT PLATELETS BLD QL SMEAR: PRESENT — SIGNIFICANT CHANGE UP
GLUCOSE SERPL-MCNC: 145 MG/DL — HIGH (ref 70–99)
GLUCOSE SERPL-MCNC: 145 MG/DL — HIGH (ref 70–99)
HCT VFR BLD CALC: 31.9 % — LOW (ref 39–50)
HCT VFR BLD CALC: 31.9 % — LOW (ref 39–50)
HGB BLD-MCNC: 9.6 G/DL — LOW (ref 13–17)
HGB BLD-MCNC: 9.6 G/DL — LOW (ref 13–17)
HYPOCHROMIA BLD QL: SLIGHT — SIGNIFICANT CHANGE UP
HYPOCHROMIA BLD QL: SLIGHT — SIGNIFICANT CHANGE UP
LYMPHOCYTES # BLD AUTO: 1.05 K/UL — SIGNIFICANT CHANGE UP (ref 1–3.3)
LYMPHOCYTES # BLD AUTO: 1.05 K/UL — SIGNIFICANT CHANGE UP (ref 1–3.3)
LYMPHOCYTES # BLD AUTO: 16 % — SIGNIFICANT CHANGE UP (ref 13–44)
LYMPHOCYTES # BLD AUTO: 16 % — SIGNIFICANT CHANGE UP (ref 13–44)
MACROCYTES BLD QL: SIGNIFICANT CHANGE UP
MACROCYTES BLD QL: SIGNIFICANT CHANGE UP
MAGNESIUM SERPL-MCNC: 1.7 MG/DL — SIGNIFICANT CHANGE UP (ref 1.6–2.6)
MAGNESIUM SERPL-MCNC: 1.7 MG/DL — SIGNIFICANT CHANGE UP (ref 1.6–2.6)
MANUAL SMEAR VERIFICATION: SIGNIFICANT CHANGE UP
MANUAL SMEAR VERIFICATION: SIGNIFICANT CHANGE UP
MCHC RBC-ENTMCNC: 30.1 GM/DL — LOW (ref 32–36)
MCHC RBC-ENTMCNC: 30.1 GM/DL — LOW (ref 32–36)
MCHC RBC-ENTMCNC: 32.7 PG — SIGNIFICANT CHANGE UP (ref 27–34)
MCHC RBC-ENTMCNC: 32.7 PG — SIGNIFICANT CHANGE UP (ref 27–34)
MCV RBC AUTO: 108.5 FL — HIGH (ref 80–100)
MCV RBC AUTO: 108.5 FL — HIGH (ref 80–100)
MONOCYTES # BLD AUTO: 0.46 K/UL — SIGNIFICANT CHANGE UP (ref 0–0.9)
MONOCYTES # BLD AUTO: 0.46 K/UL — SIGNIFICANT CHANGE UP (ref 0–0.9)
MONOCYTES NFR BLD AUTO: 7 % — SIGNIFICANT CHANGE UP (ref 2–14)
MONOCYTES NFR BLD AUTO: 7 % — SIGNIFICANT CHANGE UP (ref 2–14)
NEUTROPHILS # BLD AUTO: 4.93 K/UL — SIGNIFICANT CHANGE UP (ref 1.8–7.4)
NEUTROPHILS # BLD AUTO: 4.93 K/UL — SIGNIFICANT CHANGE UP (ref 1.8–7.4)
NEUTROPHILS NFR BLD AUTO: 75 % — SIGNIFICANT CHANGE UP (ref 43–77)
NEUTROPHILS NFR BLD AUTO: 75 % — SIGNIFICANT CHANGE UP (ref 43–77)
NRBC # BLD: 0 /100 — SIGNIFICANT CHANGE UP (ref 0–0)
NRBC # BLD: 0 /100 — SIGNIFICANT CHANGE UP (ref 0–0)
NRBC # BLD: SIGNIFICANT CHANGE UP /100 WBCS (ref 0–0)
NRBC # BLD: SIGNIFICANT CHANGE UP /100 WBCS (ref 0–0)
OVALOCYTES BLD QL SMEAR: SLIGHT — SIGNIFICANT CHANGE UP
OVALOCYTES BLD QL SMEAR: SLIGHT — SIGNIFICANT CHANGE UP
PHOSPHATE SERPL-MCNC: 3.1 MG/DL — SIGNIFICANT CHANGE UP (ref 2.5–4.5)
PHOSPHATE SERPL-MCNC: 3.1 MG/DL — SIGNIFICANT CHANGE UP (ref 2.5–4.5)
PLAT MORPH BLD: ABNORMAL
PLAT MORPH BLD: ABNORMAL
PLATELET # BLD AUTO: 98 K/UL — LOW (ref 150–400)
PLATELET # BLD AUTO: 98 K/UL — LOW (ref 150–400)
POIKILOCYTOSIS BLD QL AUTO: SIGNIFICANT CHANGE UP
POIKILOCYTOSIS BLD QL AUTO: SIGNIFICANT CHANGE UP
POTASSIUM SERPL-MCNC: 4.1 MMOL/L — SIGNIFICANT CHANGE UP (ref 3.5–5.3)
POTASSIUM SERPL-MCNC: 4.1 MMOL/L — SIGNIFICANT CHANGE UP (ref 3.5–5.3)
POTASSIUM SERPL-SCNC: 4.1 MMOL/L — SIGNIFICANT CHANGE UP (ref 3.5–5.3)
POTASSIUM SERPL-SCNC: 4.1 MMOL/L — SIGNIFICANT CHANGE UP (ref 3.5–5.3)
PROT SERPL-MCNC: 6.1 G/DL — SIGNIFICANT CHANGE UP (ref 6–8.3)
PROT SERPL-MCNC: 6.1 G/DL — SIGNIFICANT CHANGE UP (ref 6–8.3)
RBC # BLD: 2.94 M/UL — LOW (ref 4.2–5.8)
RBC # BLD: 2.94 M/UL — LOW (ref 4.2–5.8)
RBC # FLD: 17.8 % — HIGH (ref 10.3–14.5)
RBC # FLD: 17.8 % — HIGH (ref 10.3–14.5)
RBC BLD AUTO: ABNORMAL
RBC BLD AUTO: ABNORMAL
SODIUM SERPL-SCNC: 121 MMOL/L — LOW (ref 135–145)
SODIUM SERPL-SCNC: 121 MMOL/L — LOW (ref 135–145)
WBC # BLD: 6.57 K/UL — SIGNIFICANT CHANGE UP (ref 3.8–10.5)
WBC # BLD: 6.57 K/UL — SIGNIFICANT CHANGE UP (ref 3.8–10.5)
WBC # FLD AUTO: 6.57 K/UL — SIGNIFICANT CHANGE UP (ref 3.8–10.5)
WBC # FLD AUTO: 6.57 K/UL — SIGNIFICANT CHANGE UP (ref 3.8–10.5)

## 2023-10-19 PROCEDURE — 90937 HEMODIALYSIS REPEATED EVAL: CPT

## 2023-10-19 PROCEDURE — 99233 SBSQ HOSP IP/OBS HIGH 50: CPT

## 2023-10-19 RX ADMIN — HEPARIN SODIUM 5000 UNIT(S): 5000 INJECTION INTRAVENOUS; SUBCUTANEOUS at 14:14

## 2023-10-19 RX ADMIN — HEPARIN SODIUM 5000 UNIT(S): 5000 INJECTION INTRAVENOUS; SUBCUTANEOUS at 05:31

## 2023-10-19 RX ADMIN — ATORVASTATIN CALCIUM 40 MILLIGRAM(S): 80 TABLET, FILM COATED ORAL at 22:08

## 2023-10-19 RX ADMIN — PANTOPRAZOLE SODIUM 40 MILLIGRAM(S): 20 TABLET, DELAYED RELEASE ORAL at 08:13

## 2023-10-19 RX ADMIN — PANTOPRAZOLE SODIUM 40 MILLIGRAM(S): 20 TABLET, DELAYED RELEASE ORAL at 05:31

## 2023-10-19 RX ADMIN — MIDODRINE HYDROCHLORIDE 10 MILLIGRAM(S): 2.5 TABLET ORAL at 08:14

## 2023-10-19 RX ADMIN — Medication 81 MILLIGRAM(S): at 10:57

## 2023-10-19 RX ADMIN — Medication 75 MILLIGRAM(S): at 10:58

## 2023-10-19 RX ADMIN — Medication 1 MILLIGRAM(S): at 10:58

## 2023-10-19 RX ADMIN — Medication 1 TABLET(S): at 10:58

## 2023-10-19 RX ADMIN — HEPARIN SODIUM 5000 UNIT(S): 5000 INJECTION INTRAVENOUS; SUBCUTANEOUS at 22:08

## 2023-10-19 NOTE — PROGRESS NOTE ADULT - ASSESSMENT
63Y M w/hx of ESRD admitted for acute on chronic HFpEF  h/c c/b cardiogenic shock, requiring CVVHD (10/6-10/11) and inotrope, total 11.2L , transitioned back to iHD (10/12), still grossly volume overloaded will need to be upgraded to optimize volume status as breaking even with HD given pts fluid intake       #ESRD with hyponatremia  121 volume overloaded, hyponatremia       Plan:  HD today will aim for 3L, will need upgraded for further volume optimization   Hold UF if SBP <80   Daily BMP   Daily Weights   Renal diet  fluid restriction 1L as will also help with Hyponatremia will dialyze with 148 Na bath   Electrolytes to correct with HD       Access:  R TDC functional     Hypotension:  UF with HD as above   Recommend midodrine 5mg prior to HD only     Anemia:  Hgb at goal 9.6  EPO with HD     MBD;  Calcium: 8.8  Phos: 3.1  Continue to hold phos binders

## 2023-10-19 NOTE — PROGRESS NOTE ADULT - SUBJECTIVE AND OBJECTIVE BOX
Patient seen on HD tolerating procedure well. Patient is resting comfortably, does not offer any complaints, and is hemodynamically stable with SBP 100s at time of assessment. Continue HD as ordered.     Hemodialysis Treatment.:     Schedule: Once, Modality: Hemodialysis, Access: Internal Jugular Central Venous Catheter    Dialyzer: Optiflux [id number], Time: 270 Min    Blood Flow: 400 mL/Min , Dialysate Flow: 500 mL/Min, Dialysate Temp: 35, Tubinmm (Adult)    Target Fluid Removal: 3 Liters    Dialysate Electrolytes (mEq/L): Potassium 2, Calcium 2.5, Sodium 138, Bicarbonate 35      Vitals   T(F): 98.7  HR: 96  BP: 93/59  RR: 18  SpO2: 93%      Review of Systems:  As above, otherwise negative      PHYSICAL EXAM:  GENERAL: NAD laying in bed tolerating HD   HEENT: NCAT, EOMI  CHEST/LUNG: Normal respiratory effort  HEART: Regular rate  ABDOMEN: Mildly distended  EXTREMITIES: trace pedal edema  Neurology: Somnolent but arousable  SKIN: No rash or skin lesion on exposed skin   ACCESS: TDC connected to HD

## 2023-10-19 NOTE — PROGRESS NOTE ADULT - ATTENDING COMMENTS
seen again while on dialysis  BP 90s  remains sleepy  was on high Na bath of 140  now reduced to 138  follow trend in blood na levels

## 2023-10-19 NOTE — PROGRESS NOTE ADULT - PROBLEM SELECTOR PLAN 3
Chronic EtOH abuse with cirrhosis on Abd US; discussed with patient the impact his current EtOH use has on his condition, and patient strongly endorsed he has no plans to decrease EtOH use. Out of withdrawal window, not on CIWA protocol moving further.  -low concern for hepatorenal syndrome as remains hemodynamically stable. No known gallbladder pathology  -Abd US (10/4) Cirrhotic morphology. Pulsatile flow in the main and left portal veins. Moderate ascites. Increased right renal cortical echogenicity compatible w/ medical renal dz.  -s/p IV thiamine; c/w folate 1mg daily    #Hepatic Cirrhosis as above  - Encourage outpt Hepatology f/u post-dc Chronic EtOH abuse with cirrhosis on Abd US; discussed with patient the impact his current EtOH use has on his condition, and patient strongly endorsed he has no plans to decrease EtOH use. Out of withdrawal window, not on CIWA protocol moving further.  -low concern for hepatorenal syndrome as remains hemodynamically stable. No known gallbladder pathology  -Abd US (10/4) Cirrhotic morphology. Pulsatile flow in the main and left portal veins. Moderate ascites. Increased right renal cortical echogenicity compatible w/ medical renal dz.  -s/p IV thiamine; c/w folate 1mg daily    #Hepatic Cirrhosis as above  - outpt Hepatology f/u post-dc

## 2023-10-19 NOTE — PROGRESS NOTE ADULT - PROBLEM SELECTOR PLAN 1
NICM (likely EtOH induced) admitted with volume overload i/s/o missed HD, course c/b cardiogenic shock, now s/p CVVHD and pressors with net negative 10.6L. Remains peripherally overloaded with distended abdomen, 2+ LE edema, and orthopnea, however unable to tolerate further volume removal.  TTEs inpt 10/5 & 10/6/23: LVEF 35-40% with global hypokinesis. Mildly dilated RV w/ mod reduced RVSF. Elevated RA pressure. SHALOM. Mod AR. mild to mod AS. Mod to severe TR. PASP 39 mmHg. Small-to-moderate pericardial effusion without echocardiographic evidence of cardiac tamponade physiology. Ascites present.  Repeat TTE 10/14 largely unchanged, w/ LVEF remaining 35-40% and global hypokinesis despite volume optimization  GDMT not initiated at this time as patients BPs consistently hypotensive 70s-90s, no room to initiate.    - Further volume optimization with HD as per nephro, Pt requires Midodrine 10mg qAM on HD days prior to HD due to persistent hypotension  -continuous pulse ox and telemetry; Core measures, strict i/o, daily standing weight, fluid restrict NICM (likely EtOH induced) admitted with volume overload i/s/o missed HD, course c/b cardiogenic shock, now s/p CVVHD and pressors with net negative 10.6L. Remains peripherally overloaded with distended abdomen, 2+ LE edema, and orthopnea, however unable to tolerate further volume removal.  TTEs inpt 10/5 & 10/6/23: LVEF 35-40% with global hypokinesis. Mildly dilated RV w/ mod reduced RVSF. Elevated RA pressure. SHALOM. Mod AR. mild to mod AS. Mod to severe TR. PASP 39 mmHg. Small-to-moderate pericardial effusion without echocardiographic evidence of cardiac tamponade physiology. Ascites present.  Repeat TTE 10/14 largely unchanged, w/ LVEF remaining 35-40% and global hypokinesis despite volume optimization  GDMT not initiated at this time as patients BPs previous consistently hypotensive 70s-90s, no room to initiate.    - Further volume optimization with HD as per nephro, Pt requires Midodrine 10mg qAM on HD days prior to HD due to persistent hypotension  -continuous pulse ox and telemetry; Core measures, strict i/o, daily standing weight, fluid restrict

## 2023-10-19 NOTE — PROGRESS NOTE ADULT - SUBJECTIVE AND OBJECTIVE BOX
Subjective:  No acute events overnight.  Patient is doing well today without new complaints.  Tolerated HD yesterday.  Denies HA, CP, SOB, abdominal pain, nausea, vomiting, fever, chills or diarrhea.     Objective:   Vital Signs:  T(C): 36.7 (19 Oct 2023 09:12), Max: 37.1 (18 Oct 2023 22:19)  T(F): 98 (19 Oct 2023 09:12), Max: 98.7 (18 Oct 2023 22:19)  HR: 91 (19 Oct 2023 08:35) (88 - 98)  BP: 105/73 (19 Oct 2023 08:35) (90/54 - 114/84)  BP(mean): 85 (19 Oct 2023 08:35) (85 - 95)  RR: 18 (19 Oct 2023 08:35) (18 - 20)  SpO2: 99% (19 Oct 2023 08:35) (92% - 99%)    Parameters below as of 19 Oct 2023 08:35  Patient On (Oxygen Delivery Method): nasal cannula  O2 Flow (L/min): 2    Physical Exam:   -Gen: NAD, resting in bed  -HEENT: EOMI, PERRL, no scleral icterus, no JVD, no bruits, right chest TDC  -CV: normal S1 and S2  -Lungs: CTABL,, normal respiratory effort on RA  -Ab: obese, distended, umbilical hernia is non-tender, normal BS  -Ext: +1 LE edema  -Neuro: A&O x 3, no focal deficits     Labs:                        9.6    6.57  )-----------( 98       ( 19 Oct 2023 05:30 )             31.9       10-19    121<L>  |  87<L>  |  12  ----------------------------<  145<H>  4.1   |  20<L>  |  2.57<H>    Ca    8.8      19 Oct 2023 05:30  Phos  3.1     10-19  Mg     1.7     10-19    TPro  6.1  /  Alb  2.5<L>  /  TBili  1.8<H>  /  DBili  x   /  AST  31  /  ALT  21  /  AlkPhos  233<H>  10-19    Medications:  MEDICATIONS  (STANDING):  aspirin enteric coated 81 milliGRAM(s) Oral daily  atorvastatin 40 milliGRAM(s) Oral at bedtime  folic acid 1 milliGRAM(s) Oral daily  heparin   Injectable 5000 Unit(s) SubCutaneous every 8 hours  influenza  Vaccine (HIGH DOSE) 0.7 milliLiter(s) IntraMuscular once  midodrine 10 milliGRAM(s) Oral <User Schedule>  Nephro-chichi 1 Tablet(s) Oral every 24 hours  pantoprazole    Tablet 40 milliGRAM(s) Oral before breakfast    MEDICATIONS  (PRN):  ondansetron Injectable 4 milliGRAM(s) IV Push every 12 hours PRN Nausea and/or Vomiting

## 2023-10-19 NOTE — PROGRESS NOTE ADULT - SUBJECTIVE AND OBJECTIVE BOX
Patient is a 63y old  Male who presents with a chief complaint of missed HD for ESRD (19 Oct 2023 15:04)    HPI:  CC: missed HD for ESRD  HPI: 63 year old males with history of ESRD (HD 4 x M/T//SAT)ELMER HLD, GERD, CAD presents after missed HD on 10/3/23 with increased shortness of breath at rest and brief bilateral leg weakness and two episodes of non-bloody emesis. He began HD 6 months prior with R chest port use, with recent left hand mapping 1-2 weeks ago for upcoming L AV fistula placement. He noticed increased shortness of breath without any recent travel or sick contacts. At baseline he breathes comfortably on room air, but required 3 L nasal cannula for 85% oxygen saturation in ED. He felt like his legs gave out earlier in the day and at baseline ambulates with a cane. He denies recent falls, but has fall history with most recent fall 3-4 weeks ago with no acute head injuries. He also chronically drinks alcohol, with 2-3 martini's per day with last drink at 2 pm on 10/3/23. He denies alcohol withdrawal hospitalizations in the past and no withdrawal seizures. He endorses some mid epigastric chest pain that does not radiate. He denies fever, chills, diarrhea, abdominal pain, headaches, neck pain, dizziness, or syncope. denies smoking. He receives all his care at [facility] and is compliant with medications. He took last took all his medications at home at 10/3/23 AM and takes all medications together in the morning to prevent forgetting taking his medications.     In the ED:    - VS: Tmax: 98.3, HR: 106, /78, RR 18, 98% on RA    - Pertinent Labs: WBC 6, Hg 10.2, .6; INR 1.46; Serum Ammonia 37, K 3.4, Mg 1.7, Na 132, AGAP 17, Cr 3.93, Albumin 2.0, BiliT 2, Alk Phos 273, AST 45, ALT 43, VBG pH 7.36, BIENVENIDO 29, proBNP 65582, Trop I 564    - Imaging:  EKG sinus tach 104 bpm, Qtc 397, right axis deviation, incomplete RBBB, possible right ventricular hypertrophy, nonspecific t wave abnormality. CXR no infiltrates.    - Treatment/interventions: None in ED  PMHx: HTN, HLD, GERD, CAD s/p MI no stents, HD on ESRD  PSHx: two hip replacement surgeries  Meds: See med rec  Allergies: pencillin- patient not aware of reaction  Social: see below  (04 Oct 2023 01:03)    = it IS THE summary line (true HPI is in subjective)    *[Add on prior hx that can further clarify case such as PCI hx or what led to this situation]*     INTERVAL HPI/OVERNIGHT EVENTS:   No overnight events   Afebrile, hemodynamically stable     Subjective:    ICU Vital Signs Last 24 Hrs  T(C): 37.1 (19 Oct 2023 13:40), Max: 37.1 (18 Oct 2023 22:19)  T(F): 98.7 (19 Oct 2023 13:40), Max: 98.7 (18 Oct 2023 22:19)  HR: 96 (19 Oct 2023 13:06) (88 - 98)  BP: 93/59 (19 Oct 2023 13:06) (90/54 - 114/84)  BP(mean): 70 (19 Oct 2023 13:06) (70 - 95)  ABP: --  ABP(mean): --  RR: 18 (19 Oct 2023 13:06) (18 - 20)  SpO2: 93% (19 Oct 2023 13:06) (92% - 99%)    O2 Parameters below as of 19 Oct 2023 13:06  Patient On (Oxygen Delivery Method): nasal cannula  O2 Flow (L/min): 2        I&O's Summary    18 Oct 2023 07:01  -  19 Oct 2023 07:00  --------------------------------------------------------  IN: 118 mL / OUT: 1500 mL / NET: -1382 mL    19 Oct 2023 07:  -  19 Oct 2023 15:57  --------------------------------------------------------  IN: 280 mL / OUT: 0 mL / NET: 280 mL          Daily     Daily Weight in k.5 (19 Oct 2023 05:30)    Adult Advanced Hemodynamics Last 24 Hrs  CVP(mm Hg): --  CVP(cm H2O): --  CO: --  CI: --  PA: --  PA(mean): --  PCWP: --  SVR: --  SVRI: --  PVR: --  PVRI: --    EKG/Telemetry Events:    MEDICATIONS  (STANDING):  aspirin enteric coated 81 milliGRAM(s) Oral daily  atorvastatin 40 milliGRAM(s) Oral at bedtime  folic acid 1 milliGRAM(s) Oral daily  heparin   Injectable 5000 Unit(s) SubCutaneous every 8 hours  influenza  Vaccine (HIGH DOSE) 0.7 milliLiter(s) IntraMuscular once  midodrine 10 milliGRAM(s) Oral <User Schedule>  Nephro-chichi 1 Tablet(s) Oral every 24 hours  pantoprazole    Tablet 40 milliGRAM(s) Oral before breakfast    MEDICATIONS  (PRN):  ondansetron Injectable 4 milliGRAM(s) IV Push every 12 hours PRN Nausea and/or Vomiting      PHYSICAL EXAM:  GENERAL:   HEAD:  Atraumatic, Normocephalic  EYES: EOMI, PERRLA, conjunctiva and sclera clear  NECK: Supple, No JVD, Normal thyroid, no enlarged nodes  NERVOUS SYSTEM:  Alert & Awake.   CHEST/LUNG: B/L good air entry; No rales, rhonchi, or wheezing  HEART: S1S2 normal, no S3, Regular rate and rhythm; No murmurs  ABDOMEN: Soft, Nontender, Nondistended; Bowel sounds present  EXTREMITIES:  2+ Peripheral Pulses, No clubbing, cyanosis, or edema  LYMPH: No lymphadenopathy noted  SKIN: No rashes or lesions  -> Lines:                Foley________________IVs_____________________________________________    LABS:                        9.6    6.57  )-----------( 98       ( 19 Oct 2023 05:30 )             31.9     10-19    121<L>  |  87<L>  |  12  ----------------------------<  145<H>  4.1   |  20<L>  |  2.57<H>    Ca    8.8      19 Oct 2023 05:30  Phos  3.1     10  Mg     1.7     10-19    TPro  6.1  /  Alb  2.5<L>  /  TBili  1.8<H>  /  DBili  x   /  AST  31  /  ALT  21  /  AlkPhos  233<H>  10-19    LIVER FUNCTIONS - ( 19 Oct 2023 05:30 )  Alb: 2.5 g/dL / Pro: 6.1 g/dL / ALK PHOS: 233 U/L / ALT: 21 U/L / AST: 31 U/L / GGT: x             CAPILLARY BLOOD GLUCOSE                Urinalysis Basic - ( 19 Oct 2023 05:30 )    Color: x / Appearance: x / SG: x / pH: x  Gluc: 145 mg/dL / Ketone: x  / Bili: x / Urobili: x   Blood: x / Protein: x / Nitrite: x   Leuk Esterase: x / RBC: x / WBC x   Sq Epi: x / Non Sq Epi: x / Bacteria: x          RADIOLOGY & ADDITIONAL TESTS:  CXR:  Others:       Care Discussed with Consultants/Other Providers [ x] YES  [ ] NO         INTERVAL HPI/OVERNIGHT EVENTS:   No overnight events, BMP stable.   Afebrile, hemodynamically stable     Subjective:  No acute events overnight.  Patient is doing well today without new complaints.  Tolerated HD yesterday and today without concerns or new sx.  Denies HA, CP, significant SOB, abdominal pain, nausea, vomiting, fever, chills or diarrhea.     ROS negative otherwise.    ICU Vital Signs Last 24 Hrs  T(C): 37.1 (19 Oct 2023 13:40), Max: 37.1 (18 Oct 2023 22:19)  T(F): 98.7 (19 Oct 2023 13:40), Max: 98.7 (18 Oct 2023 22:19)  HR: 96 (19 Oct 2023 13:06) (88 - 98)  BP: 93/59 (19 Oct 2023 13:06) (90/54 - 114/84)  BP(mean): 70 (19 Oct 2023 13:06) (70 - 95)  ABP: --  ABP(mean): --  RR: 18 (19 Oct 2023 13:06) (18 - 20)  SpO2: 93% (19 Oct 2023 13:06) (92% - 99%)    O2 Parameters below as of 19 Oct 2023 13:06  Patient On (Oxygen Delivery Method): nasal cannula  O2 Flow (L/min): 2        I&O's Summary    18 Oct 2023 07:01  -  19 Oct 2023 07:00  --------------------------------------------------------  IN: 118 mL / OUT: 1500 mL / NET: -1382 mL    19 Oct 2023 07:01  -  19 Oct 2023 15:57  --------------------------------------------------------  IN: 280 mL / OUT: 0 mL / NET: 280 mL      EKG/Telemetry Events: none    MEDICATIONS  (STANDING):  aspirin enteric coated 81 milliGRAM(s) Oral daily  atorvastatin 40 milliGRAM(s) Oral at bedtime  folic acid 1 milliGRAM(s) Oral daily  heparin   Injectable 5000 Unit(s) SubCutaneous every 8 hours  influenza  Vaccine (HIGH DOSE) 0.7 milliLiter(s) IntraMuscular once  midodrine 10 milliGRAM(s) Oral <User Schedule>  Nephro-chichi 1 Tablet(s) Oral every 24 hours  pantoprazole    Tablet 40 milliGRAM(s) Oral before breakfast    MEDICATIONS  (PRN):  ondansetron Injectable 4 milliGRAM(s) IV Push every 12 hours PRN Nausea and/or Vomiting      PHYSICAL EXAM:  GENERAL:   HEAD:  Atraumatic, Normocephalic  EYES: EOMI, PERRLA, conjunctiva and sclera clear  NECK: Supple, Normal thyroid, no enlarged nodes  NERVOUS SYSTEM:  Alert & Awake.   CHEST/LUNG: B/L good air entry; No rales, rhonchi. Mild wheezing  HEART: S1S2 normal, no S3, Regular rate and rhythm; No murmurs  ABDOMEN: Soft, Nontender, distended; Bowel sounds present  EXTREMITIES:  2+ Peripheral Pulses, No clubbing, cyanosis. B/L LE pitting edema and dermatic venous statis   LYMPH: No lymphadenopathy noted  SKIN: No rashes or lesions    LABS:                        9.6    6.57  )-----------( 98       ( 19 Oct 2023 05:30 )             31.9     10-19    121<L>  |  87<L>  |  12  ----------------------------<  145<H>  4.1   |  20<L>  |  2.57<H>    Ca    8.8      19 Oct 2023 05:30  Phos  3.1     10-19  Mg     1.7     10-19    TPro  6.1  /  Alb  2.5<L>  /  TBili  1.8<H>  /  DBili  x   /  AST  31  /  ALT  21  /  AlkPhos  233<H>  10-19    LIVER FUNCTIONS - ( 19 Oct 2023 05:30 )  Alb: 2.5 g/dL / Pro: 6.1 g/dL / ALK PHOS: 233 U/L / ALT: 21 U/L / AST: 31 U/L / GGT: x             Urinalysis Basic - ( 19 Oct 2023 05:30 )    Color: x / Appearance: x / SG: x / pH: x  Gluc: 145 mg/dL / Ketone: x  / Bili: x / Urobili: x   Blood: x / Protein: x / Nitrite: x   Leuk Esterase: x / RBC: x / WBC x   Sq Epi: x / Non Sq Epi: x / Bacteria: x          RADIOLOGY & ADDITIONAL TESTS: reviewed

## 2023-10-19 NOTE — PROGRESS NOTE ADULT - ASSESSMENT
63M, DNR/DNI, poor historian, PMHx of HTN, HLD, CAD (MI, unsure when, unsure if had stent placed), CHF (EF 35-40%), AFib (s/p watchman), ESRD (M/T/Th/Sat), Chronic EtOH abuse w/ cirrhosis, GERD, initially admitted to medicine service w/ volume overload after missing HD; course c/b RRT during first inpatient HD session d/t hypotension and pt was found to have severe biventricular failure on TTE w/ clinical evidence of early low output state; was upgraded to CCU (10/6-10/11) for concern for Cardiogenic shock, s/p CVVHD x 4 days and Dobutamine; stepped down to Telemetry with plan for intermittent HD per Nephro but remained hypotensive while off dobutamine, and was upgraded back to CCU (10/12-10/13) per Nephro concerns for safe dialysis plan given hypotension. On 10/13 pt was able to tolerate full iHD session with reinitiation of Midodrine; therefore since 10/13 PM has been stepped back down to Tele for further HD and dispo planning for GENE placement with HD.          Telephone Information:   Mobile [phone]     [facility] for patient's son, Laure Boss, to call office to schedule follow up appt. For his mother ASAP. Office contact number given. 63M, DNR/DNI, poor historian, PMHx of HTN, HLD, CAD (MI, unsure when, unsure if had stent placed), CHF (EF 35-40%), AFib (s/p watchman), ESRD (M/T/Th/Sat), Chronic EtOH abuse w/ cirrhosis, GERD, initially admitted to medicine service w/ volume overload after missing HD; course c/b RRT during first inpatient HD session d/t hypotension and pt was found to have severe biventricular failure on TTE w/ clinical evidence of early low output state; was upgraded to CCU (10/6-10/11) for concern for Cardiogenic shock, s/p CVVHD x 4 days and Dobutamine; stepped down to Telemetry with plan for intermittent HD per Nephro but remained hypotensive while off dobutamine, and was upgraded back to CCU (10/12-10/13) per Nephro concerns for safe dialysis plan given hypotension. On 10/13 pt was able to tolerate full iHD session with reinitiation of Midodrine; therefore since 10/13 PM has been stepped back down to Tele for further HD and dispo planning for GENE placement with HD with continuing relapsing overload signs

## 2023-10-19 NOTE — PROGRESS NOTE ADULT - PROBLEM SELECTOR PLAN 2
Started on HD 6 months ago, 4x week (M/T/Th/Sat) at [facility], missed HD session on 10/3/23. R chest port utilized. At admission, Cr 3.93 (un-established baseline Cr), K 3.4, Phos 4.4. Metabolic acidosis (AGAP 17) likely due to hypochloremic emesis / ETOH leading to lactic acidosis. Dialysate changed to Phoxillum due to low phos.     - nephrology consulted, follow up recs  - 10mg Midodrine on HD days only prior to dialysis.   - c/w home Nephro-chichi, 1L fluid restriction Started on HD 6 months ago, 4x week (M/T/Th/Sat) at [facility] dialysis center, missed HD session on 10/3/23. R chest port utilized. At admission, Cr 3.93 (un-established baseline Cr), K 3.4, Phos 4.4. Metabolic acidosis (AGAP 17) likely due to hypochloremic emesis / ETOH leading to lactic acidosis. Dialysate changed to Phoxillum due to low phos.     - nephrology consulted, follow up recs  - 10mg Midodrine on HD days only prior to dialysis.   - c/w home Nephro-chichi, 1L fluid restriction  - hold off binders for now per nephro

## 2023-10-19 NOTE — PROGRESS NOTE ADULT - ASSESSMENT
63-year-old male with a PMHx of HFrEF (EF 30-40%), Afib (s/p watchman), ESRD (on HD MTThS) and alcohol use disorder who presented with SOB in setting of missed HD, initially admitted to medicine for HD but was transferred to CCU for management of cardiogenic shock, s/p dobutamine and CVVHD, now stepped down to cardiac telemetry.        #HFrEF       -further management as per cardiology        -not currently on GDMT due to soft blood pressure, defer initiation to primary team        -volume removal with HD as per cardiology and nephrology        -palliative care consulted, appreciate recommendations       #ESRD       #Hyponatremia      -further management as per nephrology      -continue with midodrine 10mg TID on HD days       -continue with 1L fluid restriction        #CAD      -continue with ASA 81mg daily and atorvastatin 40mg qhs          #Afib       -continue with ASA monotherapy, patient with Watchman device          #Macrocytic Anemia        -likely 2/2 ACD and ESRD       -iron studies suggestive of ACD, B12 and Folate within normal limits, T4 within normal limits       -continue with folic acid and EPO with HD as per nephrology           #Cirrhosis       #Thrombocytopenia and Bilirubinemia (stable)       -unclear if cirrhosis is 2/2 alcohol use or heart failure       -abdominal US with cirrhotic liver, moderate ascites, no biliary dilation, prior cholecystectomy and no concern for PVT       -no ascitic fluid analysis to help determine etiology, no current role for paracentesis but patient states he underwent a paracentesis on 3/2023 and was told his cirrhosis was due to his heart      -will need follow up with Hepatologist at [facility]     DVT PPx: SQH    Dispo: GENE with HD

## 2023-10-19 NOTE — PROGRESS NOTE ADULT - SUBJECTIVE AND OBJECTIVE BOX
Patient is a 63y Male seen and evaluated at bedside. No acute distress, laying in bed, states that he is very thirsty and drank more than he should have. BP stable. sNa at 121. Discussed with Cardio team that patient will need unit for AQ or CVVHD as patient grossly volume overloaded.       Meds:    aspirin enteric coated 81 daily  atorvastatin 40 at bedtime  folic acid 1 daily  heparin   Injectable 5000 every 8 hours  influenza  Vaccine (HIGH DOSE) 0.7 once  midodrine 10 <User Schedule>  Nephro-chichi 1 every 24 hours  ondansetron Injectable 4 every 12 hours PRN  pantoprazole    Tablet 40 before breakfast      T(C): , Max: 37.1 (10-18-23 @ 22:19)  T(F): , Max: 98.7 (10-18-23 @ 22:19)  HR: 96 (10-19-23 @ 13:06)  BP: 93/59 (10-19-23 @ 13:06)  BP(mean): 70 (10-19-23 @ 13:06)  RR: 18 (10-19-23 @ 13:06)  SpO2: 93% (10-19-23 @ 13:06)  Wt(kg): --    10-18 @ 07:01  -  10-19 @ 07:00  --------------------------------------------------------  IN: 118 mL / OUT: 1500 mL / NET: -1382 mL    10-19 @ 07:01  -  10-19 @ 15:04  --------------------------------------------------------  IN: 280 mL / OUT: 0 mL / NET: 280 mL          Review of Systems:  ROS negative except as per HPI      PHYSICAL EXAM:  GENERAL: NAD laying in bed   HEENT: NCAT, EOMI  CHEST/LUNG: Normal respiratory effort  HEART: Regular rate  ABDOMEN: Mildly distended  EXTREMITIES: 2+ pitting edema b/l   Neurology: Somnolent  SKIN: No rash or skin lesion on exposed skin   ACCESS: TDC c/d/i       LABS:                        9.6    6.57  )-----------( 98       ( 19 Oct 2023 05:30 )             31.9     10-19    121<L>  |  87<L>  |  12  ----------------------------<  145<H>  4.1   |  20<L>  |  2.57<H>    Ca    8.8      19 Oct 2023 05:30  Phos  3.1     10-19  Mg     1.7     10-19    TPro  6.1  /  Alb  2.5<L>  /  TBili  1.8<H>  /  DBili  x   /  AST  31  /  ALT  21  /  AlkPhos  233<H>  10-19        Urinalysis Basic - ( 19 Oct 2023 05:30 )    Color: x / Appearance: x / SG: x / pH: x  Gluc: 145 mg/dL / Ketone: x  / Bili: x / Urobili: x   Blood: x / Protein: x / Nitrite: x   Leuk Esterase: x / RBC: x / WBC x   Sq Epi: x / Non Sq Epi: x / Bacteria: x            RADIOLOGY & ADDITIONAL STUDIES:

## 2023-10-19 NOTE — PROGRESS NOTE ADULT - ATTENDING COMMENTS
seen and evaluated while on dialysis  continues to consume a lot of fluid every day- says he is very thirsty  would benefit from continuous dialysis modalities until we get his volume status and Na concentration to better levels    62 yo man with ESRD, CHF cirrhosis, admitted for acute on chronic HFpEF, cardiogenic shock, requiring CVVHD 10/6-10/11, then transitioned back to iHD on 10/12. had needed daily dialysis this week as Na level low 121-125 range and he is drinking a lot of fluid  Clinically appears to be mostly hepatic/renal axis of concern now not as much the cardiorenal aspect as it was last week.  will d/w MDs involved

## 2023-10-19 NOTE — PROGRESS NOTE ADULT - ATTENDING COMMENTS
63M, DNR/DNI, poor historian, PMHx of HTN, HLD, CAD (MI, unsure when, unsure if had stent placed), CHF (EF 35-40%), AFib (s/p watchman), ESRD (M/T/Th/Sat), Chronic EtOH abuse w/ cirrhosis, GERD, initially admitted to medicine service w/ volume overload after missing HD; course c/b RRT during first inpatient HD session d/t hypotension and pt was found to have severe biventricular failure on TTE w/ clinical evidence of early low output state; was upgraded to CCU (10/6-10/11) for concern for Cardiogenic shock, s/p CVVHD x 4 days and Dobutamine; stepped down to Telemetry with plan for intermittent HD per Nephro but remained hypotensive while off dobutamine, and was upgraded back to CCU (10/12-10/13) per Nephro concerns for safe dialysis plan given hypotension.     1. HFrEF 35-40%, acute on chronic systolic.  Acute on chronic decompensated congestive heart failure with low systolic function. Stage D AHA/ACC, low cardiac output failure  Severe RV dilation and hypokinesis, severe TR.  On max dose of midodrine and CVVH.  Likely mixed etiology (hx of MI and ETOH).  Continue aldactone.  Unable to tolerate BB/ARNI/SGTI due to ESRD and hypotension  Discussed  with CCU, if bed available, no issues transferring to CCU from CV standpoint. Otherwise, continue on 5 LAC -stable.    2. CAD  Continue aspirin, asymptomatic.    3. AF  S/p watchman, rate controlled.    4. CKD ESRD HD  CVVH with midodrine. Appreciate Dr. Sampson input: will reduce UF goal to 1.5 L and reduce Rx time to 3H  Would be able to optimize fluid removal if transferred back to CCU for CVVHD or aquapheresis  Pt amenable to continuing with dialysis treatments at this time  will discuss with palliative team re goals of care and plan for outpt RRT- unstable for that at this time    Discussed  with CCU, if bed available, no issues transferring to CCU from CV standpoint. Otherwise, continue on 5 LAC -stable.    5. Mild to moderate AS/ Moderate AI.  Clinical monitoring, not indication for surgical replacement.

## 2023-10-19 NOTE — CHART NOTE - NSCHARTNOTEFT_GEN_A_CORE
Admitting Diagnosis:   Patient is a 63y old  Male who presents with a chief complaint of missed HD for ESRD (19 Oct 2023 13:17)      PAST MEDICAL & SURGICAL HISTORY:  HTN (hypertension)      HLD (hyperlipidemia)      Chronic kidney disease      H/O bilateral hip replacements          Current Nutrition Order: PO Renal Restriction diet [no concentrated K or Phos, Low sodium, no protein restriction] + 1L fluid restriction    PO Intake: Good (%) [ X  ]  Fair (50-75%) [   ] Poor (<25%) [   ]    GI Issues: +abd distention, last BM noted today    Pain: no pain/discomfort noted    Skin Integrity: R elbow abrasion, erythema to sacral area. Jon score 18    Labs:   10-19    121<L>  |  87<L>  |  12  ----------------------------<  145<H>  4.1   |  20<L>  |  2.57<H>    Ca    8.8      19 Oct 2023 05:30  Phos  3.1     10-19  Mg     1.7     10-19    TPro  6.1  /  Alb  2.5<L>  /  TBili  1.8<H>  /  DBili  x   /  AST  31  /  ALT  21  /  AlkPhos  233<H>  10-19    CAPILLARY BLOOD GLUCOSE          Medications:  MEDICATIONS  (STANDING):  aspirin enteric coated 81 milliGRAM(s) Oral daily  atorvastatin 40 milliGRAM(s) Oral at bedtime  folic acid 1 milliGRAM(s) Oral daily  heparin   Injectable 5000 Unit(s) SubCutaneous every 8 hours  influenza  Vaccine (HIGH DOSE) 0.7 milliLiter(s) IntraMuscular once  midodrine 10 milliGRAM(s) Oral <User Schedule>  Nephro-chichi 1 Tablet(s) Oral every 24 hours  pantoprazole    Tablet 40 milliGRAM(s) Oral before breakfast    MEDICATIONS  (PRN):  ondansetron Injectable 4 milliGRAM(s) IV Push every 12 hours PRN Nausea and/or Vomiting    Weight: 109.7kg [17 Oct 2023]  Daily 106.2kg [16 Oct 2023]  Daily 107.5kg [15 Oct 2023]    Daily 110.5 (19 Oct 2023 05:30)  Daily 104.7 (13 Oct 2023 11:00)  Daily 100.4kg [12 Oct 2023]  Daily 114kg [11 Oct 2023]  Daily 114.7kg [10 Oct 2023]  Daily 116.1kg [09 Oct 2023]    Weight Change: large weight loss noted x <1 week, likely secondary to fluid shifts/HD. Continue with daily weights for trending / ensuring adequacy of nutrition provision    Estimated energy needs:   Ideal body weight (83.4kg) used for calculations as pt >120% of IBW. Needs estimated for age and adjusted for current clinical status, increased needs for clinical status, dialysis    Calories: 1147-3440 kcals based on 25-35 kcals/kg  Protein: 100.1-141.8g based on 1.2-1.7g protein/kg  *Fluid needs per team    Subjective:   63Y M w/hx of ESRD  admitted for SOB and generalized weakness and fatigue missed HD, now h/c c/b cardiogenic shock, on inotropes and CVVHD (10/6) tolerating well, net neg 2.8L continue CVVHD     Chart reviewed. Pt seen at bedside on 5 LA, sleeping & unarousable to verbal stimuli. Pt remains on renal restricted diet + 1L fluid restriction. Appetite & intake remain good per nursing. Noted last HD yesterday. Ongoing low sodium, fluid restriction remains appropriate. Labs notable for Na 121 <L> today, chloride 87 <L>, bicarb 20 <L>, Creat 2.57 <H>, BUN WNL. total bilirubin 1.8 <H>. Meds reviewed- on nephrovite, zofran inj. prn, folic acid, protonix. RDN will continue to monitor, reassess, and intervene as appropriate.     Previous Nutrition Diagnosis:  Altered Nutrition Related Lab Values related to ESRD as evidenced by low sodium, elevated BUN & Creat    Active [ X  ]  Resolved [   ]    If resolved, new PES:     Goal:  Pt will meet at least 75% of protein & energy needs via most appropriate route for nutrition     Recommendations:  1. c/w current diet order  - monitor intake & tolerance  - honor food preferences as able  2. Fluids per team  - monitor sodium, adjust fluid restriction as indicated  - consider IV 3% NS if serum Na <120  3. c/w Nephrovite  4. Monitor chemistry, GI function, skin integrity  5. Pain & bowel regimen per team    Education: deferred    Risk Level: High [   ] Moderate [ X  ] Low [   ]

## 2023-10-20 LAB
ALBUMIN SERPL ELPH-MCNC: 2.9 G/DL — LOW (ref 3.3–5)
ALBUMIN SERPL ELPH-MCNC: 2.9 G/DL — LOW (ref 3.3–5)
ALP SERPL-CCNC: 223 U/L — HIGH (ref 40–120)
ALP SERPL-CCNC: 223 U/L — HIGH (ref 40–120)
ALT FLD-CCNC: 20 U/L — SIGNIFICANT CHANGE UP (ref 10–45)
ALT FLD-CCNC: 20 U/L — SIGNIFICANT CHANGE UP (ref 10–45)
ANION GAP SERPL CALC-SCNC: 10 MMOL/L — SIGNIFICANT CHANGE UP (ref 5–17)
ANION GAP SERPL CALC-SCNC: 10 MMOL/L — SIGNIFICANT CHANGE UP (ref 5–17)
AST SERPL-CCNC: 25 U/L — SIGNIFICANT CHANGE UP (ref 10–40)
AST SERPL-CCNC: 25 U/L — SIGNIFICANT CHANGE UP (ref 10–40)
BILIRUB SERPL-MCNC: 1.7 MG/DL — HIGH (ref 0.2–1.2)
BILIRUB SERPL-MCNC: 1.7 MG/DL — HIGH (ref 0.2–1.2)
BUN SERPL-MCNC: 10 MG/DL — SIGNIFICANT CHANGE UP (ref 7–23)
BUN SERPL-MCNC: 10 MG/DL — SIGNIFICANT CHANGE UP (ref 7–23)
CALCIUM SERPL-MCNC: 9 MG/DL — SIGNIFICANT CHANGE UP (ref 8.4–10.5)
CALCIUM SERPL-MCNC: 9 MG/DL — SIGNIFICANT CHANGE UP (ref 8.4–10.5)
CHLORIDE SERPL-SCNC: 91 MMOL/L — LOW (ref 96–108)
CHLORIDE SERPL-SCNC: 91 MMOL/L — LOW (ref 96–108)
CO2 SERPL-SCNC: 26 MMOL/L — SIGNIFICANT CHANGE UP (ref 22–31)
CO2 SERPL-SCNC: 26 MMOL/L — SIGNIFICANT CHANGE UP (ref 22–31)
CREAT SERPL-MCNC: 2.25 MG/DL — HIGH (ref 0.5–1.3)
CREAT SERPL-MCNC: 2.25 MG/DL — HIGH (ref 0.5–1.3)
EGFR: 32 ML/MIN/1.73M2 — LOW
EGFR: 32 ML/MIN/1.73M2 — LOW
GLUCOSE SERPL-MCNC: 133 MG/DL — HIGH (ref 70–99)
GLUCOSE SERPL-MCNC: 133 MG/DL — HIGH (ref 70–99)
HCT VFR BLD CALC: 30.7 % — LOW (ref 39–50)
HCT VFR BLD CALC: 30.7 % — LOW (ref 39–50)
HGB BLD-MCNC: 9.5 G/DL — LOW (ref 13–17)
HGB BLD-MCNC: 9.5 G/DL — LOW (ref 13–17)
MAGNESIUM SERPL-MCNC: 1.8 MG/DL — SIGNIFICANT CHANGE UP (ref 1.6–2.6)
MAGNESIUM SERPL-MCNC: 1.8 MG/DL — SIGNIFICANT CHANGE UP (ref 1.6–2.6)
MCHC RBC-ENTMCNC: 30.9 GM/DL — LOW (ref 32–36)
MCHC RBC-ENTMCNC: 30.9 GM/DL — LOW (ref 32–36)
MCHC RBC-ENTMCNC: 32.8 PG — SIGNIFICANT CHANGE UP (ref 27–34)
MCHC RBC-ENTMCNC: 32.8 PG — SIGNIFICANT CHANGE UP (ref 27–34)
MCV RBC AUTO: 105.9 FL — HIGH (ref 80–100)
MCV RBC AUTO: 105.9 FL — HIGH (ref 80–100)
NRBC # BLD: 0 /100 WBCS — SIGNIFICANT CHANGE UP (ref 0–0)
NRBC # BLD: 0 /100 WBCS — SIGNIFICANT CHANGE UP (ref 0–0)
PHOSPHATE SERPL-MCNC: 2.9 MG/DL — SIGNIFICANT CHANGE UP (ref 2.5–4.5)
PHOSPHATE SERPL-MCNC: 2.9 MG/DL — SIGNIFICANT CHANGE UP (ref 2.5–4.5)
PLATELET # BLD AUTO: 102 K/UL — LOW (ref 150–400)
PLATELET # BLD AUTO: 102 K/UL — LOW (ref 150–400)
POTASSIUM SERPL-MCNC: 3.8 MMOL/L — SIGNIFICANT CHANGE UP (ref 3.5–5.3)
POTASSIUM SERPL-MCNC: 3.8 MMOL/L — SIGNIFICANT CHANGE UP (ref 3.5–5.3)
POTASSIUM SERPL-SCNC: 3.8 MMOL/L — SIGNIFICANT CHANGE UP (ref 3.5–5.3)
POTASSIUM SERPL-SCNC: 3.8 MMOL/L — SIGNIFICANT CHANGE UP (ref 3.5–5.3)
PROT SERPL-MCNC: 6.2 G/DL — SIGNIFICANT CHANGE UP (ref 6–8.3)
PROT SERPL-MCNC: 6.2 G/DL — SIGNIFICANT CHANGE UP (ref 6–8.3)
RBC # BLD: 2.9 M/UL — LOW (ref 4.2–5.8)
RBC # BLD: 2.9 M/UL — LOW (ref 4.2–5.8)
RBC # FLD: 17.5 % — HIGH (ref 10.3–14.5)
RBC # FLD: 17.5 % — HIGH (ref 10.3–14.5)
SODIUM SERPL-SCNC: 127 MMOL/L — LOW (ref 135–145)
SODIUM SERPL-SCNC: 127 MMOL/L — LOW (ref 135–145)
WBC # BLD: 6.1 K/UL — SIGNIFICANT CHANGE UP (ref 3.8–10.5)
WBC # BLD: 6.1 K/UL — SIGNIFICANT CHANGE UP (ref 3.8–10.5)
WBC # FLD AUTO: 6.1 K/UL — SIGNIFICANT CHANGE UP (ref 3.8–10.5)
WBC # FLD AUTO: 6.1 K/UL — SIGNIFICANT CHANGE UP (ref 3.8–10.5)

## 2023-10-20 PROCEDURE — 99233 SBSQ HOSP IP/OBS HIGH 50: CPT

## 2023-10-20 RX ORDER — POLYETHYLENE GLYCOL 3350 17 G/17G
17 POWDER, FOR SOLUTION ORAL DAILY
Refills: 0 | Status: DISCONTINUED | OUTPATIENT
Start: 2023-10-20 | End: 2023-10-27

## 2023-10-20 RX ORDER — SENNA PLUS 8.6 MG/1
2 TABLET ORAL AT BEDTIME
Refills: 0 | Status: DISCONTINUED | OUTPATIENT
Start: 2023-10-20 | End: 2023-10-27

## 2023-10-20 RX ADMIN — HEPARIN SODIUM 5000 UNIT(S): 5000 INJECTION INTRAVENOUS; SUBCUTANEOUS at 14:32

## 2023-10-20 RX ADMIN — HEPARIN SODIUM 5000 UNIT(S): 5000 INJECTION INTRAVENOUS; SUBCUTANEOUS at 21:30

## 2023-10-20 RX ADMIN — Medication 81 MILLIGRAM(S): at 11:51

## 2023-10-20 RX ADMIN — ATORVASTATIN CALCIUM 40 MILLIGRAM(S): 80 TABLET, FILM COATED ORAL at 21:29

## 2023-10-20 RX ADMIN — Medication 1 MILLIGRAM(S): at 11:52

## 2023-10-20 RX ADMIN — Medication 1 TABLET(S): at 14:31

## 2023-10-20 RX ADMIN — Medication 75 MILLIGRAM(S): at 11:52

## 2023-10-20 RX ADMIN — PANTOPRAZOLE SODIUM 40 MILLIGRAM(S): 20 TABLET, DELAYED RELEASE ORAL at 06:24

## 2023-10-20 RX ADMIN — SENNA PLUS 2 TABLET(S): 8.6 TABLET ORAL at 21:29

## 2023-10-20 RX ADMIN — POLYETHYLENE GLYCOL 3350 17 GRAM(S): 17 POWDER, FOR SOLUTION ORAL at 11:51

## 2023-10-20 RX ADMIN — HEPARIN SODIUM 5000 UNIT(S): 5000 INJECTION INTRAVENOUS; SUBCUTANEOUS at 06:24

## 2023-10-20 NOTE — PROGRESS NOTE ADULT - PROBLEM SELECTOR PLAN 3
Chronic EtOH abuse with cirrhosis on Abd US; discussed with patient the impact his current EtOH use has on his condition, and patient strongly endorsed he has no plans to decrease EtOH use. Out of withdrawal window, not on CIWA protocol moving further.  -low concern for hepatorenal syndrome as remains hemodynamically stable. No known gallbladder pathology  -Abd US (10/4) Cirrhotic morphology. Pulsatile flow in the main and left portal veins. Moderate ascites. Increased right renal cortical echogenicity compatible w/ medical renal dz.  -s/p IV thiamine; c/w folate 1mg daily    #Hepatic Cirrhosis as above  - outpt Hepatology f/u post-dc

## 2023-10-20 NOTE — PROGRESS NOTE ADULT - ATTENDING COMMENTS
64 yo man with ESRD, CHF cirrhosis, admitted for acute on chronic HFpEF, cardiogenic shock, requiring CVVHD 10/6-10/11, then transitioned back to iHD on 10/12. had needed daily dialysis this week as Na level low 121-125 range. now at 127  defer HD today, next HD for tomorrow, 10/21  still prefer he be transferred back to CCU or  where we can proceed with continuos RRT modalities- cvvhd or AQ  agree with planned paracentesis   Clinically appears to be mostly hepatic/renal axis of concern now not as much the cardiorenal aspect as it was last week.

## 2023-10-20 NOTE — PROGRESS NOTE ADULT - ASSESSMENT
63M, DNR/DNI, poor historian, PMHx of HTN, HLD, CAD (MI, unsure when, unsure if had stent placed), CHF (EF 35-40%), AFib (s/p watchman), ESRD (M/T/Th/Sat), Chronic EtOH abuse w/ cirrhosis, GERD, initially admitted to medicine service w/ volume overload after missing HD; course c/b RRT during first inpatient HD session d/t hypotension and pt was found to have severe biventricular failure on TTE w/ clinical evidence of early low output state; was upgraded to CCU (10/6-10/11) for concern for Cardiogenic shock, s/p CVVHD x 4 days and Dobutamine; stepped down to Telemetry with plan for intermittent HD per Nephro but remained hypotensive while off dobutamine, and was upgraded back to CCU (10/12-10/13) per Nephro concerns for safe dialysis plan given hypotension. On 10/13 pt was able to tolerate full iHD session with reinitiation of Midodrine; therefore since 10/13 PM has been stepped back down to Tele for further HD and dispo planning for GENE placement with HD with continuing relapsing overload signs

## 2023-10-20 NOTE — PROGRESS NOTE ADULT - SUBJECTIVE AND OBJECTIVE BOX
Nephrology progress note    Seen at bedside. Sitting on edge of bed. No distress. No complaints offered. Tolerated HD 3L off yesterday as ordered.    Allergies:  penicillins (Unknown)    Hospital Medications:   MEDICATIONS  (STANDING):  aspirin enteric coated 81 milliGRAM(s) Oral daily  atorvastatin 40 milliGRAM(s) Oral at bedtime  folic acid 1 milliGRAM(s) Oral daily  heparin   Injectable 5000 Unit(s) SubCutaneous every 8 hours  influenza  Vaccine (HIGH DOSE) 0.7 milliLiter(s) IntraMuscular once  midodrine 10 milliGRAM(s) Oral <User Schedule>  Nephro-chichi 1 Tablet(s) Oral every 24 hours  pantoprazole    Tablet 40 milliGRAM(s) Oral before breakfast  polyethylene glycol 3350 17 Gram(s) Oral daily  pregabalin 75 milliGRAM(s) Oral daily  senna 2 Tablet(s) Oral at bedtime    REVIEW OF SYSTEMS:  All other review of systems is negative unless indicated above.    VITALS:  T(F): 98.5 (10-20-23 @ 04:00), Max: 98.7 (10-19-23 @ 13:40)  HR: 93 (10-20-23 @ 00:03)  BP: 110/76 (10-20-23 @ 00:03)  RR: 20 (10-20-23 @ 00:03)  SpO2: 99% (10-20-23 @ 00:03)  Wt(kg): --    10-18 @ 07:01  -  10-19 @ 07:00  --------------------------------------------------------  IN: 118 mL / OUT: 1500 mL / NET: -1382 mL    10-19 @ 07:01  -  10-20 @ 07:00  --------------------------------------------------------  IN: 480 mL / OUT: 3000 mL / NET: -2520 mL        PHYSICAL EXAM:  GENERAL: NAD, sitting in bed   HEENT: NCAT, EOMI  CHEST/LUNG: Normal respiratory effort  HEART: Regular rate  ABDOMEN: Mildly distended  EXTREMITIES: trace pedal edema  Neurology: Somnolent  SKIN: No rash or skin lesion on exposed skin   ACCESS: TDC c/d/i    LABS:  10-20    127<L>  |  91<L>  |  10  ----------------------------<  133<H>  3.8   |  26  |  2.25<H>    Ca    9.0      20 Oct 2023 06:50  Phos  2.9     10-20  Mg     1.8     10-20    TPro  6.2  /  Alb  2.9<L>  /  TBili  1.7<H>  /  DBili      /  AST  25  /  ALT  20  /  AlkPhos  223<H>  10-20                          9.5    6.10  )-----------( 102      ( 20 Oct 2023 06:50 )             30.7       Urine Studies:  Creatinine Trend: 2.25<--, 2.57<--, 2.35<--, 2.59<--, 1.64<--, 2.76<--  Urinalysis Basic - ( 20 Oct 2023 06:50 )    Color:  / Appearance:  / SG:  / pH:   Gluc: 133 mg/dL / Ketone:   / Bili:  / Urobili:    Blood:  / Protein:  / Nitrite:    Leuk Esterase:  / RBC:  / WBC    Sq Epi:  / Non Sq Epi:  / Bacteria:         RADIOLOGY & ADDITIONAL STUDIES:  Reviewed

## 2023-10-20 NOTE — PROGRESS NOTE ADULT - ASSESSMENT
63Y M w/hx of ESRD admitted for acute on chronic HFpEF  h/c c/b cardiogenic shock, requiring CVVHD (10/6-10/11) and inotrope, total 11.2L , transitioned back to iHD (10/12), HD today for clearance and volume removal,     #ESRD with hyponatremia  Initiated CVVHD (10/6), now on iHD although limited by hypotension  Last HD 10/19 tolerated eL with midodrine with no complications  CVVHD 10/6-10/11 11.2L UF     Plan:  Last HD yesterday, plan for next HD 10/21. Would recommend transfer to ICU for CVVHD if able for continuous UF in setting of hypotension.  Midodrine 5mg prior to HD   Daily BMP   Daily Weights   Renal diet  fluid restriction 1L as will also help with Hyponatremia  Electrolytes to correct with HD     Access:  R TDC functional     Hypotension:  UF with HD as above   Recommend midodrine 5mg prior to HD only     Anemia:  Hgb 9.5  EPO with HD - received 10/16    MBD;  Calcium: 9.0  Phos: 2.9  Continue to hold phos binders

## 2023-10-20 NOTE — PROGRESS NOTE ADULT - PROBLEM SELECTOR PLAN 2
Started on HD 6 months ago, 4x week (M/T/Th/Sat) at [facility], missed HD session on 10/3/23. R chest port utilized. At admission, Cr 3.93 (un-established baseline Cr), K 3.4, Phos 4.4. Metabolic acidosis (AGAP 17) likely due to hypochloremic emesis / ETOH leading to lactic acidosis. Dialysate changed to Phoxillum due to low phos.     - nephrology consulted, follow up recs  - 10mg Midodrine on HD days only prior to dialysis.   - c/w home Nephro-chichi, 1L fluid restriction  - hold off binders for now per nephro

## 2023-10-20 NOTE — PROGRESS NOTE ADULT - ATTENDING COMMENTS
63M, DNR/DNI, poor historian, PMHx of HTN, HLD, CAD (MI, unsure when, unsure if had stent placed), CHF (EF 35-40%), AFib (s/p watchman), ESRD (M/T/Th/Sat), Chronic EtOH abuse w/ cirrhosis, GERD, initially admitted to medicine service w/ volume overload after missing HD; course c/b RRT during first inpatient HD session d/t hypotension and pt was found to have severe biventricular failure on TTE w/ clinical evidence of early low output state; was upgraded to CCU (10/6-10/11) for concern for Cardiogenic shock, s/p CVVHD x 4 days and Dobutamine; stepped down to Telemetry with plan for intermittent HD per Nephro but remained hypotensive while off dobutamine, and was upgraded back to CCU (10/12-10/13) per Nephro concerns for safe dialysis plan given hypotension.     1. HFrEF 35-40%, acute on chronic systolic.  Acute on chronic decompensated congestive heart failure with low systolic function. Stage D AHA/ACC, low cardiac output failure  Severe RV dilation and hypokinesis, severe TR.  On max dose of midodrine and CVVH.  Likely mixed etiology (hx of MI and ETOH).  Continue aldactone.  Unable to tolerate BB/ARNI/SGTI due to ESRD and hypotension  Discussed  with CCU, if bed available, no issues transferring to CCU from CV standpoint. Otherwise, continue on 5 LAC -stable.    2. CAD  Continue aspirin, asymptomatic.    3. AF  S/p watchman, rate controlled.    4. CKD ESRD HD  Per Dr. Smapson still prefer he be transferred back to CCU or  where we can proceed with continuos RRT modalities- cvvhd or AQ  agree with planned paracentesis     Discussed  with CCU, if bed available, no issues transferring to CCU from CV standpoint. Otherwise, continue on 5 LAC -stable.    5. Mild to moderate AS/ Moderate AI.  Clinical monitoring, not indication for surgical replacement.

## 2023-10-20 NOTE — PROGRESS NOTE ADULT - SUBJECTIVE AND OBJECTIVE BOX
Subjective:  No acute events overnight.  Patient is doing well today without new complaints.  Denies HA, CP, SOB, abdominal pain, nausea, vomiting, fever, chills or diarrhea.     Objective:   Vital Signs:  T(C): 36.5 (20 Oct 2023 10:00), Max: 37.1 (19 Oct 2023 13:40)  T(F): 97.7 (20 Oct 2023 10:00), Max: 98.7 (19 Oct 2023 13:40)  HR: 85 (20 Oct 2023 11:50) (85 - 103)  BP: 138/94 (20 Oct 2023 11:50) (93/59 - 138/94)  BP(mean): 112 (20 Oct 2023 11:50) (70 - 112)  RR: 20 (20 Oct 2023 11:50) (18 - 20)  SpO2: 95% (20 Oct 2023 11:50) (92% - 99%)    Parameters below as of 20 Oct 2023 11:50  Patient On (Oxygen Delivery Method): nasal cannula    Physical Exam:   -Gen: NAD, resting in bed  -HEENT: EOMI, PERRL, no scleral icterus, no JVD, no bruits, right chest TDC  -CV: normal S1 and S2  -Lungs: CTABL,, normal respiratory effort on RA  -Ab: obese, distended, umbilical hernia is non-tender, normal BS  -Ext: +1 LE edema  -Neuro: A&O x 3, no focal deficits     Labs:                        9.5    6.10  )-----------( 102      ( 20 Oct 2023 06:50 )             30.7       10-20    127<L>  |  91<L>  |  10  ----------------------------<  133<H>  3.8   |  26  |  2.25<H>    Ca    9.0      20 Oct 2023 06:50  Phos  2.9     10-20  Mg     1.8     10-20    TPro  6.2  /  Alb  2.9<L>  /  TBili  1.7<H>  /  DBili  x   /  AST  25  /  ALT  20  /  AlkPhos  223<H>  10-20    Medications:  MEDICATIONS  (STANDING):  aspirin enteric coated 81 milliGRAM(s) Oral daily  atorvastatin 40 milliGRAM(s) Oral at bedtime  folic acid 1 milliGRAM(s) Oral daily  heparin   Injectable 5000 Unit(s) SubCutaneous every 8 hours  influenza  Vaccine (HIGH DOSE) 0.7 milliLiter(s) IntraMuscular once  midodrine 10 milliGRAM(s) Oral <User Schedule>  Nephro-chichi 1 Tablet(s) Oral every 24 hours  pantoprazole    Tablet 40 milliGRAM(s) Oral before breakfast  polyethylene glycol 3350 17 Gram(s) Oral daily  pregabalin 75 milliGRAM(s) Oral daily  senna 2 Tablet(s) Oral at bedtime    MEDICATIONS  (PRN):  ondansetron Injectable 4 milliGRAM(s) IV Push every 12 hours PRN Nausea and/or Vomiting

## 2023-10-20 NOTE — PROGRESS NOTE ADULT - ASSESSMENT
63-year-old male with a PMHx of HFrEF (EF 30-40%), Afib (s/p watchman), ESRD (on HD [facility]) and alcohol use disorder who presented with SOB in setting of missed HD, initially admitted to medicine for HD but was transferred to CCU for management of cardiogenic shock, s/p dobutamine and CVVHD, now stepped down to cardiac telemetry.        #HFrEF       -further management as per cardiology        -not currently on GDMT due to soft blood pressure, defer initiation to primary team        -volume removal with HD as per cardiology and nephrology        -palliative care consulted, appreciate recommendations       #ESRD       #Hyponatremia      -further management as per nephrology, may need to go back to ICU for CVVH or AQ  -continue with midodrine 10mg TID on HD days       -continue with 1L fluid restriction     #Cirrhosis       #Thrombocytopenia and Bilirubinemia (stable)       -unclear if cirrhosis is 2/2 alcohol use or heart failure and no ascitic fluid analysis to help determine etiology  -abdominal US with cirrhotic liver, moderate ascites, no biliary dilation, prior cholecystectomy and no concern for PVT       -given worsening abdominal distension, may benefit from diagnostic and therapeutic paracentesis, would recommend to be completed on non-HD days given limitations 2/2 hypotension and fluid shifts   -if patient to undergo paracentesis, please send peritoneal fluid for cell count with diff, gram stain/culture, cytology, peritoneal fluid protein, peritoneal fluid albumin, peritoneal fluid glucose and peritoneal fluid LDH)  -will need follow up with hepatologist at [facility]     #CAD      -continue with ASA 81mg daily and atorvastatin 40mg qhs          #Afib       -continue with ASA monotherapy, patient with Watchman device          #Macrocytic Anemia        -likely 2/2 ACD and ESRD       -iron studies suggestive of ACD, B12 and Folate within normal limits, T4 within normal limits       -continue with folic acid and EPO with HD as per nephrology           DVT PPx: SQH    Dispo: GENE with HD 63-year-old male with a PMHx of HFrEF (EF 30-40%), Afib (s/p watchman), ESRD (on HD [facility]) and alcohol use disorder who presented with SOB in setting of missed HD, initially admitted to medicine for HD but was transferred to CCU for management of cardiogenic shock, s/p dobutamine and CVVHD, now stepped down to cardiac telemetry.        #HFrEF       -further management as per cardiology        -not currently on GDMT due to soft blood pressure, defer initiation to primary team        -volume removal with HD as per cardiology and nephrology        -palliative care consulted, appreciate recommendations       #ESRD       #Hyponatremia      -further management as per nephrology, may need to go back to ICU for CVVH or AQ  -continue with midodrine 10mg TID on HD days       -continue with 1L fluid restriction     #Cirrhosis       #Thrombocytopenia and Bilirubinemia (stable)       -unclear if cirrhosis is 2/2 alcohol use or heart failure and no ascitic fluid analysis to help determine etiology  -abdominal US with cirrhotic liver, moderate ascites, no biliary dilation, prior cholecystectomy and no concern for PVT       -given worsening abdominal distension, would benefit from diagnostic and therapeutic paracentesis, recommend to be completed on non-HD days given limitations 2/2 hypotension and fluid shifts   -if patient to undergo paracentesis, please send peritoneal fluid for cell count with diff, gram stain/culture, cytology, peritoneal fluid protein, peritoneal fluid albumin, peritoneal fluid glucose and peritoneal fluid LDH  -if greater than 4L of peritoneal fluid is removed, would need 6-8g of albumin (25%) per liter     #CAD      -continue with ASA 81mg daily and atorvastatin 40mg qhs          #Afib       -continue with ASA monotherapy, patient with Watchman device          #Macrocytic Anemia        -likely 2/2 ACD and ESRD       -iron studies suggestive of ACD, B12 and Folate within normal limits, T4 within normal limits       -continue with folic acid and EPO with HD as per nephrology           DVT PPx: SQH    Dispo: GENE with HD 63-year-old male with a PMHx of HFrEF (EF 30-40%), Afib (s/p watchman), ESRD (on HD [facility]) and alcohol use disorder who presented with SOB in setting of missed HD, initially admitted to medicine for HD but was transferred to CCU for management of cardiogenic shock, s/p dobutamine and CVVHD, now stepped down to cardiac telemetry.        #HFrEF       -further management as per cardiology        -not currently on GDMT due to soft blood pressure, defer initiation to primary team        -volume removal with HD as per cardiology and nephrology        -palliative care consulted, appreciate recommendations       #ESRD       #Hyponatremia      -further management as per nephrology, may need to go back to ICU for CVVH or AQ  -continue with midodrine 10mg TID on HD days       -continue with 1L fluid restriction     #Cirrhosis       #Thrombocytopenia and Bilirubinemia (stable)       -unclear if cirrhosis is 2/2 alcohol use or heart failure and no ascitic fluid analysis to help determine etiology  -abdominal US with cirrhotic liver, moderate ascites, no biliary dilation, prior cholecystectomy and no concern for PVT       -given worsening abdominal distension, would benefit from diagnostic and therapeutic paracentesis, recommend to be completed on non-HD days given limitations 2/2 hypotension and fluid shifts   -if patient to undergo paracentesis, please send peritoneal fluid for cell count with diff, gram stain/culture, cytology, peritoneal fluid protein, peritoneal fluid albumin, peritoneal fluid glucose and peritoneal fluid LDH  -if greater than 4L of peritoneal fluid is removed, would need 6-8g of albumin (25%) per liter but would recommend only modest fluid removal of 3-4L given tenuous hemodynamics   -consider hepatology consult given complex multi-system organ failure and tenuous hemodynamics with HD and presumably following paracentesis     #CAD      -continue with ASA 81mg daily and atorvastatin 40mg qhs          #Afib       -continue with ASA monotherapy, patient with Watchman device          #Macrocytic Anemia        -likely 2/2 ACD and ESRD       -iron studies suggestive of ACD, B12 and Folate within normal limits, T4 within normal limits       -continue with folic acid and EPO with HD as per nephrology           DVT PPx: SQH    Dispo: GENE with HD 63-year-old male with a PMHx of HFrEF (EF 30-40%), Afib (s/p watchman), [facility] and alcohol use disorder who presented with SOB in setting of missed HD, initially admitted to medicine for HD but was transferred to CCU for management of cardiogenic shock, s/p dobutamine and CVVHD, now stepped down to cardiac telemetry.        #HFrEF       -further management as per cardiology        -not currently on GDMT due to soft blood pressure, defer initiation to primary team        -volume removal with HD as per cardiology and nephrology        -palliative care consulted, appreciate recommendations       #ESRD       #Hyponatremia      -further management as per nephrology, may need to go back to ICU for CVVH or AQ  -continue with midodrine 10mg TID on HD days       -continue with 1L fluid restriction     #Cirrhosis       #Thrombocytopenia and Bilirubinemia (stable)       -unclear if cirrhosis is 2/2 alcohol use or heart failure and no ascitic fluid analysis to help determine etiology  -abdominal US with cirrhotic liver, moderate ascites, no biliary dilation, prior cholecystectomy and no concern for PVT       -given worsening abdominal distension, would benefit from diagnostic and therapeutic paracentesis, recommend to be completed on non-HD days given limitations 2/2 hypotension and fluid shifts   -if patient to undergo paracentesis, please send peritoneal fluid for cell count with diff, gram stain/culture, cytology, peritoneal fluid protein, peritoneal fluid albumin, peritoneal fluid glucose and peritoneal fluid LDH  -if greater than 4L of peritoneal fluid is removed, would need 6-8g of albumin (25%) per liter but would recommend only modest fluid removal given tenuous hemodynamics   -consider hepatology consult given complex multi-system organ failure and tenuous hemodynamics with HD and presumably following paracentesis     #CAD      -continue with ASA 81mg daily and atorvastatin 40mg qhs          #Afib       -continue with ASA monotherapy, patient with Watchman device          #Macrocytic Anemia        -likely 2/2 ACD and ESRD       -iron studies suggestive of ACD, B12 and Folate within normal limits, T4 within normal limits       -continue with folic acid and EPO with HD as per nephrology           DVT PPx: SQH    Dispo: GENE with HD

## 2023-10-20 NOTE — PROGRESS NOTE ADULT - SUBJECTIVE AND OBJECTIVE BOX
CC: Patient is a 63y old  Male who presents with a chief complaint of missed HD for ESRD (19 Oct 2023 17:03)      INTERVAL EVENTS: ADEN    SUBJECTIVE / INTERVAL HPI: Patient seen and examined at bedside.     ROS: negative unless otherwise stated above.    VITAL SIGNS:  Vitals Interpretation review:  Vital Signs Last 24 Hrs  T(C): 36.9 (20 Oct 2023 04:00), Max: 37.1 (19 Oct 2023 13:40)  T(F): 98.5 (20 Oct 2023 04:00), Max: 98.7 (19 Oct 2023 13:40)  HR: 93 (20 Oct 2023 00:03) (90 - 103)  BP: 110/76 (20 Oct 2023 00:03) (93/59 - 137/73)  BP(mean): 89 (20 Oct 2023 00:03) (70 - 102)  RR: 20 (20 Oct 2023 00:03) (18 - 20)  SpO2: 99% (20 Oct 2023 00:03) (92% - 99%)    Parameters below as of 20 Oct 2023 00:03  Patient On (Oxygen Delivery Method): nasal cannula  O2 Flow (L/min): 2        10-19-23 @ 07:01  -  10-20-23 @ 07:00  --------------------------------------------------------  IN: 480 mL / OUT: 3000 mL / NET: -2520 mL        PHYSICAL EXAM:    General: NAD  HEENT: MMM  Neck: supple  Cardiovascular: +S1/S2; RRR  Respiratory: CTA B/L; no W/R/R  Gastrointestinal: soft, NT/ND  Extremities: WWP; no edema, clubbing or cyanosis  Vascular: 2+ radial, DP/PT pulses B/L  Neurological: AAOx3; no focal deficits    MEDICATIONS:  MEDICATIONS  (STANDING):  aspirin enteric coated 81 milliGRAM(s) Oral daily  atorvastatin 40 milliGRAM(s) Oral at bedtime  folic acid 1 milliGRAM(s) Oral daily  heparin   Injectable 5000 Unit(s) SubCutaneous every 8 hours  influenza  Vaccine (HIGH DOSE) 0.7 milliLiter(s) IntraMuscular once  midodrine 10 milliGRAM(s) Oral <User Schedule>  Nephro-chichi 1 Tablet(s) Oral every 24 hours  pantoprazole    Tablet 40 milliGRAM(s) Oral before breakfast  pregabalin 75 milliGRAM(s) Oral daily    MEDICATIONS  (PRN):  ondansetron Injectable 4 milliGRAM(s) IV Push every 12 hours PRN Nausea and/or Vomiting      ALLERGIES:  Allergies    penicillins (Unknown)    Intolerances        LABS:  Labs Interpretation:                         9.5    6.10  )-----------( 102      ( 20 Oct 2023 06:50 )             30.7     10-20    127<L>  |  91<L>  |  10  ----------------------------<  133<H>  3.8   |  26  |  2.25<H>    Ca    9.0      20 Oct 2023 06:50  Phos  2.9     10-20  Mg     1.8     10-20    TPro  6.2  /  Alb  2.9<L>  /  TBili  1.7<H>  /  DBili  x   /  AST  25  /  ALT  20  /  AlkPhos  223<H>  10-20      Urinalysis Basic - ( 20 Oct 2023 06:50 )    Color: x / Appearance: x / SG: x / pH: x  Gluc: 133 mg/dL / Ketone: x  / Bili: x / Urobili: x   Blood: x / Protein: x / Nitrite: x   Leuk Esterase: x / RBC: x / WBC x   Sq Epi: x / Non Sq Epi: x / Bacteria: x      CAPILLARY BLOOD GLUCOSE                RADIOLOGY & ADDITIONAL TESTS: Reviewed.  CXR  EKG    Imaging Interpretation Review:    FMHx  Surgical Hx   CC: Patient is a 63y old  Male who presents with a chief complaint of missed HD for ESRD (19 Oct 2023 17:03)      INTERVAL EVENTS: ADEN    SUBJECTIVE / INTERVAL HPI: Patient seen and examined at bedside.   No HD today. Abd slightly more distended, no pain/rigidity, had BM in mornig.    Patient feels slightly better today in terms of breathing and alertness, without new complaints.  Tolerated HD yesterday and today without concerns or new sx.  Denies HA, CP, significant SOB, abdominal pain, nausea, vomiting, fever, chills or diarrhea.         ROS: negative unless otherwise stated above.    VITAL SIGNS:  Vitals Interpretation review:  Vital Signs Last 24 Hrs  T(C): 36.9 (20 Oct 2023 04:00), Max: 37.1 (19 Oct 2023 13:40)  T(F): 98.5 (20 Oct 2023 04:00), Max: 98.7 (19 Oct 2023 13:40)  HR: 93 (20 Oct 2023 00:03) (90 - 103)  BP: 110/76 (20 Oct 2023 00:03) (93/59 - 137/73)  BP(mean): 89 (20 Oct 2023 00:03) (70 - 102)  RR: 20 (20 Oct 2023 00:03) (18 - 20)  SpO2: 99% (20 Oct 2023 00:03) (92% - 99%)    Parameters below as of 20 Oct 2023 00:03  Patient On (Oxygen Delivery Method): nasal cannula  O2 Flow (L/min): 2        10-19-23 @ 07:01  -  10-20-23 @ 07:00  --------------------------------------------------------  IN: 480 mL / OUT: 3000 mL / NET: -2520 mL        PHYSICAL EXAM:  HEAD:  Atraumatic, Normocephalic  EYES: EOMI, PERRLA, conjunctiva and sclera clear  NECK: Supple, Normal thyroid, no enlarged nodes  NERVOUS SYSTEM:  Alert & Awake.   CHEST/LUNG: B/L good air entry; No rales, rhonchi. Mild wheezing  HEART: S1S2 normal, no S3, Regular rate and rhythm; No murmurs  ABDOMEN: Soft, mild tenderness on deep palpation, distended; Bowel sounds present  EXTREMITIES:  2+ Peripheral Pulses, No clubbing, cyanosis. B/L LE pitting edema and dermatic venous statis   LYMPH: No lymphadenopathy noted  SKIN: No rashes or lesions    MEDICATIONS:  MEDICATIONS  (STANDING):  aspirin enteric coated 81 milliGRAM(s) Oral daily  atorvastatin 40 milliGRAM(s) Oral at bedtime  folic acid 1 milliGRAM(s) Oral daily  heparin   Injectable 5000 Unit(s) SubCutaneous every 8 hours  influenza  Vaccine (HIGH DOSE) 0.7 milliLiter(s) IntraMuscular once  midodrine 10 milliGRAM(s) Oral <User Schedule>  Nephro-chichi 1 Tablet(s) Oral every 24 hours  pantoprazole    Tablet 40 milliGRAM(s) Oral before breakfast  pregabalin 75 milliGRAM(s) Oral daily    MEDICATIONS  (PRN):  ondansetron Injectable 4 milliGRAM(s) IV Push every 12 hours PRN Nausea and/or Vomiting      ALLERGIES:  Allergies    penicillins (Unknown)    Intolerances        LABS:  Labs Interpretation:                         9.5    6.10  )-----------( 102      ( 20 Oct 2023 06:50 )             30.7     10-20    127<L>  |  91<L>  |  10  ----------------------------<  133<H>  3.8   |  26  |  2.25<H>    Ca    9.0      20 Oct 2023 06:50  Phos  2.9     10-20  Mg     1.8     10-20    TPro  6.2  /  Alb  2.9<L>  /  TBili  1.7<H>  /  DBili  x   /  AST  25  /  ALT  20  /  AlkPhos  223<H>  10-20      Urinalysis Basic - ( 20 Oct 2023 06:50 )    Color: x / Appearance: x / SG: x / pH: x  Gluc: 133 mg/dL / Ketone: x  / Bili: x / Urobili: x   Blood: x / Protein: x / Nitrite: x   Leuk Esterase: x / RBC: x / WBC x   Sq Epi: x / Non Sq Epi: x / Bacteria: x        RADIOLOGY & ADDITIONAL TESTS: Reviewed.

## 2023-10-20 NOTE — PROGRESS NOTE ADULT - PROBLEM SELECTOR PLAN 1
NICM (likely EtOH induced) admitted with volume overload i/s/o missed HD, course c/b cardiogenic shock, now s/p CVVHD and pressors with net negative 10.6L. Remains peripherally overloaded with distended abdomen, 2+ LE edema, and orthopnea, however unable to tolerate further volume removal.  TTEs inpt 10/5 & 10/6/23: LVEF 35-40% with global hypokinesis. Mildly dilated RV w/ mod reduced RVSF. Elevated RA pressure. SHALOM. Mod AR. mild to mod AS. Mod to severe TR. PASP 39 mmHg. Small-to-moderate pericardial effusion without echocardiographic evidence of cardiac tamponade physiology. Ascites present.  Repeat TTE 10/14 largely unchanged, w/ LVEF remaining 35-40% and global hypokinesis despite volume optimization  GDMT not initiated at this time as patients BPs previous consistently hypotensive 70s-90s, no room to initiate.    - Further volume optimization with HD as per nephro, Pt requires Midodrine 10mg qAM on HD days prior to HD due to persistent hypotension  -continuous pulse ox and telemetry; Core measures, strict i/o, daily standing weight, fluid restrict NICM (likely EtOH induced) admitted with volume overload i/s/o missed HD, course c/b cardiogenic shock, now s/p CVVHD and pressors with net negative 10.6L. Remains peripherally overloaded with distended abdomen, 2+ LE edema, and orthopnea, however unable to tolerate further volume removal.  TTEs inpt 10/5 & 10/6/23: LVEF 35-40% with global hypokinesis. Mildly dilated RV w/ mod reduced RVSF. Elevated RA pressure. SHALOM. Mod AR. mild to mod AS. Mod to severe TR. PASP 39 mmHg. Small-to-moderate pericardial effusion without echocardiographic evidence of cardiac tamponade physiology. Ascites present.  Repeat TTE 10/14 largely unchanged, w/ LVEF remaining 35-40% and global hypokinesis despite volume optimization  GDMT not initiated at this time as patients BPs previous consistently hypotensive 70s-90s, no room to initiate.    - Further volume optimization with HD as per nephro, Pt requires Midodrine 10mg qAM on HD days prior to HD due to persistent hypotension  -continuous pulse ox and telemetry; Core measures, strict i/o, daily standing weight, fluid restrict  -Given Lasix 10 IVp, 40mg IVp, and changed oral from qd to bid on 10/20 given overloaded via lethargy iso inc wob without saturation issues and lung exam and CXR indicating effusion

## 2023-10-21 LAB
ALBUMIN SERPL ELPH-MCNC: 2.6 G/DL — LOW (ref 3.3–5)
ALBUMIN SERPL ELPH-MCNC: 2.6 G/DL — LOW (ref 3.3–5)
ALP SERPL-CCNC: 237 U/L — HIGH (ref 40–120)
ALP SERPL-CCNC: 237 U/L — HIGH (ref 40–120)
ALT FLD-CCNC: 22 U/L — SIGNIFICANT CHANGE UP (ref 10–45)
ALT FLD-CCNC: 22 U/L — SIGNIFICANT CHANGE UP (ref 10–45)
ANION GAP SERPL CALC-SCNC: 12 MMOL/L — SIGNIFICANT CHANGE UP (ref 5–17)
ANION GAP SERPL CALC-SCNC: 12 MMOL/L — SIGNIFICANT CHANGE UP (ref 5–17)
AST SERPL-CCNC: 26 U/L — SIGNIFICANT CHANGE UP (ref 10–40)
AST SERPL-CCNC: 26 U/L — SIGNIFICANT CHANGE UP (ref 10–40)
BILIRUB SERPL-MCNC: 1.9 MG/DL — HIGH (ref 0.2–1.2)
BILIRUB SERPL-MCNC: 1.9 MG/DL — HIGH (ref 0.2–1.2)
BUN SERPL-MCNC: 13 MG/DL — SIGNIFICANT CHANGE UP (ref 7–23)
BUN SERPL-MCNC: 13 MG/DL — SIGNIFICANT CHANGE UP (ref 7–23)
CALCIUM SERPL-MCNC: 9.1 MG/DL — SIGNIFICANT CHANGE UP (ref 8.4–10.5)
CALCIUM SERPL-MCNC: 9.1 MG/DL — SIGNIFICANT CHANGE UP (ref 8.4–10.5)
CHLORIDE SERPL-SCNC: 86 MMOL/L — LOW (ref 96–108)
CHLORIDE SERPL-SCNC: 86 MMOL/L — LOW (ref 96–108)
CO2 SERPL-SCNC: 25 MMOL/L — SIGNIFICANT CHANGE UP (ref 22–31)
CO2 SERPL-SCNC: 25 MMOL/L — SIGNIFICANT CHANGE UP (ref 22–31)
CREAT SERPL-MCNC: 3.02 MG/DL — HIGH (ref 0.5–1.3)
CREAT SERPL-MCNC: 3.02 MG/DL — HIGH (ref 0.5–1.3)
EGFR: 22 ML/MIN/1.73M2 — LOW
EGFR: 22 ML/MIN/1.73M2 — LOW
GLUCOSE SERPL-MCNC: 117 MG/DL — HIGH (ref 70–99)
GLUCOSE SERPL-MCNC: 117 MG/DL — HIGH (ref 70–99)
HCT VFR BLD CALC: 32.2 % — LOW (ref 39–50)
HCT VFR BLD CALC: 32.2 % — LOW (ref 39–50)
HGB BLD-MCNC: 9.7 G/DL — LOW (ref 13–17)
HGB BLD-MCNC: 9.7 G/DL — LOW (ref 13–17)
MAGNESIUM SERPL-MCNC: 1.7 MG/DL — SIGNIFICANT CHANGE UP (ref 1.6–2.6)
MAGNESIUM SERPL-MCNC: 1.7 MG/DL — SIGNIFICANT CHANGE UP (ref 1.6–2.6)
MCHC RBC-ENTMCNC: 30.1 GM/DL — LOW (ref 32–36)
MCHC RBC-ENTMCNC: 30.1 GM/DL — LOW (ref 32–36)
MCHC RBC-ENTMCNC: 31.9 PG — SIGNIFICANT CHANGE UP (ref 27–34)
MCHC RBC-ENTMCNC: 31.9 PG — SIGNIFICANT CHANGE UP (ref 27–34)
MCV RBC AUTO: 105.9 FL — HIGH (ref 80–100)
MCV RBC AUTO: 105.9 FL — HIGH (ref 80–100)
NRBC # BLD: 0 /100 WBCS — SIGNIFICANT CHANGE UP (ref 0–0)
NRBC # BLD: 0 /100 WBCS — SIGNIFICANT CHANGE UP (ref 0–0)
PHOSPHATE SERPL-MCNC: 3.4 MG/DL — SIGNIFICANT CHANGE UP (ref 2.5–4.5)
PHOSPHATE SERPL-MCNC: 3.4 MG/DL — SIGNIFICANT CHANGE UP (ref 2.5–4.5)
PLATELET # BLD AUTO: 109 K/UL — LOW (ref 150–400)
PLATELET # BLD AUTO: 109 K/UL — LOW (ref 150–400)
POTASSIUM SERPL-MCNC: 3.9 MMOL/L — SIGNIFICANT CHANGE UP (ref 3.5–5.3)
POTASSIUM SERPL-MCNC: 3.9 MMOL/L — SIGNIFICANT CHANGE UP (ref 3.5–5.3)
POTASSIUM SERPL-SCNC: 3.9 MMOL/L — SIGNIFICANT CHANGE UP (ref 3.5–5.3)
POTASSIUM SERPL-SCNC: 3.9 MMOL/L — SIGNIFICANT CHANGE UP (ref 3.5–5.3)
PROT SERPL-MCNC: 6.5 G/DL — SIGNIFICANT CHANGE UP (ref 6–8.3)
PROT SERPL-MCNC: 6.5 G/DL — SIGNIFICANT CHANGE UP (ref 6–8.3)
RBC # BLD: 3.04 M/UL — LOW (ref 4.2–5.8)
RBC # BLD: 3.04 M/UL — LOW (ref 4.2–5.8)
RBC # FLD: 17.4 % — HIGH (ref 10.3–14.5)
RBC # FLD: 17.4 % — HIGH (ref 10.3–14.5)
SODIUM SERPL-SCNC: 123 MMOL/L — LOW (ref 135–145)
SODIUM SERPL-SCNC: 123 MMOL/L — LOW (ref 135–145)
WBC # BLD: 5.74 K/UL — SIGNIFICANT CHANGE UP (ref 3.8–10.5)
WBC # BLD: 5.74 K/UL — SIGNIFICANT CHANGE UP (ref 3.8–10.5)
WBC # FLD AUTO: 5.74 K/UL — SIGNIFICANT CHANGE UP (ref 3.8–10.5)
WBC # FLD AUTO: 5.74 K/UL — SIGNIFICANT CHANGE UP (ref 3.8–10.5)

## 2023-10-21 PROCEDURE — 99233 SBSQ HOSP IP/OBS HIGH 50: CPT

## 2023-10-21 RX ORDER — METOPROLOL TARTRATE 50 MG
25 TABLET ORAL DAILY
Refills: 0 | Status: DISCONTINUED | OUTPATIENT
Start: 2023-10-21 | End: 2023-10-22

## 2023-10-21 RX ORDER — LORATADINE 10 MG/1
10 TABLET ORAL ONCE
Refills: 0 | Status: COMPLETED | OUTPATIENT
Start: 2023-10-21 | End: 2023-10-21

## 2023-10-21 RX ADMIN — Medication 1 MILLIGRAM(S): at 11:59

## 2023-10-21 RX ADMIN — Medication 75 MILLIGRAM(S): at 11:59

## 2023-10-21 RX ADMIN — HEPARIN SODIUM 5000 UNIT(S): 5000 INJECTION INTRAVENOUS; SUBCUTANEOUS at 20:38

## 2023-10-21 RX ADMIN — PANTOPRAZOLE SODIUM 40 MILLIGRAM(S): 20 TABLET, DELAYED RELEASE ORAL at 05:50

## 2023-10-21 RX ADMIN — MIDODRINE HYDROCHLORIDE 10 MILLIGRAM(S): 2.5 TABLET ORAL at 11:59

## 2023-10-21 RX ADMIN — Medication 81 MILLIGRAM(S): at 11:59

## 2023-10-21 RX ADMIN — LORATADINE 10 MILLIGRAM(S): 10 TABLET ORAL at 19:29

## 2023-10-21 RX ADMIN — HEPARIN SODIUM 5000 UNIT(S): 5000 INJECTION INTRAVENOUS; SUBCUTANEOUS at 05:50

## 2023-10-21 RX ADMIN — SENNA PLUS 2 TABLET(S): 8.6 TABLET ORAL at 20:38

## 2023-10-21 RX ADMIN — Medication 1 TABLET(S): at 11:59

## 2023-10-21 RX ADMIN — POLYETHYLENE GLYCOL 3350 17 GRAM(S): 17 POWDER, FOR SOLUTION ORAL at 17:02

## 2023-10-21 RX ADMIN — ATORVASTATIN CALCIUM 40 MILLIGRAM(S): 80 TABLET, FILM COATED ORAL at 20:38

## 2023-10-21 RX ADMIN — Medication 25 MILLIGRAM(S): at 17:12

## 2023-10-21 NOTE — PROGRESS NOTE ADULT - ASSESSMENT
63Y M w/hx of ESRD admitted for acute on chronic HFpEF  h/c c/b cardiogenic shock.      HD today with the following parameters:   Hemodialysis Treatment.:     Schedule: Once, Modality: Hemodialysis, Access: Internal Jugular Central Venous Catheter    Dialyzer: Optiflux [id number], Time: 240 Min    Blood Flow: 400 mL/Min , Dialysate Flow: 500 mL/Min, Dialysate Temp: 36.5, Tubinmm (Adult)    Target Fluid Removal: 3 Liters    Dialysate Electrolytes (mEq/L): Potassium 3, Calcium 2.5, Sodium 138, Bicarbonate 35 (10-21-23 @ 09:46) [Completed]     #ESRD with hyponatremia    Plan:  HD today 10/21.   Midodrine 5mg prior to HD   Daily BMP   Daily Weights   Renal diet    Access:  R TDC functional     Hypotension:  UF with HD as above   Recommend midodrine 5mg prior to HD only     Anemia:  Hgb 9.5  EPO with HD - received 10/16    MBD;  Calcium: 9.0  Phos: 2.9  Continue to hold phos binders

## 2023-10-21 NOTE — PROGRESS NOTE ADULT - SUBJECTIVE AND OBJECTIVE BOX
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 Started on dobutamine given concern of cardiogenic shock. Patient received CVVHD, now net negative approx 11L this hospitalization. Started on heparin gtt full AC while on CVVHD. Now off CVVHD, dobutamine drip, and heparin drip. Patient with watchman device for >6 months, discontinued Plavix and transitioned now to aspirin only. Patient with significant improvement in mentation, AAOx3, warm and well-perfused extremities. Planned for transition back to intermittent HD plan for first session 10/12. Stable for stepdown to telemetry on 10/11/23 late afternoon. Interval SBP 80smmHg, also c/b acute HypoNa to 122:  as discussed with Nephrology, pt too hypotensive to safely dialyze on floor; transferred back to CCU / [facility] mini ICU for continuous HD with close monitoring. Underwent HD 10/13 (target fluid removal 0L) and 10/14 (target fluid removal 1L) Received midodrine 5mg with dialysis sessions, was stepped down to Tele with PT rec GENE. At Tele, was noted to be fluid overloaded requiring more frequent HD sessions, with maxed out midodrine dosing, but despite most recent HD 10/19, is having continuous relapsing overload signs given increasingly distended abdomen, oliguria requiring paracentesis and increasing HD frequency and aggressive intervention per nephro at ICU level.     o/n:       INTERVAL HPI/OVERNIGHT EVENTS:   No overnight events   Afebrile, hemodynamically stable     Subjective:  No acute events overnight.  Patient is doing well today - only complains of itchiness that intermittently occurs with HD.  Denies HA, CP, SOB, abdominal pain, nausea, vomiting, fever, chills or diarrhea.     ICU Vital Signs Last 24 Hrs  T(C): 36.6 (21 Oct 2023 14:23), Max: 37.1 (20 Oct 2023 18:40)  T(F): 97.8 (21 Oct 2023 14:23), Max: 98.7 (20 Oct 2023 18:40)  HR: 103 (21 Oct 2023 14:00) (89 - 103)  BP: 110/74 (21 Oct 2023 14:00) (91/69 - 116/71)  BP(mean): 88 (21 Oct 2023 14:00) (76 - 95)  ABP: --  ABP(mean): --  RR: 20 (21 Oct 2023 14:00) (16 - 20)  SpO2: 95% (21 Oct 2023 14:00) (93% - 97%)    O2 Parameters below as of 21 Oct 2023 14:00  Patient On (Oxygen Delivery Method): nasal cannula  O2 Flow (L/min): 2        I&O's Summary    20 Oct 2023 07:01  -  21 Oct 2023 07:00  --------------------------------------------------------  IN: 960 mL / OUT: 300 mL / NET: 660 mL    21 Oct 2023 07:01  -  21 Oct 2023 14:59  --------------------------------------------------------  IN: 386 mL / OUT: 325 mL / NET: 61 mL          Daily     Daily Weight in k.1 (21 Oct 2023 11:45)  Weight on admission: 102.1kg    Telemetry Events: none     MEDICATIONS  (STANDING):  aspirin enteric coated 81 milliGRAM(s) Oral daily  atorvastatin 40 milliGRAM(s) Oral at bedtime  folic acid 1 milliGRAM(s) Oral daily  heparin   Injectable 5000 Unit(s) SubCutaneous every 8 hours  influenza  Vaccine (HIGH DOSE) 0.7 milliLiter(s) IntraMuscular once  midodrine 10 milliGRAM(s) Oral <User Schedule>  Nephro-chichi 1 Tablet(s) Oral every 24 hours  pantoprazole    Tablet 40 milliGRAM(s) Oral before breakfast  polyethylene glycol 3350 17 Gram(s) Oral daily  pregabalin 75 milliGRAM(s) Oral daily  senna 2 Tablet(s) Oral at bedtime    MEDICATIONS  (PRN):  ondansetron Injectable 4 milliGRAM(s) IV Push every 12 hours PRN Nausea and/or Vomiting    HOME MEDICATIONS:   aspirin 81 mg oral tablet: 1 tab(s) orally once a day  atorvastatin 40 mg oral tablet: 1 tab(s) orally once a day  Bumex 2 mg oral tablet: 1 tab(s) orally Monday, Wednesday, and Friday  cyproheptadine 4 mg oral tablet: 1 tab(s) orally 3 times a day  folic acid 1 mg oral tablet: 1 tab(s) orally once a day  midodrine 5 mg oral tablet: 1 tab(s) orally 3 times a day  omeprazole 40 mg oral delayed release capsule: 1 cap(s) orally once a day  Plavix 75 mg oral tablet: 1 tab(s) orally once a day  pregabalin 75 mg oral capsule: 1 cap(s) orally once a day  sevelamer hydrochloride 800 mg oral tablet: 1 tab(s) orally 3 times a day      PHYSICAL EXAM:  GENERAL:   HEAD:  Atraumatic, Normocephalic  EYES: EOMI, PERRLA, conjunctiva and sclera clear  NECK: Supple, Normal thyroid, no enlarged nodes  NERVOUS SYSTEM:  Alert & Awake.   CHEST/LUNG: B/L good air entry; No rales, rhonchi. Mild wheezing  HEART: S1S2 normal, no S3, Regular rate and rhythm; No murmurs  ABDOMEN: Soft, mild tenderness on deep palpation, obese & increasingly distended; Bowel sounds present. Umbilical hernia present and not tender to palpation.   EXTREMITIES:  2+ Peripheral Pulses, No clubbing, cyanosis. B/L LE pitting edema and dermatic venous statis   LYMPH: No lymphadenopathy noted  SKIN: Diffuse excoriations over b/l UE from itching.       LABS:                        9.7    5.74  )-----------( 109      ( 21 Oct 2023 05:30 )             32.2     10-    123<L>  |  86<L>  |  13  ----------------------------<  117<H>  3.9   |  25  |  3.02<H>    Ca    9.1      21 Oct 2023 05:30  Phos  3.4     10-  Mg     1.7     10-    TPro  6.5  /  Alb  2.6<L>  /  TBili  1.9<H>  /  DBili  x   /  AST  26  /  ALT  22  /  AlkPhos  237<H>  10-    LIVER FUNCTIONS - ( 21 Oct 2023 05:30 )  Alb: 2.6 g/dL / Pro: 6.5 g/dL / ALK PHOS: 237 U/L / ALT: 22 U/L / AST: 26 U/L / GGT: x             Urinalysis Basic - ( 21 Oct 2023 05:30 )    Color: x / Appearance: x / SG: x / pH: x  Gluc: 117 mg/dL / Ketone: x  / Bili: x / Urobili: x   Blood: x / Protein: x / Nitrite: x   Leuk Esterase: x / RBC: x / WBC x   Sq Epi: x / Non Sq Epi: x / Bacteria: x    RADIOLOGY & ADDITIONAL TESTS:       HOSPITAL COURSE:  Patient is a 63 year old M PMH HFrEF (last known EF 30-40% a few weeks ago), AFib (s/p watchman), 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 Started on dobutamine given concern of cardiogenic shock. Patient received CVVHD, now net negative approx 11L this hospitalization. Started on heparin gtt full AC while on CVVHD. Now off CVVHD, dobutamine drip, and heparin drip. Patient with watchman device for >6 months, discontinued Plavix and transitioned now to aspirin only. Patient with significant improvement in mentation, AAOx3, warm and well-perfused extremities. Planned for transition back to intermittent HD plan for first session 10/12. Stable for stepdown to telemetry on 10/11/23 late afternoon. Interval SBP 80smmHg, also c/b acute HypoNa to 122:  as discussed with Nephrology, pt too hypotensive to safely dialyze on floor; transferred back to CCU / [facility] ICU for continuous HD with close monitoring. Underwent HD 10/13 (target fluid removal 0L) and 10/14 (target fluid removal 1L) Received midodrine 5mg with dialysis sessions, was stepped down to Tele with PT rec GENE. At Tele, was noted to be fluid overloaded requiring more frequent HD sessions, with maxed out midodrine dosing, but despite most recent HD 10/19, is having continuous relapsing overload signs given increasingly distended abdomen, oliguria requiring paracentesis and increasing HD frequency and aggressive intervention per nephro at ICU level.       INTERVAL HPI/OVERNIGHT EVENTS:   No overnight events   Afebrile, hemodynamically stable     Subjective:  No acute events overnight.  Patient reports no change in intermittent shortness of breath, but decreased cough. He indicated that his abdomen feels bigger today - only complains of itchiness that intermittently occurs with HD. Reports that he passed bowel today.  Denies HA, CP, SOB, abdominal pain, nausea, vomiting, fever, chills or diarrhea.     ICU Vital Signs Last 24 Hrs  T(C): 36.6 (21 Oct 2023 14:23), Max: 37.1 (20 Oct 2023 18:40)  T(F): 97.8 (21 Oct 2023 14:23), Max: 98.7 (20 Oct 2023 18:40)  HR: 103 (21 Oct 2023 14:00) (89 - 103)  BP: 110/74 (21 Oct 2023 14:00) (91/69 - 116/71)  BP(mean): 88 (21 Oct 2023 14:00) (76 - 95)  ABP: --  ABP(mean): --  RR: 20 (21 Oct 2023 14:00) (16 - 20)  SpO2: 95% (21 Oct 2023 14:00) (93% - 97%)    O2 Parameters below as of 21 Oct 2023 14:00  Patient On (Oxygen Delivery Method): nasal cannula  O2 Flow (L/min): 2        I&O's Summary    20 Oct 2023 07:01  -  21 Oct 2023 07:00  --------------------------------------------------------  IN: 960 mL / OUT: 300 mL / NET: 660 mL    21 Oct 2023 07:01  -  21 Oct 2023 14:59  --------------------------------------------------------  IN: 386 mL / OUT: 325 mL / NET: 61 mL          Daily     Daily Weight in k.1 (21 Oct 2023 11:45)  Weight on admission: 102.1kg    Telemetry Events: none     MEDICATIONS  (STANDING):  aspirin enteric coated 81 milliGRAM(s) Oral daily  atorvastatin 40 milliGRAM(s) Oral at bedtime  folic acid 1 milliGRAM(s) Oral daily  heparin   Injectable 5000 Unit(s) SubCutaneous every 8 hours  influenza  Vaccine (HIGH DOSE) 0.7 milliLiter(s) IntraMuscular once  midodrine 10 milliGRAM(s) Oral <User Schedule>  Nephro-chichi 1 Tablet(s) Oral every 24 hours  pantoprazole    Tablet 40 milliGRAM(s) Oral before breakfast  polyethylene glycol 3350 17 Gram(s) Oral daily  pregabalin 75 milliGRAM(s) Oral daily  senna 2 Tablet(s) Oral at bedtime    MEDICATIONS  (PRN):  ondansetron Injectable 4 milliGRAM(s) IV Push every 12 hours PRN Nausea and/or Vomiting    HOME MEDICATIONS:   aspirin 81 mg oral tablet: 1 tab(s) orally once a day  atorvastatin 40 mg oral tablet: 1 tab(s) orally once a day  Bumex 2 mg oral tablet: 1 tab(s) orally Monday, Wednesday, and Friday  cyproheptadine 4 mg oral tablet: 1 tab(s) orally 3 times a day  folic acid 1 mg oral tablet: 1 tab(s) orally once a day  midodrine 5 mg oral tablet: 1 tab(s) orally 3 times a day  omeprazole 40 mg oral delayed release capsule: 1 cap(s) orally once a day  Plavix 75 mg oral tablet: 1 tab(s) orally once a day  pregabalin 75 mg oral capsule: 1 cap(s) orally once a day  sevelamer hydrochloride 800 mg oral tablet: 1 tab(s) orally 3 times a day      PHYSICAL EXAM:  GENERAL:   HEAD:  Atraumatic, Normocephalic  EYES: EOMI, PERRLA, conjunctiva and sclera clear  NECK: Supple, Normal thyroid, no enlarged nodes  NERVOUS SYSTEM:  Alert & Awake.   CHEST/LUNG: B/L good air entry; No rales, rhonchi. Mild wheezing  HEART: S1S2 normal, no S3, Regular rate and rhythm; No murmurs  ABDOMEN: Soft, mild tenderness on deep palpation, obese & increasingly distended; Bowel sounds present. Umbilical hernia present and not tender to palpation.   EXTREMITIES:  2+ Peripheral Pulses, No clubbing, cyanosis. B/L LE pitting edema and dermatic venous statis   LYMPH: No lymphadenopathy noted  SKIN: Diffuse excoriations over b/l UE from itching.       LABS:                        9.7    5.74  )-----------( 109      ( 21 Oct 2023 05:30 )             32.2     10-    123<L>  |  86<L>  |  13  ----------------------------<  117<H>  3.9   |  25  |  3.02<H>    Ca    9.1      21 Oct 2023 05:30  Phos  3.4     10-  Mg     1.7     10-    TPro  6.5  /  Alb  2.6<L>  /  TBili  1.9<H>  /  DBili  x   /  AST  26  /  ALT  22  /  AlkPhos  237<H>  10-    LIVER FUNCTIONS - ( 21 Oct 2023 05:30 )  Alb: 2.6 g/dL / Pro: 6.5 g/dL / ALK PHOS: 237 U/L / ALT: 22 U/L / AST: 26 U/L / GGT: x             Urinalysis Basic - ( 21 Oct 2023 05:30 )    Color: x / Appearance: x / SG: x / pH: x  Gluc: 117 mg/dL / Ketone: x  / Bili: x / Urobili: x   Blood: x / Protein: x / Nitrite: x   Leuk Esterase: x / RBC: x / WBC x   Sq Epi: x / Non Sq Epi: x / Bacteria: x    RADIOLOGY & ADDITIONAL TESTS: Reviewed.   TELE: ADEN

## 2023-10-21 NOTE — PROGRESS NOTE ADULT - PROBLEM SELECTOR PLAN 1
NICM (likely EtOH induced) admitted with volume overload i/s/o missed HD, course c/b cardiogenic shock, now s/p CVVHD and pressors with net negative 10.6L. Remains peripherally overloaded with distended abdomen, 2+ LE edema, and orthopnea, however unable to tolerate further volume removal.  TTEs inpt 10/5 & 10/6/23: LVEF 35-40% with global hypokinesis. Mildly dilated RV w/ mod reduced RVSF. Elevated RA pressure. SHALOM. Mod AR. mild to mod AS. Mod to severe TR. PASP 39 mmHg. Small-to-moderate pericardial effusion without echocardiographic evidence of cardiac tamponade physiology. Ascites present.  Repeat TTE 10/14 largely unchanged, w/ LVEF remaining 35-40% and global hypokinesis despite volume optimization  GDMT held initiated at initially as patients BPs previous consistently hypotensive 70s-90s, no room to initiate - started Toprol 25mg PO qd 10/21.     - Further volume optimization with HD as per nephro, Pt requires Midodrine 10mg qAM on HD days prior to HD due to persistent hypotension  -continuous pulse ox and telemetry; Core measures, strict i/o, daily standing weight, fluid restrict  -Given Lasix 10 IVp, 40mg IVp, and changed oral from qd to bid on 10/20 given overloaded via lethargy iso inc wob without saturation issues and lung exam and CXR indicating effusion  -Start Toprol 25mg qd PO (GDMT)

## 2023-10-21 NOTE — PROGRESS NOTE ADULT - ASSESSMENT
63M, DNR/DNI, poor historian, PMHx of HTN, HLD, CAD (MI, unsure when, unsure if had stent placed), CHF (EF 35-40%), AFib (s/p watchman), ESRD (M/T/Th/Sat), Chronic EtOH abuse w/ cirrhosis, GERD, initially admitted to medicine service w/ volume overload after missing HD; course c/b RRT during first inpatient HD session d/t hypotension, found to have severe biventricular failure on TTE w/clinical evidence of early low output state; upgraded to CCU (10/6-10/11) for concern for Cardiogenic shock, s/p CVVHD x 4 days and Dobutamine; stepped down to Telemetry with plan for intermittent HD per Nephro but remained hypotensive while off dobutamine, and was upgraded back to CCU (10/12-10/13) per Nephro d/t concerns for safe dialysis given hypotension trends. On 10/13 pt was able to tolerate full iHD session with reinitiation of Midodrine prior to sessions; & stepped back down to Tele for further HD and dispo for GENE placement with HD with continuing relapsing overload signs and increasingly distended abdomen, pending paracentesis and increasing HD demand.        63M, DNR/DNI, poor historian, PMHx of HTN, HLD, CAD (MI, unsure when, unsure if had stent placed), CHF (EF 35-40%), AFib (s/p watchman), ESRD (M/T/Th/Sat), Chronic EtOH abuse w/ cirrhosis, GERD, initially admitted to medicine service w/ volume overload after missing HD; course c/b RRT during first inpatient HD session d/t hypotension, found to have severe biventricular failure on TTE w/clinical evidence of early low output state; upgraded to CCU (10/6-10/11) for concern for Cardiogenic shock, s/p CVVHD x 4 days and Dobutamine; stepped down to Telemetry with plan for intermittent HD per Nephro but remained hypotensive while off dobutamine, and was upgraded back to CCU (10/12-10/13) per Nephro d/t concerns for safe dialysis given hypotension trends. On 10/13 pt was able to tolerate full iHD session with reinitiation of Midodrine prior to sessions; & stepped back down to Tele for further HD and dispo for GENE placement with HD. However, continuing relapsing overload signs and increasingly distended abdomen, pending paracentesis and increasing HD demand, requiring ICU level management.

## 2023-10-21 NOTE — PROGRESS NOTE ADULT - ASSESSMENT
63-year-old male with a PMHx of HFrEF (EF 30-40%), Afib (s/p watchman), [facility] ([facility]) and alcohol use disorder who presented with SOB in setting of missed HD, initially admitted to medicine for HD but was transferred to CCU for management of cardiogenic shock, s/p dobutamine and CVVHD, now stepped down to cardiac telemetry.         #HFrEF        -further management as per cardiology         -not currently on GDMT due to soft blood pressure, defer initiation to primary team         -volume removal with HD as per cardiology and nephrology         -palliative care consulted, appreciate recommendations        #ESRD        #Hyponatremia       -further management as per nephrology, may need to go back to ICU for CVVH or AQ   -continue with midodrine 10mg TID on HD days        -continue with 1L fluid restriction        #Cirrhosis        #Thrombocytopenia and Bilirubinemia (stable)        -unclear if cirrhosis is 2/2 alcohol use or heart failure and no ascitic fluid analysis to help determine etiology   -abdominal US with cirrhotic liver, moderate ascites, no biliary dilation, prior cholecystectomy and no concern for PVT        -given worsening abdominal distension, would benefit from diagnostic and therapeutic paracentesis, recommend being completed on non-HD days given limitations 2/2 hypotension and fluid shifts    -if patient to undergo paracentesis, please send peritoneal fluid for cell count with diff, gram stain/culture, cytology, peritoneal fluid protein, peritoneal fluid albumin, peritoneal fluid glucose and peritoneal fluid LDH   -if greater than 4L of peritoneal fluid is removed, would need 6-8g of albumin (25%) per liter but would recommend only modest fluid removal given tenuous hemodynamics    -consider hepatology consult given multi-system organ failure and tenuous hemodynamics with HD and presumably following paracentesis      #CAD       -continue with ASA 81mg daily and atorvastatin 40mg qhs          #Afib        -continue with ASA monotherapy, patient with Watchman device           #Macrocytic Anemia         -likely 2/2 ACD and ESRD        -iron studies suggestive of ACD, B12 and Folate within normal limits, T4 within normal limits        -continue with folic acid and EPO with HD as per nephrology    DVT PPx: SQH    Dispo: GENE with HD

## 2023-10-21 NOTE — PROGRESS NOTE ADULT - PROBLEM SELECTOR PLAN 3
Chronic EtOH abuse with cirrhosis on Abd US; discussed with patient the impact his current EtOH use has on his condition, and patient strongly endorsed he has no plans to decrease EtOH use. Out of withdrawal window, not on CIWA protocol moving further.  -low concern for hepatorenal syndrome as remains hemodynamically stable. No known gallbladder pathology  -Abd US (10/4) Cirrhotic morphology. Pulsatile flow in the main and left portal veins. Moderate ascites. Increased right renal cortical echogenicity compatible w/ medical renal dz.  -s/p IV thiamine; c/w folate 1mg daily    #Hepatic Cirrhosis as above  RUQ r/o biliary stasis 10/04  Pt's pruritis possibly from biliary cause given recent elevation in ALP & Btotal.   - outpt Hepatology f/u post-dc  - f/u GGT

## 2023-10-21 NOTE — PROGRESS NOTE ADULT - PROBLEM SELECTOR PLAN 7
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 B/l LE dry skin, likely venous stasis dermatitis on due to peripheral artery disease vs. heart failure related edema changes. Likely from niacin deficiency (pellagra) as etiology for dermatitis as patient has 3 month anorexia, fatigue, numbness. Recently prescribed Cerave w/o use  - f/u outpt dermatologist.  - PO antihistamine

## 2023-10-21 NOTE — PROGRESS NOTE ADULT - SUBJECTIVE AND OBJECTIVE BOX
Subjective:  No acute events overnight.  Patient is doing well today - only complains of itchiness that intermittently occurs with HD.  Denies HA, CP, SOB, abdominal pain, nausea, vomiting, fever, chills or diarrhea.     Objective:   Vital Signs:  T(C): 36.6 (21 Oct 2023 10:21), Max: 37.1 (20 Oct 2023 18:40)  T(F): 97.9 (21 Oct 2023 10:21), Max: 98.7 (20 Oct 2023 18:40)  HR: 99 (21 Oct 2023 09:00) (85 - 99)  BP: 98/73 (21 Oct 2023 09:00) (91/69 - 138/94)  BP(mean): 82 (21 Oct 2023 09:00) (76 - 112)  RR: 18 (21 Oct 2023 09:00) (16 - 20)  SpO2: 95% (21 Oct 2023 09:00) (93% - 97%)    Parameters below as of 21 Oct 2023 09:00  Patient On (Oxygen Delivery Method): nasal cannula  O2 Flow (L/min): 2    Physical Exam:   -Gen: NAD, resting in chair  -HEENT: EOMI, PERRL, no scleral icterus, no JVD, no bruits, right chest TDC  -CV: normal S1 and S2  -Lungs: CTABL,, normal respiratory effort on RA  -Ab: obese, distended, umbilical hernia is non-tender, normal BS  -Ext: +1 LE edema  -Neuro: A&O x 3, no focal deficits     Labs:                        9.7    5.74  )-----------( 109      ( 21 Oct 2023 05:30 )             32.2       10-21    123<L>  |  86<L>  |  13  ----------------------------<  117<H>  3.9   |  25  |  3.02<H>    Ca    9.1      21 Oct 2023 05:30  Phos  3.4     10-21  Mg     1.7     10-21    TPro  6.5  /  Alb  2.6<L>  /  TBili  1.9<H>  /  DBili  x   /  AST  26  /  ALT  22  /  AlkPhos  237<H>  10-21    Medications:  MEDICATIONS  (STANDING):  aspirin enteric coated 81 milliGRAM(s) Oral daily  atorvastatin 40 milliGRAM(s) Oral at bedtime  folic acid 1 milliGRAM(s) Oral daily  heparin   Injectable 5000 Unit(s) SubCutaneous every 8 hours  influenza  Vaccine (HIGH DOSE) 0.7 milliLiter(s) IntraMuscular once  midodrine 10 milliGRAM(s) Oral <User Schedule>  Nephro-chichi 1 Tablet(s) Oral every 24 hours  pantoprazole    Tablet 40 milliGRAM(s) Oral before breakfast  polyethylene glycol 3350 17 Gram(s) Oral daily  pregabalin 75 milliGRAM(s) Oral daily  senna 2 Tablet(s) Oral at bedtime    MEDICATIONS  (PRN):  ondansetron Injectable 4 milliGRAM(s) IV Push every 12 hours PRN Nausea and/or Vomiting

## 2023-10-21 NOTE — PROGRESS NOTE ADULT - PROBLEM SELECTOR PLAN 2
Started on HD 6 months ago, 4x week (M/T/Th/Sat) at [facility], missed HD session on 10/3/23. R chest port utilized. At admission, Cr 3.93 (un-established baseline Cr), K 3.4, Phos 4.4. Metabolic acidosis (AGAP 17) likely due to hypochloremic emesis / ETOH leading to lactic acidosis. Dialysate changed to Phoxillum due to low phos.     - nephrology consulted, follow up recs  - 10mg Midodrine on HD days only prior to dialysis.   - c/w home Nephro-chichi, 1L fluid restriction  - hold off binders for now per nephro    #Hyponatremia       -further management as per nephrology, may need to go back to ICU for CVVH or AQ   -continue with midodrine 10mg TID on HD days        -continue with 1L fluid restriction

## 2023-10-21 NOTE — PROGRESS NOTE ADULT - SUBJECTIVE AND OBJECTIVE BOX
Nephrology progress note    Seen at bedside. Sitting on edge of bed. No distress. No  offered.     Allergies:  penicillins (Unknown)    REVIEW OF SYSTEMS:  All other review of systems is negative unless indicated above.    PHYSICAL EXAM:  GENERAL: NAD, sitting in bed   HEENT: NCAT, EOMI  CHEST/LUNG: Normal respiratory effort  HEART: Regular rate  ABDOMEN: Mildly distended  EXTREMITIES: trace pedal edema  Neurology: Somnolent  SKIN: No rash or skin lesion on exposed skin   ACCESS: TDC c/d/i

## 2023-10-22 LAB
ALBUMIN SERPL ELPH-MCNC: 2.6 G/DL — LOW (ref 3.3–5)
ALBUMIN SERPL ELPH-MCNC: 2.6 G/DL — LOW (ref 3.3–5)
ALBUMIN SERPL ELPH-MCNC: 2.9 G/DL — LOW (ref 3.3–5)
ALBUMIN SERPL ELPH-MCNC: 2.9 G/DL — LOW (ref 3.3–5)
ALP SERPL-CCNC: 253 U/L — HIGH (ref 40–120)
ALP SERPL-CCNC: 253 U/L — HIGH (ref 40–120)
ALP SERPL-CCNC: SIGNIFICANT CHANGE UP U/L (ref 40–120)
ALP SERPL-CCNC: SIGNIFICANT CHANGE UP U/L (ref 40–120)
ALT FLD-CCNC: 22 U/L — SIGNIFICANT CHANGE UP (ref 10–45)
ALT FLD-CCNC: 22 U/L — SIGNIFICANT CHANGE UP (ref 10–45)
ALT FLD-CCNC: SIGNIFICANT CHANGE UP U/L (ref 10–45)
ALT FLD-CCNC: SIGNIFICANT CHANGE UP U/L (ref 10–45)
ANION GAP SERPL CALC-SCNC: 11 MMOL/L — SIGNIFICANT CHANGE UP (ref 5–17)
ANION GAP SERPL CALC-SCNC: 11 MMOL/L — SIGNIFICANT CHANGE UP (ref 5–17)
ANION GAP SERPL CALC-SCNC: 9 MMOL/L — SIGNIFICANT CHANGE UP (ref 5–17)
ANION GAP SERPL CALC-SCNC: 9 MMOL/L — SIGNIFICANT CHANGE UP (ref 5–17)
ANISOCYTOSIS BLD QL: SIGNIFICANT CHANGE UP
ANISOCYTOSIS BLD QL: SIGNIFICANT CHANGE UP
AST SERPL-CCNC: 32 U/L — SIGNIFICANT CHANGE UP (ref 10–40)
AST SERPL-CCNC: 32 U/L — SIGNIFICANT CHANGE UP (ref 10–40)
AST SERPL-CCNC: SIGNIFICANT CHANGE UP U/L (ref 10–40)
AST SERPL-CCNC: SIGNIFICANT CHANGE UP U/L (ref 10–40)
BASOPHILS # BLD AUTO: 0.19 K/UL — SIGNIFICANT CHANGE UP (ref 0–0.2)
BASOPHILS # BLD AUTO: 0.19 K/UL — SIGNIFICANT CHANGE UP (ref 0–0.2)
BASOPHILS NFR BLD AUTO: 2.7 % — HIGH (ref 0–2)
BASOPHILS NFR BLD AUTO: 2.7 % — HIGH (ref 0–2)
BILIRUB SERPL-MCNC: 1.6 MG/DL — HIGH (ref 0.2–1.2)
BILIRUB SERPL-MCNC: 1.6 MG/DL — HIGH (ref 0.2–1.2)
BILIRUB SERPL-MCNC: 1.9 MG/DL — HIGH (ref 0.2–1.2)
BILIRUB SERPL-MCNC: 1.9 MG/DL — HIGH (ref 0.2–1.2)
BUN SERPL-MCNC: 12 MG/DL — SIGNIFICANT CHANGE UP (ref 7–23)
BUN SERPL-MCNC: 12 MG/DL — SIGNIFICANT CHANGE UP (ref 7–23)
BUN SERPL-MCNC: 13 MG/DL — SIGNIFICANT CHANGE UP (ref 7–23)
BUN SERPL-MCNC: 13 MG/DL — SIGNIFICANT CHANGE UP (ref 7–23)
CALCIUM SERPL-MCNC: 9 MG/DL — SIGNIFICANT CHANGE UP (ref 8.4–10.5)
CALCIUM SERPL-MCNC: 9 MG/DL — SIGNIFICANT CHANGE UP (ref 8.4–10.5)
CALCIUM SERPL-MCNC: 9.3 MG/DL — SIGNIFICANT CHANGE UP (ref 8.4–10.5)
CALCIUM SERPL-MCNC: 9.3 MG/DL — SIGNIFICANT CHANGE UP (ref 8.4–10.5)
CHLORIDE SERPL-SCNC: 91 MMOL/L — LOW (ref 96–108)
CHLORIDE SERPL-SCNC: 91 MMOL/L — LOW (ref 96–108)
CHLORIDE SERPL-SCNC: 93 MMOL/L — LOW (ref 96–108)
CHLORIDE SERPL-SCNC: 93 MMOL/L — LOW (ref 96–108)
CO2 SERPL-SCNC: 24 MMOL/L — SIGNIFICANT CHANGE UP (ref 22–31)
CO2 SERPL-SCNC: 24 MMOL/L — SIGNIFICANT CHANGE UP (ref 22–31)
CO2 SERPL-SCNC: 27 MMOL/L — SIGNIFICANT CHANGE UP (ref 22–31)
CO2 SERPL-SCNC: 27 MMOL/L — SIGNIFICANT CHANGE UP (ref 22–31)
CREAT SERPL-MCNC: 2.7 MG/DL — HIGH (ref 0.5–1.3)
CREAT SERPL-MCNC: 2.7 MG/DL — HIGH (ref 0.5–1.3)
CREAT SERPL-MCNC: 2.86 MG/DL — HIGH (ref 0.5–1.3)
CREAT SERPL-MCNC: 2.86 MG/DL — HIGH (ref 0.5–1.3)
DACRYOCYTES BLD QL SMEAR: SLIGHT — SIGNIFICANT CHANGE UP
DACRYOCYTES BLD QL SMEAR: SLIGHT — SIGNIFICANT CHANGE UP
EGFR: 24 ML/MIN/1.73M2 — LOW
EGFR: 24 ML/MIN/1.73M2 — LOW
EGFR: 25 ML/MIN/1.73M2 — LOW
EGFR: 25 ML/MIN/1.73M2 — LOW
EOSINOPHIL # BLD AUTO: 0 K/UL — SIGNIFICANT CHANGE UP (ref 0–0.5)
EOSINOPHIL # BLD AUTO: 0 K/UL — SIGNIFICANT CHANGE UP (ref 0–0.5)
EOSINOPHIL NFR BLD AUTO: 0 % — SIGNIFICANT CHANGE UP (ref 0–6)
EOSINOPHIL NFR BLD AUTO: 0 % — SIGNIFICANT CHANGE UP (ref 0–6)
GIANT PLATELETS BLD QL SMEAR: PRESENT — SIGNIFICANT CHANGE UP
GIANT PLATELETS BLD QL SMEAR: PRESENT — SIGNIFICANT CHANGE UP
GLUCOSE SERPL-MCNC: 120 MG/DL — HIGH (ref 70–99)
GLUCOSE SERPL-MCNC: 120 MG/DL — HIGH (ref 70–99)
GLUCOSE SERPL-MCNC: 128 MG/DL — HIGH (ref 70–99)
GLUCOSE SERPL-MCNC: 128 MG/DL — HIGH (ref 70–99)
HCT VFR BLD CALC: 33.5 % — LOW (ref 39–50)
HCT VFR BLD CALC: 33.5 % — LOW (ref 39–50)
HGB BLD-MCNC: 10 G/DL — LOW (ref 13–17)
HGB BLD-MCNC: 10 G/DL — LOW (ref 13–17)
HYPOCHROMIA BLD QL: SLIGHT — SIGNIFICANT CHANGE UP
HYPOCHROMIA BLD QL: SLIGHT — SIGNIFICANT CHANGE UP
LACTATE SERPL-SCNC: 1.4 MMOL/L — SIGNIFICANT CHANGE UP (ref 0.5–2)
LACTATE SERPL-SCNC: 1.4 MMOL/L — SIGNIFICANT CHANGE UP (ref 0.5–2)
LACTATE SERPL-SCNC: 1.8 MMOL/L — SIGNIFICANT CHANGE UP (ref 0.5–2)
LACTATE SERPL-SCNC: 1.8 MMOL/L — SIGNIFICANT CHANGE UP (ref 0.5–2)
LYMPHOCYTES # BLD AUTO: 1.02 K/UL — SIGNIFICANT CHANGE UP (ref 1–3.3)
LYMPHOCYTES # BLD AUTO: 1.02 K/UL — SIGNIFICANT CHANGE UP (ref 1–3.3)
LYMPHOCYTES # BLD AUTO: 14.3 % — SIGNIFICANT CHANGE UP (ref 13–44)
LYMPHOCYTES # BLD AUTO: 14.3 % — SIGNIFICANT CHANGE UP (ref 13–44)
MACROCYTES BLD QL: SIGNIFICANT CHANGE UP
MACROCYTES BLD QL: SIGNIFICANT CHANGE UP
MAGNESIUM SERPL-MCNC: 1.7 MG/DL — SIGNIFICANT CHANGE UP (ref 1.6–2.6)
MAGNESIUM SERPL-MCNC: 1.7 MG/DL — SIGNIFICANT CHANGE UP (ref 1.6–2.6)
MAGNESIUM SERPL-MCNC: 1.8 MG/DL — SIGNIFICANT CHANGE UP (ref 1.6–2.6)
MAGNESIUM SERPL-MCNC: 1.8 MG/DL — SIGNIFICANT CHANGE UP (ref 1.6–2.6)
MANUAL SMEAR VERIFICATION: SIGNIFICANT CHANGE UP
MANUAL SMEAR VERIFICATION: SIGNIFICANT CHANGE UP
MCHC RBC-ENTMCNC: 29.9 GM/DL — LOW (ref 32–36)
MCHC RBC-ENTMCNC: 29.9 GM/DL — LOW (ref 32–36)
MCHC RBC-ENTMCNC: 32.7 PG — SIGNIFICANT CHANGE UP (ref 27–34)
MCHC RBC-ENTMCNC: 32.7 PG — SIGNIFICANT CHANGE UP (ref 27–34)
MCV RBC AUTO: 109.5 FL — HIGH (ref 80–100)
MCV RBC AUTO: 109.5 FL — HIGH (ref 80–100)
MONOCYTES # BLD AUTO: 0.31 K/UL — SIGNIFICANT CHANGE UP (ref 0–0.9)
MONOCYTES # BLD AUTO: 0.31 K/UL — SIGNIFICANT CHANGE UP (ref 0–0.9)
MONOCYTES NFR BLD AUTO: 4.4 % — SIGNIFICANT CHANGE UP (ref 2–14)
MONOCYTES NFR BLD AUTO: 4.4 % — SIGNIFICANT CHANGE UP (ref 2–14)
NEUTROPHILS # BLD AUTO: 5.6 K/UL — SIGNIFICANT CHANGE UP (ref 1.8–7.4)
NEUTROPHILS # BLD AUTO: 5.6 K/UL — SIGNIFICANT CHANGE UP (ref 1.8–7.4)
NEUTROPHILS NFR BLD AUTO: 78.6 % — HIGH (ref 43–77)
NEUTROPHILS NFR BLD AUTO: 78.6 % — HIGH (ref 43–77)
OVALOCYTES BLD QL SMEAR: SLIGHT — SIGNIFICANT CHANGE UP
OVALOCYTES BLD QL SMEAR: SLIGHT — SIGNIFICANT CHANGE UP
PHOSPHATE SERPL-MCNC: 2.9 MG/DL — SIGNIFICANT CHANGE UP (ref 2.5–4.5)
PHOSPHATE SERPL-MCNC: 2.9 MG/DL — SIGNIFICANT CHANGE UP (ref 2.5–4.5)
PHOSPHATE SERPL-MCNC: 3.5 MG/DL — SIGNIFICANT CHANGE UP (ref 2.5–4.5)
PHOSPHATE SERPL-MCNC: 3.5 MG/DL — SIGNIFICANT CHANGE UP (ref 2.5–4.5)
PLAT MORPH BLD: ABNORMAL
PLAT MORPH BLD: ABNORMAL
PLATELET # BLD AUTO: 105 K/UL — LOW (ref 150–400)
PLATELET # BLD AUTO: 105 K/UL — LOW (ref 150–400)
POIKILOCYTOSIS BLD QL AUTO: SIGNIFICANT CHANGE UP
POIKILOCYTOSIS BLD QL AUTO: SIGNIFICANT CHANGE UP
POTASSIUM SERPL-MCNC: 4.1 MMOL/L — SIGNIFICANT CHANGE UP (ref 3.5–5.3)
POTASSIUM SERPL-MCNC: 4.1 MMOL/L — SIGNIFICANT CHANGE UP (ref 3.5–5.3)
POTASSIUM SERPL-MCNC: SIGNIFICANT CHANGE UP MMOL/L (ref 3.5–5.3)
POTASSIUM SERPL-MCNC: SIGNIFICANT CHANGE UP MMOL/L (ref 3.5–5.3)
POTASSIUM SERPL-SCNC: 4.1 MMOL/L — SIGNIFICANT CHANGE UP (ref 3.5–5.3)
POTASSIUM SERPL-SCNC: 4.1 MMOL/L — SIGNIFICANT CHANGE UP (ref 3.5–5.3)
POTASSIUM SERPL-SCNC: SIGNIFICANT CHANGE UP MMOL/L (ref 3.5–5.3)
POTASSIUM SERPL-SCNC: SIGNIFICANT CHANGE UP MMOL/L (ref 3.5–5.3)
PROT SERPL-MCNC: 6.3 G/DL — SIGNIFICANT CHANGE UP (ref 6–8.3)
PROT SERPL-MCNC: 6.3 G/DL — SIGNIFICANT CHANGE UP (ref 6–8.3)
PROT SERPL-MCNC: 6.8 G/DL — SIGNIFICANT CHANGE UP (ref 6–8.3)
PROT SERPL-MCNC: 6.8 G/DL — SIGNIFICANT CHANGE UP (ref 6–8.3)
RBC # BLD: 3.06 M/UL — LOW (ref 4.2–5.8)
RBC # BLD: 3.06 M/UL — LOW (ref 4.2–5.8)
RBC # FLD: 17.4 % — HIGH (ref 10.3–14.5)
RBC # FLD: 17.4 % — HIGH (ref 10.3–14.5)
RBC BLD AUTO: ABNORMAL
RBC BLD AUTO: ABNORMAL
SODIUM SERPL-SCNC: 127 MMOL/L — LOW (ref 135–145)
SODIUM SERPL-SCNC: 127 MMOL/L — LOW (ref 135–145)
SODIUM SERPL-SCNC: 128 MMOL/L — LOW (ref 135–145)
SODIUM SERPL-SCNC: 128 MMOL/L — LOW (ref 135–145)
WBC # BLD: 7.12 K/UL — SIGNIFICANT CHANGE UP (ref 3.8–10.5)
WBC # BLD: 7.12 K/UL — SIGNIFICANT CHANGE UP (ref 3.8–10.5)
WBC # FLD AUTO: 7.12 K/UL — SIGNIFICANT CHANGE UP (ref 3.8–10.5)
WBC # FLD AUTO: 7.12 K/UL — SIGNIFICANT CHANGE UP (ref 3.8–10.5)

## 2023-10-22 PROCEDURE — 99222 1ST HOSP IP/OBS MODERATE 55: CPT | Mod: GC

## 2023-10-22 PROCEDURE — 99233 SBSQ HOSP IP/OBS HIGH 50: CPT

## 2023-10-22 RX ORDER — MAGNESIUM SULFATE 500 MG/ML
2 VIAL (ML) INJECTION ONCE
Refills: 0 | Status: COMPLETED | OUTPATIENT
Start: 2023-10-22 | End: 2023-10-22

## 2023-10-22 RX ORDER — DOBUTAMINE HCL 250MG/20ML
2 VIAL (ML) INTRAVENOUS
Qty: 500 | Refills: 0 | Status: DISCONTINUED | OUTPATIENT
Start: 2023-10-22 | End: 2023-10-24

## 2023-10-22 RX ORDER — DOBUTAMINE HCL 250MG/20ML
2 VIAL (ML) INTRAVENOUS
Qty: 1000 | Refills: 0 | Status: DISCONTINUED | OUTPATIENT
Start: 2023-10-22 | End: 2023-10-22

## 2023-10-22 RX ADMIN — Medication 25 GRAM(S): at 23:15

## 2023-10-22 RX ADMIN — HEPARIN SODIUM 5000 UNIT(S): 5000 INJECTION INTRAVENOUS; SUBCUTANEOUS at 22:17

## 2023-10-22 RX ADMIN — HEPARIN SODIUM 5000 UNIT(S): 5000 INJECTION INTRAVENOUS; SUBCUTANEOUS at 15:01

## 2023-10-22 RX ADMIN — Medication 6.13 MICROGRAM(S)/KG/MIN: at 21:30

## 2023-10-22 RX ADMIN — ATORVASTATIN CALCIUM 40 MILLIGRAM(S): 80 TABLET, FILM COATED ORAL at 22:17

## 2023-10-22 RX ADMIN — POLYETHYLENE GLYCOL 3350 17 GRAM(S): 17 POWDER, FOR SOLUTION ORAL at 11:08

## 2023-10-22 RX ADMIN — Medication 1 MILLIGRAM(S): at 11:08

## 2023-10-22 RX ADMIN — Medication 81 MILLIGRAM(S): at 11:08

## 2023-10-22 RX ADMIN — Medication 1 TABLET(S): at 11:10

## 2023-10-22 RX ADMIN — PANTOPRAZOLE SODIUM 40 MILLIGRAM(S): 20 TABLET, DELAYED RELEASE ORAL at 06:20

## 2023-10-22 RX ADMIN — HEPARIN SODIUM 5000 UNIT(S): 5000 INJECTION INTRAVENOUS; SUBCUTANEOUS at 06:20

## 2023-10-22 RX ADMIN — Medication 75 MILLIGRAM(S): at 11:08

## 2023-10-22 NOTE — CONSULT NOTE ADULT - CONSULT REQUESTED DATE/TIME
22-Oct-2023 15:21
06-Oct-2023 16:56
15-Oct-2023 12:56
04-Oct-2023 09:07
17-Oct-2023 17:53
06-Oct-2023 14:16

## 2023-10-22 NOTE — CONSULT NOTE ADULT - SUBJECTIVE AND OBJECTIVE BOX
Initial Hepatology Consult Note:     HPI:  CC: missed HD for ESRD  HPI: 63 year old males with history of ESRD (HD 4 x M/T/TH/SAT)ELMER HLD, GERD, CAD presents after missed HD on 10/3/23 with increased shortness of breath at rest and brief bilateral leg weakness and two episodes of non-bloody emesis. He began HD 6 months prior with R chest port use, with recent left hand mapping 1-2 weeks ago for upcoming L AV fistula placement. He noticed increased shortness of breath without any recent travel or sick contacts. At baseline he breathes comfortably on room air, but required 3 L nasal cannula for 85% oxygen saturation in ED. He felt like his legs gave out earlier in the day and at baseline ambulates with a cane. He denies recent falls, but has fall history with most recent fall 3-4 weeks ago with no acute head injuries. He also chronically drinks alcohol, with 2-3 martini's per day with last drink at 2 pm on 10/3/23. He denies alcohol withdrawal hospitalizations in the past and no withdrawal seizures. He endorses some mid epigastric chest pain that does not radiate. He denies fever, chills, diarrhea, abdominal pain, headaches, neck pain, dizziness, or syncope. denies smoking. He receives all his care at [facility] and is compliant with medications. He took last took all his medications at home at 10/3/23 AM and takes all medications together in the morning to prevent forgetting taking his medications.     In the ED:    - VS: Tmax: 98.3, HR: 106, /78, RR 18, 98% on RA    - Pertinent Labs: WBC 6, Hg 10.2, .6; INR 1.46; Serum Ammonia 37, K 3.4, Mg 1.7, Na 132, AGAP 17, Cr 3.93, Albumin 2.0, BiliT 2, Alk Phos 273, AST 45, ALT 43, VBG pH 7.36, BIENVENIDO 29, proBNP 13971, Trop I 564    - Imaging:  EKG sinus tach 104 bpm, Qtc 397, right axis deviation, incomplete RBBB, possible right ventricular hypertrophy, nonspecific t wave abnormality. CXR no infiltrates.    - Treatment/interventions: None in ED    PMHx: HTN, HLD, GERD, CAD s/p MI no stents, HD on ESRD  PSHx: two hip replacement surgeries  Meds: See med rec  Allergies: pencillin- patient not aware of reaction  Social: see below  (04 Oct 2023 01:03)    Allergies    penicillins (Unknown)    Intolerances      Home Medications:  aspirin 81 mg oral tablet: 1 tab(s) orally once a day (04 Oct 2023 01:57)  atorvastatin 40 mg oral tablet: 1 tab(s) orally once a day (04 Oct 2023 02:00)  Bumex 2 mg oral tablet: 1 tab(s) orally Monday, Wednesday, and Friday (04 Oct 2023 01:57)  cyproheptadine 4 mg oral tablet: 1 tab(s) orally 3 times a day (04 Oct 2023 04:07)  folic acid 1 mg oral tablet: 1 tab(s) orally once a day (05 Oct 2023 14:11)  midodrine 5 mg oral tablet: 1 tab(s) orally 3 times a day (05 Oct 2023 14:11)  omeprazole 40 mg oral delayed release capsule: 1 cap(s) orally once a day (04 Oct 2023 01:58)  Plavix 75 mg oral tablet: 1 tab(s) orally once a day (04 Oct 2023 02:02)  pregabalin 75 mg oral capsule: 1 cap(s) orally once a day (04 Oct 2023 01:58)  sevelamer hydrochloride 800 mg oral tablet: 1 tab(s) orally 3 times a day (05 Oct 2023 14:11)    MEDICATIONS:  MEDICATIONS  (STANDING):  aspirin enteric coated 81 milliGRAM(s) Oral daily  atorvastatin 40 milliGRAM(s) Oral at bedtime  folic acid 1 milliGRAM(s) Oral daily  heparin   Injectable 5000 Unit(s) SubCutaneous every 8 hours  influenza  Vaccine (HIGH DOSE) 0.7 milliLiter(s) IntraMuscular once  midodrine 10 milliGRAM(s) Oral <User Schedule>  Nephro-chichi 1 Tablet(s) Oral every 24 hours  pantoprazole    Tablet 40 milliGRAM(s) Oral before breakfast  polyethylene glycol 3350 17 Gram(s) Oral daily  pregabalin 75 milliGRAM(s) Oral daily  senna 2 Tablet(s) Oral at bedtime    MEDICATIONS  (PRN):  ondansetron Injectable 4 milliGRAM(s) IV Push every 12 hours PRN Nausea and/or Vomiting    PAST MEDICAL & SURGICAL HISTORY:  HTN (hypertension)      HLD (hyperlipidemia)      Chronic kidney disease      H/O bilateral hip replacements        FAMILY HISTORY:    SOCIAL HISTORY:  Tobacoo: [ ] Current, [ ] Former, [ ] Never; Pack Years:  Alcohol:  Illicit Drugs:    REVIEW OF SYSTEMS:  CONSTITUTIONAL: No weakness, fevers or chills  HEENT: No visual changes; No vertigo or throat pain   NECK: No pain or stiffness  RESPIRATORY: No cough, wheezing, hemoptysis; No shortness of breath  CARDIOVASCULAR: No chest pain or palpitations  GASTROINTESTINAL: No abdominal or epigastric pain. No nausea, vomiting, or hematemesis; No diarrhea or constipation. No melena or hematochezia.  GENITOURINARY: No dysuria, frequency or hematuria  NEUROLOGICAL: No numbness or weakness  SKIN: No itching, burning, rashes, or lesions   All other 10 review of systems is negative unless indicated above.    Vital Signs Last 24 Hrs  T(C): 36.7 (22 Oct 2023 13:23), Max: 37 (21 Oct 2023 16:47)  T(F): 98.1 (22 Oct 2023 13:23), Max: 98.6 (21 Oct 2023 16:47)  HR: 84 (22 Oct 2023 11:31) (83 - 99)  BP: 90/57 (22 Oct 2023 11:31) (81/58 - 115/66)  BP(mean): 69 (22 Oct 2023 11:31) (66 - 90)  RR: 18 (22 Oct 2023 11:31) (16 - 22)  SpO2: 94% (22 Oct 2023 08:49) (92% - 97%)    Parameters below as of 22 Oct 2023 11:31  Patient On (Oxygen Delivery Method): nasal cannula  O2 Flow (L/min): 2      10-21 @ 07:01  -  10-22 @ 07:00  --------------------------------------------------------  IN: 636 mL / OUT: 3325 mL / NET: -2689 mL    10-22 @ 07:01  -  10-22 @ 15:21  --------------------------------------------------------  IN: 612 mL / OUT: 300 mL / NET: 312 mL        PHYSICAL EXAM:    General: Well developed; well nourished; in no acute distress  Eyes: Anicteric sclerae, moist conjunctivae  HENT: Moist mucous membranes  Neck: Trachea midline, supple  Lungs: Normal respiratory effort, no intercostal retractions  Cardiovascular: RRR  Abdomen: Soft, non-tender non-distended; Normal bowel sounds; No rebound or guarding  Extremities: Normal range of motion, No clubbing, cyanosis or edema  Neurological: Alert and oriented x3  Skin: Warm and dry. No obvious rash    LABS:                        10.0   7.12  )-----------( 105      ( 22 Oct 2023 10:51 )             33.5     10-22    128<L>  |  93<L>  |  12  ----------------------------<  120<H>  4.1   |  24  |  2.70<H>    Ca    9.3      22 Oct 2023 10:51  Phos  2.9     10-22  Mg     1.8     10-22    TPro  6.8  /  Alb  2.9<L>  /  TBili  1.9<H>  /  DBili  x   /  AST  32  /  ALT  22  /  AlkPhos  253<H>  10-22            RADIOLOGY & ADDITIONAL STUDIES:     Reviewed     Initial Hepatology Consult Note:     HPI:   63M PMH HFrEF (last known EF 30-40% a few weeks ago), AFib (s/p watchman), ERSD 2/2 IgA nephropathy on dialysis (M,T, TH, S) since February 2023, congestive hepatopathy, alcohol use disorder, who presented with one day SOB and leg weakness/heaviness iso missed dialysis, admitted for HD and transferred to CCU for concern of cardiogenic shock i/s/o volume overload on dobutamine. Patient received CVVHD. Patient with watchman device for >6 months, discontinued Plavix and transitioned now to aspirin only. Patient with significant improvement in mentation, AAOx3, warm and well-perfused extremities. Was stepped down to telemetry on 10/11/23 late afternoon with interval SBP 80smmHg, also c/b acute HypoNa to 122:  as discussed with Nephrology, pt too hypotensive to safely dialyze on floor; transferred back to CCU / [facility] boarding [facility] ICU for continuous HD with close monitoring. Dialyzed in CCU and stepped back down to telemetry 10/17. At Tele, was noted to be fluid overloaded requiring more frequent HD sessions, with maxed out midodrine dosing remains hypotensive SBP 80s and remains very fluid overloaded. Given hypotension while on midodrine and fluid overload status requiring hemodialysis, patient would benefit from continuous hemodialysis requiring step up to CCU.    GI consulted for cirrhosis management. Patient seen and examined at bedside. States that he has been told before he had cirrhosis, which was previously attributed to his HF more than his drinking history. Has had a few paracenteses before when hospitalized but does not regularly get them. To his knowledge, he has not been told he had varices. Does not take any medicine at home for cirrhosis other than Bumex as a diuretic also for his CHF. Has no acute complaints at this time.         Allergies    penicillins (Unknown)    Intolerances      Home Medications:  aspirin 81 mg oral tablet: 1 tab(s) orally once a day (04 Oct 2023 01:57)  atorvastatin 40 mg oral tablet: 1 tab(s) orally once a day (04 Oct 2023 02:00)  Bumex 2 mg oral tablet: 1 tab(s) orally Monday, Wednesday, and Friday (04 Oct 2023 01:57)  cyproheptadine 4 mg oral tablet: 1 tab(s) orally 3 times a day (04 Oct 2023 04:07)  folic acid 1 mg oral tablet: 1 tab(s) orally once a day (05 Oct 2023 14:11)  midodrine 5 mg oral tablet: 1 tab(s) orally 3 times a day (05 Oct 2023 14:11)  omeprazole 40 mg oral delayed release capsule: 1 cap(s) orally once a day (04 Oct 2023 01:58)  Plavix 75 mg oral tablet: 1 tab(s) orally once a day (04 Oct 2023 02:02)  pregabalin 75 mg oral capsule: 1 cap(s) orally once a day (04 Oct 2023 01:58)  sevelamer hydrochloride 800 mg oral tablet: 1 tab(s) orally 3 times a day (05 Oct 2023 14:11)    MEDICATIONS:  MEDICATIONS  (STANDING):  aspirin enteric coated 81 milliGRAM(s) Oral daily  atorvastatin 40 milliGRAM(s) Oral at bedtime  folic acid 1 milliGRAM(s) Oral daily  heparin   Injectable 5000 Unit(s) SubCutaneous every 8 hours  influenza  Vaccine (HIGH DOSE) 0.7 milliLiter(s) IntraMuscular once  midodrine 10 milliGRAM(s) Oral <User Schedule>  Nephro-chichi 1 Tablet(s) Oral every 24 hours  pantoprazole    Tablet 40 milliGRAM(s) Oral before breakfast  polyethylene glycol 3350 17 Gram(s) Oral daily  pregabalin 75 milliGRAM(s) Oral daily  senna 2 Tablet(s) Oral at bedtime    MEDICATIONS  (PRN):  ondansetron Injectable 4 milliGRAM(s) IV Push every 12 hours PRN Nausea and/or Vomiting    PAST MEDICAL & SURGICAL HISTORY:  HTN (hypertension)      HLD (hyperlipidemia)      Chronic kidney disease      H/O bilateral hip replacements        FAMILY HISTORY:    SOCIAL HISTORY:  [place]ac[place]: [ ] Current, [ ] Former, [ ] Never; Pack Years:  Alcohol:  Illicit Drugs:    REVIEW OF SYSTEMS:  CONSTITUTIONAL: No weakness, fevers or chills  HEENT: No visual changes; No vertigo or throat pain   NECK: No pain or stiffness  RESPIRATORY: No cough, wheezing, hemoptysis; No shortness of breath  CARDIOVASCULAR: No chest pain or palpitations  GASTROINTESTINAL: No abdominal or epigastric pain. No nausea, vomiting, or hematemesis; No diarrhea or constipation. No melena or hematochezia.  GENITOURINARY: No dysuria, frequency or hematuria  NEUROLOGICAL: No numbness or weakness  SKIN: No itching, burning, rashes, or lesions   All other 10 review of systems is negative unless indicated above.    Vital Signs Last 24 Hrs  T(C): 36.7 (22 Oct 2023 13:23), Max: 37 (21 Oct 2023 16:47)  T(F): 98.1 (22 Oct 2023 13:23), Max: 98.6 (21 Oct 2023 16:47)  HR: 84 (22 Oct 2023 11:31) (83 - 99)  BP: 90/57 (22 Oct 2023 11:31) (81/58 - 115/66)  BP(mean): 69 (22 Oct 2023 11:31) (66 - 90)  RR: 18 (22 Oct 2023 11:31) (16 - 22)  SpO2: 94% (22 Oct 2023 08:49) (92% - 97%)    Parameters below as of 22 Oct 2023 11:31  Patient On (Oxygen Delivery Method): nasal cannula  O2 Flow (L/min): 2      10-21 @ 07:01  -  10-22 @ 07:00  --------------------------------------------------------  IN: 636 mL / OUT: 3325 mL / NET: -2689 mL    10-22 @ 07:01  -  10-22 @ 15:21  --------------------------------------------------------  IN: 612 mL / OUT: 300 mL / NET: 312 mL        PHYSICAL EXAM:  General: No acute distress  Lungs: Normal respiratory effort and no intercostal retractions  Cardiovascular: RRR  Abdomen: Soft, non-tender, large distended abdomen   Neurological: Alert and oriented x3, asterixis   Skin: Warm and dry. No obvious rash        LABS:                        10.0   7.12  )-----------( 105      ( 22 Oct 2023 10:51 )             33.5     10-22    128<L>  |  93<L>  |  12  ----------------------------<  120<H>  4.1   |  24  |  2.70<H>    Ca    9.3      22 Oct 2023 10:51  Phos  2.9     10-22  Mg     1.8     10-22    TPro  6.8  /  Alb  2.9<L>  /  TBili  1.9<H>  /  DBili  x   /  AST  32  /  ALT  22  /  AlkPhos  253<H>  10-22            RADIOLOGY & ADDITIONAL STUDIES:     Reviewed

## 2023-10-22 NOTE — PROGRESS NOTE ADULT - ASSESSMENT
63M, DNR/DNI, poor historian, PMHx of HTN, HLD, CAD (MI, unsure when, unsure if had stent placed), CHF (EF 35-40%), AFib (s/p watchman), ESRD (M/T/Th/Sat), Chronic EtOH abuse w/ cirrhosis, GERD, initially admitted to medicine service w/ volume overload after missing HD; course c/b RRT during first inpatient HD session d/t hypotension, found to have severe biventricular failure on TTE w/clinical evidence of early low output state; upgraded to CCU (10/6-10/11) for concern for Cardiogenic shock, s/p CVVHD x 4 days and Dobutamine; stepped down to Telemetry with plan for intermittent HD per Nephro but remained hypotensive while off dobutamine, and was upgraded back to CCU (10/12-10/13) per Nephro d/t concerns for safe dialysis given hypotension trends. On 10/13 pt was able to tolerate full iHD session with reinitiation of Midodrine prior to sessions; & stepped back down to Tele for further HD and optimization. Patient maxed on midodrine and persistently hypotensive while fluid overloaded, stepped back up to CCU on 10/22 for aquapheresis and inotrope support.     NEURO  -FLOR     CARDIO  #Acute on chronic systolic congestive heart failure  NICM (likely EtOH induced) admitted with volume overload i/s/o missed HD, course c/b cardiogenic shock, now s/p CVVHD and pressors with net negative 10.6L. Remains peripherally overloaded with distended abdomen, 2+ LE edema, and orthopnea, however unable to tolerate further volume removal.  TTEs inpt 10/5 & 10/6/23: LVEF 35-40% with global hypokinesis. Mildly dilated RV w/ mod reduced RVSF. Elevated RA pressure. SHALOM. Mod AR. mild to mod AS. Mod to severe TR. PASP 39 mmHg. Small-to-moderate pericardial effusion without echocardiographic evidence of cardiac tamponade physiology. Ascites present.  Repeat TTE 10/14 largely unchanged, w/ LVEF remaining 35-40% and global hypokinesis despite volume optimization. Last HD session 10/21.   - GDMT: d/c toprol as persistently hypotensive  - Pt requires Midodrine 10mg qAM on HD days prior to HD due to persistent hypotension  - Per nephro, plan to start aquapheresis with goal to remove 2L over the next 23hr  - Start dobutamine gtt @ 2 mcg/kg/min for concern of cardiogenic shock   - F/u perfusion labs upon transfer to CCU   -continuous pulse ox and telemetry; Core measures, strict i/o, daily standing weight, fluid restrict  - Likely needs continuous hemodialysis in setting of hypotension to offload fluid    #Coronary artery disease  History of HLD and MI, unsure if had stent placed  - c/w ASA 81 mg QD, lipitor 40 mg QHS  - unable to tolerate BBL/ACE/ARB from BP standpoint  - LDL 15 at goal    #Atrial fibrillation  -currently in rate controlled AF; s/p watchman device  -continue w/ ASA monotherapy    RESPIRATORY  -FLOR    GASTROINTESTINAL  #Hepatic Cirrhosis as above  RUQ r/o biliary stasis 10/04  Pt's pruritis possibly from biliary cause given recent elevation in ALP & Btotal.   - Outpt Hepatology f/u post-dc  - f/u GGT  - Plan for paracentesis as per day team (both therapeutic and diagnostic) as per hepatology recs and to send studies for cell count, albumin, glucose, gram stain, LDH, and protein.   - Hepatology recommended holding off on treating HE despite mild asterixis on exam as patient is mentating well     #GERD  History of GERD, no complaints of epigastric pain at this time.  -Continue Pantoprazole 40mg qd      RENAL  #ESRD on dialysis   Started on HD 6 months ago, 4x week (M/T/Th/Sat) at [facility] dialysis center, missed HD session on 10/3/23. R chest port utilized. At admission, Cr 3.93 (un-established baseline Cr), K 3.4, Phos 4.4. Metabolic acidosis (AGAP 17) likely due to hypochloremic emesis / ETOH leading to lactic acidosis. Dialysate changed to Phoxillum due to low phos.     - Nephrology consulted, plan for aquapheresis with goal to remove 2L over next 24hrs  - 10mg Midodrine on HD days only prior to dialysis.   - C/w home Nephro-chichi, 1L fluid restriction  - hold off binders for now per nephro  - F/u nephro recs     #Hyponatremia  -continue with midodrine 10mg TID on HD days  -continue with 1L fluid restriction  - Improved today  - F/u nephro recs     HEME  Home med iron 65 mg qd for ELMER  - Hold home med iron 65mg  - C/w folic acid 1mg daily   - EPO with HD   - Maintain active type and screen (last drawn 10/16)    ENDOCRINE  -FLOR    DERM  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 B/l LE dry skin, likely venous stasis dermatitis on due to peripheral artery disease vs. heart failure related edema changes. Likely from niacin deficiency (pellagra) as etiology for dermatitis as patient has 3 month anorexia, fatigue, numbness. Recently prescribed Cerave w/o use  - f/u outpt dermatologist.  - PO antihistamine    PSYCH  #Moderate alcohol use disorder  Chronic EtOH abuse with cirrhosis on Abd US; discussed with patient the impact his current EtOH use has on his condition, and patient strongly endorsed he has no plans to decrease EtOH use. Out of withdrawal window, not on CIWA protocol moving further.  -low concern for hepatorenal syndrome as remains hemodynamically stable. No known gallbladder pathology  -Abd US (10/4) Cirrhotic morphology. Pulsatile flow in the main and left portal veins. Moderate ascites. Increased right renal cortical echogenicity compatible w/ medical renal dz.  -s/p IV thiamine; c/w folate 1mg daily    Prophylactic Measure   F: None  E: Replete per HD with ESRD  N: Renal restrictions  GI ppx: pantoprazole  DVT ppx: heparin 5000u q8h  Dispo: 5lach -> CCU      63M, DNR/DNI, poor historian, PMHx of HTN, HLD, CAD (MI, unsure when, unsure if had stent placed), CHF (EF 35-40%), AFib (s/p watchman), ESRD (M/T/Th/Sat), Chronic EtOH abuse w/ cirrhosis, GERD, initially admitted to medicine service w/ volume overload after missing HD; course c/b RRT during first inpatient HD session d/t hypotension, found to have severe biventricular failure on TTE w/clinical evidence of early low output state; upgraded to CCU (10/6-10/11) for concern for Cardiogenic shock, s/p CVVHD x 4 days and Dobutamine; stepped down to Telemetry with plan for intermittent HD per Nephro but remained hypotensive while off dobutamine, and was upgraded back to CCU (10/12-10/13) per Nephro d/t concerns for safe dialysis given hypotension trends. On 10/13 pt was able to tolerate full iHD session with reinitiation of Midodrine prior to sessions; & stepped back down to Tele for further HD and optimization. Patient maxed on midodrine and persistently hypotensive while fluid overloaded, stepped back up to CCU on 10/22 for aquapheresis and inotrope support.     NEURO  -FLOR     CARDIO  #Acute on chronic systolic congestive heart failure  NICM (likely EtOH induced) admitted with volume overload i/s/o missed HD, course c/b cardiogenic shock, now s/p CVVHD and pressors with net negative 10.6L. Remains peripherally overloaded with distended abdomen, 2+ LE edema, and orthopnea, however unable to tolerate further volume removal.  TTEs inpt 10/5 & 10/6/23: LVEF 35-40% with global hypokinesis. Mildly dilated RV w/ mod reduced RVSF. Elevated RA pressure. SHALOM. Mod AR. mild to mod AS. Mod to severe TR. PASP 39 mmHg. Small-to-moderate pericardial effusion without echocardiographic evidence of cardiac tamponade physiology. Ascites present.  Repeat TTE 10/14 largely unchanged, w/ LVEF remaining 35-40% and global hypokinesis despite volume optimization. Last HD session 10/21.   - GDMT: d/c toprol as persistently hypotensive  - Pt requires Midodrine 10mg qAM on HD days prior to HD due to persistent hypotension  - Per nephro, plan to start aquapheresis with goal to remove 2L over the next 23hr  - Start dobutamine gtt @ 2 mcg/kg/min for concern of cardiogenic shock   - F/u perfusion labs upon transfer to CCU   -continuous pulse ox and telemetry; Core measures, strict i/o, daily standing weight, fluid restrict  - Likely needs continuous hemodialysis in setting of hypotension to offload fluid    #Coronary artery disease  History of HLD and MI, unsure if had stent placed  - c/w ASA 81 mg QD, lipitor 40 mg QHS  - unable to tolerate BBL/ACE/ARB from BP standpoint  - LDL 15 at goal    #Atrial fibrillation  -currently in rate controlled AF; s/p watchman device  -continue w/ ASA monotherapy    RESPIRATORY  -FLOR    GASTROINTESTINAL  #Hepatic Cirrhosis as above   RUQ r/o biliary stasis 10/04  Pt's pruritis possibly from biliary cause given recent elevation in ALP & Btotal.   - Outpt Hepatology f/u post-dc  - f/u GGT  - Plan for paracentesis as per day team (both therapeutic and diagnostic) as per hepatology recs and to send studies for cell count, albumin, glucose, gram stain, LDH, and protein.   - Hepatology recommended holding off on treating HE despite mild asterixis on exam as patient is mentating well     #GERD  History of GERD, no complaints of epigastric pain at this time.  -Continue Pantoprazole 40mg qd      RENAL  #ESRD on dialysis   Started on HD 6 months ago, 4x week (M/T/Th/Sat) at [facility] dialysis center, missed HD session on 10/3/23. R chest port utilized. At admission, Cr 3.93 (un-established baseline Cr), K 3.4, Phos 4.4. Metabolic acidosis (AGAP 17) likely due to hypochloremic emesis / ETOH leading to lactic acidosis. Dialysate changed to Phoxillum due to low phos.     - Nephrology consulted, plan for aquapheresis with goal to remove 2L over next 24hrs  - 10mg Midodrine on HD days only prior to dialysis.   - C/w home Nephro-chichi, 1L fluid restriction  - hold off binders for now per nephro  - F/u nephro recs     #Hyponatremia  -continue with midodrine 10mg TID on HD days  -continue with 1L fluid restriction  - Improved today  - F/u nephro recs     HEME  Home med iron 65 mg qd for ELMER  - Hold home med iron 65mg  - C/w folic acid 1mg daily   - EPO with HD   - Maintain active type and screen (last drawn 10/16)    ENDOCRINE  -FLOR    DERM  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 B/l LE dry skin, likely venous stasis dermatitis on due to peripheral artery disease vs. heart failure related edema changes. Likely from niacin deficiency (pellagra) as etiology for dermatitis as patient has 3 month anorexia, fatigue, numbness. Recently prescribed Cerave w/o use  - f/u outpt dermatologist.  - PO antihistamine    PSYCH  #Moderate alcohol use disorder  Chronic EtOH abuse with cirrhosis on Abd US; discussed with patient the impact his current EtOH use has on his condition, and patient strongly endorsed he has no plans to decrease EtOH use. Out of withdrawal window, not on CIWA protocol moving further.  -low concern for hepatorenal syndrome as remains hemodynamically stable. No known gallbladder pathology  -Abd US (10/4) Cirrhotic morphology. Pulsatile flow in the main and left portal veins. Moderate ascites. Increased right renal cortical echogenicity compatible w/ medical renal dz.  -s/p IV thiamine; c/w folate 1mg daily    Prophylactic Measure   F: None  E: Replete per HD with ESRD  N: Renal restrictions  GI ppx: pantoprazole  DVT ppx: heparin 5000u q8h  Dispo: 5lach -> CCU

## 2023-10-22 NOTE — PROGRESS NOTE ADULT - PROBLEM SELECTOR PLAN 1
NICM (likely EtOH induced) admitted with volume overload i/s/o missed HD, course c/b cardiogenic shock, now s/p CVVHD and pressors with net negative 10.6L. Remains peripherally overloaded with distended abdomen, 2+ LE edema, and orthopnea, however unable to tolerate further volume removal.  TTEs inpt 10/5 & 10/6/23: LVEF 35-40% with global hypokinesis. Mildly dilated RV w/ mod reduced RVSF. Elevated RA pressure. SHALOM. Mod AR. mild to mod AS. Mod to severe TR. PASP 39 mmHg. Small-to-moderate pericardial effusion without echocardiographic evidence of cardiac tamponade physiology. Ascites present.  Repeat TTE 10/14 largely unchanged, w/ LVEF remaining 35-40% and global hypokinesis despite volume optimization  GDMT held initiated at initially as patients BPs previous consistently hypotensive 70s-90s, no room to initiate - started Toprol 25mg PO qd 10/21.     - Further volume optimization with HD as per nephro, Pt requires Midodrine 10mg qAM on HD days prior to HD due to persistent hypotension  -continuous pulse ox and telemetry; Core measures, strict i/o, daily standing weight, fluid restrict  -Given Lasix 10 IVp, 40mg IVp, and changed oral from qd to bid on 10/20 given overloaded via lethargy iso inc wob without saturation issues and lung exam and CXR indicating effusion  -Start Toprol 25mg qd PO (GDMT) NICM (likely EtOH induced) admitted with volume overload i/s/o missed HD, course c/b cardiogenic shock, now s/p CVVHD and pressors with net negative 10.6L. Remains peripherally overloaded with distended abdomen, 2+ LE edema, and orthopnea, however unable to tolerate further volume removal.  TTEs inpt 10/5 & 10/6/23: LVEF 35-40% with global hypokinesis. Mildly dilated RV w/ mod reduced RVSF. Elevated RA pressure. SHALOM. Mod AR. mild to mod AS. Mod to severe TR. PASP 39 mmHg. Small-to-moderate pericardial effusion without echocardiographic evidence of cardiac tamponade physiology. Ascites present.  Repeat TTE 10/14 largely unchanged, w/ LVEF remaining 35-40% and global hypokinesis despite volume optimization  GDMT:  - d/c toprol as persistently hypotensive  - Further volume optimization with HD as per nephro, Pt requires Midodrine 10mg qAM on HD days prior to HD due to persistent hypotension  -continuous pulse ox and telemetry; Core measures, strict i/o, daily standing weight, fluid restrict  - Likely needs continuous hemodialysis in setting of hypotension to offload fluid

## 2023-10-22 NOTE — PROGRESS NOTE ADULT - SUBJECTIVE AND OBJECTIVE BOX
Nephrology progress note    Seen at bedside. Noted hypotensive, still overloaded, pending transfer to ICU. Will start Aquaphereis     Allergies:  penicillins (Unknown)    REVIEW OF SYSTEMS:  All other review of systems is negative unless indicated above.    PHYSICAL EXAM:  GENERAL: NAD, sitting in bed   HEENT: NCAT, EOMI  CHEST/LUNG: Normal respiratory effort  HEART: Regular rate  ABDOMEN: Mildly distended  EXTREMITIES: trace pedal edema  Neurology: Somnolent  SKIN: No rash or skin lesion on exposed skin   ACCESS: TDC c/d/i    Labs/radiology reviewed

## 2023-10-22 NOTE — PROGRESS NOTE ADULT - PROBLEM SELECTOR PLAN 2
Started on HD 6 months ago, 4x week (M/T/Th/Sat) at [facility] dialysis center, missed HD session on 10/3/23. R chest port utilized. At admission, Cr 3.93 (un-established baseline Cr), K 3.4, Phos 4.4. Metabolic acidosis (AGAP 17) likely due to hypochloremic emesis / ETOH leading to lactic acidosis. Dialysate changed to Phoxillum due to low phos.     - nephrology consulted, follow up recs  - 10mg Midodrine on HD days only prior to dialysis.   - c/w home Nephro-chichi, 1L fluid restriction  - hold off binders for now per nephro    #Hyponatremia       -further management as per nephrology, may need to go back to ICU for CVVH or AQ   -continue with midodrine 10mg TID on HD days        -continue with 1L fluid restriction Started on HD 6 months ago, 4x week (M/T/Th/Sat) at [facility] dialysis center, missed HD session on 10/3/23. R chest port utilized. At admission, Cr 3.93 (un-established baseline Cr), K 3.4, Phos 4.4. Metabolic acidosis (AGAP 17) likely due to hypochloremic emesis / ETOH leading to lactic acidosis. Dialysate changed to Phoxillum due to low phos.     - nephrology consulted, follow up recs  - 10mg Midodrine on HD days only prior to dialysis.   - c/w home Nephro-chichi, 1L fluid restriction  - hold off binders for now per nephro  - Likely needs continuous hemodialysis in setting of hypotension to offload fluid  #Hyponatremia       -further management as per nephrology, may need to go back to ICU for CVVH or AQ   -continue with midodrine 10mg TID on HD days        -continue with 1L fluid restriction Started on HD 6 months ago, 4x week (M/T/Th/Sat) at [facility] dialysis center, missed HD session on 10/3/23. R chest port utilized. At admission, Cr 3.93 (un-established baseline Cr), K 3.4, Phos 4.4. Metabolic acidosis (AGAP 17) likely due to hypochloremic emesis / ETOH leading to lactic acidosis. Dialysate changed to Phoxillum due to low phos.     - nephrology consulted, follow up recs  - 10mg Midodrine on HD days only prior to dialysis.   - c/w home Nephro-chichi, 1L fluid restriction  - hold off binders for now per nephro  - Likely needs continuous hemodialysis in setting of hypotension to offload fluid    #Hyponatremia  -further management as per nephrology, may need to go back to ICU for CVVH or AQ  -continue with midodrine 10mg TID on HD days  -continue with 1L fluid restriction  - Improved today

## 2023-10-22 NOTE — CONSULT NOTE ADULT - CONSULT REASON
ESRD, missed HD, inpatient HD management
Cirrhosis
Co-management
Pain/Symptom Management and Complex Medical Decision Making/GOC in the setting of Advanced Cardiac Disease
RV dysfunction
hypotension, ams

## 2023-10-22 NOTE — CONSULT NOTE ADULT - ASSESSMENT
[facility]    CC: INR check and LE edema    HPI:     DVT History/Long Term Anticoagulation Use (INR goal 2-3): - Taking warfarin as prescribed  - Denies any side effects or issues with his medication  - Denies any medication or dietary changes      LE Edema:  - Did not get request blood work or echocardiogram.  - Reports that LE edema is unchanged      Bed Bug Infestation:  - Did not get an  to fumigate his home  - Fumigated the house himself  - Has not noticed any bed bugs in his home      ROS: Positive items marked in RED  CON: fever, chills  Cardiovascular: palpitations, CP  Resp: SOB, cough, hemoptysis  GI: nausea, vomiting, diarrhea, melena, hematochezia, hematemesis  : dysuria, hematuria      Past Medical History:   Diagnosis Date    BPH (benign prostatic hyperplasia)     [facility] obstructive pulmonary disease ([facility]     Hyperlipemia     Hypertension     IBS (irritable bowel syndrome)     Recurrent deep vein thrombosis (DVT) ([facility]     [facility]     lower extremity    Thyroid disease        History reviewed. No pertinent surgical history. History reviewed. No pertinent family history. Social History     Social History    Marital status:      Spouse name: N/A    Number of children: N/A    Years of education: N/A     Social History Main Topics    Smoking status: Former Smoker    Smokeless tobacco: Never Used    Alcohol use No    Drug use: No    Sexual activity: Not Asked     Other Topics Concern    None     Social History Narrative       No Known Allergies      Current Outpatient Prescriptions:     acetaminophen (TYLENOL) 500 mg tablet, Take 2 Tabs by mouth every six (6) hours as needed for Pain., Disp: 90 Tab, Rfl: 1    finasteride (PROSCAR) 5 mg tablet, Take 1 Tab by mouth daily. , Disp: 90 Tab, Rfl: 1    levothyroxine (SYNTHROID) 50 mcg tablet, Take 1 Tab by mouth Daily (before breakfast). , Disp: 90 Tab, Rfl: 1    atenolol (TENORMIN) 25 mg tablet, Take 1 Tab by mouth daily. , Disp: 90 Tab, Rfl: 1    amLODIPine (NORVASC) 10 mg tablet, Take 0.5 Tabs by mouth daily. , Disp: 90 Tab, Rfl: 1    warfarin (COUMADIN) 2 mg tablet, Take 4 Tabs by mouth daily. (Patient taking differently: Take 7 mg by mouth daily. 3.5 tablets daily =7mg daily), Disp: 120 Tab, Rfl: 3    albuterol-ipratropium (DUO-NEB) 2.5 mg-0.5 mg/3 ml nebu, 3 mL by Nebulization route every six (6) hours as needed. , Disp: 30 Nebule, Rfl: 12    atorvastatin (LIPITOR) 40 mg tablet, Take 1 Tab by mouth daily. , Disp: 90 Tab, Rfl: 1    fluticasone-salmeterol (ADVAIR) 500-50 mcg/dose diskus inhaler, Take 1 Puff by inhalation every twelve (12) hours. , Disp: 60 Each, Rfl: 3    omeprazole (PRILOSEC) 20 mg capsule, Take 1 Cap by mouth daily. , Disp: 90 Cap, Rfl: 1    Physical Exam:      /75 (BP 1 Location: Left arm, BP Patient Position: Sitting)  Pulse 69  Temp 98.3 °F (36.8 °C) (Oral)   Resp 16  Ht 5' 11\" (1.803 m)  SpO2 96%    General:  WD, WN, NAD, conversant  Eyes: sclera clear bilaterally, no discharge noted, eyelids normal in appearance  HENT: NCAT  Lungs: CTAB, normal respiratory effort and rate  CV: RRR, no MRGs  ABD: soft, non-tender, non-distended, normal bowel sounds  Ext: 2+ pitting edema at ankles. Patient noted to have a bed bug crawling on left ankle. Skin: normal temperature, turgor, color, and texture  Psych: alert and oriented to person, place and time, normal affect  Neuro: speech normal, moving all extremities, gait normal    Results for Tommy Hills (MRN [id number]):   Ref. Range 8/2/2018 11:48   INR Unknown 1.5   Prothrombin time (POC) Latest Units: seconds 18.5       Assessment/Plan     Subtherapeutic Anticoagulation:  - Will increase Warfarin dose to 7 mg on Jara, Mo, Tu, Fr, Sa and 5 mg on We  - Patient to follow up in 2 weeks for INR check      Bilateral LE Edema, Unchanged:  - Likely 2/2 vascular insufficiency.   - Recent blood work make renal and hepatic etiology unlikely  - Will refer to vascular center for evaluation      Bed Bug Infestation, Unchanged:  - Patient advised to have a professional  come to his house and treat it for bed bugs  - Patient given bed bug sample to show to [address] Ordo[address]ez MD  8/2/2018, 12:30 PM 63M, DNR/DNI, poor historian, PMHx of HTN, HLD, CAD (MI, unsure when, unsure if had stent placed), CHF (EF 35-40%), AFib (s/p watchman), ESRD (M/T/Th/Sat), Chronic EtOH abuse w/ cirrhosis, GERD, initially admitted to medicine service w/ volume overload after missing HD; course c/b RRT during first inpatient HD session d/t hypotension and pt was found to have severe biventricular failure on TTE w/ clinical evidence of early low output state; was upgraded to CCU (10/6-10/11) for concern for Cardiogenic shock, s/p CVVHD x 4 days and Dobutamine; stepped down to Telemetry with plan for intermittent HD per Nephro but remained hypotensive while off dobutamine, and was upgraded back to CCU (10/12-10/13) per Nephro concerns for safe dialysis plan given hypotension. On 10/13, pt was able to tolerate full HD session with reinitiation of Midodrine; therefore since 10/13 PM has been stepped back down to Tele for further HD and dispo planning for GENE placement with HD once cleared by Nephro. GI consulted for management of cirrhosis.     RUQ US (10/04/23):  - Cirrhotic morphology.  - Pulsatile flow in the main and left portal veins.  - Moderate ascites.  - Increased right renal cortical echogenicity compatible with medical renal disease.    Recommendations:   - obtain paracentesis (both therapeutic and diagnostic)   - send studies for cell count, albumin, glucose, gram stain, LDH, and protein   - diuresis as per the cardiology team  - mild asterixis on exam but mentating well, so will hold off on treating HE     Case discussed with Dr. Pool. GI Team will continue to follow.    Rosanna Norris D.O.   Gastroenterology Fellow  Weekday 7am-5pm Pager: [phone]  Weeknights/Weekend/Holiday Coverage: Please call the  for contact info

## 2023-10-22 NOTE — CONSULT NOTE ADULT - CONSULT REQUESTED BY NAME
Chad Katy
[address]
Dr. Corley
Medicine
Cardiology
[facility]

## 2023-10-22 NOTE — PROGRESS NOTE ADULT - ASSESSMENT
63Y M w/hx of ESRD admitted for acute on chronic HFpEF  h/c c/b cardiogenic shock.      Echo 10/2023:    2. Moderately reduced left ventricular systolic function with global   hypokinesis.   3. Severely dilated right ventricular size.   4. Severely reduced right ventricular systolic function.   5. Mild aortic stenosis, possible bicuspid valve.   6. Moderate tricuspid regurgitation.   7. Mild pulmonary hypertension.   8. Moderate pericardial effusion localized primarily around the left   ventricle.  EF 36%.      #ESRD with hyponatremia, HFrEF.     Plan:  Hypotensive, hyponatremic, volume overload iso HFrEF and ESRD. Will start Aquapheresis.    Last HD 10/21. No indication for HD(clearance) today.   Will recommend starting vasopressors support iso hypotension/volume overloaded (HFrEF, ESRD), or may consider eugene Hypertonic NS. Starting Aquapheresis now.   Daily BMP   Daily Weights   Renal diet    Access:  R TDC functional     Hypotension:  UF with HD as above   Recommend midodrine 5mg prior to HD only     Anemia:  Hgb 9.5  EPO with HD - received 10/16    MBD;  Calcium: 9.0  Phos: 2.9  Continue to hold phos binders 63Y M w/hx of ESRD admitted for acute on chronic HFpEF  h/c c/b cardiogenic shock.      Echo 10/2023:    2. Moderately reduced left ventricular systolic function with global   hypokinesis.   3. Severely dilated right ventricular size.   4. Severely reduced right ventricular systolic function.   5. Mild aortic stenosis, possible bicuspid valve.   6. Moderate tricuspid regurgitation.   7. Mild pulmonary hypertension.   8. Moderate pericardial effusion localized primarily around the left   ventricle.  EF 36%.        #ESRD; HFrEF.     Plan:  Hypotensive, hyponatremic, volume overload iso HFrEF and ESRD. Will start Aquapheresis, goal is to remove ~2L for the next 24h.   Last HD 10/21. No indication for HD(clearance) today.   Will recommend starting vasopressors support iso hypotension/volume overloaded (HFrEF, ESRD), or may consider eugene Hypertonic NS. Starting Aquapheresis now.   Daily BMP   Daily Weights   Renal diet  Fluid restriction.     Access:  R TDC functional     Hypotension:  UF with HD as above   Recommend midodrine 5mg prior to HD only     Anemia:  Hgb 9.5  EPO with HD - received 10/16    MBD;  Calcium: 9.0  Phos: 2.9  Continue to hold phos binders

## 2023-10-22 NOTE — PROGRESS NOTE ADULT - PROBLEM SELECTOR PLAN 3
Chronic EtOH abuse with cirrhosis on Abd US; discussed with patient the impact his current EtOH use has on his condition, and patient strongly endorsed he has no plans to decrease EtOH use. Out of withdrawal window, not on CIWA protocol moving further.  -low concern for hepatorenal syndrome as remains hemodynamically stable. No known gallbladder pathology  -Abd US (10/4) Cirrhotic morphology. Pulsatile flow in the main and left portal veins. Moderate ascites. Increased right renal cortical echogenicity compatible w/ medical renal dz.  -s/p IV thiamine; c/w folate 1mg daily    #Hepatic Cirrhosis as above  RUQ r/o biliary stasis 10/04  Pt's pruritis possibly from biliary cause given recent elevation in ALP & Btotal.   - outpt Hepatology f/u post-dc  - f/u GGT Chronic EtOH abuse with cirrhosis on Abd US; discussed with patient the impact his current EtOH use has on his condition, and patient strongly endorsed he has no plans to decrease EtOH use. Out of withdrawal window, not on CIWA protocol moving further.  -low concern for hepatorenal syndrome as remains hemodynamically stable. No known gallbladder pathology  -Abd US (10/4) Cirrhotic morphology. Pulsatile flow in the main and left portal veins. Moderate ascites. Increased right renal cortical echogenicity compatible w/ medical renal dz.  -s/p IV thiamine; c/w folate 1mg daily    #Hepatic Cirrhosis as above  RUQ r/o biliary stasis 10/04  Pt's pruritis possibly from biliary cause given recent elevation in ALP & Btotal.   - outpt Hepatology f/u post-dc  - f/u GGT  - hepatology consult given comorbidities and very distended abdomen in setting of cirrhosis

## 2023-10-22 NOTE — CONSULT NOTE ADULT - REASON FOR ADMISSION
missed HD for ESRD

## 2023-10-22 NOTE — PROGRESS NOTE ADULT - SUBJECTIVE AND OBJECTIVE BOX
Subjective:  No acute events overnight.  Patient is without new complaints.  Denies HA, CP, SOB, abdominal pain, nausea, vomiting, fever, chills or diarrhea.     Objective:   Vital Signs:  T(C): 36.4 (22 Oct 2023 09:16), Max: 37 (21 Oct 2023 16:47)  T(F): 97.6 (22 Oct 2023 09:16), Max: 98.6 (21 Oct 2023 16:47)  HR: 84 (22 Oct 2023 11:31) (83 - 103)  BP: 90/57 (22 Oct 2023 11:31) (81/58 - 115/75)  BP(mean): 69 (22 Oct 2023 11:31) (66 - 94)  RR: 18 (22 Oct 2023 11:31) (16 - 22)  SpO2: 94% (22 Oct 2023 08:49) (92% - 97%)    Parameters below as of 22 Oct 2023 11:31  Patient On (Oxygen Delivery Method): nasal cannula  O2 Flow (L/min): 2    Physical Exam:   -Gen: NAD, resting in chair  -HEENT: EOMI, PERRL, no scleral icterus, no JVD, no bruits, right chest TDC  -CV: normal S1 and S2  -Lungs: CTABL,, normal respiratory effort on RA  -Ab: obese, very distended and tense, umbilical hernia is non-tender, normal BS  -Ext: +1 LE edema  -Neuro: A&O x 3, no focal deficits     Labs:                        10.0   7.12  )-----------( 105      ( 22 Oct 2023 10:51 )             33.5       10-22    128<L>  |  93<L>  |  12  ----------------------------<  120<H>  4.1   |  24  |  2.70<H>    Ca    9.3      22 Oct 2023 10:51  Phos  2.9     10-22  Mg     1.8     10-22    TPro  6.8  /  Alb  2.9<L>  /  TBili  1.9<H>  /  DBili  x   /  AST  32  /  ALT  22  /  AlkPhos  253<H>  10-22     Medications:  MEDICATIONS  (STANDING):  aspirin enteric coated 81 milliGRAM(s) Oral daily  atorvastatin 40 milliGRAM(s) Oral at bedtime  folic acid 1 milliGRAM(s) Oral daily  heparin   Injectable 5000 Unit(s) SubCutaneous every 8 hours  influenza  Vaccine (HIGH DOSE) 0.7 milliLiter(s) IntraMuscular once  midodrine 10 milliGRAM(s) Oral <User Schedule>  Nephro-chichi 1 Tablet(s) Oral every 24 hours  pantoprazole    Tablet 40 milliGRAM(s) Oral before breakfast  polyethylene glycol 3350 17 Gram(s) Oral daily  pregabalin 75 milliGRAM(s) Oral daily  senna 2 Tablet(s) Oral at bedtime    MEDICATIONS  (PRN):  ondansetron Injectable 4 milliGRAM(s) IV Push every 12 hours PRN Nausea and/or Vomiting

## 2023-10-22 NOTE — CONSULT NOTE ADULT - TIME BILLING
Time spent includes direct patient care  (interview and examination of patient), discussion with other providers, support staff and/or patient's family members, review of medical records, ordering diagnostic tests and analyzing results, and documentation.
case complexity as above

## 2023-10-22 NOTE — CONSULT NOTE ADULT - ATTENDING COMMENTS
Pt seen in HD for sudden onset of hypoxemia and cyanosis. Pt had severe peripheral cyanosis and pulse ox tracings were unreliable. Pt had PO2 of 400 on subsequent ABG's and pt admiited to MICU. No meds associated with methemoglobinemia and level checked. Severe biventricular dysfunction seen on Echo and dilated RV. Pt was heparinized for new onset Afib and possibility of PE. no DVT seen on POCUS but CT angio of chest was ordered. Pt remained awake and alert with mild increased work of breathing and was comfortable on hiflo. He remained cool peripherally on exam and case discussed at bedside with cardiology. Ischemic event was to be ruled ut and pt to be transferred to CCU for inotropic support and further cardiovascular monitoring.
I:   Dialyzed for hypervolemia. Nowq breathing comfortably.   A: ESRD. Fluid overload.   P: Continue dialysis. UF on HD.
63M with ESRD 2/2 IgA nephropathy, cirrhosis (?ETOH vs cardiac cirrhosis vs combination, still actively drinking and last drink day prior to admission), BiV failure, AI, AS, CAD with diastolic dysfunction, afib, not on GDMT presented after missed HD sessions and c/o SOB.     RRT called during HD today for patient "appearing blue" - unable to obtain pulse ox, concern for hypoxia, also with episodes of hypotension, skin significantly mottled which is new per team. PH consulted for concern of RV failure as etiology for decompensation.    Pt evaluated at bedside w/ PH fellow, BP 88/69 (baseline 100-130), HR , T 96.9, SpO2 inaccurate, RR 24, tachypneic on HFNC; he is lethargic but will respond to noxious stimuli and currently w/ no acute complaints but easily falls back asleep. + JVD, lungs CTA B/L, distant heart sounds, irregular. Abdomen distended but soft, nontender. Hands and feet are cool to touch, skin is mottled with livedo reticularis.     Labs as noted above - most significant, lactate 2.3, chronic anemia w/ thrombocytopenia, bili and AP elevated, mild troponemia with normal CK. ABG confirms NORMAL oxygenation - O2 sat 100%, PaO2 481on 100% NRB.     Bedside TTE continues to demonstrate BiV dysfunction, possible slight interval worsening of BiV function compared to TTE yesterday, IVS with mild end systolic flattening also unchanged which does not support an acute increase in RV afterload. IVC dilated well above 2.1cm with no respiratory variation. Severe biatrial enlargement.     #BiVentricular failure  #Pulmonary insufficiency    Overall clinical picture seems most consistent with worsening biventricular failure c/b hypotension, mottled skin, poor distal circulation and unable to obtain SpO2. Compared echocardiograms from yesterday to limited TTE today - perhaps mild worsening of BiV function but known prior RV and LV enlargement/dysfunction without significantly increased eccentricity index. 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 GDMT limited i/s/o hypotension despite severe cardiac disease. Likely poorly tolerant to even mild hemodynamic changes given underlying AI/AS and biventricular disease.     Concerning is physical exam, uptrending lactate (2.3), and worsening mental status. Lower suspicion for PE at this time, recommend evaluation by CCU for consideration of inotropes, can consider PA-C placement for better guidance of therapies. Discussed w/ CCU attending who has accepted pt to CCU with plans for dobutamine. CCU attending to call HF consultation, will defer management decisions to CCU/HF at this time. Consider trending lactate, SvO2, CVP (+/- PA/PAWP/CO monitoring w/ PA-C), further fluid removal. Please reconsult with any concerns or further questions, remain available for further evaluation if RV disease especially if patient does not respond to inotrope therapy.
Patient seen, examined, and discussed with Dr. Norris. Agree with above. 63M DNR/DNI, poor historian, with a h/o HTN, HLD, CAD (MI, unsure when, unsure if had stent placed), CHF (EF 35-40%), AFib (s/p watchman), ESRD (M/T/Th/Sat), Chronic EtOH abuse w/ cirrhosis, GERD, initially admitted to medicine service w/ volume overload after missing HD; course c/b RRT during first inpatient HD session d/t hypotension and pt was found to have severe biventricular failure on TTE w/ clinical evidence of early low output state; was upgraded to CCU (10/6-10/11) for concern for Cardiogenic shock, s/p CVVHD x 4 days and Dobutamine; stepped down to Telemetry with plan for intermittent HD per Nephro but remained hypotensive while off dobutamine, and was upgraded back to CCU (10/12-10/13) per Nephro concerns for safe dialysis plan given hypotension. On 10/13, pt was able to tolerate full HD session with reinitiation of Midodrine; therefore since 10/13 PM has been stepped back down to Tele for further HD and dispo planning for GENE placement with HD once cleared by Nephro. GI consulted for management of cirrhosis. Please obtain daily MELD labs, including INR and CMP. Based on most recent INR, his MELD-Na is 28 and MELD 3.0 is 29, though this is arguably driven by his ESRD. Diagnostic and therapeutic para is essential to understand the etiology of his ascites. Please obtain the above ascites fluid studies to help delineate. Agree with Dr. Phipps that he will need albumin post-para and reasonable to do it on non-HD days given fluid shifts and hypotension.     Efrain Pool MD  Gastroenterology

## 2023-10-22 NOTE — PROGRESS NOTE ADULT - SUBJECTIVE AND OBJECTIVE BOX
OVERNIGHT EVENTS:    SUBJECTIVE / INTERVAL HPI: Patient seen and examined at bedside.     VITAL SIGNS:  Vital Signs Last 24 Hrs  T(C): 36.7 (22 Oct 2023 04:58), Max: 37 (21 Oct 2023 16:47)  T(F): 98 (22 Oct 2023 04:58), Max: 98.6 (21 Oct 2023 16:47)  HR: 83 (22 Oct 2023 06:38) (83 - 103)  BP: 84/59 (22 Oct 2023 06:13) (81/58 - 115/75)  BP(mean): 68 (22 Oct 2023 06:13) (66 - 95)  RR: 20 (22 Oct 2023 06:38) (16 - 22)  SpO2: 97% (22 Oct 2023 06:38) (92% - 97%)    Parameters below as of 22 Oct 2023 06:38  Patient On (Oxygen Delivery Method): room air        PHYSICAL EXAM:    General: Well developed, well nourished, no acute distress  HEENT: NC/AT; PERRL, anicteric sclera; MMM  Neck: supple  Cardiovascular: +S1/S2, RRR, no murmurs, rubs, gallops  Respiratory: CTA B/L; no W/R/R  Gastrointestinal: soft, NT/ND; +BSx4  Extremities: WWP; no edema, clubbing or cyanosis  Vascular: 2+ radial, DP/PT pulses B/L  Neurological: AAOx3; no focal deficits    MEDICATIONS:  MEDICATIONS  (STANDING):  aspirin enteric coated 81 milliGRAM(s) Oral daily  atorvastatin 40 milliGRAM(s) Oral at bedtime  folic acid 1 milliGRAM(s) Oral daily  heparin   Injectable 5000 Unit(s) SubCutaneous every 8 hours  influenza  Vaccine (HIGH DOSE) 0.7 milliLiter(s) IntraMuscular once  metoprolol succinate ER 25 milliGRAM(s) Oral daily  midodrine 10 milliGRAM(s) Oral <User Schedule>  Nephro-chichi 1 Tablet(s) Oral every 24 hours  pantoprazole    Tablet 40 milliGRAM(s) Oral before breakfast  polyethylene glycol 3350 17 Gram(s) Oral daily  pregabalin 75 milliGRAM(s) Oral daily  senna 2 Tablet(s) Oral at bedtime    MEDICATIONS  (PRN):  ondansetron Injectable 4 milliGRAM(s) IV Push every 12 hours PRN Nausea and/or Vomiting      ALLERGIES:  Allergies    penicillins (Unknown)    Intolerances        LABS:                        9.7    5.74  )-----------( 109      ( 21 Oct 2023 05:30 )             32.2     10-21    123<L>  |  86<L>  |  13  ----------------------------<  117<H>  3.9   |  25  |  3.02<H>    Ca    9.1      21 Oct 2023 05:30  Phos  3.4     10-21  Mg     1.7     10-21    TPro  6.5  /  Alb  2.6<L>  /  TBili  1.9<H>  /  DBili  x   /  AST  26  /  ALT  22  /  AlkPhos  237<H>  10-21      Urinalysis Basic - ( 21 Oct 2023 05:30 )    Color: x / Appearance: x / SG: x / pH: x  Gluc: 117 mg/dL / Ketone: x  / Bili: x / Urobili: x   Blood: x / Protein: x / Nitrite: x   Leuk Esterase: x / RBC: x / WBC x   Sq Epi: x / Non Sq Epi: x / Bacteria: x      CAPILLARY BLOOD GLUCOSE          RADIOLOGY & ADDITIONAL TESTS: Reviewed. HOSPITAL COURSE:  Patient is a 63 year old M PMH HFrEF (last known EF 30-40% a few weeks ago), AFib (s/p watchman), ERSD 2/2 IgA nephropathy on dialysis (M,T, TH, S) since February 2023, congestive hepatopathy, alcohol use disorder, who presented with one day SOB and leg weakness/heaviness iso missed dialysis, admitted for HD and transferred to CCU for concern of cardiogenic shock i/s/o volume overload on dobutamine. Patient received CVVHD. Patient with watchman device for >6 months, discontinued Plavix and transitioned now to aspirin only. Patient with significant improvement in mentation, AAOx3, warm and well-perfused extremities. Was stepped down to telemetry on 10/11/23 late afternoon with interval SBP 80smmHg, also c/b acute HypoNa to 122:  as discussed with Nephrology, pt too hypotensive to safely dialyze on floor; transferred back to CCU / [facility] boarding [facility] ICU for continuous HD with close monitoring. Dialyzed in CCU and stepped back down to telemetry. At Tele, was noted to be fluid overloaded requiring more frequent HD sessions, with maxed out midodrine dosing, but despite most recent HD 10/19, is having continuous relapsing overload signs given increasingly distended abdomen, oliguria requiring paracentesis and increasing HD frequency and aggressive intervention per nephro at ICU level.     OVERNIGHT EVENTS: Was not moved overnight to CCU as nephro/CCU noted patient stable. Overnight SBP 80s.     SUBJECTIVE / INTERVAL HPI: Patient seen and examined at bedside. Patient asymptomatic. Reports feeling same today as yesterday, breathing without distress.    VITAL SIGNS:  Vital Signs Last 24 Hrs  T(C): 36.7 (22 Oct 2023 04:58), Max: 37 (21 Oct 2023 16:47)  T(F): 98 (22 Oct 2023 04:58), Max: 98.6 (21 Oct 2023 16:47)  HR: 83 (22 Oct 2023 06:38) (83 - 103)  BP: 84/59 (22 Oct 2023 06:13) (81/58 - 115/75)  BP(mean): 68 (22 Oct 2023 06:13) (66 - 95)  RR: 20 (22 Oct 2023 06:38) (16 - 22)  SpO2: 97% (22 Oct 2023 06:38) (92% - 97%)    Parameters below as of 22 Oct 2023 06:38  Patient On (Oxygen Delivery Method): room air        PHYSICAL EXAM:    General: Well developed, well nourished, no acute distress  HEENT: NC/AT; PERRL, anicteric sclera; MMM  Neck: supple  Cardiovascular: +S1/S2, RRR, no murmurs, rubs, gallops  Respiratory: bibasilar rales without wheezing, no accessory muscle use  Gastrointestinal: large distended but soft abdomen, non-tender  Extremities: WWP; bilateral 2+ pitting edema legs to foot  Vascular: 2+ radial, DP/PT pulses B/L  Neurological: AAOx3; no focal deficits    MEDICATIONS:  MEDICATIONS  (STANDING):  aspirin enteric coated 81 milliGRAM(s) Oral daily  atorvastatin 40 milliGRAM(s) Oral at bedtime  folic acid 1 milliGRAM(s) Oral daily  heparin   Injectable 5000 Unit(s) SubCutaneous every 8 hours  influenza  Vaccine (HIGH DOSE) 0.7 milliLiter(s) IntraMuscular once  metoprolol succinate ER 25 milliGRAM(s) Oral daily  midodrine 10 milliGRAM(s) Oral <User Schedule>  Nephro-chichi 1 Tablet(s) Oral every 24 hours  pantoprazole    Tablet 40 milliGRAM(s) Oral before breakfast  polyethylene glycol 3350 17 Gram(s) Oral daily  pregabalin 75 milliGRAM(s) Oral daily  senna 2 Tablet(s) Oral at bedtime    MEDICATIONS  (PRN):  ondansetron Injectable 4 milliGRAM(s) IV Push every 12 hours PRN Nausea and/or Vomiting      ALLERGIES:  Allergies    penicillins (Unknown)    Intolerances        LABS:                        9.7    5.74  )-----------( 109      ( 21 Oct 2023 05:30 )             32.2     10-21    123<L>  |  86<L>  |  13  ----------------------------<  117<H>  3.9   |  25  |  3.02<H>    Ca    9.1      21 Oct 2023 05:30  Phos  3.4     10-21  Mg     1.7     10-21    TPro  6.5  /  Alb  2.6<L>  /  TBili  1.9<H>  /  DBili  x   /  AST  26  /  ALT  22  /  AlkPhos  237<H>  10-21      Urinalysis Basic - ( 21 Oct 2023 05:30 )    Color: x / Appearance: x / SG: x / pH: x  Gluc: 117 mg/dL / Ketone: x  / Bili: x / Urobili: x   Blood: x / Protein: x / Nitrite: x   Leuk Esterase: x / RBC: x / WBC x   Sq Epi: x / Non Sq Epi: x / Bacteria: x      CAPILLARY BLOOD GLUCOSE          RADIOLOGY & ADDITIONAL TESTS: Reviewed. HOSPITAL COURSE:  Patient is a 63 year old M PMH HFrEF (last known EF 30-40% a few weeks ago), AFib (s/p watchman), ERSD 2/2 IgA nephropathy on dialysis (M,T, TH, S) since February 2023, congestive hepatopathy, alcohol use disorder, who presented with one day SOB and leg weakness/heaviness iso missed dialysis, admitted for HD and transferred to CCU for concern of cardiogenic shock i/s/o volume overload on dobutamine. Patient received CVVHD. Patient with watchman device for >6 months, discontinued Plavix and transitioned now to aspirin only. Patient with significant improvement in mentation, AAOx3, warm and well-perfused extremities. Was stepped down to telemetry on 10/11/23 late afternoon with interval SBP 80smmHg, also c/b acute HypoNa to 122:  as discussed with Nephrology, pt too hypotensive to safely dialyze on floor; transferred back to CCU / [facility] boarding [facility] ICU for continuous HD with close monitoring. Dialyzed in CCU and stepped back down to telemetry 10/17. At Tele, was noted to be fluid overloaded requiring more frequent HD sessions, with maxed out midodrine dosing remains hypotensive SBP 80s and remains very fluid overloaded. Given hypotension while on midodrine and fluid overload status requiring hemodialysis, patient would benefit from continuous hemodialysis requiring step up to CCU.      OVERNIGHT EVENTS: Was not moved overnight to CCU as nephro/CCU noted patient stable. Overnight SBP 80s.     SUBJECTIVE / INTERVAL HPI: Patient seen and examined at bedside. Patient asymptomatic. Reports feeling same today as yesterday, breathing without distress.    VITAL SIGNS:  Vital Signs Last 24 Hrs  T(C): 36.7 (22 Oct 2023 04:58), Max: 37 (21 Oct 2023 16:47)  T(F): 98 (22 Oct 2023 04:58), Max: 98.6 (21 Oct 2023 16:47)  HR: 83 (22 Oct 2023 06:38) (83 - 103)  BP: 84/59 (22 Oct 2023 06:13) (81/58 - 115/75)  BP(mean): 68 (22 Oct 2023 06:13) (66 - 95)  RR: 20 (22 Oct 2023 06:38) (16 - 22)  SpO2: 97% (22 Oct 2023 06:38) (92% - 97%)    Parameters below as of 22 Oct 2023 06:38  Patient On (Oxygen Delivery Method): room air        PHYSICAL EXAM:    General: Well developed, well nourished, no acute distress  HEENT: NC/AT; PERRL, anicteric sclera; MMM  Neck: supple  Cardiovascular: +S1/S2, RRR, no murmurs, rubs, gallops  Respiratory: bibasilar rales without wheezing, no accessory muscle use  Gastrointestinal: large distended but soft abdomen, non-tender  Extremities: WWP; bilateral 2+ pitting edema legs to foot  Vascular: 2+ radial, DP/PT pulses B/L  Neurological: AAOx3; no focal deficits    MEDICATIONS:  MEDICATIONS  (STANDING):  aspirin enteric coated 81 milliGRAM(s) Oral daily  atorvastatin 40 milliGRAM(s) Oral at bedtime  folic acid 1 milliGRAM(s) Oral daily  heparin   Injectable 5000 Unit(s) SubCutaneous every 8 hours  influenza  Vaccine (HIGH DOSE) 0.7 milliLiter(s) IntraMuscular once  metoprolol succinate ER 25 milliGRAM(s) Oral daily  midodrine 10 milliGRAM(s) Oral <User Schedule>  Nephro-chichi 1 Tablet(s) Oral every 24 hours  pantoprazole    Tablet 40 milliGRAM(s) Oral before breakfast  polyethylene glycol 3350 17 Gram(s) Oral daily  pregabalin 75 milliGRAM(s) Oral daily  senna 2 Tablet(s) Oral at bedtime    MEDICATIONS  (PRN):  ondansetron Injectable 4 milliGRAM(s) IV Push every 12 hours PRN Nausea and/or Vomiting      ALLERGIES:  Allergies    penicillins (Unknown)    Intolerances        LABS:                        9.7    5.74  )-----------( 109      ( 21 Oct 2023 05:30 )             32.2     10-21    123<L>  |  86<L>  |  13  ----------------------------<  117<H>  3.9   |  25  |  3.02<H>    Ca    9.1      21 Oct 2023 05:30  Phos  3.4     10-21  Mg     1.7     10-21    TPro  6.5  /  Alb  2.6<L>  /  TBili  1.9<H>  /  DBili  x   /  AST  26  /  ALT  22  /  AlkPhos  237<H>  10-21      Urinalysis Basic - ( 21 Oct 2023 05:30 )    Color: x / Appearance: x / SG: x / pH: x  Gluc: 117 mg/dL / Ketone: x  / Bili: x / Urobili: x   Blood: x / Protein: x / Nitrite: x   Leuk Esterase: x / RBC: x / WBC x   Sq Epi: x / Non Sq Epi: x / Bacteria: x      CAPILLARY BLOOD GLUCOSE          RADIOLOGY & ADDITIONAL TESTS: Reviewed.

## 2023-10-22 NOTE — PROGRESS NOTE ADULT - ASSESSMENT
63-year-old male with a PMHx of HFrEF (EF 30-40%), Afib (s/p watchman), ESRD (on HD MTThS) and alcohol use disorder who presented with SOB in setting of missed HD, initially admitted to medicine for HD but was transferred to CCU for management of cardiogenic shock, s/p dobutamine and CVVHD, now stepped down to cardiac telemetry.          #HFrEF         -further management as per cardiology          -not currently on GDMT due to soft blood pressure, defer initiation to primary team          -volume removal with HD as per cardiology and nephrology          -palliative care consulted, appreciate recommendations         #ESRD         #Hyponatremia        -further management as per nephrology, may need to go back to ICU setting for CVVH or AQ   -continue with midodrine 10mg TID on HD days         -continue with 1L fluid restriction         #Cirrhosis         #Thrombocytopenia and Bilirubinemia (stable)         -unclear if cirrhosis is 2/2 alcohol use or heart failure and no ascitic fluid analysis to help determine etiology    -abdominal US with cirrhotic liver, moderate ascites, no biliary dilation, prior cholecystectomy and no concern for PVT         -given worsening abdominal distension, would benefit from diagnostic and therapeutic paracentesis, recommend being completed on non-HD days and while in an ICU setting days given limitations 2/2 hypotension and fluid shifts     -if patient to undergo paracentesis, please send peritoneal fluid for cell count with diff, gram stain/culture, cytology, peritoneal fluid protein, peritoneal fluid albumin, peritoneal fluid glucose and peritoneal fluid LDH    -recommend hepatology consult given multi-system organ failure and tenuous hemodynamics with HD and presumably following paracentesis   -would also need albumin infusion post-paracentesis but currently currently grossly volume overloaded with difficulty achieving volume removal     #CAD        -continue with ASA 81mg daily and atorvastatin 40mg qhs           #Afib         -continue with ASA monotherapy, patient with Watchman device            #Macrocytic Anemia          -likely 2/2 ACD and ESRD         -iron studies suggestive of ACD, B12 and Folate within normal limits, T4 within normal limits         -continue with folic acid and EPO with HD as per nephrology     DVT PPx: SQH    Dispo: GENE with HD

## 2023-10-22 NOTE — PROGRESS NOTE ADULT - ASSESSMENT
63M, DNR/DNI, poor historian, PMHx of HTN, HLD, CAD (MI, unsure when, unsure if had stent placed), CHF (EF 35-40%), AFib (s/p watchman), ESRD (M/T/Th/Sat), Chronic EtOH abuse w/ cirrhosis, GERD, initially admitted to medicine service w/ volume overload after missing HD; course c/b RRT during first inpatient HD session d/t hypotension, found to have severe biventricular failure on TTE w/clinical evidence of early low output state; upgraded to CCU (10/6-10/11) for concern for Cardiogenic shock, s/p CVVHD x 4 days and Dobutamine; stepped down to Telemetry with plan for intermittent HD per Nephro but remained hypotensive while off dobutamine, and was upgraded back to CCU (10/12-10/13) per Nephro d/t concerns for safe dialysis given hypotension trends. On 10/13 pt was able to tolerate full iHD session with reinitiation of Midodrine prior to sessions; & stepped back down to Tele for further HD and optimization.

## 2023-10-22 NOTE — PROGRESS NOTE ADULT - SUBJECTIVE AND OBJECTIVE BOX
***TRANSFER FROM Merged with [facility] TO CCU***  Hospital Course: Patient is a 63 year old M PMH HFrEF (last known EF 30-40% a few weeks ago), AFib (s/p watchman), ERSD 2/2 IgA nephropathy on dialysis (M,T, TH, S) since February 2023, congestive hepatopathy, alcohol use disorder, who presented with one day SOB and leg weakness/heaviness iso missed dialysis, admitted for HD and transferred to CCU for concern of cardiogenic shock i/s/o volume overload on dobutamine. Patient received CVVHD. Patient with watchman device for >6 months, discontinued Plavix and transitioned now to aspirin only. Patient with significant improvement in mentation, AAOx3, warm and well-perfused extremities. Was stepped down to telemetry on 10/11/23 late afternoon with interval SBP 80smmHg, also c/b acute HypoNa to 122:  as discussed with Nephrology, pt too hypotensive to safely dialyze on floor; transferred back to CCU / [address] [place], [place] [id number] [facility] ICU for continuous HD with close monitoring. Dialyzed in CCU and stepped back down to telemetry 10/17. At Tele, was noted to be fluid overloaded requiring more frequent HD sessions, with maxed out midodrine dosing remains hypotensive SBP 80s and remains very fluid overloaded. Given hypotension while on midodrine and fluid overload status requiring hemodialysis, patient stepped up to CCU for aquapheresis and inotrope support.     SUBJECTIVE / INTERVAL HPI: Patient seen and examined at bedside. Patient asymptomatic. Reports feeling same today as yesterday, breathing without distress.    VITAL SIGNS:  ICU Vital Signs Last 24 Hrs  T(C): 36.9 (22 Oct 2023 17:11), Max: 36.9 (22 Oct 2023 17:11)  T(F): 98.4 (22 Oct 2023 17:11), Max: 98.4 (22 Oct 2023 17:11)  HR: 82 (22 Oct 2023 17:12) (82 - 99)  BP: 103/81 (22 Oct 2023 17:12) (81/58 - 108/63)  BP(mean): 89 (22 Oct 2023 17:12) (66 - 89)  ABP: --  ABP(mean): --  RR: 14 (22 Oct 2023 17:12) (14 - 22)  SpO2: 94% (22 Oct 2023 17:12) (93% - 97%)    O2 Parameters below as of 22 Oct 2023 17:12  Patient On (Oxygen Delivery Method): nasal cannula  O2 Flow (L/min): 2    PHYSICAL EXAM:    General: Well developed, well nourished, no acute distress  HEENT: NC/AT; PERRL, anicteric sclera; MMM  Neck: supple  Cardiovascular: +S1/S2, RRR, no murmurs, rubs, gallops  Respiratory: bibasilar rales without wheezing, no accessory muscle use  Gastrointestinal: large distended but soft abdomen, non-tender  Extremities: WWP; bilateral 2+ pitting edema legs to foot  Vascular: 2+ radial, DP/PT pulses B/L  Neurological: AAOx3; no focal deficits    MEDICATIONS:  MEDICATIONS  (STANDING):  aspirin enteric coated 81 milliGRAM(s) Oral daily  atorvastatin 40 milliGRAM(s) Oral at bedtime  folic acid 1 milliGRAM(s) Oral daily  heparin   Injectable 5000 Unit(s) SubCutaneous every 8 hours  influenza  Vaccine (HIGH DOSE) 0.7 milliLiter(s) IntraMuscular once  metoprolol succinate ER 25 milliGRAM(s) Oral daily  midodrine 10 milliGRAM(s) Oral <User Schedule>  Nephro-chichi 1 Tablet(s) Oral every 24 hours  pantoprazole    Tablet 40 milliGRAM(s) Oral before breakfast  polyethylene glycol 3350 17 Gram(s) Oral daily  pregabalin 75 milliGRAM(s) Oral daily  senna 2 Tablet(s) Oral at bedtime    MEDICATIONS  (PRN):  ondansetron Injectable 4 milliGRAM(s) IV Push every 12 hours PRN Nausea and/or Vomiting      ALLERGIES:  Allergies    penicillins (Unknown)    Intolerances        LABS:                          10.0   7.12  )-----------( 105      ( 22 Oct 2023 10:51 )             33.5     10-22    128<L>  |  93<L>  |  12  ----------------------------<  120<H>  4.1   |  24  |  2.70<H>    Ca    9.3      22 Oct 2023 10:51  Phos  2.9     10-22  Mg     1.8     10-22    TPro  6.8  /  Alb  2.9<L>  /  TBili  1.9<H>  /  DBili  x   /  AST  32  /  ALT  22  /  AlkPhos  253<H>  10-22      Urinalysis Basic - ( 22 Oct 2023 10:51 )    Color: x / Appearance: x / SG: x / pH: x  Gluc: 120 mg/dL / Ketone: x  / Bili: x / Urobili: x   Blood: x / Protein: x / Nitrite: x   Leuk Esterase: x / RBC: x / WBC x   Sq Epi: x / Non Sq Epi: x / Bacteria: x    Lactate, Blood: 1.4 mmol/L (10-22 @ 12:08)      RADIOLOGY, EKG & ADDITIONAL TESTS: Reviewed.         RADIOLOGY & ADDITIONAL TESTS: Reviewed.

## 2023-10-23 DIAGNOSIS — Z51.5 ENCOUNTER FOR PALLIATIVE CARE: ICD-10-CM

## 2023-10-23 DIAGNOSIS — N18.6 END STAGE RENAL DISEASE: ICD-10-CM

## 2023-10-23 DIAGNOSIS — I50.9 HEART FAILURE, UNSPECIFIED: ICD-10-CM

## 2023-10-23 DIAGNOSIS — R53.81 OTHER MALAISE: ICD-10-CM

## 2023-10-23 DIAGNOSIS — Z71.89 OTHER SPECIFIED COUNSELING: ICD-10-CM

## 2023-10-23 LAB
ALBUMIN SERPL ELPH-MCNC: 2.5 G/DL — LOW (ref 3.3–5)
ALBUMIN SERPL ELPH-MCNC: 2.5 G/DL — LOW (ref 3.3–5)
ALBUMIN SERPL ELPH-MCNC: 2.6 G/DL — LOW (ref 3.3–5)
ALBUMIN SERPL ELPH-MCNC: 2.6 G/DL — LOW (ref 3.3–5)
ALP SERPL-CCNC: 224 U/L — HIGH (ref 40–120)
ALP SERPL-CCNC: 224 U/L — HIGH (ref 40–120)
ALP SERPL-CCNC: 241 U/L — HIGH (ref 40–120)
ALP SERPL-CCNC: 241 U/L — HIGH (ref 40–120)
ALT FLD-CCNC: 19 U/L — SIGNIFICANT CHANGE UP (ref 10–45)
ALT FLD-CCNC: 19 U/L — SIGNIFICANT CHANGE UP (ref 10–45)
ALT FLD-CCNC: 21 U/L — SIGNIFICANT CHANGE UP (ref 10–45)
ALT FLD-CCNC: 21 U/L — SIGNIFICANT CHANGE UP (ref 10–45)
ANION GAP SERPL CALC-SCNC: 10 MMOL/L — SIGNIFICANT CHANGE UP (ref 5–17)
ANION GAP SERPL CALC-SCNC: 10 MMOL/L — SIGNIFICANT CHANGE UP (ref 5–17)
ANION GAP SERPL CALC-SCNC: 14 MMOL/L — SIGNIFICANT CHANGE UP (ref 5–17)
ANION GAP SERPL CALC-SCNC: 14 MMOL/L — SIGNIFICANT CHANGE UP (ref 5–17)
ANISOCYTOSIS BLD QL: SIGNIFICANT CHANGE UP
ANISOCYTOSIS BLD QL: SIGNIFICANT CHANGE UP
AST SERPL-CCNC: 25 U/L — SIGNIFICANT CHANGE UP (ref 10–40)
AST SERPL-CCNC: 25 U/L — SIGNIFICANT CHANGE UP (ref 10–40)
AST SERPL-CCNC: 35 U/L — SIGNIFICANT CHANGE UP (ref 10–40)
AST SERPL-CCNC: 35 U/L — SIGNIFICANT CHANGE UP (ref 10–40)
BASOPHILS # BLD AUTO: 0 K/UL — SIGNIFICANT CHANGE UP (ref 0–0.2)
BASOPHILS # BLD AUTO: 0 K/UL — SIGNIFICANT CHANGE UP (ref 0–0.2)
BASOPHILS # BLD AUTO: 0.05 K/UL — SIGNIFICANT CHANGE UP (ref 0–0.2)
BASOPHILS # BLD AUTO: 0.05 K/UL — SIGNIFICANT CHANGE UP (ref 0–0.2)
BASOPHILS NFR BLD AUTO: 0 % — SIGNIFICANT CHANGE UP (ref 0–2)
BASOPHILS NFR BLD AUTO: 0 % — SIGNIFICANT CHANGE UP (ref 0–2)
BASOPHILS NFR BLD AUTO: 0.9 % — SIGNIFICANT CHANGE UP (ref 0–2)
BASOPHILS NFR BLD AUTO: 0.9 % — SIGNIFICANT CHANGE UP (ref 0–2)
BILIRUB SERPL-MCNC: 1.7 MG/DL — HIGH (ref 0.2–1.2)
BILIRUB SERPL-MCNC: 1.7 MG/DL — HIGH (ref 0.2–1.2)
BILIRUB SERPL-MCNC: 1.8 MG/DL — HIGH (ref 0.2–1.2)
BILIRUB SERPL-MCNC: 1.8 MG/DL — HIGH (ref 0.2–1.2)
BUN SERPL-MCNC: 14 MG/DL — SIGNIFICANT CHANGE UP (ref 7–23)
BUN SERPL-MCNC: 14 MG/DL — SIGNIFICANT CHANGE UP (ref 7–23)
BUN SERPL-MCNC: 16 MG/DL — SIGNIFICANT CHANGE UP (ref 7–23)
BUN SERPL-MCNC: 16 MG/DL — SIGNIFICANT CHANGE UP (ref 7–23)
BURR CELLS BLD QL SMEAR: PRESENT — SIGNIFICANT CHANGE UP
BURR CELLS BLD QL SMEAR: PRESENT — SIGNIFICANT CHANGE UP
CALCIUM SERPL-MCNC: 9.1 MG/DL — SIGNIFICANT CHANGE UP (ref 8.4–10.5)
CHLORIDE SERPL-SCNC: 90 MMOL/L — LOW (ref 96–108)
CHLORIDE SERPL-SCNC: 90 MMOL/L — LOW (ref 96–108)
CHLORIDE SERPL-SCNC: 92 MMOL/L — LOW (ref 96–108)
CHLORIDE SERPL-SCNC: 92 MMOL/L — LOW (ref 96–108)
CO2 SERPL-SCNC: 19 MMOL/L — LOW (ref 22–31)
CO2 SERPL-SCNC: 19 MMOL/L — LOW (ref 22–31)
CO2 SERPL-SCNC: 25 MMOL/L — SIGNIFICANT CHANGE UP (ref 22–31)
CO2 SERPL-SCNC: 25 MMOL/L — SIGNIFICANT CHANGE UP (ref 22–31)
CREAT SERPL-MCNC: 3.16 MG/DL — HIGH (ref 0.5–1.3)
CREAT SERPL-MCNC: 3.16 MG/DL — HIGH (ref 0.5–1.3)
CREAT SERPL-MCNC: 3.38 MG/DL — HIGH (ref 0.5–1.3)
CREAT SERPL-MCNC: 3.38 MG/DL — HIGH (ref 0.5–1.3)
EGFR: 19 ML/MIN/1.73M2 — LOW
EGFR: 19 ML/MIN/1.73M2 — LOW
EGFR: 21 ML/MIN/1.73M2 — LOW
EGFR: 21 ML/MIN/1.73M2 — LOW
EOSINOPHIL # BLD AUTO: 0 K/UL — SIGNIFICANT CHANGE UP (ref 0–0.5)
EOSINOPHIL # BLD AUTO: 0 K/UL — SIGNIFICANT CHANGE UP (ref 0–0.5)
EOSINOPHIL # BLD AUTO: 0.01 K/UL — SIGNIFICANT CHANGE UP (ref 0–0.5)
EOSINOPHIL # BLD AUTO: 0.01 K/UL — SIGNIFICANT CHANGE UP (ref 0–0.5)
EOSINOPHIL NFR BLD AUTO: 0 % — SIGNIFICANT CHANGE UP (ref 0–6)
EOSINOPHIL NFR BLD AUTO: 0 % — SIGNIFICANT CHANGE UP (ref 0–6)
EOSINOPHIL NFR BLD AUTO: 0.2 % — SIGNIFICANT CHANGE UP (ref 0–6)
EOSINOPHIL NFR BLD AUTO: 0.2 % — SIGNIFICANT CHANGE UP (ref 0–6)
GIANT PLATELETS BLD QL SMEAR: PRESENT — SIGNIFICANT CHANGE UP
GIANT PLATELETS BLD QL SMEAR: PRESENT — SIGNIFICANT CHANGE UP
GLUCOSE SERPL-MCNC: 119 MG/DL — HIGH (ref 70–99)
GLUCOSE SERPL-MCNC: 119 MG/DL — HIGH (ref 70–99)
GLUCOSE SERPL-MCNC: 129 MG/DL — HIGH (ref 70–99)
GLUCOSE SERPL-MCNC: 129 MG/DL — HIGH (ref 70–99)
HCT VFR BLD CALC: 29.3 % — LOW (ref 39–50)
HCT VFR BLD CALC: 29.3 % — LOW (ref 39–50)
HCT VFR BLD CALC: 33.1 % — LOW (ref 39–50)
HCT VFR BLD CALC: 33.1 % — LOW (ref 39–50)
HGB BLD-MCNC: 9.1 G/DL — LOW (ref 13–17)
HGB BLD-MCNC: 9.1 G/DL — LOW (ref 13–17)
HGB BLD-MCNC: 9.9 G/DL — LOW (ref 13–17)
HGB BLD-MCNC: 9.9 G/DL — LOW (ref 13–17)
HYPOCHROMIA BLD QL: SLIGHT — SIGNIFICANT CHANGE UP
HYPOCHROMIA BLD QL: SLIGHT — SIGNIFICANT CHANGE UP
IMM GRANULOCYTES NFR BLD AUTO: 0.6 % — SIGNIFICANT CHANGE UP (ref 0–0.9)
IMM GRANULOCYTES NFR BLD AUTO: 0.6 % — SIGNIFICANT CHANGE UP (ref 0–0.9)
LACTATE SERPL-SCNC: 0.9 MMOL/L — SIGNIFICANT CHANGE UP (ref 0.5–2)
LACTATE SERPL-SCNC: 0.9 MMOL/L — SIGNIFICANT CHANGE UP (ref 0.5–2)
LACTATE SERPL-SCNC: 1 MMOL/L — SIGNIFICANT CHANGE UP (ref 0.5–2)
LACTATE SERPL-SCNC: 1 MMOL/L — SIGNIFICANT CHANGE UP (ref 0.5–2)
LYMPHOCYTES # BLD AUTO: 0.53 K/UL — LOW (ref 1–3.3)
LYMPHOCYTES # BLD AUTO: 10 % — LOW (ref 13–44)
LYMPHOCYTES # BLD AUTO: 10 % — LOW (ref 13–44)
LYMPHOCYTES # BLD AUTO: 9.1 % — LOW (ref 13–44)
LYMPHOCYTES # BLD AUTO: 9.1 % — LOW (ref 13–44)
MACROCYTES BLD QL: SIGNIFICANT CHANGE UP
MACROCYTES BLD QL: SIGNIFICANT CHANGE UP
MAGNESIUM SERPL-MCNC: 2 MG/DL — SIGNIFICANT CHANGE UP (ref 1.6–2.6)
MAGNESIUM SERPL-MCNC: 2 MG/DL — SIGNIFICANT CHANGE UP (ref 1.6–2.6)
MAGNESIUM SERPL-MCNC: 2.1 MG/DL — SIGNIFICANT CHANGE UP (ref 1.6–2.6)
MAGNESIUM SERPL-MCNC: 2.1 MG/DL — SIGNIFICANT CHANGE UP (ref 1.6–2.6)
MANUAL SMEAR VERIFICATION: SIGNIFICANT CHANGE UP
MANUAL SMEAR VERIFICATION: SIGNIFICANT CHANGE UP
MCHC RBC-ENTMCNC: 29.9 GM/DL — LOW (ref 32–36)
MCHC RBC-ENTMCNC: 29.9 GM/DL — LOW (ref 32–36)
MCHC RBC-ENTMCNC: 31.1 GM/DL — LOW (ref 32–36)
MCHC RBC-ENTMCNC: 31.1 GM/DL — LOW (ref 32–36)
MCHC RBC-ENTMCNC: 32.4 PG — SIGNIFICANT CHANGE UP (ref 27–34)
MCHC RBC-ENTMCNC: 32.4 PG — SIGNIFICANT CHANGE UP (ref 27–34)
MCHC RBC-ENTMCNC: 32.8 PG — SIGNIFICANT CHANGE UP (ref 27–34)
MCHC RBC-ENTMCNC: 32.8 PG — SIGNIFICANT CHANGE UP (ref 27–34)
MCV RBC AUTO: 104.3 FL — HIGH (ref 80–100)
MCV RBC AUTO: 104.3 FL — HIGH (ref 80–100)
MCV RBC AUTO: 109.6 FL — HIGH (ref 80–100)
MCV RBC AUTO: 109.6 FL — HIGH (ref 80–100)
MONOCYTES # BLD AUTO: 0.05 K/UL — SIGNIFICANT CHANGE UP (ref 0–0.9)
MONOCYTES # BLD AUTO: 0.05 K/UL — SIGNIFICANT CHANGE UP (ref 0–0.9)
MONOCYTES # BLD AUTO: 0.41 K/UL — SIGNIFICANT CHANGE UP (ref 0–0.9)
MONOCYTES # BLD AUTO: 0.41 K/UL — SIGNIFICANT CHANGE UP (ref 0–0.9)
MONOCYTES NFR BLD AUTO: 0.9 % — LOW (ref 2–14)
MONOCYTES NFR BLD AUTO: 0.9 % — LOW (ref 2–14)
MONOCYTES NFR BLD AUTO: 7.7 % — SIGNIFICANT CHANGE UP (ref 2–14)
MONOCYTES NFR BLD AUTO: 7.7 % — SIGNIFICANT CHANGE UP (ref 2–14)
NEUTROPHILS # BLD AUTO: 4.27 K/UL — SIGNIFICANT CHANGE UP (ref 1.8–7.4)
NEUTROPHILS # BLD AUTO: 4.27 K/UL — SIGNIFICANT CHANGE UP (ref 1.8–7.4)
NEUTROPHILS # BLD AUTO: 5.25 K/UL — SIGNIFICANT CHANGE UP (ref 1.8–7.4)
NEUTROPHILS # BLD AUTO: 5.25 K/UL — SIGNIFICANT CHANGE UP (ref 1.8–7.4)
NEUTROPHILS NFR BLD AUTO: 80.6 % — HIGH (ref 43–77)
NEUTROPHILS NFR BLD AUTO: 80.6 % — HIGH (ref 43–77)
NEUTROPHILS NFR BLD AUTO: 90 % — HIGH (ref 43–77)
NEUTROPHILS NFR BLD AUTO: 90 % — HIGH (ref 43–77)
NRBC # BLD: 0 /100 WBCS — SIGNIFICANT CHANGE UP (ref 0–0)
NRBC # BLD: 0 /100 WBCS — SIGNIFICANT CHANGE UP (ref 0–0)
OVALOCYTES BLD QL SMEAR: SLIGHT — SIGNIFICANT CHANGE UP
OVALOCYTES BLD QL SMEAR: SLIGHT — SIGNIFICANT CHANGE UP
PHOSPHATE SERPL-MCNC: 3.7 MG/DL — SIGNIFICANT CHANGE UP (ref 2.5–4.5)
PHOSPHATE SERPL-MCNC: 3.7 MG/DL — SIGNIFICANT CHANGE UP (ref 2.5–4.5)
PHOSPHATE SERPL-MCNC: 4.1 MG/DL — SIGNIFICANT CHANGE UP (ref 2.5–4.5)
PHOSPHATE SERPL-MCNC: 4.1 MG/DL — SIGNIFICANT CHANGE UP (ref 2.5–4.5)
PLAT MORPH BLD: NORMAL — SIGNIFICANT CHANGE UP
PLAT MORPH BLD: NORMAL — SIGNIFICANT CHANGE UP
PLATELET # BLD AUTO: 89 K/UL — LOW (ref 150–400)
PLATELET # BLD AUTO: 89 K/UL — LOW (ref 150–400)
PLATELET # BLD AUTO: 98 K/UL — LOW (ref 150–400)
PLATELET # BLD AUTO: 98 K/UL — LOW (ref 150–400)
POIKILOCYTOSIS BLD QL AUTO: SIGNIFICANT CHANGE UP
POIKILOCYTOSIS BLD QL AUTO: SIGNIFICANT CHANGE UP
POLYCHROMASIA BLD QL SMEAR: SLIGHT — SIGNIFICANT CHANGE UP
POLYCHROMASIA BLD QL SMEAR: SLIGHT — SIGNIFICANT CHANGE UP
POTASSIUM SERPL-MCNC: 4.1 MMOL/L — SIGNIFICANT CHANGE UP (ref 3.5–5.3)
POTASSIUM SERPL-MCNC: 4.1 MMOL/L — SIGNIFICANT CHANGE UP (ref 3.5–5.3)
POTASSIUM SERPL-MCNC: 4.5 MMOL/L — SIGNIFICANT CHANGE UP (ref 3.5–5.3)
POTASSIUM SERPL-MCNC: 4.5 MMOL/L — SIGNIFICANT CHANGE UP (ref 3.5–5.3)
POTASSIUM SERPL-SCNC: 4.1 MMOL/L — SIGNIFICANT CHANGE UP (ref 3.5–5.3)
POTASSIUM SERPL-SCNC: 4.1 MMOL/L — SIGNIFICANT CHANGE UP (ref 3.5–5.3)
POTASSIUM SERPL-SCNC: 4.5 MMOL/L — SIGNIFICANT CHANGE UP (ref 3.5–5.3)
POTASSIUM SERPL-SCNC: 4.5 MMOL/L — SIGNIFICANT CHANGE UP (ref 3.5–5.3)
PROT SERPL-MCNC: 6 G/DL — SIGNIFICANT CHANGE UP (ref 6–8.3)
PROT SERPL-MCNC: 6 G/DL — SIGNIFICANT CHANGE UP (ref 6–8.3)
PROT SERPL-MCNC: 6.1 G/DL — SIGNIFICANT CHANGE UP (ref 6–8.3)
PROT SERPL-MCNC: 6.1 G/DL — SIGNIFICANT CHANGE UP (ref 6–8.3)
RBC # BLD: 2.81 M/UL — LOW (ref 4.2–5.8)
RBC # BLD: 2.81 M/UL — LOW (ref 4.2–5.8)
RBC # BLD: 3.02 M/UL — LOW (ref 4.2–5.8)
RBC # BLD: 3.02 M/UL — LOW (ref 4.2–5.8)
RBC # FLD: 17 % — HIGH (ref 10.3–14.5)
RBC # FLD: 17 % — HIGH (ref 10.3–14.5)
RBC # FLD: 17.2 % — HIGH (ref 10.3–14.5)
RBC # FLD: 17.2 % — HIGH (ref 10.3–14.5)
RBC BLD AUTO: ABNORMAL
RBC BLD AUTO: ABNORMAL
SMUDGE CELLS # BLD: PRESENT — SIGNIFICANT CHANGE UP
SMUDGE CELLS # BLD: PRESENT — SIGNIFICANT CHANGE UP
SODIUM SERPL-SCNC: 125 MMOL/L — LOW (ref 135–145)
WBC # BLD: 5.3 K/UL — SIGNIFICANT CHANGE UP (ref 3.8–10.5)
WBC # BLD: 5.3 K/UL — SIGNIFICANT CHANGE UP (ref 3.8–10.5)
WBC # BLD: 5.83 K/UL — SIGNIFICANT CHANGE UP (ref 3.8–10.5)
WBC # BLD: 5.83 K/UL — SIGNIFICANT CHANGE UP (ref 3.8–10.5)
WBC # FLD AUTO: 5.3 K/UL — SIGNIFICANT CHANGE UP (ref 3.8–10.5)
WBC # FLD AUTO: 5.3 K/UL — SIGNIFICANT CHANGE UP (ref 3.8–10.5)
WBC # FLD AUTO: 5.83 K/UL — SIGNIFICANT CHANGE UP (ref 3.8–10.5)
WBC # FLD AUTO: 5.83 K/UL — SIGNIFICANT CHANGE UP (ref 3.8–10.5)

## 2023-10-23 PROCEDURE — 71045 X-RAY EXAM CHEST 1 VIEW: CPT | Mod: 26

## 2023-10-23 PROCEDURE — 99233 SBSQ HOSP IP/OBS HIGH 50: CPT

## 2023-10-23 PROCEDURE — 99223 1ST HOSP IP/OBS HIGH 75: CPT

## 2023-10-23 PROCEDURE — 99291 CRITICAL CARE FIRST HOUR: CPT

## 2023-10-23 RX ORDER — SODIUM CHLORIDE 5 G/100ML
250 INJECTION, SOLUTION INTRAVENOUS ONCE
Refills: 0 | Status: COMPLETED | OUTPATIENT
Start: 2023-10-23 | End: 2023-10-23

## 2023-10-23 RX ORDER — CHLORHEXIDINE GLUCONATE 213 G/1000ML
1 SOLUTION TOPICAL
Refills: 0 | Status: DISCONTINUED | OUTPATIENT
Start: 2023-10-23 | End: 2023-10-27

## 2023-10-23 RX ADMIN — HEPARIN SODIUM 5000 UNIT(S): 5000 INJECTION INTRAVENOUS; SUBCUTANEOUS at 06:12

## 2023-10-23 RX ADMIN — ATORVASTATIN CALCIUM 40 MILLIGRAM(S): 80 TABLET, FILM COATED ORAL at 21:04

## 2023-10-23 RX ADMIN — Medication 1 MILLIGRAM(S): at 11:36

## 2023-10-23 RX ADMIN — Medication 1 TABLET(S): at 09:25

## 2023-10-23 RX ADMIN — HEPARIN SODIUM 5000 UNIT(S): 5000 INJECTION INTRAVENOUS; SUBCUTANEOUS at 14:09

## 2023-10-23 RX ADMIN — SENNA PLUS 2 TABLET(S): 8.6 TABLET ORAL at 21:04

## 2023-10-23 RX ADMIN — SODIUM CHLORIDE 50 MILLILITER(S): 5 INJECTION, SOLUTION INTRAVENOUS at 18:48

## 2023-10-23 RX ADMIN — Medication 81 MILLIGRAM(S): at 11:36

## 2023-10-23 RX ADMIN — SODIUM CHLORIDE 50 MILLILITER(S): 5 INJECTION, SOLUTION INTRAVENOUS at 11:36

## 2023-10-23 RX ADMIN — Medication 75 MILLIGRAM(S): at 11:38

## 2023-10-23 RX ADMIN — HEPARIN SODIUM 5000 UNIT(S): 5000 INJECTION INTRAVENOUS; SUBCUTANEOUS at 21:04

## 2023-10-23 RX ADMIN — PANTOPRAZOLE SODIUM 40 MILLIGRAM(S): 20 TABLET, DELAYED RELEASE ORAL at 06:12

## 2023-10-23 RX ADMIN — ONDANSETRON 4 MILLIGRAM(S): 8 TABLET, FILM COATED ORAL at 11:48

## 2023-10-23 NOTE — PROGRESS NOTE ADULT - ATTENDING COMMENTS
Pt is a 62 y/o man c HTN, HLP, CAD, systolic CHF (35-40%), afib s/p watchman, ESRD, chronic EtOH abuse c cirrhosis, with volume overload and hyponatremia    Pt transferred for aquaphoresis and will give inotropic support to assist    afeb, HR 92, 105/70, 1005    Hgb 9.2  Plt 98    Na 125  K 4.1  Cr 3.28    ECG: afib @ 90 RAD  CXR: rt sided infiltrates    - pt with persistent hyponatremia and fluid overload  - will bolus hypertonic saline  - cont aquaphoresis to remove volume  - d/c dobutamine when aquaphoresis trial completed

## 2023-10-23 NOTE — ADVANCED PRACTICE NURSE CONSULT - REASON FOR CONSULT
possible sacral and heel wounds    Per chart review, 63M, DNR/DNI, poor historian, PMHx of HTN, HLD, CAD (MI, unsure when, unsure if had stent placed), CHF (EF 35-40%), AFib (s/p watchman), ESRD (M/T/Th/Sat), Chronic EtOH abuse w/ cirrhosis, GERD, initially admitted to medicine service w/ volume overload after missing HD; course c/b RRT during first inpatient HD session d/t hypotension, found to have severe biventricular failure on TTE w/clinical evidence of early low output state; upgraded to CCU (10/6-10/11) for concern for Cardiogenic shock, s/p CVVHD x 4 days and Dobutamine; stepped down to Telemetry with plan for intermittent HD per Nephro but remained hypotensive while off dobutamine, and was upgraded back to CCU (10/12-10/13) per Nephro d/t concerns for safe dialysis given hypotension trends. On 10/13 pt was able to tolerate full iHD session with reinitiation of Midodrine prior to sessions; & stepped back down to Tele for further HD and optimization. Patient maxed on midodrine and persistently hypotensive while fluid overloaded, stepped back up to CCU on 10/22 for aquapheresis and inotrope support.

## 2023-10-23 NOTE — PROGRESS NOTE ADULT - ASSESSMENT
62 yo man with ESRD, CHF cirrhosis, admitted for acute on chronic HFpEF, cardiogenic shock, requiring CVVHD 10/6-10/11, then transitioned back to iHD on 10/12. had needed daily dialysis last week as Na level low 121-125 range, likely now more HRS physiology, related, sNa 125 this AM on AQ, plan for 500cc hypertonic with plan to transition to iHD 10/24      ESRD   -Now AQ (10/22) with net neg 1L thus far   -Electrolytes noted sNA 125    Plan:  Continue AQ with with QBF: 40mL/min and UF adjusted based on water intake and now hypertonic with goal net neg 2-2.5L in next 24hrs   Transition back to iHD 10/25 clearance and volume removal   Start hypertonic saline 50cc/hr for 10hrs total  of 500cc with repeat BMP 2PM and 10PM, anticipated to correct to 128  Continue with fluid restriction to 1L     Access:  R TDC functional     BP:  Stable   Midodrine only on HD days   UF as above     Anemia:  Hgb stable   EPO with HD     MBD:  Calcium: 9.1  Phos: 3.7

## 2023-10-23 NOTE — PROGRESS NOTE ADULT - ASSESSMENT
63M, DNR/DNI, poor historian, PMHx of HTN, HLD, CAD (MI, unsure when, unsure if had stent placed), CHF (EF 35-40%), AFib (s/p watchman), ESRD (M/T/Th/Sat), Chronic EtOH abuse w/ cirrhosis, GERD, initially admitted to medicine service w/ volume overload after missing HD; course c/b RRT during first inpatient HD session d/t hypotension, found to have severe biventricular failure on TTE w/clinical evidence of early low output state; upgraded to CCU (10/6-10/11) for concern for Cardiogenic shock, s/p CVVHD x 4 days and Dobutamine; stepped down to Telemetry with plan for intermittent HD per Nephro but remained hypotensive while off dobutamine, and was upgraded back to CCU (10/12-10/13) per Nephro d/t concerns for safe dialysis given hypotension trends. On 10/13 pt was able to tolerate full iHD session with reinitiation of Midodrine prior to sessions; & stepped back down to Tele for further HD and optimization. Patient maxed on midodrine and persistently hypotensive while fluid overloaded, stepped back up to CCU on 10/22 for aquapheresis and inotrope support.     NEURO  -FLOR     CARDIO  #Acute on chronic systolic congestive heart failure  NICM (likely EtOH induced) admitted with volume overload i/s/o missed HD, course c/b cardiogenic shock, now s/p CVVHD and pressors with net negative 10.6L. Remains peripherally overloaded with distended abdomen, 2+ LE edema, and orthopnea, however unable to tolerate further volume removal.  TTEs inpt 10/5 & 10/6/23: LVEF 35-40% with global hypokinesis. Mildly dilated RV w/ mod reduced RVSF. Elevated RA pressure. SHALOM. Mod AR. mild to mod AS. Mod to severe TR. PASP 39 mmHg. Small-to-moderate pericardial effusion without echocardiographic evidence of cardiac tamponade physiology. Ascites present.  Repeat TTE 10/14 largely unchanged, w/ LVEF remaining 35-40% and global hypokinesis despite volume optimization. Last HD session 10/21.   - GDMT: toprol discontinued as persistently hypotensive  - Pt requires Midodrine 10mg qAM on HD days prior to HD due to persistent hypotension  - c/w aquapharesis to remove 2L, HD per renal  - c/w dobutamine gtt @ 2 mcg/kg/min   - continue to monitor perfusion labs    #Coronary artery disease  History of HLD and MI, unsure if had stent placed  - c/w ASA 81 mg QD, lipitor 40 mg QHS  - unable to tolerate BBL/ACE/ARB from BP standpoint  - LDL 15 at goal    #Atrial fibrillation  - currently in rate controlled AF; s/p watchman device  - continue w/ ASA monotherapy    RESPIRATORY  -FLOR    GASTROINTESTINAL  #Hepatic Cirrhosis as above   RUQ r/o biliary stasis 10/04  Pt's pruritis possibly from biliary cause given recent elevation in ALP & Btotal.   - Outpt Hepatology f/u post-dc  - Plan for paracentesis (both therapeutic and diagnostic) as per hepatology recs and to send studies for cell count, albumin, glucose, gram stain, LDH, and protein.   - Hepatology recommended holding off on treating HE despite mild asterixis on exam as patient is mentating well     #GERD  History of GERD, no complaints of epigastric pain at this time.  -Continue Pantoprazole 40mg qd      RENAL  #ESRD on dialysis   Started on HD 6 months ago, 4x week (M/T/Th/Sat) at [facility] dialysis center, missed HD session on 10/3/23. R chest port utilized. At admission, Cr 3.93 (un-established baseline Cr), K 3.4, Phos 4.4. Metabolic acidosis (AGAP 17) likely due to hypochloremic emesis / ETOH leading to lactic acidosis. Dialysate changed to Phoxillum due to low phos.     - Nephrology consulted, plan for aquapheresis with goal to remove 2L over next 24hrs  - 10mg Midodrine on HD days only prior to dialysis.   - C/w home Nephro-chichi, 1L fluid restriction  - hold off binders for now per nephro  - F/u nephro recs     #Hyponatremia  -continue with midodrine 10mg TID on HD days  - continue with 1L fluid restriction  - F/u nephro recs     HEME  Home med iron 65 mg qd for ELMER  - Hold home med iron 65mg  - C/w folic acid 1mg daily   - EPO with HD   - Maintain active type and screen (last drawn 10/16)    ENDOCRINE  -FLOR    DERM  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 B/l LE dry skin, likely venous stasis dermatitis on due to peripheral artery disease vs. heart failure related edema changes. Likely from niacin deficiency (pellagra) as etiology for dermatitis as patient has 3 month anorexia, fatigue, numbness. Recently prescribed Cerave w/o use  - f/u outpt dermatologist.  - PO antihistamine    PSYCH  #Moderate alcohol use disorder  Chronic EtOH abuse with cirrhosis on Abd US; discussed with patient the impact his current EtOH use has on his condition, and patient strongly endorsed he has no plans to decrease EtOH use. Out of withdrawal window, not on CIWA protocol moving further.  -low concern for hepatorenal syndrome as remains hemodynamically stable. No known gallbladder pathology  -Abd US (10/4) Cirrhotic morphology. Pulsatile flow in the main and left portal veins. Moderate ascites. Increased right renal cortical echogenicity compatible w/ medical renal dz.  -s/p IV thiamine; c/w folate 1mg daily    Prophylactic Measure   F: None  E: Replete per HD with ESRD  N: Renal restrictions  GI ppx: pantoprazole  DVT ppx: heparin 5000u q8h  Dispo: CCU    63M, DNR/DNI, poor historian, PMHx of HTN, HLD, CAD (MI, unsure when, unsure if had stent placed), CHF (EF 35-40%), AFib (s/p watchman), ESRD (M/T/Th/Sat), Chronic EtOH abuse w/ cirrhosis, GERD, initially admitted to medicine service w/ volume overload after missing HD; course c/b RRT during first inpatient HD session d/t hypotension, found to have severe biventricular failure on TTE w/clinical evidence of early low output state; upgraded to CCU (10/6-10/11) for concern for Cardiogenic shock, s/p CVVHD x 4 days and Dobutamine; stepped down to Telemetry with plan for intermittent HD per Nephro but remained hypotensive while off dobutamine, and was upgraded back to CCU (10/12-10/13) per Nephro d/t concerns for safe dialysis given hypotension trends. On 10/13 pt was able to tolerate full iHD session with reinitiation of Midodrine prior to sessions; & stepped back down to Tele for further HD and optimization. Patient maxed on midodrine and persistently hypotensive while fluid overloaded, stepped back up to CCU on 10/22 for aquapheresis and inotrope support.     NEURO  -FLOR     CARDIO  #Acute on chronic systolic congestive heart failure  NICM (likely EtOH induced) admitted with volume overload i/s/o missed HD, course c/b cardiogenic shock, now s/p CVVHD and pressors with net negative 10.6L. Remains peripherally overloaded with distended abdomen, 2+ LE edema, and orthopnea, however unable to tolerate further volume removal.  TTEs inpt 10/5 & 10/6/23: LVEF 35-40% with global hypokinesis. Mildly dilated RV w/ mod reduced RVSF. Elevated RA pressure. SHALOM. Mod AR. mild to mod AS. Mod to severe TR. PASP 39 mmHg. Small-to-moderate pericardial effusion without echocardiographic evidence of cardiac tamponade physiology. Ascites present.  Repeat TTE 10/14 largely unchanged, w/ LVEF remaining 35-40% and global hypokinesis despite volume optimization. Last HD session 10/21.   - GDMT: toprol discontinued as persistently hypotensive  - Pt requires Midodrine 10mg qAM on HD days prior to HD due to persistent hypotension  - c/w aquapharesis to remove 2L, HD per renal  - c/w dobutamine gtt @ 2 mcg/kg/min   - continue to monitor perfusion labs    #Coronary artery disease  History of HLD and MI, unsure if had stent placed  - c/w ASA 81 mg QD, lipitor 40 mg QHS  - unable to tolerate BBL/ACE/ARB from BP standpoint  - LDL 15 at goal    #Atrial fibrillation  - currently in rate controlled AF; s/p watchman device  - continue w/ ASA monotherapy    RESPIRATORY  -FLOR    GASTROINTESTINAL  #Hepatic Cirrhosis as above   RUQ r/o biliary stasis 10/04  Pt's pruritis possibly from biliary cause given recent elevation in ALP & Btotal.   - Outpt Hepatology f/u post-dc  - Plan for paracentesis (both therapeutic and diagnostic) as per hepatology recs and to send studies for cell count, albumin, glucose, gram stain, LDH, and protein.   - Hepatology recommended holding off on treating HE despite mild asterixis on exam as patient is mentating well     #GERD  History of GERD, no complaints of epigastric pain at this time.  -Continue Pantoprazole 40mg qd      RENAL  #ESRD on dialysis   Started on HD 6 months ago, 4x week (M/T/Th/Sat) at [facility], missed HD session on 10/3/23. R chest port utilized. At admission, Cr 3.93 (un-established baseline Cr), K 3.4, Phos 4.4. Metabolic acidosis (AGAP 17) likely due to hypochloremic emesis / ETOH leading to lactic acidosis. Dialysate changed to Phoxillum due to low phos.     - Nephrology consulted, plan for aquapheresis with goal to remove 2L over next 24hrs  - 10mg Midodrine on HD days only prior to dialysis.   - C/w home Nephro-chichi, 1L fluid restriction  - hold off binders for now per nephro  - F/u nephro recs     #Hyponatremia  - continue with midodrine 10mg TID on HD days  - continue with 1L fluid restriction  - giving 250cc 3% saline per renal  - F/u nephro recs     HEME  Home med iron 65 mg qd for ELMER  - Hold home med iron 65mg  - C/w folic acid 1mg daily   - EPO with HD   - Maintain active type and screen (last drawn 10/16)    ENDOCRINE  -FLOR    DERM  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 B/l LE dry skin, likely venous stasis dermatitis on due to peripheral artery disease vs. heart failure related edema changes. Likely from niacin deficiency (pellagra) as etiology for dermatitis as patient has 3 month anorexia, fatigue, numbness. Recently prescribed Cerave w/o use  - f/u outpt dermatologist.  - PO antihistamine    PSYCH  #Moderate alcohol use disorder  Chronic EtOH abuse with cirrhosis on Abd US; discussed with patient the impact his current EtOH use has on his condition, and patient strongly endorsed he has no plans to decrease EtOH use. Out of withdrawal window, not on CIWA protocol moving further.  -low concern for hepatorenal syndrome as remains hemodynamically stable. No known gallbladder pathology  -Abd US (10/4) Cirrhotic morphology. Pulsatile flow in the main and left portal veins. Moderate ascites. Increased right renal cortical echogenicity compatible w/ medical renal dz.  -s/p IV thiamine; c/w folate 1mg daily    Prophylactic Measure   F: None  E: Replete per HD with ESRD  N: Renal restrictions  GI ppx: pantoprazole  DVT ppx: heparin 5000u q8h  Dispo: CCU

## 2023-10-23 NOTE — PROGRESS NOTE ADULT - ATTENDING COMMENTS
Ascites imaging suggestive of cirrhosis of the liver  Left-sided heart failure, pulmonary hypertension and end-stage renal disease on dialysis  Needs paracentesis fluid assessment for albumin and total protein.  Fluid characteristics can potentially help us to differentiate liver versus heart/kidney related ascites  Please check urine sodium.  Urine sodium less than 20 does not have much of a value in this case however, urine sodium more than 20 would indicate that patient may respond, at least partially, to diuretics.  Overall poor prognosis as he is not a candidate for TIPS or liver transplant

## 2023-10-23 NOTE — ADVANCED PRACTICE NURSE CONSULT - ASSESSMENT
Pt seen in ICU, very sleepy. 2 person assist to turn. Episodes of incontinence per RN. Jon 14.     Bilateral heels intact with blanchable redness.  Sacrum, incontinence associated skin damage: small linear wound, approx 0.5x0.2cm with pale white wound bed. Periwound blanchable redness.

## 2023-10-23 NOTE — PROGRESS NOTE ADULT - SUBJECTIVE AND OBJECTIVE BOX
O/N Events: Pt admitted to CCU for aquapharesis    Subjective/ROS: Patient seen and examined at bedside. Resting comfortably, reports improved breathing and that he is feeling well today    VITALS  Vital Signs Last 24 Hrs  T(C): 36.1 (23 Oct 2023 05:49), Max: 36.9 (22 Oct 2023 17:11)  T(F): 97 (23 Oct 2023 05:49), Max: 98.4 (22 Oct 2023 17:11)  HR: 92 (23 Oct 2023 07:00) (82 - 113)  BP: 110/79 (23 Oct 2023 07:00) (85/55 - 123/76)  BP(mean): 91 (23 Oct 2023 07:00) (66 - 91)  RR: 17 (23 Oct 2023 07:00) (14 - 46)  SpO2: 94% (23 Oct 2023 07:00) (92% - 100%)    Parameters below as of 23 Oct 2023 08:00  Patient On (Oxygen Delivery Method): nasal cannula  O2 Flow (L/min): 3      CAPILLARY BLOOD GLUCOSE    PHYSICAL EXAM  General: NAD  Head: pupils reactive, sclera anicteric  Neck: Supple; no JVD  Respiratory: b/l basilar rales, no increased wob  Cardiovascular: irregular rhythm/rate; S1/S2+, no murmurs, rubs gallops   Gastrointestinal: large, distended, soft nontender  Extremities: WWP; 1+ pedal edema  Neurological: A&Ox3    MEDICATIONS  (STANDING):  aspirin enteric coated 81 milliGRAM(s) Oral daily  atorvastatin 40 milliGRAM(s) Oral at bedtime  DOBUTamine Infusion 2 MICROgram(s)/kG/Min (6.13 mL/Hr) IV Continuous <Continuous>  folic acid 1 milliGRAM(s) Oral daily  heparin   Injectable 5000 Unit(s) SubCutaneous every 8 hours  influenza  Vaccine (HIGH DOSE) 0.7 milliLiter(s) IntraMuscular once  midodrine 10 milliGRAM(s) Oral <User Schedule>  Nephro-chichi 1 Tablet(s) Oral every 24 hours  pantoprazole    Tablet 40 milliGRAM(s) Oral before breakfast  polyethylene glycol 3350 17 Gram(s) Oral daily  pregabalin 75 milliGRAM(s) Oral daily  senna 2 Tablet(s) Oral at bedtime    MEDICATIONS  (PRN):  ondansetron Injectable 4 milliGRAM(s) IV Push every 12 hours PRN Nausea and/or Vomiting    penicillins (Unknown)      LABS                        9.1    5.83  )-----------( 98       ( 23 Oct 2023 05:30 )             29.3     10-23    125<L>  |  90<L>  |  14  ----------------------------<  119<H>  4.1   |  25  |  3.38<H>    Ca    9.1      23 Oct 2023 05:30  Phos  3.7     10-23  Mg     2.0     10-23    TPro  6.0  /  Alb  2.5<L>  /  TBili  1.7<H>  /  DBili  x   /  AST  25  /  ALT  19  /  AlkPhos  224<H>  10-23    Urinalysis Basic - ( 23 Oct 2023 05:30 )    Color: x / Appearance: x / SG: x / pH: x  Gluc: 119 mg/dL / Ketone: x  / Bili: x / Urobili: x   Blood: x / Protein: x / Nitrite: x   Leuk Esterase: x / RBC: x / WBC x   Sq Epi: x / Non Sq Epi: x / Bacteria: x O/N Events: Pt admitted to CCU for aquapharesis and fluid overload    Subjective/ROS: Patient seen and examined at bedside. Resting comfortably, reports improved breathing and that he is feeling well today    VITALS  Vital Signs Last 24 Hrs  T(C): 36.1 (23 Oct 2023 05:49), Max: 36.9 (22 Oct 2023 17:11)  T(F): 97 (23 Oct 2023 05:49), Max: 98.4 (22 Oct 2023 17:11)  HR: 92 (23 Oct 2023 07:00) (82 - 113)  BP: 110/79 (23 Oct 2023 07:00) (85/55 - 123/76)  BP(mean): 91 (23 Oct 2023 07:00) (66 - 91)  RR: 17 (23 Oct 2023 07:00) (14 - 46)  SpO2: 94% (23 Oct 2023 07:00) (92% - 100%)    Parameters below as of 23 Oct 2023 08:00  Patient On (Oxygen Delivery Method): nasal cannula  O2 Flow (L/min): 3      CAPILLARY BLOOD GLUCOSE    PHYSICAL EXAM  General: NAD  Head: pupils reactive, sclera anicteric  Neck: Supple; no JVD  Respiratory: b/l basilar rales, no increased wob  Cardiovascular: irregular rhythm/rate; S1/S2+, no murmurs, rubs gallops   Gastrointestinal: large, distended, soft nontender  Extremities: WWP; 1+ pedal edema  Neurological: A&Ox3    MEDICATIONS  (STANDING):  aspirin enteric coated 81 milliGRAM(s) Oral daily  atorvastatin 40 milliGRAM(s) Oral at bedtime  DOBUTamine Infusion 2 MICROgram(s)/kG/Min (6.13 mL/Hr) IV Continuous <Continuous>  folic acid 1 milliGRAM(s) Oral daily  heparin   Injectable 5000 Unit(s) SubCutaneous every 8 hours  influenza  Vaccine (HIGH DOSE) 0.7 milliLiter(s) IntraMuscular once  midodrine 10 milliGRAM(s) Oral <User Schedule>  Nephro-chichi 1 Tablet(s) Oral every 24 hours  pantoprazole    Tablet 40 milliGRAM(s) Oral before breakfast  polyethylene glycol 3350 17 Gram(s) Oral daily  pregabalin 75 milliGRAM(s) Oral daily  senna 2 Tablet(s) Oral at bedtime    MEDICATIONS  (PRN):  ondansetron Injectable 4 milliGRAM(s) IV Push every 12 hours PRN Nausea and/or Vomiting    penicillins (Unknown)      LABS                        9.1    5.83  )-----------( 98       ( 23 Oct 2023 05:30 )             29.3     10-23    125<L>  |  90<L>  |  14  ----------------------------<  119<H>  4.1   |  25  |  3.38<H>    Ca    9.1      23 Oct 2023 05:30  Phos  3.7     10-23  Mg     2.0     10-23    TPro  6.0  /  Alb  2.5<L>  /  TBili  1.7<H>  /  DBili  x   /  AST  25  /  ALT  19  /  AlkPhos  224<H>  10-23    Urinalysis Basic - ( 23 Oct 2023 05:30 )    Color: x / Appearance: x / SG: x / pH: x  Gluc: 119 mg/dL / Ketone: x  / Bili: x / Urobili: x   Blood: x / Protein: x / Nitrite: x   Leuk Esterase: x / RBC: x / WBC x   Sq Epi: x / Non Sq Epi: x / Bacteria: x

## 2023-10-23 NOTE — ADVANCED PRACTICE NURSE CONSULT - RECOMMEDATIONS
Sacrum: cleanse with normal saline, apply Triad, leave open to air. Reapply daily and prn cleansing.     Continue pressure injury prevention including frequent repositioning and heel offloading.   Reiterated to pt and staff importance of frequent repositioning for pressure injury prevention.     Discussed with primary RN at bedside and patient.     Please reconsult if wound worsens or new concerns develop.

## 2023-10-23 NOTE — PROGRESS NOTE ADULT - SUBJECTIVE AND OBJECTIVE BOX
Hepatology Consult Progress Note:       OVERNIGHT EVENTS: ADEN.    SUBJECTIVE / INTERVAL HPI:   Patient seen and examined at bedside. Now in the CCU. States that he feels fine. Per patient, fairly recently started HD.       VITAL SIGNS:  Vital Signs Last 24 Hrs  T(C): 36.4 (23 Oct 2023 17:43), Max: 36.4 (23 Oct 2023 17:43)  T(F): 97.5 (23 Oct 2023 17:43), Max: 97.5 (23 Oct 2023 17:43)  HR: 95 (23 Oct 2023 20:27) (84 - 113)  BP: 98/70 (23 Oct 2023 20:27) (85/63 - 123/76)  BP(mean): 80 (23 Oct 2023 20:27) (68 - 91)  RR: 15 (23 Oct 2023 20:27) (15 - 54)  SpO2: 98% (23 Oct 2023 20:27) (90% - 100%)    Parameters below as of 23 Oct 2023 20:27  Patient On (Oxygen Delivery Method): nasal cannula  O2 Flow (L/min): 3      10-22-23 @ 07:01  -  10-23-23 @ 07:00  --------------------------------------------------------  IN: 1123.2 mL / OUT: 1022 mL / NET: 101.2 mL    10-23-23 @ 07:01  -  10-23-23 @ 21:05  --------------------------------------------------------  IN: 738.4 mL / OUT: 1586 mL / NET: -847.6 mL        PHYSICAL EXAM:  General: No acute distress  Lungs: Normal respiratory effort and no intercostal retractions  Cardiovascular: RRR  Abdomen: Soft, non-tender, large distended abdomen   Neurological: Alert and oriented x3   Skin: Warm and dry. No obvious rash      MEDICATIONS:  MEDICATIONS  (STANDING):  aspirin enteric coated 81 milliGRAM(s) Oral daily  atorvastatin 40 milliGRAM(s) Oral at bedtime  chlorhexidine 2% Cloths 1 Application(s) Topical <User Schedule>  DOBUTamine Infusion 2 MICROgram(s)/kG/Min (6.13 mL/Hr) IV Continuous <Continuous>  folic acid 1 milliGRAM(s) Oral daily  heparin   Injectable 5000 Unit(s) SubCutaneous every 8 hours  influenza  Vaccine (HIGH DOSE) 0.7 milliLiter(s) IntraMuscular once  midodrine 10 milliGRAM(s) Oral <User Schedule>  Nephro-chichi 1 Tablet(s) Oral every 24 hours  pantoprazole    Tablet 40 milliGRAM(s) Oral before breakfast  polyethylene glycol 3350 17 Gram(s) Oral daily  pregabalin 75 milliGRAM(s) Oral daily  senna 2 Tablet(s) Oral at bedtime    MEDICATIONS  (PRN):  ondansetron Injectable 4 milliGRAM(s) IV Push every 12 hours PRN Nausea and/or Vomiting      ALLERGIES:  Allergies    penicillins (Unknown)    Intolerances        LABS:                        9.9    5.30  )-----------( 89       ( 23 Oct 2023 14:04 )             33.1     10-23    125<L>  |  92<L>  |  16  ----------------------------<  129<H>  4.5   |  19<L>  |  3.16<H>    Ca    9.1      23 Oct 2023 14:04  Phos  4.1     10-23  Mg     2.1     10-23    TPro  6.1  /  Alb  2.6<L>  /  TBili  1.8<H>  /  DBili  x   /  AST  35  /  ALT  21  /  AlkPhos  241<H>  10-23      Urinalysis Basic - ( 23 Oct 2023 14:04 )    Color: x / Appearance: x / SG: x / pH: x  Gluc: 129 mg/dL / Ketone: x  / Bili: x / Urobili: x   Blood: x / Protein: x / Nitrite: x   Leuk Esterase: x / RBC: x / WBC x   Sq Epi: x / Non Sq Epi: x / Bacteria: x      CAPILLARY BLOOD GLUCOSE            RADIOLOGY & ADDITIONAL TESTS: Reviewed.

## 2023-10-23 NOTE — PROGRESS NOTE ADULT - ATTENDING COMMENTS
64 yo man with ESRD, CHF cirrhosis, admitted for acute on chronic HFpEF, cardiogenic shock, requiring CVVHD 10/6-10/11, then transitioned back to iHD on 10/12. had needed daily dialysis this week as Na level low 121-125 range. now at 127  last HD10/21, then transferred to CCU for Aquaphereisis  tolerating well-  able to keep net negative balance  Na 125 as still drinking water-  to add 3% saline today- trend Sna q4-5H while receiving hypertonic saline  still needs to limit water intake  for repeat HD in AM  reviewed with CCU team    Clinically appears to be mostly hepatic/renal axis of concern now not as much the cardiorenal aspect as it was prior

## 2023-10-23 NOTE — PROGRESS NOTE ADULT - SUBJECTIVE AND OBJECTIVE BOX
Patient is a 63y Male seen and evaluated at bedside. No acute distress, patient upgraded to CCU for aggressive diureses with AQ. Last iHD on 10/21 with 3L UF with no complications. Continue to be polydipsic, sNA 125, discussed with CCU team to give 500cc hypertonic while on AQ. Plan to transition back to iHD 10/25. Seen on AQ no complications, net neg 1L since initiation of AQ. Continue AQ as prescribed        Meds:    aspirin enteric coated 81 daily  atorvastatin 40 at bedtime  chlorhexidine 2% Cloths 1 <User Schedule>  DOBUTamine Infusion 2 <Continuous>  folic acid 1 daily  heparin   Injectable 5000 every 8 hours  influenza  Vaccine (HIGH DOSE) 0.7 once  midodrine 10 <User Schedule>  Nephro-chichi 1 every 24 hours  ondansetron Injectable 4 every 12 hours PRN  pantoprazole    Tablet 40 before breakfast  polyethylene glycol 3350 17 daily  pregabalin 75 daily  senna 2 at bedtime  sodium chloride 3% Bolus 250 once      T(C): , Max: 36.9 (10-22-23 @ 17:11)  T(F): , Max: 98.4 (10-22-23 @ 17:11)  HR: 93 (10-23-23 @ 10:00)  BP: 105/70 (10-23-23 @ 10:00)  BP(mean): 80 (10-23-23 @ 10:00)  RR: 15 (10-23-23 @ 10:00)  SpO2: 100% (10-23-23 @ 10:00)  Wt(kg): --    10-22 @ 07:01  -  10-23 @ 07:00  --------------------------------------------------------  IN: 1123.2 mL / OUT: 1022 mL / NET: 101.2 mL    10-23 @ 07:01  -  10-23 @ 10:58  --------------------------------------------------------  IN: 220.4 mL / OUT: 379 mL / NET: -158.6 mL        Weight (kg): 113.2 (10-22 @ 21:15)    Review of Systems:  ROS negative except as per HPI      PHYSICAL EXAM:  General: NAD, tolerating AQ   Head: pupils reactive, sclera anicteric  Neck: Supple; no JVD  Respiratory: b/l basilar rales, no increased wob  Cardiovascular: irregular rhythm/rate; S1/S2+, no murmurs, rubs gallops   Gastrointestinal: large, distended, soft nontender  Extremities: WWP; 1+ pedal edema  Neurological: A&Ox3  Access: R TDC accessed     LABS:                        9.1    5.83  )-----------( 98       ( 23 Oct 2023 05:30 )             29.3     10-23    125<L>  |  90<L>  |  14  ----------------------------<  119<H>  4.1   |  25  |  3.38<H>    Ca    9.1      23 Oct 2023 05:30  Phos  3.7     10-23  Mg     2.0     10-23    TPro  6.0  /  Alb  2.5<L>  /  TBili  1.7<H>  /  DBili  x   /  AST  25  /  ALT  19  /  AlkPhos  224<H>  10-23        Urinalysis Basic - ( 23 Oct 2023 05:30 )    Color: x / Appearance: x / SG: x / pH: x  Gluc: 119 mg/dL / Ketone: x  / Bili: x / Urobili: x   Blood: x / Protein: x / Nitrite: x   Leuk Esterase: x / RBC: x / WBC x   Sq Epi: x / Non Sq Epi: x / Bacteria: x            RADIOLOGY & ADDITIONAL STUDIES:

## 2023-10-23 NOTE — PROGRESS NOTE ADULT - ASSESSMENT
63M, DNR/DNI, poor historian, PMHx of HTN, HLD, CAD (MI, unsure when, unsure if had stent placed), CHF (EF 35-40%), AFib (s/p watchman), ESRD (M/T/Th/Sat), Chronic EtOH abuse w/ cirrhosis, GERD, initially admitted to medicine service w/ volume overload after missing HD; course c/b RRT during first inpatient HD session d/t hypotension and pt was found to have severe biventricular failure on TTE w/ clinical evidence of early low output state; was upgraded to CCU (10/6-10/11) for concern for Cardiogenic shock, s/p CVVHD x 4 days and Dobutamine; stepped down to Telemetry with plan for intermittent HD per Nephro but remained hypotensive while off dobutamine, and was upgraded back to CCU (10/12-10/13) per Nephro concerns for safe dialysis plan given hypotension. On 10/13, pt was able to tolerate full HD session with reinitiation of Midodrine; therefore since 10/13 PM has been stepped back down to Tele for further HD and dispo planning for GENE placement with HD once cleared by Nephro. GI consulted for management of cirrhosis.     RUQ US (10/04/23):  - Cirrhotic morphology.  - Pulsatile flow in the main and left portal veins.  - Moderate ascites.  - Increased right renal cortical echogenicity compatible with medical renal disease.    Recommendations:   - obtain paracentesis (both therapeutic and diagnostic)   - send studies for cell count, albumin, glucose, gram stain, LDH, and protein   - diuresis as per the cardiology team  - mild asterixis on initial exam but mentating well, so will hold off on treating HE     Case discussed with Dr. Everett. GI Team will continue to follow.    Rosanna Norris D.O.   Gastroenterology Fellow  Weekday 7am-5pm Pager: [phone]  Weeknights/Weekend/Holiday Coverage: Please call the  for contact info

## 2023-10-24 DIAGNOSIS — D64.9 ANEMIA, UNSPECIFIED: ICD-10-CM

## 2023-10-24 DIAGNOSIS — I50.23 ACUTE ON CHRONIC SYSTOLIC (CONGESTIVE) HEART FAILURE: ICD-10-CM

## 2023-10-24 DIAGNOSIS — E87.1 HYPO-OSMOLALITY AND HYPONATREMIA: ICD-10-CM

## 2023-10-24 DIAGNOSIS — F10.20 ALCOHOL DEPENDENCE, UNCOMPLICATED: ICD-10-CM

## 2023-10-24 DIAGNOSIS — I25.10 ATHEROSCLEROTIC HEART DISEASE OF NATIVE CORONARY ARTERY WITHOUT ANGINA PECTORIS: ICD-10-CM

## 2023-10-24 DIAGNOSIS — I48.91 UNSPECIFIED ATRIAL FIBRILLATION: ICD-10-CM

## 2023-10-24 DIAGNOSIS — K21.9 GASTRO-ESOPHAGEAL REFLUX DISEASE WITHOUT ESOPHAGITIS: ICD-10-CM

## 2023-10-24 DIAGNOSIS — R21 RASH AND OTHER NONSPECIFIC SKIN ERUPTION: ICD-10-CM

## 2023-10-24 DIAGNOSIS — K74.60 UNSPECIFIED CIRRHOSIS OF LIVER: ICD-10-CM

## 2023-10-24 LAB
ALBUMIN SERPL ELPH-MCNC: 2.6 G/DL — LOW (ref 3.3–5)
ALBUMIN SERPL ELPH-MCNC: 2.6 G/DL — LOW (ref 3.3–5)
ALBUMIN SERPL ELPH-MCNC: 2.7 G/DL — LOW (ref 3.3–5)
ALBUMIN SERPL ELPH-MCNC: 2.7 G/DL — LOW (ref 3.3–5)
ALP SERPL-CCNC: 223 U/L — HIGH (ref 40–120)
ALP SERPL-CCNC: 223 U/L — HIGH (ref 40–120)
ALP SERPL-CCNC: 250 U/L — HIGH (ref 40–120)
ALP SERPL-CCNC: 250 U/L — HIGH (ref 40–120)
ALT FLD-CCNC: 19 U/L — SIGNIFICANT CHANGE UP (ref 10–45)
ALT FLD-CCNC: 19 U/L — SIGNIFICANT CHANGE UP (ref 10–45)
ALT FLD-CCNC: 20 U/L — SIGNIFICANT CHANGE UP (ref 10–45)
ALT FLD-CCNC: 20 U/L — SIGNIFICANT CHANGE UP (ref 10–45)
ANION GAP SERPL CALC-SCNC: 10 MMOL/L — SIGNIFICANT CHANGE UP (ref 5–17)
ANION GAP SERPL CALC-SCNC: 10 MMOL/L — SIGNIFICANT CHANGE UP (ref 5–17)
ANION GAP SERPL CALC-SCNC: 13 MMOL/L — SIGNIFICANT CHANGE UP (ref 5–17)
ANION GAP SERPL CALC-SCNC: 13 MMOL/L — SIGNIFICANT CHANGE UP (ref 5–17)
AST SERPL-CCNC: 26 U/L — SIGNIFICANT CHANGE UP (ref 10–40)
AST SERPL-CCNC: 26 U/L — SIGNIFICANT CHANGE UP (ref 10–40)
AST SERPL-CCNC: 33 U/L — SIGNIFICANT CHANGE UP (ref 10–40)
AST SERPL-CCNC: 33 U/L — SIGNIFICANT CHANGE UP (ref 10–40)
BASOPHILS # BLD AUTO: 0.06 K/UL — SIGNIFICANT CHANGE UP (ref 0–0.2)
BASOPHILS # BLD AUTO: 0.06 K/UL — SIGNIFICANT CHANGE UP (ref 0–0.2)
BASOPHILS NFR BLD AUTO: 1.1 % — SIGNIFICANT CHANGE UP (ref 0–2)
BASOPHILS NFR BLD AUTO: 1.1 % — SIGNIFICANT CHANGE UP (ref 0–2)
BILIRUB SERPL-MCNC: 1.7 MG/DL — HIGH (ref 0.2–1.2)
BILIRUB SERPL-MCNC: 1.7 MG/DL — HIGH (ref 0.2–1.2)
BILIRUB SERPL-MCNC: 1.8 MG/DL — HIGH (ref 0.2–1.2)
BILIRUB SERPL-MCNC: 1.8 MG/DL — HIGH (ref 0.2–1.2)
BUN SERPL-MCNC: 18 MG/DL — SIGNIFICANT CHANGE UP (ref 7–23)
BUN SERPL-MCNC: 18 MG/DL — SIGNIFICANT CHANGE UP (ref 7–23)
BUN SERPL-MCNC: 19 MG/DL — SIGNIFICANT CHANGE UP (ref 7–23)
BUN SERPL-MCNC: 19 MG/DL — SIGNIFICANT CHANGE UP (ref 7–23)
CALCIUM SERPL-MCNC: 9 MG/DL — SIGNIFICANT CHANGE UP (ref 8.4–10.5)
CALCIUM SERPL-MCNC: 9 MG/DL — SIGNIFICANT CHANGE UP (ref 8.4–10.5)
CALCIUM SERPL-MCNC: 9.2 MG/DL — SIGNIFICANT CHANGE UP (ref 8.4–10.5)
CALCIUM SERPL-MCNC: 9.2 MG/DL — SIGNIFICANT CHANGE UP (ref 8.4–10.5)
CHLORIDE SERPL-SCNC: 92 MMOL/L — LOW (ref 96–108)
CHLORIDE SERPL-SCNC: 92 MMOL/L — LOW (ref 96–108)
CHLORIDE SERPL-SCNC: 94 MMOL/L — LOW (ref 96–108)
CHLORIDE SERPL-SCNC: 94 MMOL/L — LOW (ref 96–108)
CO2 SERPL-SCNC: 22 MMOL/L — SIGNIFICANT CHANGE UP (ref 22–31)
CO2 SERPL-SCNC: 22 MMOL/L — SIGNIFICANT CHANGE UP (ref 22–31)
CO2 SERPL-SCNC: 26 MMOL/L — SIGNIFICANT CHANGE UP (ref 22–31)
CO2 SERPL-SCNC: 26 MMOL/L — SIGNIFICANT CHANGE UP (ref 22–31)
CREAT SERPL-MCNC: 3.54 MG/DL — HIGH (ref 0.5–1.3)
CREAT SERPL-MCNC: 3.54 MG/DL — HIGH (ref 0.5–1.3)
CREAT SERPL-MCNC: 3.8 MG/DL — HIGH (ref 0.5–1.3)
CREAT SERPL-MCNC: 3.8 MG/DL — HIGH (ref 0.5–1.3)
EGFR: 17 ML/MIN/1.73M2 — LOW
EGFR: 17 ML/MIN/1.73M2 — LOW
EGFR: 18 ML/MIN/1.73M2 — LOW
EGFR: 18 ML/MIN/1.73M2 — LOW
EOSINOPHIL # BLD AUTO: 0.03 K/UL — SIGNIFICANT CHANGE UP (ref 0–0.5)
EOSINOPHIL # BLD AUTO: 0.03 K/UL — SIGNIFICANT CHANGE UP (ref 0–0.5)
EOSINOPHIL NFR BLD AUTO: 0.5 % — SIGNIFICANT CHANGE UP (ref 0–6)
EOSINOPHIL NFR BLD AUTO: 0.5 % — SIGNIFICANT CHANGE UP (ref 0–6)
GLUCOSE SERPL-MCNC: 113 MG/DL — HIGH (ref 70–99)
GLUCOSE SERPL-MCNC: 113 MG/DL — HIGH (ref 70–99)
GLUCOSE SERPL-MCNC: 96 MG/DL — SIGNIFICANT CHANGE UP (ref 70–99)
GLUCOSE SERPL-MCNC: 96 MG/DL — SIGNIFICANT CHANGE UP (ref 70–99)
HCT VFR BLD CALC: 31.6 % — LOW (ref 39–50)
HCT VFR BLD CALC: 31.6 % — LOW (ref 39–50)
HGB BLD-MCNC: 9.8 G/DL — LOW (ref 13–17)
HGB BLD-MCNC: 9.8 G/DL — LOW (ref 13–17)
IMM GRANULOCYTES NFR BLD AUTO: 0.5 % — SIGNIFICANT CHANGE UP (ref 0–0.9)
IMM GRANULOCYTES NFR BLD AUTO: 0.5 % — SIGNIFICANT CHANGE UP (ref 0–0.9)
LYMPHOCYTES # BLD AUTO: 0.69 K/UL — LOW (ref 1–3.3)
LYMPHOCYTES # BLD AUTO: 0.69 K/UL — LOW (ref 1–3.3)
LYMPHOCYTES # BLD AUTO: 12.6 % — LOW (ref 13–44)
LYMPHOCYTES # BLD AUTO: 12.6 % — LOW (ref 13–44)
MAGNESIUM SERPL-MCNC: 2 MG/DL — SIGNIFICANT CHANGE UP (ref 1.6–2.6)
MCHC RBC-ENTMCNC: 31 GM/DL — LOW (ref 32–36)
MCHC RBC-ENTMCNC: 31 GM/DL — LOW (ref 32–36)
MCHC RBC-ENTMCNC: 32.5 PG — SIGNIFICANT CHANGE UP (ref 27–34)
MCHC RBC-ENTMCNC: 32.5 PG — SIGNIFICANT CHANGE UP (ref 27–34)
MCV RBC AUTO: 104.6 FL — HIGH (ref 80–100)
MCV RBC AUTO: 104.6 FL — HIGH (ref 80–100)
MONOCYTES # BLD AUTO: 0.39 K/UL — SIGNIFICANT CHANGE UP (ref 0–0.9)
MONOCYTES # BLD AUTO: 0.39 K/UL — SIGNIFICANT CHANGE UP (ref 0–0.9)
MONOCYTES NFR BLD AUTO: 7.1 % — SIGNIFICANT CHANGE UP (ref 2–14)
MONOCYTES NFR BLD AUTO: 7.1 % — SIGNIFICANT CHANGE UP (ref 2–14)
NEUTROPHILS # BLD AUTO: 4.28 K/UL — SIGNIFICANT CHANGE UP (ref 1.8–7.4)
NEUTROPHILS # BLD AUTO: 4.28 K/UL — SIGNIFICANT CHANGE UP (ref 1.8–7.4)
NEUTROPHILS NFR BLD AUTO: 78.2 % — HIGH (ref 43–77)
NEUTROPHILS NFR BLD AUTO: 78.2 % — HIGH (ref 43–77)
NRBC # BLD: 0 /100 WBCS — SIGNIFICANT CHANGE UP (ref 0–0)
NRBC # BLD: 0 /100 WBCS — SIGNIFICANT CHANGE UP (ref 0–0)
PHOSPHATE SERPL-MCNC: 4.4 MG/DL — SIGNIFICANT CHANGE UP (ref 2.5–4.5)
PLATELET # BLD AUTO: 92 K/UL — LOW (ref 150–400)
PLATELET # BLD AUTO: 92 K/UL — LOW (ref 150–400)
POTASSIUM SERPL-MCNC: 4.7 MMOL/L — SIGNIFICANT CHANGE UP (ref 3.5–5.3)
POTASSIUM SERPL-MCNC: 4.7 MMOL/L — SIGNIFICANT CHANGE UP (ref 3.5–5.3)
POTASSIUM SERPL-MCNC: 5.1 MMOL/L — SIGNIFICANT CHANGE UP (ref 3.5–5.3)
POTASSIUM SERPL-MCNC: 5.1 MMOL/L — SIGNIFICANT CHANGE UP (ref 3.5–5.3)
POTASSIUM SERPL-SCNC: 4.7 MMOL/L — SIGNIFICANT CHANGE UP (ref 3.5–5.3)
POTASSIUM SERPL-SCNC: 4.7 MMOL/L — SIGNIFICANT CHANGE UP (ref 3.5–5.3)
POTASSIUM SERPL-SCNC: 5.1 MMOL/L — SIGNIFICANT CHANGE UP (ref 3.5–5.3)
POTASSIUM SERPL-SCNC: 5.1 MMOL/L — SIGNIFICANT CHANGE UP (ref 3.5–5.3)
PROT SERPL-MCNC: 6.2 G/DL — SIGNIFICANT CHANGE UP (ref 6–8.3)
RBC # BLD: 3.02 M/UL — LOW (ref 4.2–5.8)
RBC # BLD: 3.02 M/UL — LOW (ref 4.2–5.8)
RBC # FLD: 16.9 % — HIGH (ref 10.3–14.5)
RBC # FLD: 16.9 % — HIGH (ref 10.3–14.5)
SODIUM SERPL-SCNC: 127 MMOL/L — LOW (ref 135–145)
SODIUM SERPL-SCNC: 127 MMOL/L — LOW (ref 135–145)
SODIUM SERPL-SCNC: 130 MMOL/L — LOW (ref 135–145)
SODIUM SERPL-SCNC: 130 MMOL/L — LOW (ref 135–145)
WBC # BLD: 5.48 K/UL — SIGNIFICANT CHANGE UP (ref 3.8–10.5)
WBC # BLD: 5.48 K/UL — SIGNIFICANT CHANGE UP (ref 3.8–10.5)
WBC # FLD AUTO: 5.48 K/UL — SIGNIFICANT CHANGE UP (ref 3.8–10.5)
WBC # FLD AUTO: 5.48 K/UL — SIGNIFICANT CHANGE UP (ref 3.8–10.5)

## 2023-10-24 PROCEDURE — 90937 HEMODIALYSIS REPEATED EVAL: CPT

## 2023-10-24 PROCEDURE — 71045 X-RAY EXAM CHEST 1 VIEW: CPT | Mod: 26

## 2023-10-24 PROCEDURE — 99234 HOSP IP/OBS SM DT SF/LOW 45: CPT

## 2023-10-24 RX ADMIN — PANTOPRAZOLE SODIUM 40 MILLIGRAM(S): 20 TABLET, DELAYED RELEASE ORAL at 06:46

## 2023-10-24 RX ADMIN — Medication 1 TABLET(S): at 10:05

## 2023-10-24 RX ADMIN — SENNA PLUS 2 TABLET(S): 8.6 TABLET ORAL at 22:31

## 2023-10-24 RX ADMIN — HEPARIN SODIUM 5000 UNIT(S): 5000 INJECTION INTRAVENOUS; SUBCUTANEOUS at 13:28

## 2023-10-24 RX ADMIN — MIDODRINE HYDROCHLORIDE 10 MILLIGRAM(S): 2.5 TABLET ORAL at 07:04

## 2023-10-24 RX ADMIN — Medication 1 MILLIGRAM(S): at 11:15

## 2023-10-24 RX ADMIN — Medication 81 MILLIGRAM(S): at 11:15

## 2023-10-24 RX ADMIN — HEPARIN SODIUM 5000 UNIT(S): 5000 INJECTION INTRAVENOUS; SUBCUTANEOUS at 06:45

## 2023-10-24 RX ADMIN — Medication 75 MILLIGRAM(S): at 11:18

## 2023-10-24 RX ADMIN — HEPARIN SODIUM 5000 UNIT(S): 5000 INJECTION INTRAVENOUS; SUBCUTANEOUS at 22:31

## 2023-10-24 RX ADMIN — ATORVASTATIN CALCIUM 40 MILLIGRAM(S): 80 TABLET, FILM COATED ORAL at 22:31

## 2023-10-24 RX ADMIN — CHLORHEXIDINE GLUCONATE 1 APPLICATION(S): 213 SOLUTION TOPICAL at 06:45

## 2023-10-24 NOTE — PROGRESS NOTE ADULT - PROBLEM SELECTOR PLAN 3
- continue with midodrine 10mg TID on HD days  - continue with 1L fluid restriction  - given 250cc 3% saline x2 per renal  - F/u nephro recs

## 2023-10-24 NOTE — PROGRESS NOTE ADULT - SUBJECTIVE AND OBJECTIVE BOX
Seen on HD tolerating procedure well.   SBP < 80 MAP 61, pt asymptomatic, stop further UF and continue clearance along with Na bath of 138 to help bring sNA levels above 130  Continue HD with new prescription below   Hemodialysis Treatment.:     Schedule: Once, Modality: Hemodialysis, Access: Internal Jugular Central Venous Catheter    Dialyzer: Optiflux [id number], Time: 180 Min    Blood Flow: 400 mL/Min , Dialysate Flow: 500 mL/Min, Dialysate Temp: 35, Tubinmm (Adult)    Target Fluid Removal: 0 Liters    Dialysate Electrolytes (mEq/L): Potassium 2, Calcium 2.5, Sodium 138, Bicarbonate 35       Physical Exam:   GENERAL: NAD, laying in bed tolerating HD   HEENT: NCAT, EOMI  CHEST/LUNG: Normal respiratory effort, b/l basilar crackles   HEART: Regular rate  ABDOMEN: Mildly distended  EXTREMITIES: trace pedal edema  Neurology: Somnolent  SKIN: No rash or skin lesion on exposed skin   ACCESS: Right TDC accessed

## 2023-10-24 NOTE — PROGRESS NOTE ADULT - TIME BILLING
Pt is a 62 y/o man c HTN, HLP, CAD, systolic CHF (35-40%), afib s/p watchman, ESRD, chronic EtOH abuse c cirrhosis, with volume overload and hyponatremia

## 2023-10-24 NOTE — PROGRESS NOTE ADULT - PROBLEM SELECTOR PLAN 8
Chronic EtOH abuse with cirrhosis on Abd US; discussed with patient the impact his current EtOH use has on his condition, and patient strongly endorsed he has no plans to decrease EtOH use. Out of withdrawal window, not on CIWA protocol moving further.  -low concern for hepatorenal syndrome as remains hemodynamically stable. No known gallbladder pathology  -Abd US (10/4) Cirrhotic morphology. Pulsatile flow in the main and left portal veins. Moderate ascites. Increased right renal cortical echogenicity compatible w/ medical renal dz.  -s/p IV thiamine; c/w folate 1mg daily

## 2023-10-24 NOTE — PROGRESS NOTE ADULT - SUBJECTIVE AND OBJECTIVE BOX
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 Patient received CVVHD. Patient with watchman device for >6 months, discontinued Plavix and transitioned now to aspirin only. Patient with significant improvement in mentation, AAOx3, warm and well-perfused extremities. Was stepped down to telemetry on 10/11/23 late afternoon with interval SBP 80smmHg, also c/b acute HypoNa to 122:  as discussed with Nephrology, pt too hypotensive to safely dialyze on floor; transferred back to CCU / [facility] boarding mini ICU for continuous HD with close monitoring. Dialyzed in CCU and stepped back down to telemetry 10/17. At Tele, was noted to be fluid overloaded requiring more frequent HD sessions, with maxed out midodrine dosing remains hypotensive SBP 80s and remains very fluid overloaded. Given hypotension while on midodrine and fluid overload status requiring hemodialysis, patient would benefit from continuous hemodialysis and aquapheresis requiring step up to CCU.    In the CCU, pt was started on aquapheresis and dobutamine gtt. BPs soft, however MAPs remained stable. Sodium 125-127 on CCU admission, so pt was given 250cc 3% NaCl bolus x2 per renal recs. Pt now completed aquapheresis and weaned off dobutamine gtt. Clinically stable for stepdown to cardiac tele.    O/N Events: Aquapheresis completed, dobutamine weaned off.    Subjective/ROS: Patient seen and examined at bedside. Resting comfortably in chair, eating breakfast. Denies any complaints this morning.    VITALS  Vital Signs Last 24 Hrs  T(C): 37.2 (24 Oct 2023 09:00), Max: 37.2 (24 Oct 2023 09:00)  T(F): 98.9 (24 Oct 2023 09:00), Max: 98.9 (24 Oct 2023 09:00)  HR: 103 (24 Oct 2023 10:00) (92 - 109)  BP: 97/58 (24 Oct 2023 10:00) (85/56 - 108/78)  BP(mean): 70 (24 Oct 2023 10:00) (66 - 89)  RR: 30 (24 Oct 2023 10:00) (12 - 54)  SpO2: 97% (24 Oct 2023 10:00) (90% - 100%)    Parameters below as of 24 Oct 2023 10:00  Patient On (Oxygen Delivery Method): nasal cannula  O2 Flow (L/min): 2    CAPILLARY BLOOD GLUCOSE    PHYSICAL EXAM  General: NAD  Head: pupils reactive, sclera anicteric  Neck: Supple; no JVD  Respiratory: decreased breath sounds b/l  Cardiovascular: irregular rate and rhythm  Gastrointestinal: Soft; distended  Extremities: WWP  Neurological: A&Ox2-3    MEDICATIONS  (STANDING):  aspirin enteric coated 81 milliGRAM(s) Oral daily  atorvastatin 40 milliGRAM(s) Oral at bedtime  chlorhexidine 2% Cloths 1 Application(s) Topical <User Schedule>  folic acid 1 milliGRAM(s) Oral daily  heparin   Injectable 5000 Unit(s) SubCutaneous every 8 hours  influenza  Vaccine (HIGH DOSE) 0.7 milliLiter(s) IntraMuscular once  midodrine 10 milliGRAM(s) Oral <User Schedule>  Nephro-chichi 1 Tablet(s) Oral every 24 hours  pantoprazole    Tablet 40 milliGRAM(s) Oral before breakfast  polyethylene glycol 3350 17 Gram(s) Oral daily  pregabalin 75 milliGRAM(s) Oral daily  senna 2 Tablet(s) Oral at bedtime    MEDICATIONS  (PRN):  ondansetron Injectable 4 milliGRAM(s) IV Push every 12 hours PRN Nausea and/or Vomiting      penicillins (Unknown)      LABS                        9.8    5.48  )-----------( 92       ( 24 Oct 2023 05:07 )             31.6     10-24    127<L>  |  92<L>  |  19  ----------------------------<  96  5.1   |  22  |  3.80<H>    Ca    9.0      24 Oct 2023 05:07  Phos  4.4     10-24  Mg     2.0     10-24    TPro  6.2  /  Alb  2.6<L>  /  TBili  1.8<H>  /  DBili  x   /  AST  33  /  ALT  20  /  AlkPhos  250<H>  10-24      Urinalysis Basic - ( 24 Oct 2023 05:07 )    Color: x / Appearance: x / SG: x / pH: x  Gluc: 96 mg/dL / Ketone: x  / Bili: x / Urobili: x   Blood: x / Protein: x / Nitrite: x   Leuk Esterase: x / RBC: x / WBC x   Sq Epi: x / Non Sq Epi: x / Bacteria: x              IMAGING/EKG/ETC

## 2023-10-24 NOTE — PROGRESS NOTE ADULT - PROBLEM SELECTOR PLAN 5
Started on HD 6 months ago, 4x week (M/T/Th/Sat) at [facility], missed HD session on 10/3/23. R chest port utilized. At admission, Cr 3.93 (un-established baseline Cr), K 3.4, Phos 4.4. Metabolic acidosis (AGAP 17) likely due to hypochloremic emesis / ETOH leading to lactic acidosis. Dialysate changed to Phoxillum due to low phos.     - Nephrology consulted, s/p aquapheresis, plan to transition to HD 10/25  - 10mg Midodrine on HD days only prior to dialysis.   - C/w home Nephro-chichi, 1L fluid restriction  - hold off binders for now per nephro  - F/u nephro recs

## 2023-10-24 NOTE — PROGRESS NOTE ADULT - ATTENDING COMMENTS
Pt is a 64 y/o man c HTN, HLP, CAD, systolic CHF (35-40%), afib s/p watchman, ESRD, chronic EtOH abuse c cirrhosis, with volume overload and hyponatremia    Pt transferred for aquaphoresis and will give inotropic support to assist  Aquaphoresis stopped, pt received bolus of 3% NaCL (500cc)    afeb, HR 92, 100/70    Na 127    ECG: afib @ 90 RAD  CXR: rt sided infiltrates    - pt with persistent hyponatremia and fluid overload  - bolused hypertonic saline  - d/c dobutamine  - will aim to try HD again to normalize electrolytes

## 2023-10-24 NOTE — PROGRESS NOTE ADULT - PROBLEM SELECTOR PLAN 10
History of GERD, no complaints of epigastric pain at this time.  -Continue Pantoprazole 40mg qd      Prophylactic Measure   F: None  E: Replete per HD with ESRD  N: Renal restrictions  GI ppx: pantoprazole  DVT ppx: heparin 5000u q8h

## 2023-10-24 NOTE — PROGRESS NOTE ADULT - PROBLEM SELECTOR PLAN 9
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 B/l LE dry skin, likely venous stasis dermatitis on due to peripheral artery disease vs. heart failure related edema changes. Likely from niacin deficiency (pellagra) as etiology for dermatitis as patient has 3 month anorexia, fatigue, numbness. Recently prescribed Cerave w/o use  - PO antihistamine

## 2023-10-24 NOTE — PROGRESS NOTE ADULT - ASSESSMENT
63YM  with ESRD, CHF cirrhosis, admitted for acute on chronic HFpEF, cardiogenic shock, requiring CVVHD 10/6-10/11, then transitioned back to iHD on 10/12. had needed daily dialysis last week as Na level low 121-125 range,   upgraded to CCU for aggressive diuresis and better hyponatremia management with hypertonic saline s/p 500cc with repeat sNA 127 this AM as expected   Seen on HD tolerating well, patient stable to downgrade from unit to step down   C/w 1L fluid restriction   Will reassess in AM for further need of RRT

## 2023-10-24 NOTE — PROGRESS NOTE ADULT - ATTENDING COMMENTS
seen and evaluated while on dialysis  tolerating the procedure well  continue full treatment s outlined above

## 2023-10-24 NOTE — PROGRESS NOTE ADULT - PROBLEM SELECTOR PLAN 1
NICM (likely EtOH induced) admitted with volume overload i/s/o missed HD, course c/b cardiogenic shock, now s/p CVVHD and pressors with net negative 10.6L. Remains peripherally overloaded with distended abdomen, 2+ LE edema, and orthopnea, however unable to tolerate further volume removal.  TTEs inpt 10/5 & 10/6/23: LVEF 35-40% with global hypokinesis. Mildly dilated RV w/ mod reduced RVSF. Elevated RA pressure. SHALOM. Mod AR. mild to mod AS. Mod to severe TR. PASP 39 mmHg. Small-to-moderate pericardial effusion without echocardiographic evidence of cardiac tamponade physiology. Ascites present.  Repeat TTE 10/14 largely unchanged, w/ LVEF remaining 35-40% and global hypokinesis despite volume optimization. Last HD session 10/21.   - GDMT: toprol discontinued as persistently hypotensive  - Pt requires Midodrine 10mg qAM on HD days prior to HD due to persistent hypotension  - s/p aquapheresis, tolerated well  - weaned off dobutamine gtt as of 10/24

## 2023-10-24 NOTE — PROGRESS NOTE ADULT - ASSESSMENT
63M, DNR/DNI, poor historian, PMHx of HTN, HLD, CAD (MI, unsure when, unsure if had stent placed), CHF (EF 35-40%), AFib (s/p watchman), ESRD (M/T/Th/Sat), Chronic EtOH abuse w/ cirrhosis, GERD, initially admitted to medicine service w/ volume overload after missing HD; course c/b RRT during first inpatient HD session d/t hypotension, found to have severe biventricular failure on TTE w/clinical evidence of early low output state; upgraded to CCU (10/6-10/11) for concern for Cardiogenic shock, s/p CVVHD x 4 days and Dobutamine; stepped down to Telemetry with plan for intermittent HD per Nephro but remained hypotensive while off dobutamine, and was upgraded back to CCU (10/12-10/13) per Nephro d/t concerns for safe dialysis given hypotension trends. On 10/13 pt was able to tolerate full iHD session with reinitiation of Midodrine prior to sessions; & stepped back down to Tele for further HD and optimization. Patient maxed on midodrine and persistently hypotensive while fluid overloaded, stepped back up to CCU on 10/22 for aquapheresis and inotrope support.     NEURO  -FLOR     CARDIO  #Acute on chronic systolic congestive heart failure  NICM (likely EtOH induced) admitted with volume overload i/s/o missed HD, course c/b cardiogenic shock, now s/p CVVHD and pressors with net negative 10.6L. Remains peripherally overloaded with distended abdomen, 2+ LE edema, and orthopnea, however unable to tolerate further volume removal.  TTEs inpt 10/5 & 10/6/23: LVEF 35-40% with global hypokinesis. Mildly dilated RV w/ mod reduced RVSF. Elevated RA pressure. SHALOM. Mod AR. mild to mod AS. Mod to severe TR. PASP 39 mmHg. Small-to-moderate pericardial effusion without echocardiographic evidence of cardiac tamponade physiology. Ascites present.  Repeat TTE 10/14 largely unchanged, w/ LVEF remaining 35-40% and global hypokinesis despite volume optimization. Last HD session 10/21.   - GDMT: toprol discontinued as persistently hypotensive  - Pt requires Midodrine 10mg qAM on HD days prior to HD due to persistent hypotension  - s/p aquapheresis, tolerated well  - weaned off dobutamine gtt as of 10/24    #Coronary artery disease  History of HLD and MI, unsure if had stent placed  - c/w ASA 81 mg QD, lipitor 40 mg QHS  - unable to tolerate BBL/ACE/ARB from BP standpoint  - LDL 15 at goal    #Atrial fibrillation  - currently in rate controlled AF; s/p watchman device  - continue w/ ASA monotherapy    RESPIRATORY  -FLOR    GASTROINTESTINAL  #Hepatic Cirrhosis as above   RUQ r/o biliary stasis 10/04  Pt's pruritis possibly from biliary cause given recent elevation in ALP & Btotal.   - Outpt Hepatology f/u post-dc  - Plan for paracentesis (both therapeutic and diagnostic) as per hepatology recs and to send studies for cell count, albumin, glucose, gram stain, LDH, and protein.   - Hepatology recommended holding off on treating HE despite mild asterixis on exam as patient is mentating well     #GERD  History of GERD, no complaints of epigastric pain at this time.  -Continue Pantoprazole 40mg qd      RENAL  #ESRD on dialysis   Started on HD 6 months ago, 4x week (M/T/Th/Sat) at [facility] dialysis [facility], missed HD session on 10/3/23. R chest port utilized. At admission, Cr 3.93 (un-established baseline Cr), K 3.4, Phos 4.4. Metabolic acidosis (AGAP 17) likely due to hypochloremic emesis / ETOH leading to lactic acidosis. Dialysate changed to Phoxillum due to low phos.     - Nephrology consulted, s/p aquapheresis, plan to transition to HD 10/25  - 10mg Midodrine on HD days only prior to dialysis.   - C/w home Nephro-chichi, 1L fluid restriction  - hold off binders for now per nephro  - F/u nephro recs     #Hyponatremia  - continue with midodrine 10mg TID on HD days  - continue with 1L fluid restriction  - given 250cc 3% saline x2 per renal  - F/u nephro recs     HEME  Home med iron 65 mg qd for ELMER  - Hold home med iron 65mg  - C/w folic acid 1mg daily   - EPO with HD   - Maintain active type and screen (last drawn 10/16)    ENDOCRINE  -FLOR    DERM  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 B/l LE dry skin, likely venous stasis dermatitis on due to peripheral artery disease vs. heart failure related edema changes. Likely from niacin deficiency (pellagra) as etiology for dermatitis as patient has 3 month anorexia, fatigue, numbness. Recently prescribed Cerave w/o use  - PO antihistamine    PSYCH  #Moderate alcohol use disorder  Chronic EtOH abuse with cirrhosis on Abd US; discussed with patient the impact his current EtOH use has on his condition, and patient strongly endorsed he has no plans to decrease EtOH use. Out of withdrawal window, not on CIWA protocol moving further.  -low concern for hepatorenal syndrome as remains hemodynamically stable. No known gallbladder pathology  -Abd US (10/4) Cirrhotic morphology. Pulsatile flow in the main and left portal veins. Moderate ascites. Increased right renal cortical echogenicity compatible w/ medical renal dz.  -s/p IV thiamine; c/w folate 1mg daily    Prophylactic Measure   F: None  E: Replete per HD with ESRD  N: Renal restrictions  GI ppx: pantoprazole  DVT ppx: heparin 5000u q8h  Dispo: CCU

## 2023-10-24 NOTE — PROGRESS NOTE ADULT - ATTENDING COMMENTS
seen and evaluated again while on dialysis to assess hemodynamic stability  tolerated AQ and 3% saline with some increase in Na concentration-   BPs low but stable   NAD, awake and alert-  c/w full Hd treatment for clearance and correction of hyponatremia  will not remove fluid today

## 2023-10-24 NOTE — PROGRESS NOTE ADULT - SUBJECTIVE AND OBJECTIVE BOX
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 Patient received CVVHD. Patient with watchman device for >6 months, discontinued Plavix and transitioned now to aspirin only. Patient with significant improvement in mentation, AAOx3, warm and well-perfused extremities. Was stepped down to telemetry on 10/11/23 late afternoon with interval SBP 80smmHg, also c/b acute HypoNa to 122:  as discussed with Nephrology, pt too hypotensive to safely dialyze on floor; transferred back to CCU / [facility] mini ICU for continuous HD with close monitoring. Dialyzed in CCU and stepped back down to telemetry 10/17. At Tele, was noted to be fluid overloaded requiring more frequent HD sessions, with maxed out midodrine dosing remains hypotensive SBP 80s and remains very fluid overloaded. Given hypotension while on midodrine and fluid overload status requiring hemodialysis, patient would benefit from continuous hemodialysis and aquapheresis requiring step up to CCU.    In the CCU, pt was started on aquapheresis and dobutamine gtt. BPs soft, however MAPs remained stable. Sodium 125-127 on CCU admission, so pt was given 250cc 3% NaCl bolus x2 per renal recs. Pt now completed aquapheresis and weaned off dobutamine gtt. Clinically stable for stepdown to cardiac tele.    O/N Events: Aquapheresis completed, dobutamine weaned off.    Subjective/ROS: Patient seen and examined at bedside. Resting comfortably in chair, eating breakfast. Denies any complaints this morning.    Interventional Cardiology PA Adult Progress Note    MEDICATIONS:  midodrine 10 milliGRAM(s) Oral <User Schedule>  ondansetron Injectable 4 milliGRAM(s) IV Push every 12 hours PRN  pregabalin 75 milliGRAM(s) Oral daily  pantoprazole    Tablet 40 milliGRAM(s) Oral before breakfast  polyethylene glycol 3350 17 Gram(s) Oral daily  senna 2 Tablet(s) Oral at bedtime  atorvastatin 40 milliGRAM(s) Oral at bedtime  aspirin enteric coated 81 milliGRAM(s) Oral daily  chlorhexidine 2% Cloths 1 Application(s) Topical <User Schedule>  folic acid 1 milliGRAM(s) Oral daily  heparin   Injectable 5000 Unit(s) SubCutaneous every 8 hours  influenza  Vaccine (HIGH DOSE) 0.7 milliLiter(s) IntraMuscular once  Nephro-chichi 1 Tablet(s) Oral every 24 hours      	    [PHYSICAL EXAM:  TELEMETRY:  T(C): 37.2 (10-24-23 @ 15:20), Max: 37.5 (10-24-23 @ 14:14)  HR: 90 (10-24-23 @ 15:20) (90 - 109)  BP: 95/71 (10-24-23 @ 15:20) (85/56 - 106/59)  RR: 15 (10-24-23 @ 15:20) (10 - 34)  SpO2: 98% (10-24-23 @ 15:20) (91% - 100%)  Wt(kg): --  I&O's Summary    23 Oct 2023 07:01  -  24 Oct 2023 07:00  --------------------------------------------------------  IN: 1358.8 mL / OUT: 1586 mL / NET: -227.2 mL    24 Oct 2023 07:01  -  24 Oct 2023 16:13  --------------------------------------------------------  IN: 674 mL / OUT: 2000 mL / NET: -1326 mL                                         Appearance: Normal	  HEENT:   Normal oral mucosa, PERRL, EOMI	  Neck: Supple, - JVD; Carotid Bruit   Respiratory: decreased breath sounds b/l  Cardiovascular: irregular rate and rhythm  Gastrointestinal:  Soft, Non-tender, + BS	  Skin: No rashes, No ecchymoses, No cyanosis  Extremities: Normal range of motion, No clubbing, cyanosis or edema  Vascular: Peripheral pulses palpable 2+ bilaterally  Neurologic: Non-focal  Psychiatry: A & O x 3, Mood & affect appropriate                            9.8    5.48  )-----------( 92       ( 24 Oct 2023 05:07 )             31.6     10-24    127<L>  |  92<L>  |  19  ----------------------------<  96  5.1   |  22  |  3.80<H>    Ca    9.0      24 Oct 2023 05:07  Phos  4.4     10-24  Mg     2.0     10-24    TPro  6.2  /  Alb  2.6<L>  /  TBili  1.8<H>  /  DBili  x   /  AST  33  /  ALT  20  /  AlkPhos  250<H>  10-24

## 2023-10-24 NOTE — PROGRESS NOTE ADULT - ASSESSMENT
63M, DNR/DNI, poor historian, PMHx of HTN, HLD, CAD (MI, unsure when, unsure if had stent placed), CHF (EF 35-40%), AFib (s/p watchman), ESRD (M/T/Th/Sat), Chronic EtOH abuse w/ cirrhosis, GERD, initially admitted to medicine service w/ volume overload after missing HD; course c/b RRT during first inpatient HD session d/t hypotension, found to have severe biventricular failure on TTE w/clinical evidence of early low output state; upgraded to CCU (10/6-10/11) for concern for Cardiogenic shock, s/p CVVHD x 4 days and Dobutamine; stepped down to Telemetry with plan for intermittent HD per Nephro but remained hypotensive while off dobutamine, and was upgraded back to CCU (10/12-10/13) per Nephro d/t concerns for safe dialysis given hypotension trends. On 10/13 pt was able to tolerate full iHD session with reinitiation of Midodrine prior to sessions; & stepped back down to Tele for further HD and optimization. Patient maxed on midodrine and persistently hypotensive while fluid overloaded, stepped back up to CCU on 10/22 for aquapheresis and inotrope support.

## 2023-10-24 NOTE — PROGRESS NOTE ADULT - PROBLEM SELECTOR PLAN 7
I ordered stool testing for the patient. Can we please call her and let her know how to collect her specimen and where to bring, etc. Thanks!   RUQ r/o biliary stasis 10/04  Pt's pruritis possibly from biliary cause given recent elevation in ALP & Btotal.   - Outpt Hepatology f/u post-dc  - Plan for paracentesis (both therapeutic and diagnostic) as per hepatology recs and to send studies for cell count, albumin, glucose, gram stain, LDH, and protein.   - Hepatology recommended holding off on treating HE despite mild asterixis on exam as patient is mentating well

## 2023-10-24 NOTE — PROGRESS NOTE ADULT - SUBJECTIVE AND OBJECTIVE BOX
Patient is a 63y Male seen and evaluated at bedside tolerating HD, reassessed BP now stable will readjust UF with 2L UF. VSS, continue HD.       Meds:    aspirin enteric coated 81 daily  atorvastatin 40 at bedtime  chlorhexidine 2% Cloths 1 <User Schedule>  folic acid 1 daily  heparin   Injectable 5000 every 8 hours  influenza  Vaccine (HIGH DOSE) 0.7 once  midodrine 10 <User Schedule>  Nephro-chichi 1 every 24 hours  ondansetron Injectable 4 every 12 hours PRN  pantoprazole    Tablet 40 before breakfast  polyethylene glycol 3350 17 daily  pregabalin 75 daily  senna 2 at bedtime      T(C): , Max: 37.5 (10-24-23 @ 14:14)  T(F): , Max: 99.5 (10-24-23 @ 14:14)  HR: 90 (10-24-23 @ 15:20)  BP: 95/71 (10-24-23 @ 15:20)  BP(mean): 73 (10-24-23 @ 15:00)  RR: 15 (10-24-23 @ 15:20)  SpO2: 98% (10-24-23 @ 15:20)  Wt(kg): --    10-23 @ 07:01  -  10-24 @ 07:00  --------------------------------------------------------  IN: 1358.8 mL / OUT: 1586 mL / NET: -227.2 mL    10-24 @ 07:01  -  10-24 @ 16:05  --------------------------------------------------------  IN: 674 mL / OUT: 2000 mL / NET: -1326 mL          Review of Systems:  ROS negative except as per HPI      PHYSICAL EXAM:  GENERAL: NAD, laying in bed tolerating HD   HEENT: NCAT, EOMI  CHEST/LUNG: Normal respiratory effort, b/l basilar crackles   HEART: Regular rate  ABDOMEN: Mildly distended  EXTREMITIES: trace pedal edema  Neurology: Somnolent  SKIN: No rash or skin lesion on exposed skin   ACCESS: Right TDC accessed       LABS:                        9.8    5.48  )-----------( 92       ( 24 Oct 2023 05:07 )             31.6     10-24    127<L>  |  92<L>  |  19  ----------------------------<  96  5.1   |  22  |  3.80<H>    Ca    9.0      24 Oct 2023 05:07  Phos  4.4     10-24  Mg     2.0     10-24    TPro  6.2  /  Alb  2.6<L>  /  TBili  1.8<H>  /  DBili  x   /  AST  33  /  ALT  20  /  AlkPhos  250<H>  10-24        Urinalysis Basic - ( 24 Oct 2023 05:07 )    Color: x / Appearance: x / SG: x / pH: x  Gluc: 96 mg/dL / Ketone: x  / Bili: x / Urobili: x   Blood: x / Protein: x / Nitrite: x   Leuk Esterase: x / RBC: x / WBC x   Sq Epi: x / Non Sq Epi: x / Bacteria: x            RADIOLOGY & ADDITIONAL STUDIES:

## 2023-10-25 LAB
ALBUMIN FLD-MCNC: 1.4 G/DL — SIGNIFICANT CHANGE UP
ALBUMIN FLD-MCNC: 1.4 G/DL — SIGNIFICANT CHANGE UP
ALBUMIN SERPL ELPH-MCNC: 2.7 G/DL — LOW (ref 3.3–5)
ALBUMIN SERPL ELPH-MCNC: 2.7 G/DL — LOW (ref 3.3–5)
ALP SERPL-CCNC: 252 U/L — HIGH (ref 40–120)
ALP SERPL-CCNC: 252 U/L — HIGH (ref 40–120)
ALT FLD-CCNC: 19 U/L — SIGNIFICANT CHANGE UP (ref 10–45)
ALT FLD-CCNC: 19 U/L — SIGNIFICANT CHANGE UP (ref 10–45)
ANION GAP SERPL CALC-SCNC: 11 MMOL/L — SIGNIFICANT CHANGE UP (ref 5–17)
ANION GAP SERPL CALC-SCNC: 11 MMOL/L — SIGNIFICANT CHANGE UP (ref 5–17)
AST SERPL-CCNC: 31 U/L — SIGNIFICANT CHANGE UP (ref 10–40)
AST SERPL-CCNC: 31 U/L — SIGNIFICANT CHANGE UP (ref 10–40)
B PERT IGG+IGM PNL SER: CLEAR — SIGNIFICANT CHANGE UP
B PERT IGG+IGM PNL SER: CLEAR — SIGNIFICANT CHANGE UP
BASOPHILS # BLD AUTO: 0.06 K/UL — SIGNIFICANT CHANGE UP (ref 0–0.2)
BASOPHILS # BLD AUTO: 0.06 K/UL — SIGNIFICANT CHANGE UP (ref 0–0.2)
BASOPHILS NFR BLD AUTO: 0.9 % — SIGNIFICANT CHANGE UP (ref 0–2)
BASOPHILS NFR BLD AUTO: 0.9 % — SIGNIFICANT CHANGE UP (ref 0–2)
BILIRUB SERPL-MCNC: 1.9 MG/DL — HIGH (ref 0.2–1.2)
BILIRUB SERPL-MCNC: 1.9 MG/DL — HIGH (ref 0.2–1.2)
BUN SERPL-MCNC: 16 MG/DL — SIGNIFICANT CHANGE UP (ref 7–23)
BUN SERPL-MCNC: 16 MG/DL — SIGNIFICANT CHANGE UP (ref 7–23)
CALCIUM SERPL-MCNC: 9.2 MG/DL — SIGNIFICANT CHANGE UP (ref 8.4–10.5)
CALCIUM SERPL-MCNC: 9.2 MG/DL — SIGNIFICANT CHANGE UP (ref 8.4–10.5)
CHLORIDE SERPL-SCNC: 92 MMOL/L — LOW (ref 96–108)
CHLORIDE SERPL-SCNC: 92 MMOL/L — LOW (ref 96–108)
CO2 SERPL-SCNC: 24 MMOL/L — SIGNIFICANT CHANGE UP (ref 22–31)
CO2 SERPL-SCNC: 24 MMOL/L — SIGNIFICANT CHANGE UP (ref 22–31)
COLOR FLD: YELLOW — SIGNIFICANT CHANGE UP
COLOR FLD: YELLOW — SIGNIFICANT CHANGE UP
CREAT SERPL-MCNC: 3.35 MG/DL — HIGH (ref 0.5–1.3)
CREAT SERPL-MCNC: 3.35 MG/DL — HIGH (ref 0.5–1.3)
EGFR: 20 ML/MIN/1.73M2 — LOW
EGFR: 20 ML/MIN/1.73M2 — LOW
EOSINOPHIL # BLD AUTO: 0.01 K/UL — SIGNIFICANT CHANGE UP (ref 0–0.5)
EOSINOPHIL # BLD AUTO: 0.01 K/UL — SIGNIFICANT CHANGE UP (ref 0–0.5)
EOSINOPHIL NFR BLD AUTO: 0.2 % — SIGNIFICANT CHANGE UP (ref 0–6)
EOSINOPHIL NFR BLD AUTO: 0.2 % — SIGNIFICANT CHANGE UP (ref 0–6)
FLUID INTAKE SUBSTANCE CLASS: SIGNIFICANT CHANGE UP
FLUID INTAKE SUBSTANCE CLASS: SIGNIFICANT CHANGE UP
GLUCOSE FLD-MCNC: 101 MG/DL — SIGNIFICANT CHANGE UP
GLUCOSE FLD-MCNC: 101 MG/DL — SIGNIFICANT CHANGE UP
GLUCOSE SERPL-MCNC: 96 MG/DL — SIGNIFICANT CHANGE UP (ref 70–99)
GLUCOSE SERPL-MCNC: 96 MG/DL — SIGNIFICANT CHANGE UP (ref 70–99)
HCT VFR BLD CALC: 33.1 % — LOW (ref 39–50)
HCT VFR BLD CALC: 33.1 % — LOW (ref 39–50)
HGB BLD-MCNC: 10.1 G/DL — LOW (ref 13–17)
HGB BLD-MCNC: 10.1 G/DL — LOW (ref 13–17)
IMM GRANULOCYTES NFR BLD AUTO: 0.5 % — SIGNIFICANT CHANGE UP (ref 0–0.9)
IMM GRANULOCYTES NFR BLD AUTO: 0.5 % — SIGNIFICANT CHANGE UP (ref 0–0.9)
LDH SERPL L TO P-CCNC: 150 U/L — SIGNIFICANT CHANGE UP
LDH SERPL L TO P-CCNC: 150 U/L — SIGNIFICANT CHANGE UP
LYMPHOCYTES # BLD AUTO: 0.91 K/UL — LOW (ref 1–3.3)
LYMPHOCYTES # BLD AUTO: 0.91 K/UL — LOW (ref 1–3.3)
LYMPHOCYTES # BLD AUTO: 14 % — SIGNIFICANT CHANGE UP (ref 13–44)
LYMPHOCYTES # BLD AUTO: 14 % — SIGNIFICANT CHANGE UP (ref 13–44)
LYMPHOCYTES # FLD: 12 % — SIGNIFICANT CHANGE UP
LYMPHOCYTES # FLD: 12 % — SIGNIFICANT CHANGE UP
MAGNESIUM SERPL-MCNC: 2 MG/DL — SIGNIFICANT CHANGE UP (ref 1.6–2.6)
MAGNESIUM SERPL-MCNC: 2 MG/DL — SIGNIFICANT CHANGE UP (ref 1.6–2.6)
MCHC RBC-ENTMCNC: 30.5 GM/DL — LOW (ref 32–36)
MCHC RBC-ENTMCNC: 30.5 GM/DL — LOW (ref 32–36)
MCHC RBC-ENTMCNC: 32.2 PG — SIGNIFICANT CHANGE UP (ref 27–34)
MCHC RBC-ENTMCNC: 32.2 PG — SIGNIFICANT CHANGE UP (ref 27–34)
MCV RBC AUTO: 105.4 FL — HIGH (ref 80–100)
MCV RBC AUTO: 105.4 FL — HIGH (ref 80–100)
MESOTHL CELL # FLD: 1 % — SIGNIFICANT CHANGE UP
MESOTHL CELL # FLD: 1 % — SIGNIFICANT CHANGE UP
MONOCYTES # BLD AUTO: 0.54 K/UL — SIGNIFICANT CHANGE UP (ref 0–0.9)
MONOCYTES # BLD AUTO: 0.54 K/UL — SIGNIFICANT CHANGE UP (ref 0–0.9)
MONOCYTES NFR BLD AUTO: 8.3 % — SIGNIFICANT CHANGE UP (ref 2–14)
MONOCYTES NFR BLD AUTO: 8.3 % — SIGNIFICANT CHANGE UP (ref 2–14)
MONOS+MACROS # FLD: 45 % — SIGNIFICANT CHANGE UP
MONOS+MACROS # FLD: 45 % — SIGNIFICANT CHANGE UP
NEUTROPHILS # BLD AUTO: 4.95 K/UL — SIGNIFICANT CHANGE UP (ref 1.8–7.4)
NEUTROPHILS # BLD AUTO: 4.95 K/UL — SIGNIFICANT CHANGE UP (ref 1.8–7.4)
NEUTROPHILS NFR BLD AUTO: 76.1 % — SIGNIFICANT CHANGE UP (ref 43–77)
NEUTROPHILS NFR BLD AUTO: 76.1 % — SIGNIFICANT CHANGE UP (ref 43–77)
NEUTROPHILS-BODY FLUID: 42 % — SIGNIFICANT CHANGE UP
NEUTROPHILS-BODY FLUID: 42 % — SIGNIFICANT CHANGE UP
NRBC # BLD: 0 /100 WBCS — SIGNIFICANT CHANGE UP (ref 0–0)
NRBC # BLD: 0 /100 WBCS — SIGNIFICANT CHANGE UP (ref 0–0)
PHOSPHATE SERPL-MCNC: 4.2 MG/DL — SIGNIFICANT CHANGE UP (ref 2.5–4.5)
PHOSPHATE SERPL-MCNC: 4.2 MG/DL — SIGNIFICANT CHANGE UP (ref 2.5–4.5)
PLATELET # BLD AUTO: 109 K/UL — LOW (ref 150–400)
PLATELET # BLD AUTO: 109 K/UL — LOW (ref 150–400)
POTASSIUM SERPL-MCNC: 4.7 MMOL/L — SIGNIFICANT CHANGE UP (ref 3.5–5.3)
POTASSIUM SERPL-MCNC: 4.7 MMOL/L — SIGNIFICANT CHANGE UP (ref 3.5–5.3)
POTASSIUM SERPL-SCNC: 4.7 MMOL/L — SIGNIFICANT CHANGE UP (ref 3.5–5.3)
POTASSIUM SERPL-SCNC: 4.7 MMOL/L — SIGNIFICANT CHANGE UP (ref 3.5–5.3)
PROT FLD-MCNC: 3 G/DL — SIGNIFICANT CHANGE UP
PROT FLD-MCNC: 3 G/DL — SIGNIFICANT CHANGE UP
PROT SERPL-MCNC: 6.5 G/DL — SIGNIFICANT CHANGE UP (ref 6–8.3)
PROT SERPL-MCNC: 6.5 G/DL — SIGNIFICANT CHANGE UP (ref 6–8.3)
RBC # BLD: 3.14 M/UL — LOW (ref 4.2–5.8)
RBC # BLD: 3.14 M/UL — LOW (ref 4.2–5.8)
RBC # FLD: 16.9 % — HIGH (ref 10.3–14.5)
RBC # FLD: 16.9 % — HIGH (ref 10.3–14.5)
RCV VOL RI: < 2000 /UL — SIGNIFICANT CHANGE UP (ref 0–0)
RCV VOL RI: < 2000 /UL — SIGNIFICANT CHANGE UP (ref 0–0)
SODIUM SERPL-SCNC: 127 MMOL/L — LOW (ref 135–145)
SODIUM SERPL-SCNC: 127 MMOL/L — LOW (ref 135–145)
TOTAL NUCLEATED CELL COUNT, BODY FLUID: 230 /UL — SIGNIFICANT CHANGE UP
TOTAL NUCLEATED CELL COUNT, BODY FLUID: 230 /UL — SIGNIFICANT CHANGE UP
TUBE TYPE: SIGNIFICANT CHANGE UP
TUBE TYPE: SIGNIFICANT CHANGE UP
WBC # BLD: 6.5 K/UL — SIGNIFICANT CHANGE UP (ref 3.8–10.5)
WBC # BLD: 6.5 K/UL — SIGNIFICANT CHANGE UP (ref 3.8–10.5)
WBC # FLD AUTO: 6.5 K/UL — SIGNIFICANT CHANGE UP (ref 3.8–10.5)
WBC # FLD AUTO: 6.5 K/UL — SIGNIFICANT CHANGE UP (ref 3.8–10.5)

## 2023-10-25 PROCEDURE — 99233 SBSQ HOSP IP/OBS HIGH 50: CPT

## 2023-10-25 PROCEDURE — 99232 SBSQ HOSP IP/OBS MODERATE 35: CPT

## 2023-10-25 RX ORDER — ALBUMIN HUMAN 25 %
200 VIAL (ML) INTRAVENOUS ONCE
Refills: 0 | Status: COMPLETED | OUTPATIENT
Start: 2023-10-25 | End: 2023-10-25

## 2023-10-25 RX ORDER — MIDODRINE HYDROCHLORIDE 2.5 MG/1
10 TABLET ORAL ONCE
Refills: 0 | Status: COMPLETED | OUTPATIENT
Start: 2023-10-25 | End: 2023-10-25

## 2023-10-25 RX ADMIN — HEPARIN SODIUM 5000 UNIT(S): 5000 INJECTION INTRAVENOUS; SUBCUTANEOUS at 22:45

## 2023-10-25 RX ADMIN — SENNA PLUS 2 TABLET(S): 8.6 TABLET ORAL at 22:44

## 2023-10-25 RX ADMIN — Medication 75 MILLIGRAM(S): at 14:14

## 2023-10-25 RX ADMIN — Medication 1 TABLET(S): at 09:04

## 2023-10-25 RX ADMIN — PANTOPRAZOLE SODIUM 40 MILLIGRAM(S): 20 TABLET, DELAYED RELEASE ORAL at 06:21

## 2023-10-25 RX ADMIN — Medication 81 MILLIGRAM(S): at 14:14

## 2023-10-25 RX ADMIN — CHLORHEXIDINE GLUCONATE 1 APPLICATION(S): 213 SOLUTION TOPICAL at 06:21

## 2023-10-25 RX ADMIN — ATORVASTATIN CALCIUM 40 MILLIGRAM(S): 80 TABLET, FILM COATED ORAL at 22:43

## 2023-10-25 RX ADMIN — HEPARIN SODIUM 5000 UNIT(S): 5000 INJECTION INTRAVENOUS; SUBCUTANEOUS at 06:21

## 2023-10-25 RX ADMIN — Medication 200 MILLILITER(S): at 14:16

## 2023-10-25 RX ADMIN — Medication 1 MILLIGRAM(S): at 14:14

## 2023-10-25 RX ADMIN — MIDODRINE HYDROCHLORIDE 10 MILLIGRAM(S): 2.5 TABLET ORAL at 14:35

## 2023-10-25 NOTE — PROCEDURE NOTE - NSPROCDETAILS_GEN_ALL_CORE
location identified, sterile technique used, catheter introduced, fluid drained
location identified, draped/prepped, sterile technique used, needle inserted/introduced/positive blood return obtained via catheter/connected to a pressurized flush line/sutured in place/hemostasis with direct pressure, dressing applied/Seldinger technique/all materials/supplies accounted for at end of procedure
location identified, draped/prepped, sterile technique used/blood seen on insertion/dressing applied/flushes easily/secured in place/sterile technique, catheter placed
all materials/supplies accounted for at end of procedure

## 2023-10-25 NOTE — PROGRESS NOTE ADULT - ATTENDING COMMENTS
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 Patient received CVVHD. Patient with watchman device for >6 months, discontinued Plavix and transitioned now to aspirin only. Patient with significant improvement in mentation, AAOx3, warm and well-perfused extremities. Was stepped down to telemetry on 10/11/23 late afternoon with interval SBP 80smmHg, also c/b acute HypoNa to 122:  as discussed with Nephrology, pt too hypotensive to safely dialyze on floor; transferred back to CCU / [address] [place], [place] [address]ing [facility] ICU for continuous HD with close monitoring. Dialyzed in CCU and stepped back down to telemetry 10/17. At Tele, was noted to be fluid overloaded requiring more frequent HD sessions, with maxed out midodrine dosing remains hypotensive SBP 80s and remains very fluid overloaded. Given hypotension while on midodrine and fluid overload status requiring hemodialysis, patient would benefit from continuous hemodialysis and aquapheresis requiring step up to CCU. After a short-period in the CCU, he was able to be weaned off inotropic support and tx back to cardiac telemetry floor Na 125-127s.      1. HFrEF 35-40%, acute on chronic systolic.  Acute on chronic decompensated congestive heart failure with low systolic function. Stage D AHA/ACC.  Severe RV dilation and hypokinesis, severe TR.  On max dose of midodrine and CVVH.  Likely mixed etiology (hx of MI and ETOH).  Unable to tolerate BB/ARNI/SGTI due to ESRD and hypotension    2. CAD  Continue aspirin, asymptomatic.    3. AF  S/p watchman, rate controlled.    4. CKD ESRD HD  Follow renal recommendations.    5. Mild to moderate AS/ Moderate AI.  Clinical monitoring, not indication for surgical replacement.    Once on a stable HD regimen, placement.

## 2023-10-25 NOTE — CHART NOTE - NSCHARTNOTEFT_GEN_A_CORE
Patient's chart reviewed. Patient is now stepped down from CCU to cardiac telemetry floor.     Still pending paracentesis to further work-up the etiology of patient's cirrhosis.     Please notify hepatology team when patient undergoes paracentesis. If a large volume paracentesis is performed, please replete albumin 6-8g per liter of fluid removed, if >5 liters is removed via paracentesis and okay from a volume perspective regarding heart failure.     Hepatology team will continue to follow.     Rosanna Norris D.O.   Gastroenterology Fellow  Weekday 7am-5pm Pager: [phone]  Weeknights/Weekend/Holiday Coverage: Please call the  for contact info

## 2023-10-25 NOTE — PROVIDER CONTACT NOTE (OTHER) - SITUATION
low bp
Pt difficult to arouse, quickly falls back asleep, systolic blood pressure in the 80s
low bp
Pt episode of vomiting. Airway maintained. Pt vomiting up breakfast. About 250ml recorded.
hypotension

## 2023-10-25 NOTE — PROGRESS NOTE ADULT - SUBJECTIVE AND OBJECTIVE BOX
Patient seen and examined at bedside.       T(C): , Max: 36.7 (10-25-23 @ 13:07)  T(F): , Max: 98 (10-25-23 @ 13:07)  HR: 91 (10-25-23 @ 16:04)  BP: 88/66 (10-25-23 @ 16:04)  BP(mean): 73 (10-25-23 @ 16:04)  RR: 15 (10-25-23 @ 16:04)  SpO2: 96% (10-25-23 @ 16:04)  Wt(kg): --    10-24 @ 07:01  -  10-25 @ 07:00  --------------------------------------------------------  IN:    Oral Fluid: 674 mL  Total IN: 674 mL    OUT:    Other (mL): 2000 mL    Stool (mL): 1 mL    Voided (mL): 950 mL  Total OUT: 2951 mL    Total NET: -2277 mL      10-25 @ 07:01  -  10-25 @ 18:10  --------------------------------------------------------  IN:    Oral Fluid: 480 mL  Total IN: 480 mL    OUT:    Voided (mL): 200 mL  Total OUT: 200 mL    Total NET: 280 mL            aspirin enteric coated 81 daily  atorvastatin 40 at bedtime  chlorhexidine 2% Cloths 1 <User Schedule>  folic acid 1 daily  heparin   Injectable 5000 every 8 hours  midodrine 10 <User Schedule>  Nephro-chcihi 1 every 24 hours  pantoprazole    Tablet 40 before breakfast  polyethylene glycol 3350 17 daily  pregabalin 75 daily  senna 2 at bedtime    Allergies    penicillins (Unknown)      PHYSICAL EXAM:  Constitutional:  No acute distress  Respiratory: reduced BS bases  Cardiovascular: S1, S2  Gastrointestinal: soft, non-tender + ascites  Vasc/Extremities:  1+ edema      LABS:                        10.1   6.50  )-----------( 109      ( 25 Oct 2023 05:30 )             33.1     10-25    127<L>  |  92<L>  |  16  ----------------------------<  96  4.7   |  24  |  3.35<H>    Ca    9.2      25 Oct 2023 05:30  Phos  4.2     10-25  Mg     2.0     10-25    TPro  6.5  /  Alb  2.7<L>  /  TBili  1.9<H>  /  DBili  x   /  AST  31  /  ALT  19  /  AlkPhos  252<H>  10-25        Urinalysis Basic - ( 25 Oct 2023 05:30 )    Color: x / Appearance: x / SG: x / pH: x  Gluc: 96 mg/dL / Ketone: x  / Bili: x / Urobili: x   Blood: x / Protein: x / Nitrite: x   Leuk Esterase: x / RBC: x / WBC x   Sq Epi: x / Non Sq Epi: x / Bacteria: x            RADIOLOGY & ADDITIONAL STUDIES:

## 2023-10-25 NOTE — PROCEDURE NOTE - ADDITIONAL PROCEDURE DETAILS
Paracentesis performed on Left side, patient tolerated procedure well with no complications.  Samples sent for diagnostic and therapeutic purposes.
R hand Bao test negative, no dopplerable flow from ulnar artery and distal cyanosis since rapid response (see note for details). R Axillary A-line placed using Seldinger technique over wire 1/1, no complications.

## 2023-10-25 NOTE — PROGRESS NOTE ADULT - SUBJECTIVE AND OBJECTIVE BOX
***INCOMPLETE NOTE*** CC: Patient is a 63y old  Male who presents with a chief complaint of missed HD for ESRD (25 Oct 2023 11:58)    INTERVAL EVENTS: ADEN    SUBJECTIVE / INTERVAL HPI: Patient seen and examined at bedside.   No acute events overnight.  Patient reports no change in intermittent shortness of breath, but decreased cough. He indicated that his abdomen feels bigger today - only complains of itchiness that intermittently. Reports that he passed bowel today. Underwent 6.5L of fluid removal with paracentesis, was somnolent after it with MAPs stable.   Denies HA, CP, SOB, abdominal pain, nausea, vomiting, fever, chills or diarrhea.       ROS: negative unless otherwise stated above.    VITAL SIGNS:  Vitals Interpretation review:  Vital Signs Last 24 Hrs  T(C): 36.9 (25 Oct 2023 20:15), Max: 36.9 (25 Oct 2023 20:15)  T(F): 98.4 (25 Oct 2023 20:15), Max: 98.4 (25 Oct 2023 20:15)  HR: 98 (25 Oct 2023 20:15) (84 - 106)  BP: 92/66 (25 Oct 2023 20:15) (86/68 - 110/77)  BP(mean): 70 (25 Oct 2023 18:04) (69 - 89)  RR: 16 (25 Oct 2023 20:15) (14 - 18)  SpO2: 95% (25 Oct 2023 20:15) (94% - 98%)    Parameters below as of 25 Oct 2023 20:15  Patient On (Oxygen Delivery Method): room air          10-24-23 @ 07:01  -  10-25-23 @ 07:00  --------------------------------------------------------  IN: 674 mL / OUT: 2951 mL / NET: -2277 mL    10-25-23 @ 07:01  -  10-26-23 @ 00:03  --------------------------------------------------------  IN: 480 mL / OUT: 200 mL / NET: 280 mL        PHYSICAL EXAM:  HEAD:  Atraumatic, Normocephalic  EYES: EOMI, PERRLA, conjunctiva and sclera clear  NECK: Supple, Normal thyroid, no enlarged nodes  NERVOUS SYSTEM:  Alert & Awake.   CHEST/LUNG: B/L good air entry; No rales, rhonchi. Mild wheezing  HEART: S1S2 normal, no S3, Regular rate and rhythm; No murmurs  ABDOMEN: Soft, no tenderness on deep palpation, obese & increasingly distended but significantly reduced s/p para; Bowel sounds present. Umbilical hernia present and not tender to palpation.   EXTREMITIES:  2+ Peripheral Pulses, No clubbing, cyanosis. B/L LE pitting edema and dermatic venous statis. Palpable thrill over distal LUE at previous HD fistula site  LYMPH: No lymphadenopathy noted  SKIN: Diffuse pruritic excoriations over b/l UE .       MEDICATIONS:  MEDICATIONS  (STANDING):  aspirin enteric coated 81 milliGRAM(s) Oral daily  atorvastatin 40 milliGRAM(s) Oral at bedtime  chlorhexidine 2% Cloths 1 Application(s) Topical <User Schedule>  folic acid 1 milliGRAM(s) Oral daily  heparin   Injectable 5000 Unit(s) SubCutaneous every 8 hours  midodrine 10 milliGRAM(s) Oral <User Schedule>  Nephro-chichi 1 Tablet(s) Oral every 24 hours  pantoprazole    Tablet 40 milliGRAM(s) Oral before breakfast  polyethylene glycol 3350 17 Gram(s) Oral daily  pregabalin 75 milliGRAM(s) Oral daily  senna 2 Tablet(s) Oral at bedtime    MEDICATIONS  (PRN):  ondansetron Injectable 4 milliGRAM(s) IV Push every 12 hours PRN Nausea and/or Vomiting      ALLERGIES:  Allergies    penicillins (Unknown)    Intolerances        LABS:  Labs Interpretation:                         10.1   6.50  )-----------( 109      ( 25 Oct 2023 05:30 )             33.1     10-25    127<L>  |  92<L>  |  16  ----------------------------<  96  4.7   |  24  |  3.35<H>    Ca    9.2      25 Oct 2023 05:30  Phos  4.2     10-25  Mg     2.0     10-25    TPro  6.5  /  Alb  2.7<L>  /  TBili  1.9<H>  /  DBili  x   /  AST  31  /  ALT  19  /  AlkPhos  252<H>  10-25      Urinalysis Basic - ( 25 Oct 2023 05:30 )    Color: x / Appearance: x / SG: x / pH: x  Gluc: 96 mg/dL / Ketone: x  / Bili: x / Urobili: x   Blood: x / Protein: x / Nitrite: x   Leuk Esterase: x / RBC: x / WBC x   Sq Epi: x / Non Sq Epi: x / Bacteria: x      CAPILLARY BLOOD GLUCOSE                RADIOLOGY & ADDITIONAL TESTS: Reviewed.  CXR  EKG    Imaging Interpretation Review:    FMHx  Surgical Hx

## 2023-10-25 NOTE — PROGRESS NOTE ADULT - SUBJECTIVE AND OBJECTIVE BOX
Subjective:  No acute events overnight.  Patient is doing well today without new complaints.  Denies HA, CP, SOB, abdominal pain, nausea, vomiting, fever, chills or diarrhea.     Objective:   Vital Signs:  T(C): 36.6 (25 Oct 2023 08:27), Max: 37.5 (24 Oct 2023 14:14)  T(F): 97.8 (25 Oct 2023 08:27), Max: 99.5 (24 Oct 2023 14:14)  HR: 106 (25 Oct 2023 08:27) (84 - 106)  BP: 110/77 (25 Oct 2023 08:27) (95/57 - 110/77)  BP(mean): 89 (25 Oct 2023 08:27) (70 - 89)  RR: 18 (25 Oct 2023 08:27) (10 - 30)  SpO2: 97% (25 Oct 2023 08:27) (91% - 100%)    Parameters below as of 25 Oct 2023 08:27  Patient On (Oxygen Delivery Method): nasal cannula  O2 Flow (L/min): 2    Physical Exam:   -Gen: NAD, resting in bed  -HEENT: EOMI, PERRL, no scleral icterus, no JVD, no bruits, right chest TDC  -CV: normal S1 and S2  -Lungs: CTABL, normal respiratory effort on NC  -Ab: obese, very distended and tense, umbilical hernia is non-tender, normal BS  -Ext: +1 LE edema  -Neuro: A&O x 3, no focal deficits     Labs:                        10.1   6.50  )-----------( 109      ( 25 Oct 2023 05:30 )             33.1       10-25    127<L>  |  92<L>  |  16  ----------------------------<  96  4.7   |  24  |  3.35<H>    Ca    9.2      25 Oct 2023 05:30  Phos  4.2     10-25  Mg     2.0     10-25    TPro  6.5  /  Alb  2.7<L>  /  TBili  1.9<H>  /  DBili  x   /  AST  31  /  ALT  19  /  AlkPhos  252<H>  10-25    Medications:  MEDICATIONS  (STANDING):  aspirin enteric coated 81 milliGRAM(s) Oral daily  atorvastatin 40 milliGRAM(s) Oral at bedtime  chlorhexidine 2% Cloths 1 Application(s) Topical <User Schedule>  folic acid 1 milliGRAM(s) Oral daily  heparin   Injectable 5000 Unit(s) SubCutaneous every 8 hours  influenza  Vaccine (HIGH DOSE) 0.7 milliLiter(s) IntraMuscular once  midodrine 10 milliGRAM(s) Oral <User Schedule>  Nephro-chichi 1 Tablet(s) Oral every 24 hours  pantoprazole    Tablet 40 milliGRAM(s) Oral before breakfast  polyethylene glycol 3350 17 Gram(s) Oral daily  pregabalin 75 milliGRAM(s) Oral daily  senna 2 Tablet(s) Oral at bedtime    MEDICATIONS  (PRN):  ondansetron Injectable 4 milliGRAM(s) IV Push every 12 hours PRN Nausea and/or Vomiting

## 2023-10-25 NOTE — PROGRESS NOTE ADULT - ASSESSMENT
63-year-old male with a PMHx of HFrEF (EF 30-40%), Afib (s/p watchman), ESRD (on HD [facility]) and alcohol use disorder who presented with SOB in setting of missed HD, initially admitted to medicine for HD but was transferred to CCU for management of cardiogenic shock, s/p multiple admissions to the CCU for dobutamine, CVVHD and AQ, now stepped down to cardiac telemetry.           #HFrEF          -further management as per cardiology           -not currently on GDMT due to soft blood pressure, defer initiation to primary team           -volume removal with HD as per cardiology and nephrology           -palliative care consulted, appreciate recommendations          #ESRD          #Hyponatremia         -further management as per nephrology, s/p multiple admissions to the CCU for dobutamine, hypertonic saline, CVVHD and AQ   -continue with midodrine 10mg TID on HD days          -continue with 1L fluid restriction        -follow up plan for iHD    #Cirrhosis          #Thrombocytopenia and Bilirubinemia (stable)      -further management as per hepatology       -unclear if cirrhosis is 2/2 alcohol use or heart failure and no ascitic fluid analysis to help determine etiology     -abdominal US with cirrhotic liver, moderate ascites, no biliary dilation, prior cholecystectomy and no concern for PVT          -recommend diagnostic and therapeutic paracentesis, ideally performed non-HD days given limitations 2/2 hypotension and fluid shifts, given tenuous hemodynamics it may be worth only removing a modest amount (4-5L) to see how patient's hemodynamics respond    -if patient to undergo paracentesis, please send peritoneal fluid for cell count with diff, gram stain/culture, cytology, peritoneal fluid protein and peritoneal fluid albumin   -will need albumin infusion post-paracentesis, 6-8g/L for if > 5L removed  -daily MELD labs (CMP and INR)    #CAD         -continue with ASA 81mg daily and atorvastatin 40mg qhs            #Afib          -continue with ASA monotherapy, patient with Watchman device             #Macrocytic Anemia           -likely 2/2 ACD and ESRD          -iron studies suggestive of ACD, B12 and Folate within normal limits, T4 within normal limits          -continue with folic acid and EPO with HD as per nephrology     DVT PPx: SQH    Dispo: pending, recommend repeat PT evaluation

## 2023-10-25 NOTE — PROCEDURE NOTE - NSINDICATIONS_GEN_A_CORE
arterial puncture to obtain ABG's/blood sampling/cannulation purposes/critical patient/monitoring purposes/transfusion
emergency venous access
ascites
arterial puncture to obtain ABG's

## 2023-10-25 NOTE — PROVIDER CONTACT NOTE (OTHER) - ASSESSMENT
84/59  pt awake asymptomatic
Pt currently on aquaphoresis. Vomiting recorded 250ml.
Pt somnolent and difficult to arouse but once awake pt is oriented x3 and able to follow commands before falling back asleep, systolic 80s but MAP >70. Albumin infusing per orders.
bp 90/61
81/58

## 2023-10-25 NOTE — PROCEDURE NOTE - NSPROCNAME_GEN_A_CORE
Arterial Puncture/Cannulation
Point of Care Ultrasound Cardiac
Point of Care Ultrasound Lung
Paracentesis
Arterial Puncture/Cannulation
Peripheral Line Insertion

## 2023-10-25 NOTE — PROCEDURE NOTE - NSINFORMCONSENT_GEN_A_CORE
Benefits, risks, and possible complications of procedure explained to patient/caregiver who verbalized understanding and gave verbal consent.
Benefits, risks, and possible complications of procedure explained to patient/caregiver who verbalized understanding and gave written consent.
Benefits, risks, and possible complications of procedure explained to patient/caregiver who verbalized understanding and gave written consent.
Benefits, risks, and possible complications of procedure explained to patient/caregiver who verbalized understanding and gave verbal consent.

## 2023-10-25 NOTE — PROVIDER CONTACT NOTE (OTHER) - ACTION/TREATMENT ORDERED:
zofran given. Pt sitting upright in bed, VSS.
check in 1 hour
move am toprol to afternoon
one time stat midodrine ordered.
rechk bp in 1 hour

## 2023-10-25 NOTE — PROGRESS NOTE ADULT - ASSESSMENT
62 yo man with ESRD, CHF cirrhosis, admitted for acute on chronic HFpEF, cardiogenic shock, requiring CVVHD 10/6-10/11, then transitioned back to iHD on 10/12.   frequent HD treatments and 1 session of AQ with 3% saline  to help correct hyponatremia  much more awake and alert this week than lst week.  Na 127  he is trying to drink less fluid  tolerated HD adequately yesterday  okay to defer HD tody-   cautioned in regard to fluid intake and Na concentration  for repeat HD tomorrow 10/26  no signs of CHF-   hyponatremia from liver disease and ESRD

## 2023-10-25 NOTE — PROVIDER CONTACT NOTE (OTHER) - BACKGROUND
hosp course c/b cardiogenic shock, pt previously on dobutamine gtt, experiences hypotension during HD, pt just had abdominal paracentesis w/ 6L removed this afternoon.
hemodialysis

## 2023-10-26 LAB
ALBUMIN SERPL ELPH-MCNC: 2.9 G/DL — LOW (ref 3.3–5)
ALBUMIN SERPL ELPH-MCNC: 2.9 G/DL — LOW (ref 3.3–5)
ALP SERPL-CCNC: 223 U/L — HIGH (ref 40–120)
ALP SERPL-CCNC: 223 U/L — HIGH (ref 40–120)
ALT FLD-CCNC: 16 U/L — SIGNIFICANT CHANGE UP (ref 10–45)
ALT FLD-CCNC: 16 U/L — SIGNIFICANT CHANGE UP (ref 10–45)
ANION GAP SERPL CALC-SCNC: 13 MMOL/L — SIGNIFICANT CHANGE UP (ref 5–17)
ANION GAP SERPL CALC-SCNC: 13 MMOL/L — SIGNIFICANT CHANGE UP (ref 5–17)
AST SERPL-CCNC: 26 U/L — SIGNIFICANT CHANGE UP (ref 10–40)
AST SERPL-CCNC: 26 U/L — SIGNIFICANT CHANGE UP (ref 10–40)
BASOPHILS # BLD AUTO: 0.03 K/UL — SIGNIFICANT CHANGE UP (ref 0–0.2)
BASOPHILS # BLD AUTO: 0.03 K/UL — SIGNIFICANT CHANGE UP (ref 0–0.2)
BASOPHILS NFR BLD AUTO: 0.5 % — SIGNIFICANT CHANGE UP (ref 0–2)
BASOPHILS NFR BLD AUTO: 0.5 % — SIGNIFICANT CHANGE UP (ref 0–2)
BILIRUB SERPL-MCNC: 1.8 MG/DL — HIGH (ref 0.2–1.2)
BILIRUB SERPL-MCNC: 1.8 MG/DL — HIGH (ref 0.2–1.2)
BUN SERPL-MCNC: 23 MG/DL — SIGNIFICANT CHANGE UP (ref 7–23)
BUN SERPL-MCNC: 23 MG/DL — SIGNIFICANT CHANGE UP (ref 7–23)
CALCIUM SERPL-MCNC: 9.2 MG/DL — SIGNIFICANT CHANGE UP (ref 8.4–10.5)
CALCIUM SERPL-MCNC: 9.2 MG/DL — SIGNIFICANT CHANGE UP (ref 8.4–10.5)
CHLORIDE SERPL-SCNC: 88 MMOL/L — LOW (ref 96–108)
CHLORIDE SERPL-SCNC: 88 MMOL/L — LOW (ref 96–108)
CO2 SERPL-SCNC: 23 MMOL/L — SIGNIFICANT CHANGE UP (ref 22–31)
CO2 SERPL-SCNC: 23 MMOL/L — SIGNIFICANT CHANGE UP (ref 22–31)
CREAT SERPL-MCNC: 4.25 MG/DL — HIGH (ref 0.5–1.3)
CREAT SERPL-MCNC: 4.25 MG/DL — HIGH (ref 0.5–1.3)
EGFR: 15 ML/MIN/1.73M2 — LOW
EGFR: 15 ML/MIN/1.73M2 — LOW
EOSINOPHIL # BLD AUTO: 0.01 K/UL — SIGNIFICANT CHANGE UP (ref 0–0.5)
EOSINOPHIL # BLD AUTO: 0.01 K/UL — SIGNIFICANT CHANGE UP (ref 0–0.5)
EOSINOPHIL NFR BLD AUTO: 0.2 % — SIGNIFICANT CHANGE UP (ref 0–6)
EOSINOPHIL NFR BLD AUTO: 0.2 % — SIGNIFICANT CHANGE UP (ref 0–6)
GLUCOSE SERPL-MCNC: 98 MG/DL — SIGNIFICANT CHANGE UP (ref 70–99)
GLUCOSE SERPL-MCNC: 98 MG/DL — SIGNIFICANT CHANGE UP (ref 70–99)
HCT VFR BLD CALC: 31.6 % — LOW (ref 39–50)
HCT VFR BLD CALC: 31.6 % — LOW (ref 39–50)
HGB BLD-MCNC: 9.9 G/DL — LOW (ref 13–17)
HGB BLD-MCNC: 9.9 G/DL — LOW (ref 13–17)
IMM GRANULOCYTES NFR BLD AUTO: 0.9 % — SIGNIFICANT CHANGE UP (ref 0–0.9)
IMM GRANULOCYTES NFR BLD AUTO: 0.9 % — SIGNIFICANT CHANGE UP (ref 0–0.9)
LYMPHOCYTES # BLD AUTO: 0.74 K/UL — LOW (ref 1–3.3)
LYMPHOCYTES # BLD AUTO: 0.74 K/UL — LOW (ref 1–3.3)
LYMPHOCYTES # BLD AUTO: 12.6 % — LOW (ref 13–44)
LYMPHOCYTES # BLD AUTO: 12.6 % — LOW (ref 13–44)
MAGNESIUM SERPL-MCNC: 2.1 MG/DL — SIGNIFICANT CHANGE UP (ref 1.6–2.6)
MAGNESIUM SERPL-MCNC: 2.1 MG/DL — SIGNIFICANT CHANGE UP (ref 1.6–2.6)
MCHC RBC-ENTMCNC: 31.3 GM/DL — LOW (ref 32–36)
MCHC RBC-ENTMCNC: 31.3 GM/DL — LOW (ref 32–36)
MCHC RBC-ENTMCNC: 32.6 PG — SIGNIFICANT CHANGE UP (ref 27–34)
MCHC RBC-ENTMCNC: 32.6 PG — SIGNIFICANT CHANGE UP (ref 27–34)
MCV RBC AUTO: 103.9 FL — HIGH (ref 80–100)
MCV RBC AUTO: 103.9 FL — HIGH (ref 80–100)
MONOCYTES # BLD AUTO: 0.48 K/UL — SIGNIFICANT CHANGE UP (ref 0–0.9)
MONOCYTES # BLD AUTO: 0.48 K/UL — SIGNIFICANT CHANGE UP (ref 0–0.9)
MONOCYTES NFR BLD AUTO: 8.2 % — SIGNIFICANT CHANGE UP (ref 2–14)
MONOCYTES NFR BLD AUTO: 8.2 % — SIGNIFICANT CHANGE UP (ref 2–14)
NEUTROPHILS # BLD AUTO: 4.54 K/UL — SIGNIFICANT CHANGE UP (ref 1.8–7.4)
NEUTROPHILS # BLD AUTO: 4.54 K/UL — SIGNIFICANT CHANGE UP (ref 1.8–7.4)
NEUTROPHILS NFR BLD AUTO: 77.6 % — HIGH (ref 43–77)
NEUTROPHILS NFR BLD AUTO: 77.6 % — HIGH (ref 43–77)
NRBC # BLD: 0 /100 WBCS — SIGNIFICANT CHANGE UP (ref 0–0)
NRBC # BLD: 0 /100 WBCS — SIGNIFICANT CHANGE UP (ref 0–0)
PHOSPHATE SERPL-MCNC: 4.5 MG/DL — SIGNIFICANT CHANGE UP (ref 2.5–4.5)
PHOSPHATE SERPL-MCNC: 4.5 MG/DL — SIGNIFICANT CHANGE UP (ref 2.5–4.5)
PLATELET # BLD AUTO: 126 K/UL — LOW (ref 150–400)
PLATELET # BLD AUTO: 126 K/UL — LOW (ref 150–400)
POTASSIUM SERPL-MCNC: 4.8 MMOL/L — SIGNIFICANT CHANGE UP (ref 3.5–5.3)
POTASSIUM SERPL-MCNC: 4.8 MMOL/L — SIGNIFICANT CHANGE UP (ref 3.5–5.3)
POTASSIUM SERPL-SCNC: 4.8 MMOL/L — SIGNIFICANT CHANGE UP (ref 3.5–5.3)
POTASSIUM SERPL-SCNC: 4.8 MMOL/L — SIGNIFICANT CHANGE UP (ref 3.5–5.3)
PROT SERPL-MCNC: 6 G/DL — SIGNIFICANT CHANGE UP (ref 6–8.3)
PROT SERPL-MCNC: 6 G/DL — SIGNIFICANT CHANGE UP (ref 6–8.3)
RBC # BLD: 3.04 M/UL — LOW (ref 4.2–5.8)
RBC # BLD: 3.04 M/UL — LOW (ref 4.2–5.8)
RBC # FLD: 17 % — HIGH (ref 10.3–14.5)
RBC # FLD: 17 % — HIGH (ref 10.3–14.5)
SODIUM SERPL-SCNC: 124 MMOL/L — LOW (ref 135–145)
SODIUM SERPL-SCNC: 124 MMOL/L — LOW (ref 135–145)
WBC # BLD: 5.85 K/UL — SIGNIFICANT CHANGE UP (ref 3.8–10.5)
WBC # BLD: 5.85 K/UL — SIGNIFICANT CHANGE UP (ref 3.8–10.5)
WBC # FLD AUTO: 5.85 K/UL — SIGNIFICANT CHANGE UP (ref 3.8–10.5)
WBC # FLD AUTO: 5.85 K/UL — SIGNIFICANT CHANGE UP (ref 3.8–10.5)

## 2023-10-26 PROCEDURE — 99233 SBSQ HOSP IP/OBS HIGH 50: CPT

## 2023-10-26 PROCEDURE — 90937 HEMODIALYSIS REPEATED EVAL: CPT

## 2023-10-26 RX ORDER — SODIUM CHLORIDE 9 MG/ML
250 INJECTION INTRAMUSCULAR; INTRAVENOUS; SUBCUTANEOUS ONCE
Refills: 0 | Status: COMPLETED | OUTPATIENT
Start: 2023-10-26 | End: 2023-10-26

## 2023-10-26 RX ORDER — ERYTHROPOIETIN 10000 [IU]/ML
10000 INJECTION, SOLUTION INTRAVENOUS; SUBCUTANEOUS ONCE
Refills: 0 | Status: COMPLETED | OUTPATIENT
Start: 2023-10-26 | End: 2023-10-26

## 2023-10-26 RX ADMIN — Medication 81 MILLIGRAM(S): at 15:49

## 2023-10-26 RX ADMIN — Medication 1 MILLIGRAM(S): at 15:49

## 2023-10-26 RX ADMIN — MIDODRINE HYDROCHLORIDE 10 MILLIGRAM(S): 2.5 TABLET ORAL at 07:56

## 2023-10-26 RX ADMIN — Medication 1 TABLET(S): at 15:50

## 2023-10-26 RX ADMIN — SODIUM CHLORIDE 250 MILLILITER(S): 9 INJECTION INTRAMUSCULAR; INTRAVENOUS; SUBCUTANEOUS at 17:53

## 2023-10-26 RX ADMIN — ERYTHROPOIETIN 10000 UNIT(S): 10000 INJECTION, SOLUTION INTRAVENOUS; SUBCUTANEOUS at 11:38

## 2023-10-26 RX ADMIN — PANTOPRAZOLE SODIUM 40 MILLIGRAM(S): 20 TABLET, DELAYED RELEASE ORAL at 06:25

## 2023-10-26 RX ADMIN — CHLORHEXIDINE GLUCONATE 1 APPLICATION(S): 213 SOLUTION TOPICAL at 05:51

## 2023-10-26 RX ADMIN — ATORVASTATIN CALCIUM 40 MILLIGRAM(S): 80 TABLET, FILM COATED ORAL at 22:51

## 2023-10-26 RX ADMIN — HEPARIN SODIUM 5000 UNIT(S): 5000 INJECTION INTRAVENOUS; SUBCUTANEOUS at 15:48

## 2023-10-26 RX ADMIN — HEPARIN SODIUM 5000 UNIT(S): 5000 INJECTION INTRAVENOUS; SUBCUTANEOUS at 05:51

## 2023-10-26 RX ADMIN — HEPARIN SODIUM 5000 UNIT(S): 5000 INJECTION INTRAVENOUS; SUBCUTANEOUS at 22:51

## 2023-10-26 RX ADMIN — Medication 75 MILLIGRAM(S): at 15:49

## 2023-10-26 NOTE — PROGRESS NOTE ADULT - ASSESSMENT
64 yo man with ESRD, CHF cirrhosis, admitted for acute on chronic HFpEF, cardiogenic shock, requiring CVVHD 10/6-10/11, then transitioned back to iHD on 10/12.   frequent HD treatments and 1 session of AQ with 3% saline  to help correct hyponatremia  patient s/p paracentesis with 6L removed suggestive of cardiac cirrhosis   much more awake and alert this week than lst week.  Na 124  Seen on HD with increased Na bath to 145 to help increase sNa   Tolerating procedure well continue as prescribed       C/w with midodrine 45 min prior HD   Discussed to decrease fluid intake to 1L/day   Patient stable for discharge on nephrological point of view  Further outpatient HD schedule as per outpatient HD unit and primary nephrologist  64 yo man with ESRD, CHF cirrhosis, admitted for acute on chronic HFpEF, cardiogenic shock, requiring CVVHD 10/6-10/11, then transitioned back to iHD on 10/12.   frequent HD treatments and 1 session of AQ with 3% saline  to help correct hyponatremia  patient s/p paracentesis with 6L removed suggestive of cardiac cirrhosis   much more awake and alert this week than lst week.  Na 124  Seen on HD with increased Na bath to 145 to help increase sNa   Tolerating procedure well continue as prescribed       C/w with midodrine 45 min prior HD   Discussed to decrease fluid intake to 1L/day   Patient stable for discharge on nephrological point of view  Further outpatient HD schedule as per outpatient HD unit and primary nephrologist   Post HD standing weight 105.1, can be set as new baseline EDW moving forward to help guide tx

## 2023-10-26 NOTE — PROGRESS NOTE ADULT - PROBLEM SELECTOR PROBLEM 1
Acute on chronic systolic congestive heart failure
Debility
ESRD on dialysis
Acute on chronic systolic congestive heart failure
Acute on chronic systolic congestive heart failure
ESRD on dialysis
Acute on chronic systolic congestive heart failure
CAD (coronary artery disease)
Acute on chronic systolic congestive heart failure
ESRD on dialysis
ESRD on dialysis

## 2023-10-26 NOTE — PROGRESS NOTE ADULT - PROBLEM SELECTOR PROBLEM 2
ESRD on dialysis
Anemia
ESRD on dialysis
Acute decompensated heart failure
ESRD on dialysis
ESRD on dialysis
CAD (coronary artery disease)
ESRD on dialysis
Heart failure with reduced ejection fraction
Heart failure with reduced ejection fraction
CAD (coronary artery disease)
ESRD on dialysis
Heart failure with reduced ejection fraction
Heart failure with reduced ejection fraction

## 2023-10-26 NOTE — PROGRESS NOTE ADULT - SUBJECTIVE AND OBJECTIVE BOX
Subjective:  No acute events overnight.  Patient is doing well today - tolerated paracentesis yesterday without issue.  Denies HA, CP, SOB, abdominal pain, nausea, vomiting, fever, chills or diarrhea.     Objective:   Vital Signs:  T(C): 36.6 (26 Oct 2023 11:00), Max: 36.9 (25 Oct 2023 20:15)  T(F): 97.9 (26 Oct 2023 11:00), Max: 98.4 (25 Oct 2023 20:15)  HR: 96 (26 Oct 2023 11:00) (84 - 101)  BP: 89/72 (26 Oct 2023 11:00) (86/67 - 101/69)  BP(mean): 80 (26 Oct 2023 09:20) (69 - 80)  RR: 15 (26 Oct 2023 11:00) (14 - 18)  SpO2: 99% (26 Oct 2023 11:00) (89% - 99%)    Parameters below as of 26 Oct 2023 11:00  Patient On (Oxygen Delivery Method): room air    Physical Exam:   -Gen: NAD, resting in bed  -HEENT: EOMI, PERRL, no scleral icterus, no JVD, no bruits, right chest TDC  -CV: normal S1 and S2  -Lungs: CTABL, normal respiratory effort on NC  -Ab: obese, distended and tense, umbilical hernia is non-tender, normal BS  -Ext: +1 LE edema  -Neuro: A&O x 3, no focal deficits     Labs:                        9.9    5.85  )-----------( 126      ( 26 Oct 2023 05:30 )             31.6       10-26    124<L>  |  88<L>  |  23  ----------------------------<  98  4.8   |  23  |  4.25<H>    Ca    9.2      26 Oct 2023 05:30  Phos  4.5     10-26  Mg     2.1     10-26    TPro  6.0  /  Alb  2.9<L>  /  TBili  1.8<H>  /  DBili  x   /  AST  26  /  ALT  16  /  AlkPhos  223<H>  10-26     Medications:  MEDICATIONS  (STANDING):  aspirin enteric coated 81 milliGRAM(s) Oral daily  atorvastatin 40 milliGRAM(s) Oral at bedtime  chlorhexidine 2% Cloths 1 Application(s) Topical <User Schedule>  folic acid 1 milliGRAM(s) Oral daily  heparin   Injectable 5000 Unit(s) SubCutaneous every 8 hours  midodrine 10 milliGRAM(s) Oral <User Schedule>  Nephro-chichi 1 Tablet(s) Oral every 24 hours  pantoprazole    Tablet 40 milliGRAM(s) Oral before breakfast  polyethylene glycol 3350 17 Gram(s) Oral daily  pregabalin 75 milliGRAM(s) Oral daily  senna 2 Tablet(s) Oral at bedtime    MEDICATIONS  (PRN):  ondansetron Injectable 4 milliGRAM(s) IV Push every 12 hours PRN Nausea and/or Vomiting

## 2023-10-26 NOTE — PROGRESS NOTE ADULT - ASSESSMENT
63-year-old male with a PMHx of HFrEF (EF 30-40%), Afib (s/p watchman), ESRD (on HD [facility]) and alcohol use disorder who presented with SOB in setting of missed HD, initially admitted to medicine for HD but was transferred to CCU for management of cardiogenic shock, s/p multiple admissions to the CCU for dobutamine, CVVHD and AQ, now stepped down to cardiac telemetry.            #HFrEF           -further management as per cardiology            -not currently on GDMT due to soft blood pressure, defer initiation to primary team            -volume removal with HD as per cardiology and nephrology            -palliative care consulted, appreciate recommendations           #ESRD           #Hyponatremia          -further management as per nephrology, s/p multiple admissions to the CCU for dobutamine, hypertonic saline, CVVHD and AQ    -continue with midodrine 10mg TID on HD days           -continue with 1L fluid restriction         -follow up plan for iHD     #Cirrhosis           #Thrombocytopenia and Bilirubinemia (stable)       -further management as per hepatology        -abdominal US with cirrhotic liver, moderate ascites, no biliary dilation, prior cholecystectomy and no concern for PVT           -s/p paracentesis (10/25) with removal of 6L, negative for SBP, SSAAG > 1.1 and protein > 2.5 suggestive of cardiac etiology of cirrhosis    -daily MELD labs (CMP and INR)     #CAD          -continue with ASA 81mg daily and atorvastatin 40mg qhs             #Afib           -continue with ASA monotherapy, patient with Watchman device              #Macrocytic Anemia            -likely 2/2 ACD and ESRD           -iron studies suggestive of ACD, B12 and Folate within normal limits, T4 within normal limits           -continue with folic acid and EPO with HD as per nephrology     DVT PPx: SQH    Dispo: GENE with HD

## 2023-10-26 NOTE — PROGRESS NOTE ADULT - ATTENDING COMMENTS
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 Patient received CVVHD. Patient with watchman device for >6 months, discontinued Plavix and transitioned now to aspirin only. Patient with significant improvement in mentation, AAOx3, warm and well-perfused extremities. Was stepped down to telemetry on 10/11/23 late afternoon with interval SBP 80smmHg, also c/b acute HypoNa to 122:  as discussed with Nephrology, pt too hypotensive to safely dialyze on floor; transferred back to CCU / [address] [place], [place] [address]ing [facility] ICU for continuous HD with close monitoring. Dialyzed in CCU and stepped back down to telemetry 10/17. At Tele, was noted to be fluid overloaded requiring more frequent HD sessions, with maxed out midodrine dosing remains hypotensive SBP 80s and remains very fluid overloaded. Given hypotension while on midodrine and fluid overload status requiring hemodialysis, patient would benefit from continuous hemodialysis and aquapheresis requiring step up to CCU. After a short-period in the CCU, he was able to be weaned off inotropic support and tx back to cardiac telemetry floor Na 125-127s.      1. HFrEF 35-40%, acute on chronic systolic.  Acute on chronic decompensated congestive heart failure with low systolic function. Stage D AHA/ACC.  Severe RV dilation and hypokinesis, severe TR.  On max dose of midodrine and CVVH.  Likely mixed etiology (hx of MI and ETOH).  Unable to tolerate BB/ARNI/SGTI due to ESRD and hypotension    2. CAD  Continue aspirin, asymptomatic.    3. AF  S/p watchman, rate controlled.    4. CKD ESRD HD  Follow renal recommendations.    5. Mild to moderate AS/ Moderate AI.  Clinical monitoring, not indication for surgical replacement.    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 Patient received CVVHD. Patient with watchman device for >6 months, discontinued Plavix and transitioned now to aspirin only. Patient with significant improvement in mentation, AAOx3, warm and well-perfused extremities. Was stepped down to telemetry on 10/11/23 late afternoon with interval SBP 80smmHg, also c/b acute HypoNa to 122:  as discussed with Nephrology, pt too hypotensive to safely dialyze on floor; transferred back to CCU / [address] [place], [place] [address]ing [facility] ICU for continuous HD with close monitoring. Dialyzed in CCU and stepped back down to telemetry 10/17. At Tele, was noted to be fluid overloaded requiring more frequent HD sessions, with maxed out midodrine dosing remains hypotensive SBP 80s and remains very fluid overloaded. Given hypotension while on midodrine and fluid overload status requiring hemodialysis, patient would benefit from continuous hemodialysis and aquapheresis requiring step up to CCU. After a short-period in the CCU, he was able to be weaned off inotropic support and tx back to cardiac telemetry floor Na 125-127s.      1. HFrEF 35-40%, acute on chronic systolic.  Acute on chronic decompensated congestive heart failure with low systolic function. Stage D AHA/ACC.  Severe RV dilation and hypokinesis, severe TR.  On max dose of midodrine and CVVH.  Likely mixed etiology (hx of MI and ETOH).  Unable to tolerate BB/ARNI/SGTI due to ESRD and hypotension    2. CAD  Continue aspirin, asymptomatic.    3. AF  S/p watchman, rate controlled.    4. CKD ESRD HD  Follow renal recommendations.  will need HD 4x week.     5. Mild to moderate AS/ Moderate AI.  Clinical monitoring, not indication for surgical replacement.

## 2023-10-26 NOTE — PROGRESS NOTE ADULT - SUBJECTIVE AND OBJECTIVE BOX
Seen and evaluated in HD unit, Tolerating HD well   BP stable will continue goal of 2L UF with increased Na bath to 142 to help bring sNa above 130   HDS stable, continue HD as prescribed   Hemodialysis Treatment.:     Schedule: Once, Modality: Hemodialysis, Access: Internal Jugular Central Venous Catheter    Dialyzer: Optiflux [id number], Time: 240 Min    Blood Flow: 400 mL/Min , Dialysate Flow: 500 mL/Min, Dialysate Temp: 35, Tubinmm (Adult)    Target Fluid Removal: 2 Liters    Dialysate Electrolytes (mEq/L): Potassium 2, Calcium 2.5, Sodium 142, Bicarbonate 35         Vitals   T(F): 97.5  HR: 101  BP: 101/69              PHYSICAL EXAM:  General: NAD, tolerating HD   Head: pupils reactive, sclera anicteric  Neck: Supple; no JVD  Respiratory: b/l basilar rales, no increased wob  Cardiovascular: irregular rhythm/rate; S1/S2+, no murmurs, rubs gallops   Gastrointestinal: large, ND, soft nontender  Extremities: WWP; 1+ pedal edema  Neurological: A&Ox3  Access: R TDC accessed

## 2023-10-26 NOTE — CHART NOTE - NSCHARTNOTEFT_GEN_A_CORE
Admitting Diagnosis:   Patient is a 63y old  Male who presents with a chief complaint of missed HD for ESRD (26 Oct 2023 13:17)      PAST MEDICAL & SURGICAL HISTORY:  HTN (hypertension)      HLD (hyperlipidemia)      Chronic kidney disease      H/O bilateral hip replacements    Current Nutrition Order: Renal ; 1L fluid restriction      PO Intake: Good (%) [   ]  Fair (50-75%) [ x ] Poor (<25%) [   ]    GI Issues: Abdomen distended, +BS x4, last bowel movement 10/25    Pain: 0 per chart review     Skin Integrity: R elbow wound, +2 bilateral legs     Labs:   10-26    124<L>  |  88<L>  |  23  ----------------------------<  98  4.8   |  23  |  4.25<H>    Ca    9.2      26 Oct 2023 05:30  Phos  4.5     10-26  Mg     2.1     10-26    TPro  6.0  /  Alb  2.9<L>  /  TBili  1.8<H>  /  DBili  x   /  AST  26  /  ALT  16  /  AlkPhos  223<H>  10-26    CAPILLARY BLOOD GLUCOSE    Medications:  MEDICATIONS  (STANDING):  aspirin enteric coated 81 milliGRAM(s) Oral daily  atorvastatin 40 milliGRAM(s) Oral at bedtime  chlorhexidine 2% Cloths 1 Application(s) Topical <User Schedule>  folic acid 1 milliGRAM(s) Oral daily  heparin   Injectable 5000 Unit(s) SubCutaneous every 8 hours  midodrine 10 milliGRAM(s) Oral <User Schedule>  Nephro-chichi 1 Tablet(s) Oral every 24 hours  pantoprazole    Tablet 40 milliGRAM(s) Oral before breakfast  polyethylene glycol 3350 17 Gram(s) Oral daily  pregabalin 75 milliGRAM(s) Oral daily  senna 2 Tablet(s) Oral at bedtime    MEDICATIONS  (PRN):  ondansetron Injectable 4 milliGRAM(s) IV Push every 12 hours PRN Nausea and/or Vomiting      Weight:   Daily 88.6kg [10/26]  Daily 93.3kg [10/25] -Paracentesis   Daily 109.9kg [10/23]  Daily 109.2kg [10/22]  Daily 110.1kg [10/21]  Daily 108.9kg [10/20]  Daily 110kg [10/19]  Daily 109.8kg [10/18]  Daily 109.7kg [17 Oct 2023]  Daily 106.2kg [16 Oct 2023]  Daily 107.5kg [15 Oct 2023]    Daily 110.5 (19 Oct 2023 05:30)  Daily 104.7 (13 Oct 2023 11:00)  Daily 100.4kg [12 Oct 2023]  Daily 114kg [11 Oct 2023]  Daily 114.7kg [10 Oct 2023]  Daily 116.1kg [09 Oct 2023]    Weight Change: Fluid fluctuations     Estimated energy needs:   Ideal body weight (83.4kg) used for calculations as pt >120% of IBW. Needs estimated for age and adjusted for current clinical status, increased needs for clinical status, dialysis    Calories: 2933-7395 kcals based on 25-35 kcals/kg  Protein: 100.1-141.8g based on 1.2-1.7g protein/kg  *Fluid needs per team    Subjective:   63Y M w/hx of ESRD  admitted for SOB and generalized weakness and fatigue missed HD, now h/c c/b cardiogenic shock, on inotropes and CVVHD (10/6) tolerating well, net neg 2.8L continue CVVHD. 10/24: Step down to 5UR. 10/25: Paracentesis, -6.0L.     Pt unavailable x2 attempts at nutrition f/u. Rx and labs reviewed. Pt status post paracentesis on 10/25 with 6L removed; monitor wt trend for approximate dry wt as able. Nephrology following for HD needs and fluid management. Pt otherwise reportedly tolerating diet well with plan for d/c to [facility]. Continues on 1L fluid restriction in setting of edema and extensive paracentesis; monitor serum Na. No other reports GI distress or further nutritional concerns at this time. RDN will continue to reassess, intervene, and monitor as appropriate.     Previous Nutrition Diagnosis:  Altered Nutrition Related Lab Values related to ESRD as evidenced by low sodium, elevated BUN & Creat    Active [ X  ]  Resolved [   ]    If resolved, new PES:     Goal:  Pt will meet at least 75% of protein & energy needs via most appropriate route for nutrition     Recommendations:  1. c/w current diet order  - monitor intake & tolerance  - honor food preferences as able  2. Fluids per team  - monitor sodium, adjust fluid restriction as indicated  - consider IV 3% NS if serum Na <120  3. c/w Nephrovite  4. Monitor chemistry, GI function, skin integrity  5. Pain & bowel regimen per team    Education: deferred    Risk Level: High [   ] Moderate [ X  ] Low [   ].

## 2023-10-26 NOTE — PROGRESS NOTE ADULT - PROBLEM SELECTOR PLAN 5
Started on HD 6 months ago, 4x week (M/T/Th/Sat) at [facility], missed HD session on 10/3/23. R chest port utilized. At admission, Cr 3.93 (un-established baseline Cr), K 3.4, Phos 4.4. Metabolic acidosis (AGAP 17) likely due to hypochloremic emesis / ETOH leading to lactic acidosis. Dialysate changed to Phoxillum due to low phos.     - Nephrology consulted, s/p aquapheresis, plan to transition to HD 10/25  - 10mg Midodrine on HD days only prior to dialysis.   - C/w home Nephro-chichi, 1L fluid restriction  - hold off binders for now per nephro  - F/u nephro recs

## 2023-10-26 NOTE — PROGRESS NOTE ADULT - SUBJECTIVE AND OBJECTIVE BOX
Patient is a 63y Male seen and evaluated at bedside again on HD for BP monitoring and increase Na bath to 145 given repeat sNa 124.   Patient BP throughout tx stable between 95-100SBP   Does not offer complaints  Continue HD as prescribed with increased sodium bath       Meds:    aspirin enteric coated 81 daily  atorvastatin 40 at bedtime  chlorhexidine 2% Cloths 1 <User Schedule>  folic acid 1 daily  heparin   Injectable 5000 every 8 hours  midodrine 10 <User Schedule>  Nephro-chichi 1 every 24 hours  ondansetron Injectable 4 every 12 hours PRN  pantoprazole    Tablet 40 before breakfast  polyethylene glycol 3350 17 daily  pregabalin 75 daily  senna 2 at bedtime      T(C): , Max: 36.9 (10-25-23 @ 20:15)  T(F): , Max: 98.4 (10-25-23 @ 20:15)  HR: 96 (10-26-23 @ 11:00)  BP: 89/72 (10-26-23 @ 11:00)  BP(mean): 80 (10-26-23 @ 09:20)  RR: 15 (10-26-23 @ 11:00)  SpO2: 99% (10-26-23 @ 11:00)  Wt(kg): --    10-25 @ 07:01  -  10-26 @ 07:00  --------------------------------------------------------  IN: 570 mL / OUT: 221 mL / NET: 349 mL    10-26 @ 07:01  -  10-26 @ 13:17  --------------------------------------------------------  IN: 240 mL / OUT: 0 mL / NET: 240 mL          Review of Systems:  ROS negative except as per HPI      PHYSICAL EXAM:  General: NAD, tolerating HD   Head: pupils reactive, sclera anicteric  Neck: Supple; no JVD  Respiratory: b/l basilar rales, no increased wob  Cardiovascular: irregular rhythm/rate; S1/S2+, no murmurs, rubs gallops   Gastrointestinal: large, ND, soft nontender  Extremities: WWP; 1+ pedal edema  Neurological: A&Ox3  Access: R TDC accessed       LABS:                        9.9    5.85  )-----------( 126      ( 26 Oct 2023 05:30 )             31.6     10-26    124<L>  |  88<L>  |  23  ----------------------------<  98  4.8   |  23  |  4.25<H>    Ca    9.2      26 Oct 2023 05:30  Phos  4.5     10-26  Mg     2.1     10-26    TPro  6.0  /  Alb  2.9<L>  /  TBili  1.8<H>  /  DBili  x   /  AST  26  /  ALT  16  /  AlkPhos  223<H>  10-26        Urinalysis Basic - ( 26 Oct 2023 05:30 )    Color: x / Appearance: x / SG: x / pH: x  Gluc: 98 mg/dL / Ketone: x  / Bili: x / Urobili: x   Blood: x / Protein: x / Nitrite: x   Leuk Esterase: x / RBC: x / WBC x   Sq Epi: x / Non Sq Epi: x / Bacteria: x            RADIOLOGY & ADDITIONAL STUDIES:

## 2023-10-26 NOTE — PROGRESS NOTE ADULT - SUBJECTIVE AND OBJECTIVE BOX
***INCOMPLETE NOTE****   CC: Patient is a 63y old  Male who presents with a chief complaint of missed HD for ESRD (26 Oct 2023 13:17)      INTERVAL EVENTS: ADEN    SUBJECTIVE / INTERVAL HPI: Patient seen and examined at bedside.     ROS: negative unless otherwise stated above.    VITAL SIGNS:  Vitals Interpretation review:  Vital Signs Last 24 Hrs  T(C): 36.3 (26 Oct 2023 15:34), Max: 36.9 (25 Oct 2023 20:15)  T(F): 97.4 (26 Oct 2023 15:34), Max: 98.4 (25 Oct 2023 20:15)  HR: 91 (26 Oct 2023 17:55) (80 - 101)  BP: 80/61 (26 Oct 2023 17:55) (80/61 - 101/69)  BP(mean): 80 (26 Oct 2023 09:20) (72 - 80)  RR: 17 (26 Oct 2023 17:55) (15 - 18)  SpO2: 100% (26 Oct 2023 17:55) (89% - 100%)    Parameters below as of 26 Oct 2023 17:55  Patient On (Oxygen Delivery Method): nasal cannula  O2 Flow (L/min): 6        10-25-23 @ 07:01  -  10-26-23 @ 07:00  --------------------------------------------------------  IN: 570 mL / OUT: 221 mL / NET: 349 mL    10-26-23 @ 07:01  -  10-26-23 @ 19:19  --------------------------------------------------------  IN: 420 mL / OUT: 2000 mL / NET: -1580 mL        PHYSICAL EXAM:    General: NAD  HEENT: MMM  Neck: supple  Cardiovascular: +S1/S2; RRR  Respiratory: CTA B/L; no W/R/R  Gastrointestinal: soft, NT/ND  Extremities: WWP; no edema, clubbing or cyanosis  Vascular: 2+ radial, DP/PT pulses B/L  Neurological: AAOx3; no focal deficits    MEDICATIONS:  MEDICATIONS  (STANDING):  aspirin enteric coated 81 milliGRAM(s) Oral daily  atorvastatin 40 milliGRAM(s) Oral at bedtime  chlorhexidine 2% Cloths 1 Application(s) Topical <User Schedule>  folic acid 1 milliGRAM(s) Oral daily  heparin   Injectable 5000 Unit(s) SubCutaneous every 8 hours  midodrine 10 milliGRAM(s) Oral <User Schedule>  Nephro-chichi 1 Tablet(s) Oral every 24 hours  pantoprazole    Tablet 40 milliGRAM(s) Oral before breakfast  polyethylene glycol 3350 17 Gram(s) Oral daily  senna 2 Tablet(s) Oral at bedtime    MEDICATIONS  (PRN):  ondansetron Injectable 4 milliGRAM(s) IV Push every 12 hours PRN Nausea and/or Vomiting      ALLERGIES:  Allergies    penicillins (Unknown)    Intolerances        LABS:  Labs Interpretation:                         9.9    5.85  )-----------( 126      ( 26 Oct 2023 05:30 )             31.6     10-26    124<L>  |  88<L>  |  23  ----------------------------<  98  4.8   |  23  |  4.25<H>    Ca    9.2      26 Oct 2023 05:30  Phos  4.5     10-26  Mg     2.1     10-26    TPro  6.0  /  Alb  2.9<L>  /  TBili  1.8<H>  /  DBili  x   /  AST  26  /  ALT  16  /  AlkPhos  223<H>  10-26      Urinalysis Basic - ( 26 Oct 2023 05:30 )    Color: x / Appearance: x / SG: x / pH: x  Gluc: 98 mg/dL / Ketone: x  / Bili: x / Urobili: x   Blood: x / Protein: x / Nitrite: x   Leuk Esterase: x / RBC: x / WBC x   Sq Epi: x / Non Sq Epi: x / Bacteria: x      CAPILLARY BLOOD GLUCOSE                RADIOLOGY & ADDITIONAL TESTS: Reviewed.  CXR  EKG    Imaging Interpretation Review:    FMHx  Surgical Hx

## 2023-10-26 NOTE — PROGRESS NOTE ADULT - PROBLEM SELECTOR PLAN 7
RUQ r/o biliary stasis 10/04  Pt's pruritis possibly from biliary cause given recent elevation in ALP & Btotal.   - Outpt Hepatology f/u post-dc  - Plan for paracentesis (both therapeutic and diagnostic) as per hepatology recs and to send studies for cell count, albumin, glucose, gram stain, LDH, and protein.   - Hepatology recommended holding off on treating HE despite mild asterixis on exam as patient is mentating well

## 2023-10-26 NOTE — PROGRESS NOTE ADULT - PROBLEM SELECTOR PROBLEM 3
Cardiac cirrhosis
Cardiac cirrhosis
Hyponatremia
Moderate alcohol use disorder
Cardiac cirrhosis
Hyponatremia
ESRD on dialysis
GERD (gastroesophageal reflux disease)
Moderate alcohol use disorder
Cardiac cirrhosis
Moderate alcohol use disorder
Moderate alcohol use disorder

## 2023-10-26 NOTE — PROGRESS NOTE ADULT - ATTENDING COMMENTS
seen and evaluated again while on hemodialysis to assure hemodynamic stability  BPs   NAD  resting comfortably  c/w present prescription seen and evaluated again while on hemodialysis to assure hemodynamic stability  BPs 90s  NAD  resting comfortably  c/w present prescription

## 2023-10-26 NOTE — CHART NOTE - NSCHARTNOTESELECT_GEN_ALL_CORE
Hepatology
Nephrology
Nutrition Services
Event Note
Nutrition Services
Nutrition Services
RRT CCU Consult/Event Note
Rapid Response

## 2023-10-26 NOTE — PROGRESS NOTE ADULT - ATTENDING COMMENTS
seen and evaluated while on dialysis   tolerating the procedure well  using high Na bath to help improve sodium concentration  anxious to go home now  no signs of CHF-    62 yo man with ESRD, CHF cirrhosis, admitted for acute on chronic HFpEF, cardiogenic shock, requiring CVVHD 10/6-10/11, then transitioned back to iHD on 10/12.   frequent HD treatments and 1 session of AQ with 3% saline  to help correct hyponatremia

## 2023-10-27 VITALS
OXYGEN SATURATION: 95 % | RESPIRATION RATE: 18 BRPM | SYSTOLIC BLOOD PRESSURE: 93 MMHG | HEART RATE: 115 BPM | DIASTOLIC BLOOD PRESSURE: 66 MMHG

## 2023-10-27 LAB
ALBUMIN SERPL ELPH-MCNC: 3 G/DL — LOW (ref 3.3–5)
ALBUMIN SERPL ELPH-MCNC: 3 G/DL — LOW (ref 3.3–5)
ALP SERPL-CCNC: 219 U/L — HIGH (ref 40–120)
ALP SERPL-CCNC: 219 U/L — HIGH (ref 40–120)
ALT FLD-CCNC: 17 U/L — SIGNIFICANT CHANGE UP (ref 10–45)
ALT FLD-CCNC: 17 U/L — SIGNIFICANT CHANGE UP (ref 10–45)
ANION GAP SERPL CALC-SCNC: 11 MMOL/L — SIGNIFICANT CHANGE UP (ref 5–17)
ANION GAP SERPL CALC-SCNC: 11 MMOL/L — SIGNIFICANT CHANGE UP (ref 5–17)
APTT BLD: 34.6 SEC — SIGNIFICANT CHANGE UP (ref 24.5–35.6)
APTT BLD: 34.6 SEC — SIGNIFICANT CHANGE UP (ref 24.5–35.6)
AST SERPL-CCNC: 25 U/L — SIGNIFICANT CHANGE UP (ref 10–40)
AST SERPL-CCNC: 25 U/L — SIGNIFICANT CHANGE UP (ref 10–40)
BILIRUB SERPL-MCNC: 1.5 MG/DL — HIGH (ref 0.2–1.2)
BILIRUB SERPL-MCNC: 1.5 MG/DL — HIGH (ref 0.2–1.2)
BUN SERPL-MCNC: 14 MG/DL — SIGNIFICANT CHANGE UP (ref 7–23)
BUN SERPL-MCNC: 14 MG/DL — SIGNIFICANT CHANGE UP (ref 7–23)
CALCIUM SERPL-MCNC: 7.9 MG/DL — LOW (ref 8.4–10.5)
CALCIUM SERPL-MCNC: 7.9 MG/DL — LOW (ref 8.4–10.5)
CHLORIDE SERPL-SCNC: 90 MMOL/L — LOW (ref 96–108)
CHLORIDE SERPL-SCNC: 90 MMOL/L — LOW (ref 96–108)
CO2 SERPL-SCNC: 26 MMOL/L — SIGNIFICANT CHANGE UP (ref 22–31)
CO2 SERPL-SCNC: 26 MMOL/L — SIGNIFICANT CHANGE UP (ref 22–31)
CREAT SERPL-MCNC: 3.19 MG/DL — HIGH (ref 0.5–1.3)
CREAT SERPL-MCNC: 3.19 MG/DL — HIGH (ref 0.5–1.3)
EGFR: 21 ML/MIN/1.73M2 — LOW
EGFR: 21 ML/MIN/1.73M2 — LOW
GLUCOSE SERPL-MCNC: 160 MG/DL — HIGH (ref 70–99)
GLUCOSE SERPL-MCNC: 160 MG/DL — HIGH (ref 70–99)
HCT VFR BLD CALC: 32.9 % — LOW (ref 39–50)
HCT VFR BLD CALC: 32.9 % — LOW (ref 39–50)
HGB BLD-MCNC: 10.1 G/DL — LOW (ref 13–17)
HGB BLD-MCNC: 10.1 G/DL — LOW (ref 13–17)
INR BLD: 1.27 — HIGH (ref 0.85–1.18)
INR BLD: 1.27 — HIGH (ref 0.85–1.18)
MAGNESIUM SERPL-MCNC: 1.9 MG/DL — SIGNIFICANT CHANGE UP (ref 1.6–2.6)
MAGNESIUM SERPL-MCNC: 1.9 MG/DL — SIGNIFICANT CHANGE UP (ref 1.6–2.6)
MCHC RBC-ENTMCNC: 30.7 GM/DL — LOW (ref 32–36)
MCHC RBC-ENTMCNC: 30.7 GM/DL — LOW (ref 32–36)
MCHC RBC-ENTMCNC: 32 PG — SIGNIFICANT CHANGE UP (ref 27–34)
MCHC RBC-ENTMCNC: 32 PG — SIGNIFICANT CHANGE UP (ref 27–34)
MCV RBC AUTO: 104.1 FL — HIGH (ref 80–100)
MCV RBC AUTO: 104.1 FL — HIGH (ref 80–100)
NRBC # BLD: 0 /100 WBCS — SIGNIFICANT CHANGE UP (ref 0–0)
NRBC # BLD: 0 /100 WBCS — SIGNIFICANT CHANGE UP (ref 0–0)
PHOSPHATE SERPL-MCNC: 3.4 MG/DL — SIGNIFICANT CHANGE UP (ref 2.5–4.5)
PHOSPHATE SERPL-MCNC: 3.4 MG/DL — SIGNIFICANT CHANGE UP (ref 2.5–4.5)
PLATELET # BLD AUTO: 122 K/UL — LOW (ref 150–400)
PLATELET # BLD AUTO: 122 K/UL — LOW (ref 150–400)
POTASSIUM SERPL-MCNC: 3.6 MMOL/L — SIGNIFICANT CHANGE UP (ref 3.5–5.3)
POTASSIUM SERPL-MCNC: 3.6 MMOL/L — SIGNIFICANT CHANGE UP (ref 3.5–5.3)
POTASSIUM SERPL-SCNC: 3.6 MMOL/L — SIGNIFICANT CHANGE UP (ref 3.5–5.3)
POTASSIUM SERPL-SCNC: 3.6 MMOL/L — SIGNIFICANT CHANGE UP (ref 3.5–5.3)
PROT SERPL-MCNC: 6.4 G/DL — SIGNIFICANT CHANGE UP (ref 6–8.3)
PROT SERPL-MCNC: 6.4 G/DL — SIGNIFICANT CHANGE UP (ref 6–8.3)
PROTHROM AB SERPL-ACNC: 14.4 SEC — HIGH (ref 9.5–13)
PROTHROM AB SERPL-ACNC: 14.4 SEC — HIGH (ref 9.5–13)
RBC # BLD: 3.16 M/UL — LOW (ref 4.2–5.8)
RBC # BLD: 3.16 M/UL — LOW (ref 4.2–5.8)
RBC # FLD: 16.9 % — HIGH (ref 10.3–14.5)
RBC # FLD: 16.9 % — HIGH (ref 10.3–14.5)
SODIUM SERPL-SCNC: 127 MMOL/L — LOW (ref 135–145)
SODIUM SERPL-SCNC: 127 MMOL/L — LOW (ref 135–145)
WBC # BLD: 7.18 K/UL — SIGNIFICANT CHANGE UP (ref 3.8–10.5)
WBC # BLD: 7.18 K/UL — SIGNIFICANT CHANGE UP (ref 3.8–10.5)
WBC # FLD AUTO: 7.18 K/UL — SIGNIFICANT CHANGE UP (ref 3.8–10.5)
WBC # FLD AUTO: 7.18 K/UL — SIGNIFICANT CHANGE UP (ref 3.8–10.5)

## 2023-10-27 PROCEDURE — 99233 SBSQ HOSP IP/OBS HIGH 50: CPT

## 2023-10-27 PROCEDURE — 99232 SBSQ HOSP IP/OBS MODERATE 35: CPT

## 2023-10-27 RX ORDER — CYPROHEPTADINE HYDROCHLORIDE 4 MG/1
1 TABLET ORAL
Refills: 0 | DISCHARGE

## 2023-10-27 RX ORDER — MIDODRINE HYDROCHLORIDE 2.5 MG/1
1 TABLET ORAL
Qty: 0 | Refills: 0 | DISCHARGE

## 2023-10-27 RX ORDER — POLYETHYLENE GLYCOL 3350 17 G/17G
17 POWDER, FOR SOLUTION ORAL
Qty: 0 | Refills: 0 | DISCHARGE
Start: 2023-10-27

## 2023-10-27 RX ORDER — MIDODRINE HYDROCHLORIDE 2.5 MG/1
2 TABLET ORAL
Qty: 34 | Refills: 0
Start: 2023-10-27 | End: 2023-11-25

## 2023-10-27 RX ORDER — BUMETANIDE 0.25 MG/ML
1 INJECTION INTRAMUSCULAR; INTRAVENOUS
Refills: 0 | DISCHARGE

## 2023-10-27 RX ORDER — SENNA PLUS 8.6 MG/1
2 TABLET ORAL
Qty: 0 | Refills: 0 | DISCHARGE
Start: 2023-10-27

## 2023-10-27 RX ORDER — CLOPIDOGREL BISULFATE 75 MG/1
1 TABLET, FILM COATED ORAL
Refills: 0 | DISCHARGE

## 2023-10-27 RX ADMIN — PANTOPRAZOLE SODIUM 40 MILLIGRAM(S): 20 TABLET, DELAYED RELEASE ORAL at 06:36

## 2023-10-27 RX ADMIN — POLYETHYLENE GLYCOL 3350 17 GRAM(S): 17 POWDER, FOR SOLUTION ORAL at 12:47

## 2023-10-27 RX ADMIN — Medication 1 MILLIGRAM(S): at 12:48

## 2023-10-27 RX ADMIN — Medication 75 MILLIGRAM(S): at 12:47

## 2023-10-27 RX ADMIN — Medication 1 TABLET(S): at 12:48

## 2023-10-27 RX ADMIN — HEPARIN SODIUM 5000 UNIT(S): 5000 INJECTION INTRAVENOUS; SUBCUTANEOUS at 05:22

## 2023-10-27 RX ADMIN — CHLORHEXIDINE GLUCONATE 1 APPLICATION(S): 213 SOLUTION TOPICAL at 05:22

## 2023-10-27 RX ADMIN — Medication 81 MILLIGRAM(S): at 12:48

## 2023-10-27 NOTE — PROGRESS NOTE ADULT - ATTENDING SUPERVISION STATEMENT
Fellow
Resident
Fellow
Resident
Fellow
Resident
Resident/Fellow
Resident
Resident/Fellow
Resident

## 2023-10-27 NOTE — PROGRESS NOTE ADULT - ASSESSMENT
63M w HFrEF (30-40%; 107.8KG on 10/27/23), AFib s/p watchman procedure, ESRD on HD MTuThS,, EtOH use d/o p/w dyspnea in setting of missed HD, Decompensated cirrhosis (MELD-Na 29; Child-Mary class B on 10/27/23) initially admitted to medicine for HD then transferred to CCU for management of cardiogenic shock, s/p multiple admissions to the CCU for dobutamine, CVVHD and AQ, now stepped down to cardiac telemetry for GENE         #HFrEF           -further management as per cardiology            -not currently on GDMT due to soft blood pressure, defer initiation to primary team            -volume removal with HD as per cardiology and nephrology            -palliative care consulted, appreciate recommendations           #ESRD           #Hyponatremia          -further management as per nephrology, s/p multiple admissions to the CCU for dobutamine, hypertonic saline, CVVHD and AQ    -continue with midodrine 10mg TID on HD days           -continue with 1L fluid restriction         -follow up plan for iHD     #Cirrhosis           #Thrombocytopenia and Bilirubinemia (stable)       -further management as per hepatology        -abdominal US with cirrhotic liver, moderate ascites, no biliary dilation, prior cholecystectomy and no concern for PVT           -s/p paracentesis (10/25) with removal of 6L, negative for SBP, SSAAG > 1.1 and protein > 2.5 suggestive of cardiac etiology of cirrhosis    -MELD 29 today - 19.6% 3mo mortality; Child-Mary score class B    #CAD          -continue with ASA 81mg daily and atorvastatin 40mg qhs             #Afib           -continue with ASA monotherapy, patient with Watchman device              #Macrocytic Anemia            -likely 2/2 ACD and ESRD           -iron studies suggestive of ACD, B12 and Folate within normal limits, Elevated TSH 5.4 but FT4 within normal limits           -continue with folic acid and EPO with HD as per nephrology     DVT PPx: SQH    Dispo: [facility] with HD

## 2023-10-27 NOTE — PROGRESS NOTE ADULT - ASSESSMENT
64 yo man with ESRD, CHF cirrhosis, admitted for acute on chronic HFpEF, cardiogenic shock, requiring CVVHD 10/6-10/11, then transitioned back to iHD on 10/12.  Paracentesis suggestive of cardiac cirrhosis   frequent HD treatments and 1 session of AQ with 3% saline  to help correct hyponatremia  Na 127  Established EDW 105kg, would use this weight to help guide future tx   May need continuous therapeutic paracentesis in future    C/w with midodrine 45 min prior HD   Discussed to decrease fluid intake to 1L/day   Patient stable for discharge on nephrological point of view  Further outpatient HD schedule as per outpatient HD unit and primary nephrologist

## 2023-10-27 NOTE — DISCHARGE NOTE NURSING/CASE MANAGEMENT/SOCIAL WORK - NSDCPEFALRISK_GEN_ALL_CORE
For information on Fall & Injury Prevention, visit: [email or web address] OR  [email or web address] OR  https://www.cdc.gov/steadi/patient.html

## 2023-10-27 NOTE — PROGRESS NOTE ADULT - ATTENDING COMMENTS
NAD  64 yo man with ESRD, CHF cirrhosis, admitted for acute on chronic HFpEF, cardiogenic shock, requiring CVVHD 10/6-10/11, then transitioned back to iHD on 10/12.   tolerated HD well yesterday  despite high Na bath, still with Na < 130- is still drinking a lot of water  this AM awake and alert anxious to leave hospital  nephrologically stable for transfer to [facility]  reminded to limit his fluid intake

## 2023-10-27 NOTE — PROGRESS NOTE ADULT - REASON FOR ADMISSION
missed HD for ESRD

## 2023-10-27 NOTE — DISCHARGE NOTE NURSING/CASE MANAGEMENT/SOCIAL WORK - PATIENT PORTAL LINK FT
You can access the FollowMyHealth Patient Portal offered by [facility] by registering at the following website: [email or web address]. By joining [employer]’s FollowMyHealth portal, you will also be able to view your health information using other applications (apps) compatible with our system.

## 2023-10-27 NOTE — PROGRESS NOTE ADULT - SUBJECTIVE AND OBJECTIVE BOX
Patient is a 63y Male seen and evaluated at bedside. No acute distress, does not offer any complaints. Tolerated HD on 10/26 with no complications BP stable throughout tx between 95-105SBP, tolerated 2L UF.   Post HD standing weight 105kg-would use this as established EDW moving fwd   sNa 127 this AM       Meds:    aspirin enteric coated 81 daily  atorvastatin 40 at bedtime  chlorhexidine 2% Cloths 1 <User Schedule>  folic acid 1 daily  heparin   Injectable 5000 every 8 hours  midodrine 10 <User Schedule>  Nephro-chichi 1 every 24 hours  ondansetron Injectable 4 every 12 hours PRN  pantoprazole    Tablet 40 before breakfast  polyethylene glycol 3350 17 daily  pregabalin 75 daily  senna 2 at bedtime      T(C): , Max: 37.1 (10-27-23 @ 08:56)  T(F): , Max: 98.7 (10-27-23 @ 08:56)  HR: 100 (10-27-23 @ 08:56)  BP: 99/74 (10-27-23 @ 08:56)  RR: 18 (10-27-23 @ 08:56)  SpO2: 99% (10-27-23 @ 08:56)  Wt(kg): --    10-26 @ 07:01  -  10-27 @ 07:00  --------------------------------------------------------  IN: 600 mL / OUT: 2050 mL / NET: -1450 mL    10-27 @ 07:01  -  10-27 @ 12:14  --------------------------------------------------------  IN: 240 mL / OUT: 0 mL / NET: 240 mL          Review of Systems:  ROS negative except as per HPI      PHYSICAL EXAM:  General: NAD, sitting up in bed, awake, interactive   Head: pupils reactive, sclera anicteric  Neck: Supple; no JVD  Respiratory: CTLA b/l   Cardiovascular: RRR S1/S2+, no murmurs, rubs gallops   Gastrointestinal: large, distended but soft on palpation. Umbilical hernia present and non-tender  Extremities: WWP; 1+ pedal edema  Neurological: A&Ox3  Access: R [facility] c/d/i       LABS:                        10.1   7.18  )-----------( 122      ( 27 Oct 2023 05:30 )             32.9     10-27    127<L>  |  90<L>  |  14  ----------------------------<  160<H>  3.6   |  26  |  3.19<H>    Ca    7.9<L>      27 Oct 2023 05:30  Phos  3.4     10-27  Mg     1.9     10-27    TPro  6.4  /  Alb  3.0<L>  /  TBili  1.5<H>  /  DBili  x   /  AST  25  /  ALT  17  /  AlkPhos  219<H>  10-27      PT/INR - ( 27 Oct 2023 05:30 )   PT: 14.4 sec;   INR: 1.27          PTT - ( 27 Oct 2023 05:30 )  PTT:34.6 sec  Urinalysis Basic - ( 27 Oct 2023 05:30 )    Color: x / Appearance: x / SG: x / pH: x  Gluc: 160 mg/dL / Ketone: x  / Bili: x / Urobili: x   Blood: x / Protein: x / Nitrite: x   Leuk Esterase: x / RBC: x / WBC x   Sq Epi: x / Non Sq Epi: x / Bacteria: x            RADIOLOGY & ADDITIONAL STUDIES:

## 2023-10-27 NOTE — PROGRESS NOTE ADULT - SUBJECTIVE AND OBJECTIVE BOX
INTERVAL HPI/OVERNIGHT EVENTS: jose o/n    SUBJECTIVE: Patient seen and examined at bedside.   Per nursing report pt sitting on edge of bed this morning and eating breakfast. Pt awakened to voice - denies any pain, dyspnea, chest pain, LH/dizziness.     OBJECTIVE:    VITAL SIGNS:  ICU Vital Signs Last 24 Hrs  T(C): 37.1 (27 Oct 2023 08:56), Max: 37.1 (27 Oct 2023 08:56)  T(F): 98.7 (27 Oct 2023 08:56), Max: 98.7 (27 Oct 2023 08:56)  HR: 100 (27 Oct 2023 08:56) (80 - 100)  BP: 99/74 (27 Oct 2023 08:56) (80/61 - 99/74)  BP(mean): --  ABP: --  ABP(mean): --  RR: 18 (27 Oct 2023 08:56) (16 - 18)  SpO2: 99% (27 Oct 2023 08:56) (95% - 100%)    O2 Parameters below as of 27 Oct 2023 08:56  Patient On (Oxygen Delivery Method): nasal cannula  O2 Flow (L/min): 4            10-26 @ 07:01  -  10-27 @ 07:00  --------------------------------------------------------  IN: 600 mL / OUT: 2050 mL / NET: -1450 mL    10-27 @ 07:01  -  10-27 @ 12:01  --------------------------------------------------------  IN: 240 mL / OUT: 0 mL / NET: 240 mL      CAPILLARY BLOOD GLUCOSE          PHYSICAL EXAM:  GEN: Male in NAD on 4L NC  HEENT: NC/AT, MMM  CV: RRR, nml S1S2,  PULM: nml effort, CTAB  ABD: large, distended but soft on palpation. Umbilical hernia present and non-tender. Non tender, NABS  NEURO  A/O x self, hospital, month/date, moving all extremities Sensation intact      MEDICATIONS:  MEDICATIONS  (STANDING):  aspirin enteric coated 81 milliGRAM(s) Oral daily  atorvastatin 40 milliGRAM(s) Oral at bedtime  chlorhexidine 2% Cloths 1 Application(s) Topical <User Schedule>  folic acid 1 milliGRAM(s) Oral daily  heparin   Injectable 5000 Unit(s) SubCutaneous every 8 hours  midodrine 10 milliGRAM(s) Oral <User Schedule>  Nephro-chichi 1 Tablet(s) Oral every 24 hours  pantoprazole    Tablet 40 milliGRAM(s) Oral before breakfast  polyethylene glycol 3350 17 Gram(s) Oral daily  pregabalin 75 milliGRAM(s) Oral daily  senna 2 Tablet(s) Oral at bedtime    MEDICATIONS  (PRN):  ondansetron Injectable 4 milliGRAM(s) IV Push every 12 hours PRN Nausea and/or Vomiting      ALLERGIES:  Allergies    penicillins (Unknown)    Intolerances        LABS:                        10.1   7.18  )-----------( 122      ( 27 Oct 2023 05:30 )             32.9     10-27    127<L>  |  90<L>  |  14  ----------------------------<  160<H>  3.6   |  26  |  3.19<H>    Ca    7.9<L>      27 Oct 2023 05:30  Phos  3.4     10-27  Mg     1.9     10-27    TPro  6.4  /  Alb  3.0<L>  /  TBili  1.5<H>  /  DBili  x   /  AST  25  /  ALT  17  /  AlkPhos  219<H>  10-27    PT/INR - ( 27 Oct 2023 05:30 )   PT: 14.4 sec;   INR: 1.27          PTT - ( 27 Oct 2023 05:30 )  PTT:34.6 sec  Urinalysis Basic - ( 27 Oct 2023 05:30 )    Color: x / Appearance: x / SG: x / pH: x  Gluc: 160 mg/dL / Ketone: x  / Bili: x / Urobili: x   Blood: x / Protein: x / Nitrite: x   Leuk Esterase: x / RBC: x / WBC x   Sq Epi: x / Non Sq Epi: x / Bacteria: x        RADIOLOGY & ADDITIONAL TESTS: Reviewed.

## 2023-11-02 DIAGNOSIS — I25.10 ATHEROSCLEROTIC HEART DISEASE OF NATIVE CORONARY ARTERY WITHOUT ANGINA PECTORIS: ICD-10-CM

## 2023-11-02 DIAGNOSIS — I95.3 HYPOTENSION OF HEMODIALYSIS: ICD-10-CM

## 2023-11-02 DIAGNOSIS — R63.8 OTHER SYMPTOMS AND SIGNS CONCERNING FOOD AND FLUID INTAKE: ICD-10-CM

## 2023-11-02 DIAGNOSIS — Z79.82 LONG TERM (CURRENT) USE OF ASPIRIN: ICD-10-CM

## 2023-11-02 DIAGNOSIS — Z91.81 HISTORY OF FALLING: ICD-10-CM

## 2023-11-02 DIAGNOSIS — E52 NIACIN DEFICIENCY [PELLAGRA]: ICD-10-CM

## 2023-11-02 DIAGNOSIS — I45.10 UNSPECIFIED RIGHT BUNDLE-BRANCH BLOCK: ICD-10-CM

## 2023-11-02 DIAGNOSIS — K76.1 CHRONIC PASSIVE CONGESTION OF LIVER: ICD-10-CM

## 2023-11-02 DIAGNOSIS — Z79.899 OTHER LONG TERM (CURRENT) DRUG THERAPY: ICD-10-CM

## 2023-11-02 DIAGNOSIS — F10.10 ALCOHOL ABUSE, UNCOMPLICATED: ICD-10-CM

## 2023-11-02 DIAGNOSIS — I48.0 PAROXYSMAL ATRIAL FIBRILLATION: ICD-10-CM

## 2023-11-02 DIAGNOSIS — K70.31 ALCOHOLIC CIRRHOSIS OF LIVER WITH ASCITES: ICD-10-CM

## 2023-11-02 DIAGNOSIS — Z66 DO NOT RESUSCITATE: ICD-10-CM
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
## 2023-11-02 DIAGNOSIS — N18.6 END STAGE RENAL DISEASE: ICD-10-CM

## 2023-11-02 DIAGNOSIS — Z88.0 ALLERGY STATUS TO PENICILLIN: ICD-10-CM

## 2023-11-02 DIAGNOSIS — I50.23 ACUTE ON CHRONIC SYSTOLIC (CONGESTIVE) HEART FAILURE: ICD-10-CM

## 2023-11-02 DIAGNOSIS — R63.0 ANOREXIA: ICD-10-CM

## 2023-11-02 DIAGNOSIS — E87.70 FLUID OVERLOAD, UNSPECIFIED: ICD-10-CM

## 2023-11-02 DIAGNOSIS — Z20.822 CONTACT WITH AND (SUSPECTED) EXPOSURE TO COVID-19: ICD-10-CM

## 2023-11-02 DIAGNOSIS — Z51.5 ENCOUNTER FOR PALLIATIVE CARE: ICD-10-CM

## 2023-11-02 DIAGNOSIS — I27.20 PULMONARY HYPERTENSION, UNSPECIFIED: ICD-10-CM

## 2023-11-02 DIAGNOSIS — N02.B1 RECURRENT AND PERSISTENT IMMUNOGLOBULIN A NEPHROPATHY WITH GLOMERULAR LESION: ICD-10-CM

## 2023-11-02 DIAGNOSIS — I07.1 RHEUMATIC TRICUSPID INSUFFICIENCY: ICD-10-CM

## 2023-11-02 DIAGNOSIS — Z79.02 LONG TERM (CURRENT) USE OF ANTITHROMBOTICS/ANTIPLATELETS: ICD-10-CM

## 2023-11-02 DIAGNOSIS — I25.2 OLD MYOCARDIAL INFARCTION: ICD-10-CM

## 2023-11-02 DIAGNOSIS — I42.6 ALCOHOLIC CARDIOMYOPATHY: ICD-10-CM

## 2023-11-02 DIAGNOSIS — I35.1 NONRHEUMATIC AORTIC (VALVE) INSUFFICIENCY: ICD-10-CM

## 2023-11-02 DIAGNOSIS — I35.2 NONRHEUMATIC AORTIC (VALVE) STENOSIS WITH INSUFFICIENCY: ICD-10-CM

## 2023-11-02 DIAGNOSIS — Z95.5 PRESENCE OF CORONARY ANGIOPLASTY IMPLANT AND GRAFT: ICD-10-CM

## 2023-11-02 DIAGNOSIS — I13.2 HYPERTENSIVE HEART AND CHRONIC KIDNEY DISEASE WITH HEART FAILURE AND WITH STAGE 5 CHRONIC KIDNEY DISEASE, OR END STAGE RENAL DISEASE: ICD-10-CM

## 2023-11-02 DIAGNOSIS — E87.20 ACIDOSIS, UNSPECIFIED: ICD-10-CM

## 2023-11-02 DIAGNOSIS — I73.9 PERIPHERAL VASCULAR DISEASE, UNSPECIFIED: ICD-10-CM

## 2023-11-02 DIAGNOSIS — E53.8 DEFICIENCY OF OTHER SPECIFIED B GROUP VITAMINS: ICD-10-CM

## 2023-11-02 DIAGNOSIS — E87.1 HYPO-OSMOLALITY AND HYPONATREMIA: ICD-10-CM

## 2023-11-02 DIAGNOSIS — Z91.158 PATIENT'S NONCOMPLIANCE WITH RENAL DIALYSIS FOR OTHER REASON: ICD-10-CM

## 2023-11-02 DIAGNOSIS — K21.9 GASTRO-ESOPHAGEAL REFLUX DISEASE WITHOUT ESOPHAGITIS: ICD-10-CM

## 2023-11-02 DIAGNOSIS — J96.01 ACUTE RESPIRATORY FAILURE WITH HYPOXIA: ICD-10-CM

## 2023-11-02 DIAGNOSIS — Z99.2 DEPENDENCE ON RENAL DIALYSIS: ICD-10-CM

## 2023-11-02 DIAGNOSIS — E78.5 HYPERLIPIDEMIA, UNSPECIFIED: ICD-10-CM

## 2023-11-02 DIAGNOSIS — D50.9 IRON DEFICIENCY ANEMIA, UNSPECIFIED: ICD-10-CM

## 2023-11-02 DIAGNOSIS — R53.81 OTHER MALAISE: ICD-10-CM

## 2023-11-02 DIAGNOSIS — D63.1 ANEMIA IN CHRONIC KIDNEY DISEASE: ICD-10-CM

## 2023-11-02 DIAGNOSIS — D69.6 THROMBOCYTOPENIA, UNSPECIFIED: ICD-10-CM

## 2023-11-02 DIAGNOSIS — R57.0 CARDIOGENIC SHOCK: ICD-10-CM

## 2023-11-02 DIAGNOSIS — K42.9 UMBILICAL HERNIA WITHOUT OBSTRUCTION OR GANGRENE: ICD-10-CM

## 2023-11-02 DIAGNOSIS — Z86.73 PERSONAL HISTORY OF TRANSIENT ISCHEMIC ATTACK (TIA), AND CEREBRAL INFARCTION WITHOUT RESIDUAL DEFICITS: ICD-10-CM

## 2023-11-20 NOTE — PHYSICAL THERAPY INITIAL EVALUATION ADULT - ASSISTIVE DEVICE FOR TRANSFER: SIT/STAND, REHAB EVAL
[facility] - Periop Services  Brief Operative Note    Surgery Date: 11/20/2023     Surgeon(s) and Role:     * Juan David Bautista Jr., MD - Primary    Assisting Surgeon: None    Pre-op Diagnosis:  * Umbilical hernia    Post-op Diagnosis:  Post-Op Diagnosis Codes:     * Umbilical hernia without obstruction or gangrene [K42.9]    Procedure(s) (LRB):  REPAIR, HERNIA, UMBILICAL, AGE 5 YEARS OR OLDER (N/A)    Anesthesia: General    Operative Findings: N/a    Estimated Blood Loss: * No values recorded between 11/20/2023  8:55 AM and 11/20/2023  9:38 AM *         Specimens:   Specimen (24h ago, onward)      None              Discharge Note    OUTCOME: Patient tolerated treatment/procedure well without complication and is now ready for discharge.    DISPOSITION: Home or Self Care    FINAL DIAGNOSIS:  Umbilical hernia    FOLLOWUP: In clinic    DISCHARGE INSTRUCTIONS:    Discharge Procedure Orders   Diet Adult Regular     Lifting restrictions     No dressing needed     Activity as tolerated     RESIDENT ATTESTATION    I have seen and  evaluated the patient with the student doctor. Agree with assessment and plan with following changes:      Raul Whitlock MD  [employer] General Surgery PGY-1  9:39 AM       rolling walker

## 2023-12-13 PROCEDURE — 83036 HEMOGLOBIN GLYCOSYLATED A1C: CPT

## 2023-12-13 PROCEDURE — 84484 ASSAY OF TROPONIN QUANT: CPT

## 2023-12-13 PROCEDURE — 89051 BODY FLUID CELL COUNT: CPT

## 2023-12-13 PROCEDURE — 86704 HEP B CORE ANTIBODY TOTAL: CPT

## 2023-12-13 PROCEDURE — 86850 RBC ANTIBODY SCREEN: CPT

## 2023-12-13 PROCEDURE — 83735 ASSAY OF MAGNESIUM: CPT

## 2023-12-13 PROCEDURE — 83615 LACTATE (LD) (LDH) ENZYME: CPT

## 2023-12-13 PROCEDURE — 84157 ASSAY OF PROTEIN OTHER: CPT

## 2023-12-13 PROCEDURE — 93005 ELECTROCARDIOGRAM TRACING: CPT

## 2023-12-13 PROCEDURE — 85027 COMPLETE CBC AUTOMATED: CPT

## 2023-12-13 PROCEDURE — 86480 TB TEST CELL IMMUN MEASURE: CPT

## 2023-12-13 PROCEDURE — 80048 BASIC METABOLIC PNL TOTAL CA: CPT

## 2023-12-13 PROCEDURE — 76705 ECHO EXAM OF ABDOMEN: CPT

## 2023-12-13 PROCEDURE — 85610 PROTHROMBIN TIME: CPT

## 2023-12-13 PROCEDURE — 85384 FIBRINOGEN ACTIVITY: CPT

## 2023-12-13 PROCEDURE — 85730 THROMBOPLASTIN TIME PARTIAL: CPT

## 2023-12-13 PROCEDURE — 93306 TTE W/DOPPLER COMPLETE: CPT

## 2023-12-13 PROCEDURE — 87040 BLOOD CULTURE FOR BACTERIA: CPT

## 2023-12-13 PROCEDURE — 84466 ASSAY OF TRANSFERRIN: CPT

## 2023-12-13 PROCEDURE — 84439 ASSAY OF FREE THYROXINE: CPT

## 2023-12-13 PROCEDURE — 82553 CREATINE MB FRACTION: CPT

## 2023-12-13 PROCEDURE — 87340 HEPATITIS B SURFACE AG IA: CPT

## 2023-12-13 PROCEDURE — 80061 LIPID PANEL: CPT

## 2023-12-13 PROCEDURE — 71045 X-RAY EXAM CHEST 1 VIEW: CPT

## 2023-12-13 PROCEDURE — 87637 SARSCOV2&INF A&B&RSV AMP PRB: CPT

## 2023-12-13 PROCEDURE — 85025 COMPLETE CBC W/AUTO DIFF WBC: CPT

## 2023-12-13 PROCEDURE — 86803 HEPATITIS C AB TEST: CPT

## 2023-12-13 PROCEDURE — 36415 COLL VENOUS BLD VENIPUNCTURE: CPT

## 2023-12-13 PROCEDURE — 99285 EMERGENCY DEPT VISIT HI MDM: CPT

## 2023-12-13 PROCEDURE — 82945 GLUCOSE OTHER FLUID: CPT

## 2023-12-13 PROCEDURE — 86706 HEP B SURFACE ANTIBODY: CPT

## 2023-12-13 PROCEDURE — 80053 COMPREHEN METABOLIC PANEL: CPT

## 2023-12-13 PROCEDURE — 97110 THERAPEUTIC EXERCISES: CPT

## 2023-12-13 PROCEDURE — 84550 ASSAY OF BLOOD/URIC ACID: CPT

## 2023-12-13 PROCEDURE — 83050 HGB METHEMOGLOBIN QUAN: CPT

## 2023-12-13 PROCEDURE — 82962 GLUCOSE BLOOD TEST: CPT

## 2023-12-13 PROCEDURE — 82607 VITAMIN B-12: CPT

## 2023-12-13 PROCEDURE — 86901 BLOOD TYPING SEROLOGIC RH(D): CPT

## 2023-12-13 PROCEDURE — 82330 ASSAY OF CALCIUM: CPT

## 2023-12-13 PROCEDURE — 82746 ASSAY OF FOLIC ACID SERUM: CPT

## 2023-12-13 PROCEDURE — 84443 ASSAY THYROID STIM HORMONE: CPT

## 2023-12-13 PROCEDURE — 97116 GAIT TRAINING THERAPY: CPT

## 2023-12-13 PROCEDURE — 80074 ACUTE HEPATITIS PANEL: CPT

## 2023-12-13 PROCEDURE — 86705 HEP B CORE ANTIBODY IGM: CPT

## 2023-12-13 PROCEDURE — 82728 ASSAY OF FERRITIN: CPT

## 2023-12-13 PROCEDURE — 83605 ASSAY OF LACTIC ACID: CPT

## 2023-12-13 PROCEDURE — 82140 ASSAY OF AMMONIA: CPT

## 2023-12-13 PROCEDURE — 84100 ASSAY OF PHOSPHORUS: CPT

## 2023-12-13 PROCEDURE — 82042 OTHER SOURCE ALBUMIN QUAN EA: CPT

## 2023-12-13 PROCEDURE — 84132 ASSAY OF SERUM POTASSIUM: CPT

## 2023-12-13 PROCEDURE — 93321 DOPPLER ECHO F-UP/LMTD STD: CPT

## 2023-12-13 PROCEDURE — 97530 THERAPEUTIC ACTIVITIES: CPT

## 2023-12-13 PROCEDURE — 84436 ASSAY OF TOTAL THYROXINE: CPT

## 2023-12-13 PROCEDURE — 85379 FIBRIN DEGRADATION QUANT: CPT

## 2023-12-13 PROCEDURE — 83540 ASSAY OF IRON: CPT

## 2023-12-13 PROCEDURE — 83550 IRON BINDING TEST: CPT

## 2023-12-13 PROCEDURE — 90935 HEMODIALYSIS ONE EVALUATION: CPT

## 2023-12-13 PROCEDURE — 80307 DRUG TEST PRSMV CHEM ANLYZR: CPT

## 2023-12-13 PROCEDURE — P9047: CPT

## 2023-12-13 PROCEDURE — 82803 BLOOD GASES ANY COMBINATION: CPT

## 2023-12-13 PROCEDURE — 84295 ASSAY OF SERUM SODIUM: CPT

## 2023-12-13 PROCEDURE — 86709 HEPATITIS A IGM ANTIBODY: CPT

## 2023-12-13 PROCEDURE — 86900 BLOOD TYPING SEROLOGIC ABO: CPT

## 2023-12-13 PROCEDURE — 83880 ASSAY OF NATRIURETIC PEPTIDE: CPT

## 2023-12-13 PROCEDURE — 82550 ASSAY OF CK (CPK): CPT

## 2024-11-13 NOTE — PROGRESS NOTE ADULT - SUBJECTIVE AND OBJECTIVE BOX
Subjective:  No acute events overnight.  Patient is doing well today - tolerated HD yesterday without symptoms.  Denies HA, CP, SOB, abdominal pain, nausea, vomiting, fever, chills or diarrhea.     Objective:   Vital Signs:  T(C): 36.2 (18 Oct 2023 10:15), Max: 37 (17 Oct 2023 17:15)  T(F): 97.1 (18 Oct 2023 10:15), Max: 98.6 (17 Oct 2023 17:15)  HR: 89 (18 Oct 2023 10:15) (88 - 97)  BP: 106/70 (18 Oct 2023 10:15) (91/75 - 106/70)  BP(mean): 84 (18 Oct 2023 10:15) (69 - 84)  RR: 13 (18 Oct 2023 10:15) (13 - 22)  SpO2: 95% (18 Oct 2023 10:15) (91% - 99%)    Parameters below as of 18 Oct 2023 10:15  Patient On (Oxygen Delivery Method): nasal cannula  O2 Flow (L/min): 2    Physical Exam:   -Gen: NAD, resting in bed  -HEENT: EOMI, PERRL, no scleral icterus, no JVD, no bruits, right chest TDC  -CV: normal S1 and S2  -Lungs: CTABL,, normal respiratory effort on RA  -Ab: obese, distended, umbilical hernia is non-tender, normal BS  -Ext: +1 LE edema  -Neuro: A&O x 3, no focal deficits     Labs:                        10.1   6.98  )-----------( 116      ( 18 Oct 2023 05:30 )             31.9       10-18    123<L>  |  87<L>  |  14  ----------------------------<  119<H>  4.2   |  26  |  2.59<H>    Ca    9.1      18 Oct 2023 05:30  Phos  3.3     10-18  Mg     1.9     10-18    TPro  6.4  /  Alb  2.8<L>  /  TBili  2.0<H>  /  DBili  x   /  AST  25  /  ALT  23  /  AlkPhos  235<H>  10-18     Medications:  MEDICATIONS  (STANDING):  aspirin enteric coated 81 milliGRAM(s) Oral daily  atorvastatin 40 milliGRAM(s) Oral at bedtime  folic acid 1 milliGRAM(s) Oral daily  heparin   Injectable 5000 Unit(s) SubCutaneous every 8 hours  influenza  Vaccine (HIGH DOSE) 0.7 milliLiter(s) IntraMuscular once  midodrine 10 milliGRAM(s) Oral <User Schedule>  Nephro-chichi 1 Tablet(s) Oral every 24 hours  pantoprazole    Tablet 40 milliGRAM(s) Oral before breakfast  pregabalin 75 milliGRAM(s) Oral daily    MEDICATIONS  (PRN):  ondansetron Injectable 4 milliGRAM(s) IV Push every 12 hours PRN Nausea and/or Vomiting   daughter son at bedside
